# Patient Record
Sex: MALE | Race: WHITE | NOT HISPANIC OR LATINO | Employment: OTHER | URBAN - METROPOLITAN AREA
[De-identification: names, ages, dates, MRNs, and addresses within clinical notes are randomized per-mention and may not be internally consistent; named-entity substitution may affect disease eponyms.]

---

## 2017-01-11 ENCOUNTER — OFFICE VISIT (OUTPATIENT)
Dept: OPHTHALMOLOGY | Facility: CLINIC | Age: 72
End: 2017-01-11
Attending: OPHTHALMOLOGY
Payer: COMMERCIAL

## 2017-01-11 DIAGNOSIS — H52.4 PRESBYOPIA: ICD-10-CM

## 2017-01-11 DIAGNOSIS — H52.203 MYOPIC ASTIGMATISM OF BOTH EYES: ICD-10-CM

## 2017-01-11 DIAGNOSIS — H52.13 MYOPIC ASTIGMATISM OF BOTH EYES: ICD-10-CM

## 2017-01-11 DIAGNOSIS — H26.9 CATARACTS, BILATERAL: Primary | ICD-10-CM

## 2017-01-11 PROCEDURE — 99214 OFFICE O/P EST MOD 30 MIN: CPT | Mod: ZF

## 2017-01-11 ASSESSMENT — REFRACTION_MANIFEST
OD_AXIS: 140
OD_ADD: +2.50
OD_CYLINDER: +0.50
OS_AXIS: 130
OS_SPHERE: -4.00
OS_CYLINDER: +0.50
OD_SPHERE: -5.75
OS_ADD: +2.50

## 2017-01-11 ASSESSMENT — VISUAL ACUITY
OS_CC+: -1
OD_CC: 20/60
METHOD: SNELLEN - LINEAR
OD_CC+: -2
OD_PH_CC: 20/25-3
OS_PH_CC: 20/20-1
CORRECTION_TYPE: GLASSES
OS_CC: 20/50

## 2017-01-11 ASSESSMENT — REFRACTION_WEARINGRX
OS_SPHERE: -2.00
OS_CYLINDER: SPHERE
OD_SPHERE: -3.75
OD_CYLINDER: +0.50
OD_AXIS: 132

## 2017-01-11 ASSESSMENT — EXTERNAL EXAM - RIGHT EYE: OD_EXAM: NORMAL

## 2017-01-11 ASSESSMENT — CONF VISUAL FIELD
METHOD: COUNTING FINGERS
OS_NORMAL: 1
OD_NORMAL: 1

## 2017-01-11 ASSESSMENT — EXTERNAL EXAM - LEFT EYE: OS_EXAM: NORMAL

## 2017-01-11 ASSESSMENT — TONOMETRY
OD_IOP_MMHG: 8
IOP_METHOD: TONOPEN
OS_IOP_MMHG: 11

## 2017-01-11 ASSESSMENT — CUP TO DISC RATIO
OS_RATIO: 0.3
OD_RATIO: 0.3

## 2017-01-11 ASSESSMENT — SLIT LAMP EXAM - LIDS
COMMENTS: NORMAL
COMMENTS: NORMAL

## 2017-01-11 NOTE — MR AVS SNAPSHOT
After Visit Summary   2017    Reinaldo Gordillo    MRN: [id number]           Patient Information     Date Of Birth          1945        Visit Information        Provider Department      2017 1:15 PM [facility]        Today's Diagnoses     Cataracts, bilateral    -  1     Myopic astigmatism of both eyes         Presbyopia            Follow-ups after your visit        Follow-up notes from your care team     Return in about 1 year (around 2018).      Who to contact     Please call your clinic at [phone] to:    Ask questions about your health    Make or cancel appointments    Discuss your medicines    Learn about your test results    Speak to your doctor   If you have compliments or concerns about an experience at your clinic, or if you wish to file a complaint, please contact [facility] Patient Relations at [phone] or email us at [email or web address]         Additional Information About Your Visit        MyChart Information     [employer] is an electronic gateway that provides easy, online access to your medical records. With [employer], you can request a clinic appointment, read your test results, renew a prescription or communicate with your care team.     To sign up for [employer] visit the website at [email or web address]   You will be asked to enter the access code listed below, as well as some personal information. Please follow the directions to create your username and password.     Your access code is: [email or web address]  Expires: 2017  8:30 AM     Your access code will  in 90 days. If you need help or a new code, please contact your [facility] or call [phone] for assistance.        Care EveryWhere ID     This is your Care EveryWhere ID. This could be used by other organizations to access your [facility] medical records  [id number]         Blood Pressure from Last 3  Encounters:   No data found for BP    Weight from Last 3 Encounters:   No data found for Wt              Today, you had the following     No orders found for display       Primary Care Provider    None Specified       No primary provider on file.        Thank you!     Thank you for choosing [facility]  for your care. Our goal is always to provide you with excellent care. Hearing back from our patients is one way we can continue to improve our services. Please take a few minutes to complete the written survey that you may receive in the mail after your visit with us. Thank you!             Your Updated Medication List - Protect others around you: Learn how to safely use, store and throw away your medicines at www.disposemymeds.org.          This list is accurate as of: 1/11/17  2:13 PM.  Always use your most recent med list.                   Brand Name Dispense Instructions for use    ASPIRIN PO          CRESTOR PO          TOPROL XL PO

## 2017-01-11 NOTE — NURSING NOTE
Chief Complaints and History of Present Illnesses   Patient presents with     Blurred Vision Both Eyes     HPI    Additional Referring Providers:  Dr. Isabelle Larose MD   Affected eye(s):  Both   Symptoms:     Blurred vision   Difficulty with driving      Unknown duration    Frequency:  Constant, Daily       Do you have eye pain now?:  No      Comments:  Pt complains of blurry vision at distance, hard to focus while driving. Likes to take off glasses to read the fine print, sometimes. BE feeling good and comfortable. Notes that he recently just moved back to [facility] from [place]. Pt referred to Dr. Liz from Dr. 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

## 2017-01-11 NOTE — PROGRESS NOTES
HPI  Reinaldo Gordillo is a 71 year old male here for cataract evaluation. He has been noticing a slow decrease in his distance vision in both eyes over the last year or so. He cannot read road signs as well or follow the hockey puck as clearly. His current glasses don't make the vision as clear as they used to. They are ~ 5 years old. No pain, redness, discharge. No flashes/floaters.    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  Plan: Recommend updating glasses for now. If he continues to have issues with new glasses or notes continued changes, can consider CE/IOL    (H52.203) Myopic astigmatism of both eyes  (H52.4) Presbyopia  Comment: Good vision with refraction  Plan: Given updated glasses Rx.      -----------------------------------------------------------------------------------    Patient disposition:   Return in about 1 year (around 1/11/2018). or sooner as needed.    Teaching statement:  I have confirmed the content of the chief complaint, history of present illness, review of systems, and past medical/surgical history sections as documented by others and edited the information as needed.    I have interviewed and examined the patient and confirm the pertinent findings.  I agree with the findings and plan as documented.    Justa Liz MD  Comprehensive Ophthalmology & Ocular Pathology  Department of Ophthalmology and Visual Neurosciences  [email or web address]  Pager [phone]

## 2019-06-18 ENCOUNTER — TELEPHONE (OUTPATIENT)
Dept: DERMATOLOGY | Facility: CLINIC | Age: 74
End: 2019-06-18

## 2019-06-18 NOTE — TELEPHONE ENCOUNTER
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 He has had previous lesions that were cancerous. He would like to see Dr Tovar for initial appt as well as for procedure. Please call the pt to discuss. Thanks.    Action Taken: Message routed to:  [facility] ([facility] madhu jimenez

## 2019-06-19 NOTE — TELEPHONE ENCOUNTER
I called the patient and it sounds like he needs a skin cancer exam. I let the patient know that Dr. Tovar typically only does skin cancer surgery, and he was offered an appointment with another staff member. The patient was scheduled with Dr. Randall.   Janene Bianchi, STEWART

## 2019-06-26 ENCOUNTER — DOCUMENTATION ONLY (OUTPATIENT)
Dept: CARE COORDINATION | Facility: CLINIC | Age: 74
End: 2019-06-26

## 2019-07-10 ENCOUNTER — TELEPHONE (OUTPATIENT)
Dept: GASTROENTEROLOGY | Facility: CLINIC | Age: 74
End: 2019-07-10

## 2019-07-10 DIAGNOSIS — Z12.11 SPECIAL SCREENING FOR MALIGNANT NEOPLASMS, COLON: Primary | ICD-10-CM

## 2019-07-10 NOTE — TELEPHONE ENCOUNTER
"Patient Name: Reinaldo Godrillo   : 1945  MRN: [id number]       Patient scheduled for:  [x] ? EGD (see additional information below)                   [x] Colonoscopy  [] EUS  [] Flex Sig   [] Other:     Indication for procedure. [x] Screening   []     Sedation Type: [x] Conscious Sedation   [] MAC   [] None    Procedure Provider:  Chon      Referring Provider. (self referral)    Arrival time verified: 19    Facility location verified:   [][facility] - [address], 1st Floor, [address]    Pt meets medical necessity for outpatient procedure in hospital Endoscopy Unit:   [x] [facility], 5th floor     [][facility] - [address], 3rd Floor Surgery check-in      Prep Type:   [x]Golytely eRx: [id number] - [place], [place] - [address] [place] [address];  [] MoviPrep: , [] MiraLax: , [] Other:   []NPO /p [place], No solid food /p 2200 the night before    Anticoagulants or blood thinners: [x]None [] ASA 81mg  - may continue           [] Warfarin   [] Warfarin + Lovenox bridge [] Plavix [] Effient [] Eliquis         [] Xarelto  [] Brilinta [] NSAIDS  [] Other    LAST anticoagulant dose: Date/Time:   INR:     Electronic implanted devices: [x] No  [] IPG  []  ICD  []  LVAD  []     H&P / Pre op physical completed: [x] N/A, [] Complete, Date , [] Scheduled, Date , [] No,     Additional Information: No medical history in Epic, No \"Care Everywhere\" info in Epic    Pt stated during pre-assessment call that he was to have EGD & Colonoscopy.  Education for both provided.  He stated he talked with Dr. Givens about this and has instructions for both.  No orders or clinic notes found to support this.  Inbox msg to Dr. Givens reguesting guidance/order.      _______________________________________________      Instructions given: [x] Rec'd & Read   [] Reviewed         Pre procedure teaching completed: [x] Yes - Reviewed, [] No,     [x] No questions " regarding Sedation as ordered, []     Transportation from procedure & responsible adult to be with patient following procedure for a minimum of 6 hrs (Conscious Sedation) 24 hrs (MAC): [x] Yes Wife - confirmed will have post-procedure companionship as required, [] Pending , [] No     Zoila Hurst RN  [facility] Endoscopy

## 2019-07-12 DIAGNOSIS — R12 HEARTBURN: Primary | ICD-10-CM

## 2019-07-12 RX ORDER — BISACODYL 5 MG/1
10 TABLET, DELAYED RELEASE ORAL DAILY
Qty: 4 TABLET | Refills: 0 | Status: SHIPPED | OUTPATIENT
Start: 2019-07-12 | End: 2019-07-17

## 2019-07-17 ENCOUNTER — HOSPITAL ENCOUNTER (OUTPATIENT)
Facility: AMBULATORY SURGERY CENTER | Age: 74
End: 2019-07-17
Attending: INTERNAL MEDICINE
Payer: COMMERCIAL

## 2019-07-17 VITALS
SYSTOLIC BLOOD PRESSURE: 143 MMHG | DIASTOLIC BLOOD PRESSURE: 95 MMHG | OXYGEN SATURATION: 93 % | RESPIRATION RATE: 12 BRPM | HEART RATE: 56 BPM

## 2019-07-17 LAB
COLONOSCOPY: NORMAL
UPPER GI ENDOSCOPY: NORMAL

## 2019-07-17 RX ORDER — NALOXONE HYDROCHLORIDE 0.4 MG/ML
.1-.4 INJECTION, SOLUTION INTRAMUSCULAR; INTRAVENOUS; SUBCUTANEOUS
Status: DISCONTINUED | OUTPATIENT
Start: 2019-07-17 | End: 2019-07-18 | Stop reason: HOSPADM

## 2019-07-17 RX ORDER — ONDANSETRON 4 MG/1
4 TABLET, ORALLY DISINTEGRATING ORAL EVERY 6 HOURS PRN
Status: DISCONTINUED | OUTPATIENT
Start: 2019-07-17 | End: 2019-07-18 | Stop reason: HOSPADM

## 2019-07-17 RX ORDER — FENTANYL CITRATE 50 UG/ML
INJECTION, SOLUTION INTRAMUSCULAR; INTRAVENOUS PRN
Status: DISCONTINUED | OUTPATIENT
Start: 2019-07-17 | End: 2019-07-17 | Stop reason: HOSPADM

## 2019-07-17 RX ORDER — ONDANSETRON 2 MG/ML
4 INJECTION INTRAMUSCULAR; INTRAVENOUS
Status: DISCONTINUED | OUTPATIENT
Start: 2019-07-17 | End: 2019-07-17 | Stop reason: HOSPADM

## 2019-07-17 RX ORDER — ONDANSETRON 2 MG/ML
4 INJECTION INTRAMUSCULAR; INTRAVENOUS EVERY 6 HOURS PRN
Status: DISCONTINUED | OUTPATIENT
Start: 2019-07-17 | End: 2019-07-18 | Stop reason: HOSPADM

## 2019-07-17 RX ORDER — LIDOCAINE 40 MG/G
CREAM TOPICAL
Status: DISCONTINUED | OUTPATIENT
Start: 2019-07-17 | End: 2019-07-17 | Stop reason: HOSPADM

## 2019-07-17 RX ORDER — SIMETHICONE
LIQUID (ML) MISCELLANEOUS PRN
Status: DISCONTINUED | OUTPATIENT
Start: 2019-07-17 | End: 2019-07-17 | Stop reason: HOSPADM

## 2019-07-17 RX ORDER — FLUMAZENIL 0.1 MG/ML
0.2 INJECTION, SOLUTION INTRAVENOUS
Status: ACTIVE | OUTPATIENT
Start: 2019-07-17 | End: 2019-07-17

## 2019-07-17 NOTE — DISCHARGE INSTRUCTIONS
Discharge Instructions after Colonoscopy  or Sigmoidoscopy    Today you had a __x__ Colonoscopy ____ Sigmoidoscopy    Activity and Diet  You were given medicine for pain. You may be dizzy or sleepy.  For 24 hours:    Do not drive or use heavy equipment.    Do not make important decisions.    Do not drink any alcohol.  You may return to your normal diet and medicines.    Discomfort    Air was placed in your colon during the exam in order to see it. Walking helps to pass the air.    You may take Tylenol (acetaminophen) for pain unless your doctor has told you not to.  Do not take aspirin or ibuprofen (Advil, Motrin, or other anti-inflammatory  drugs) for _____ days.    Follow-up  ____ We took small tissue samples or polyps to study. Your doctor will call you with the results  within two weeks.    When to call:    Call right away if you have:    Unusual pain in belly or chest pain not relieved with passing air.    More than 1 to 2 Tablespoons of bleeding from your rectum.    Fever above 100.6  F (37.5  C).    If you have severe pain, bleeding, or shortness of breath, go to an emergency room.    If you have questions, call:  Monday to Friday, 7 a.m. to 4:30 p.m.  Endoscopy: [phone] (We may have to call you back)    After hours  Hospital: [phone] (Ask for the GI fellow on call)      Discharge Instructions after  Upper Endoscopy (EGD)    Activity and Diet  You were given medicine for pain. You may be dizzy or sleepy.  For 24 hours:    Do not drive or use heavy equipment.    Do not make important decisions.    Do not drink any alcohol.  ___ You may return to your regular diet.    Discomfort  You may have a sore throat for 2 to 3 days. It may help to:    Avoid hot liquids for 24 hours.    Use sore throat lozenges.    Gargle as needed with salt water up to 4 times a day. Mix 1 cup of warm water  with 1 teaspoon of salt. Do not swallow.  ___ Your esophagus was dilated (opened) or banded during the exam:    Drink  only cool liquids for the rest of the day. Eat a soft diet for the next few days.    You may have a sore chest for 2 to 3 days.    You may take Tylenol (acetaminophen) for pain unless your doctor has told you not to.    Do not take aspirin or ibuprofen (Advil, Motrin) or other NSAIDS  (anti-inflammatory drugs) for ___ days.    Follow-up  ___ We took small tissue samples for study. If you do not have a follow-up visit scheduled,  call your provider s office in 2 weeks for the results.    Other instructions________________________________________________________    When to call us:  Problems are rare. Call right away if you have:    Unusual throat pain or trouble swallowing    Unusual pain in belly or chest that is not relieved by belching or passing air    Black stools (tar-like looking bowel movement)    Temperature above 100.6  F. (37.5  C).    If you vomit blood or have severe pain, go to an emergency room.    If you have questions, call:  Monday to Friday, 7 a.m. to 4:30 p.m.: Endoscopy: [phone] (We may have to call you back)    After hours: Hospital: [phone] (Ask for the GI fellow on call)

## 2019-07-18 LAB — COPATH REPORT: NORMAL

## 2019-07-23 ENCOUNTER — OFFICE VISIT (OUTPATIENT)
Dept: DERMATOLOGY | Facility: CLINIC | Age: 74
End: 2019-07-23
Payer: COMMERCIAL

## 2019-07-23 DIAGNOSIS — L57.8 DIFFUSE PHOTODAMAGE OF SKIN: ICD-10-CM

## 2019-07-23 DIAGNOSIS — Z85.828 HISTORY OF SKIN CANCER: ICD-10-CM

## 2019-07-23 DIAGNOSIS — L57.0 ACTINIC KERATOSIS: ICD-10-CM

## 2019-07-23 DIAGNOSIS — D48.5 NEOPLASM OF UNCERTAIN BEHAVIOR OF SKIN: Primary | ICD-10-CM

## 2019-07-23 DIAGNOSIS — L30.9 ECZEMA, UNSPECIFIED TYPE: ICD-10-CM

## 2019-07-23 RX ORDER — FLUOROURACIL 50 MG/G
CREAM TOPICAL 2 TIMES DAILY
Qty: 40 G | Refills: 1 | Status: SHIPPED | OUTPATIENT
Start: 2019-07-23 | End: 2020-10-06

## 2019-07-23 RX ORDER — CLOBETASOL PROPIONATE 0.5 MG/G
OINTMENT TOPICAL 2 TIMES DAILY
Qty: 120 G | Refills: 3 | Status: ON HOLD | OUTPATIENT
Start: 2019-07-23 | End: 2023-04-30

## 2019-07-23 RX ORDER — CALCIPOTRIENE 50 UG/G
OINTMENT TOPICAL
Qty: 90 G | Refills: 0 | Status: SHIPPED | OUTPATIENT
Start: 2019-07-23 | End: 2020-10-06

## 2019-07-23 ASSESSMENT — PAIN SCALES - GENERAL
PAINLEVEL: NO PAIN (0)
PAINLEVEL: NO PAIN (0)

## 2019-07-23 NOTE — PATIENT INSTRUCTIONS
Wound Care After a Biopsy    What is a skin biopsy?  A skin biopsy allows the doctor to examine a very small piece of tissue under the microscope to determine the diagnosis and the best treatment for the skin condition. A local anesthetic (numbing medicine)  is injected with a very small needle into the skin area to be tested. A small piece of skin is taken from the area. Sometimes a suture (stitch) is used.     What are the risks of a skin biopsy?  I will experience scar, bleeding, swelling, pain, crusting and redness. I may experience incomplete removal or recurrence. Risks of this procedure are excessive bleeding, bruising, infection, nerve damage, numbness, thick (hypertrophic or keloidal) scar and non-diagnostic biopsy.    How should I care for my wound for the first 24 hours?    Keep the wound dry and covered for 24 hours    If it bleeds, hold direct pressure on the area for 15 minutes. If bleeding does not stop then go to the emergency room    Avoid strenuous exercise the first 1-2 days or as your doctor instructs you    How should I care for the wound after 24 hours?    After 24 hours, remove the bandage    You may bathe or shower as normal    If you had a scalp biopsy, you can shampoo as usual and can use shower water to clean the biopsy site daily    Clean the wound twice a day with gentle soap and water    Do not scrub, be gentle    Apply white petroleum/Vaseline after cleaning the wound with a cotton swab or a clean finger, and keep the site covered with a Bandaid /bandage. Bandages are not necessary with a scalp biopsy    If you are unable to cover the site with a Bandaid /bandage, re-apply ointment 2-3 times a day to keep the site moist. Moisture will help with healing    Avoid strenuous activity for first 1-2 days    Avoid lakes, rivers, pools, and oceans until the stitches are removed or the site is healed    How do I clean my wound?    Wash hands thoroughly with soap or use hand  before all  wound care    Clean the wound with gentle soap and water    Apply white petroleum/Vaseline  to wound after it is clean    Replace the Bandaid /bandage to keep the wound covered for the first few days or as instructed by your doctor    If you had a scalp biopsy, warm shower water to the area on a daily basis should suffice    What should I use to clean my wound?     Cotton-tipped applicators (Qtips )    White petroleum jelly (Vaseline ). Use a clean new container and use Q-tips to apply.    Bandaids   as needed    Gentle soap     How should I care for my wound long term?    Do not get your wound dirty    Keep up with wound care for one week or until the area is healed.    A small scab will form and fall off by itself when the area is completely healed. The area will be red and will become pink in color as it heals. Sun protection is very important for how your scar will turn out. Sunscreen with an SPF 30 or greater is recommended once the area is healed.    You should have some soreness but it should be mild and slowly go away over several days. Talk to your doctor about using tylenol for pain,    When should I call my doctor?  If you have increased:     Pain or swelling    Pus or drainage (clear or slightly yellow drainage is ok)    Temperature over 100F    Spreading redness or warmth around wound    When will I hear about my results?  The biopsy results can take 2-3 weeks to come back. The clinic will call you with the results, send you a [employer] message, or have you schedule a follow-up clinic or phone time to discuss the results. Contact our clinics if you do not hear from us in 3 weeks.     Who should I call with questions?    [facility]: [phone]     [facility]: [phone]    For urgent needs outside of business hours call the [facility] at [phone] and ask for the dermatology resident on call    Cryotherapy    What is it?    Use  of a very cold liquid, such as liquid nitrogen, to freeze and destroy abnormal skin cells that need to be removed    What should I expect?    Tenderness and redness    A small blister that might grow and fill with dark purple blood. There may be crusting.    More than one treatment may be needed if the lesions do not go away.    How do I care for the treated area?    Gently wash the area with your hands when bathing.    Use a thin layer of Vaseline to help with healing. You may use a Band-Aid.     The area should heal within 7-10 days and may leave behind a pink or lighter color.     Do not use an antibiotic or Neosporin ointment.     You may take acetaminophen (Tylenol) for pain.     Call your Doctor if you have:    Severe pain    Signs of infection (warmth, redness, cloudy yellow drainage, and or a bad smell)    Questions or concerns    Who should I call with questions?       [facility]: [phone]       [facility]: [phone]       For urgent needs outside of business hours call the [facility] at [phone]        and ask for the dermatology resident on call

## 2019-07-23 NOTE — Clinical Note
7/23/2019       RE: Reinaldo Gordillo  [address]  [address]  [place] [address]     Dear Colleague,    Thank you for referring your patient, Reinaldo Gordillo, to the [facility] DERMATOLOGY at [facility]. Please see a copy of my visit note below.    [facility] Dermatology Note      Dermatology Problem List:    # NUB, left malar cheek, 1 cm erythematous and hyperkeratotic plaque, dDx: NMSC versus HAK, s/p shave biopsy on 7/23/2019  # NUB, left lateral forehead, 9 mm hyperkeratotic papule, dDx: NMSC versus HAK, s/p shave biopsy on 7/23/2019  # NUB, left central chest, 1.1 cm hyperkeratotic erythematous plaque with superficial erosions, dDx: KA, s/p shave biopsy on 7/23/2019    0. Continue to monitor: L lateral shoulder 1 cm hyperkeratotic plaque with superficial erosion, suspected irritated keratosis  1. BCC- upper lip- s/p MMS in 2015- performed in [place]  2. Actinic keratoses  3. NUB on the L upper chest-ddx NMSC- s/p biopsy 7/23/19  4. NUB on the L malar cheek- ddx NMSC vs HAK- s/p biopsy 7/23/19  5. NUB on the L lateral forehead- ddx NMSC vs HAK- s/p biopsy 7/23/19  6. Actinic keratoses on the face and ears   -s/p cryo 7/23/19, plan for fluorouracil 5% cream and calcipotriene 0.005% ointment  7.  Eczematous Dermatitis of the lower legs   -clobetasol 0.05% ointment BID  8. Filiform wart on the L inner thigh- s/p cryo on 7/23/19   9. Seborrheic keratoses, cherry angiomata, solar lentigines    Encounter Date: Jul 23, 2019    CC:  Chief Complaint   Patient presents with     Skin Check     Personal hx of BCC. Ed has a few spots of concern today.     History of Present Illness:  Mr. Reinaldo Gordillo is a 74 year old male with a history of NMSC who presets for a skin check. The patient reports that he has lesions of concern on his bilateral cheeks, forehead, and chest which he would like evaluated. He denies any pain, bleeding, or tingling associated with the lesions. The  lesion on his chest is mildly tender and has been present for about 2 months. There is an area of rash on his left leg that the patient reports has been on going for a long time despite his use of prescribed topical steroids. He denies using compressions stockings and he denies any history of asthma. 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 He states that he does not have an extensive history of sun burns, but he notes that he used tanning beds as a teenager. The patient voices no other concerns. He denies constitutional symptoms or other skin problems.    Past Medical History:   There is no problem list on file for this patient.    Past Medical History:   Diagnosis Date     ASCVD (arteriosclerotic cardiovascular disease)      Coronary artery disease      Gastroesophageal reflux disease      Hypertension      Stented coronary artery      Past Surgical History:   Procedure Laterality Date     COLONOSCOPY N/A 7/17/2019    Procedure: COLONOSCOPY;  Surgeon: Roque Givens MD;  Location: [facility] OR     ESOPHAGOSCOPY, GASTROSCOPY, DUODENOSCOPY (EGD), COMBINED N/A 7/17/2019    Procedure: ESOPHAGOGASTRODUODENOSCOPY, WITH BIOPSY;  Surgeon: Roque Givens MD;  Location: [facility] OR     HC CORONARY STENT PERCUT, EA ADDTL VESSEL       HERNIA REPAIR  2000     MOHS MICROGRAPHIC PROCEDURE         Social History:  Patient reports that he has never smoked. He has never used smokeless tobacco. He reports that he drinks alcohol.    Family History:  Family History   Problem Relation Age of Onset     Glaucoma No family hx of      Macular Degeneration No family hx of      Retinal detachment No family hx of      Amblyopia No family hx of      Melanoma No family hx of      Skin Cancer No family hx of        Medications:  Current Outpatient Medications   Medication Sig Dispense Refill     ASPIRIN PO        Metoprolol Succinate (TOPROL XL PO)        omeprazole (PRILOSEC) 20 MG DR capsule  Take 20 mg by mouth daily         No Known Allergies    Review of Systems:  -Constitutional: Otherwise feeling well today, in usual state of health.  -HEENT: Patient denies nonhealing oral sores.  -Skin: As above in HPI. No additional skin concerns.    Physical exam:  Vitals: There were no vitals taken for this visit.  GEN: This is a well developed, well-nourished male in no acute distress, in a pleasant mood.    SKIN: Total skin excluding the undergarment areas was performed. The exam included the head/face, neck, both arms, chest, back, abdomen, both legs, digits and/or nails.   -There are reticular hypopigmented patches on the lower legs at areas of prior rash, per the patient  -Bilateral lateral calves: large erythematous scaly plaques with many superficial erosions and excoriations  -on the L chest, there is a 1.1 cm hyperkeratotic erythematous plaque with superficial erosions  -on the face and ear helices, there are scattered hyperkeratotic erythematous papules   -there are Medium brown macules on the back c/w solar lentigines  -trunk and extremities relatively spared of melanocytic nevi  -Scattered stuck on medium brown papules on the trunk and extremities  -Scattered cherry red vascular papules  -L lateral shoulder, there is a 1 cm hyperkeratotic plaque with superficial erosion  -L inner thigh: papulated flesh colored papule measuring 6mm   -on the L malar cheek: there is a 1 cm erythematous and hyperkeratotic plaque  -on the L lateral forehead, there is a 9 mm hyperkeratotic papule  -No other lesions of concern on areas examined.                 Impression/Plan:  1. Neoplasm of uncertain behavior on the left chest. The differential diagnosis includes NMSC.     Shave biopsy:  After discussion of benefits and risks including but not limited to bleeding/bruising, pain/swelling, infection, scar, incomplete removal, nerve damage/numbness, recurrence, and non-diagnostic biopsy, written consent, verbal consent and  photographs were obtained. Time-out was performed. The area was cleaned with isopropyl alcohol. 0.5ml of 1% lidocaine with 1:100,000 epinephrine was injected to obtain adequate anesthesia. A shave biopsy was performed. Hemostasis was achieved with aluminium chloride. Vaseline and a sterile dressing were applied. The patient tolerated the procedure and no complications were noted. The patient was provided with verbal and written post care instructions.    2. Neoplasm of uncertain behavior on the L malar cheek. The Ddx includes NMSC vs HAK    Shave biopsy:  After discussion of benefits and risks including but not limited to bleeding/bruising, pain/swelling, infection, scar, incomplete removal, nerve damage/numbness, recurrence, and non-diagnostic biopsy, written consent, verbal consent and photographs were obtained. Time-out was performed. The area was cleaned with isopropyl alcohol. 0.5ml of 1% lidocaine with 1:100,000 epinephrine was injected to obtain adequate anesthesia. A shave biopsy was performed. Hemostasis was achieved with aluminium chloride. Vaseline and a sterile dressing were applied. The patient tolerated the procedure and no complications were noted. The patient was provided with verbal and written post care instructions.     3. Neoplasm of uncertain behavior on the L lateral forehead. The Ddx includes NMSC vs HAK    Shave biopsy:  After discussion of benefits and risks including but not limited to bleeding/bruising, pain/swelling, infection, scar, incomplete removal, nerve damage/numbness, recurrence, and non-diagnostic biopsy, written consent, verbal consent and photographs were obtained. Time-out was performed. The area was cleaned with isopropyl alcohol. 0.5ml of 1% lidocaine with 1:100,000 epinephrine was injected to obtain adequate anesthesia. A shave biopsy was performed. Hemostasis was achieved with aluminium chloride. Vaseline and a sterile dressing were applied. The patient tolerated the  procedure and no complications were noted. The patient was provided with verbal and written post care instructions.     4. Many actinic keratoses on the face and ear helices    Cryotherapy procedure note: After verbal consent and discussion of risks and benefits including but no limited to dyspigmentation/scar, blister, and pain, 12 was(were) treated with 1-2mm freeze border for 2 cycles with liquid nitrogen. Post cryotherapy instructions were provided.     Discussed the risks and benefits of field treatment with topical fluorouracil and calcipotriene    The patient will start fluorouracil 0.5% cream and calcipotriene 0.005% ointment BID in equal parts after his biopsy sites are well-healed for 4-5 days. We spoke about expected irritation and redness.    5. Filiform wart on the L inner thigh    Cryotherapy procedure note: After verbal consent and discussion of risks and benefits including but no limited to dyspigmentation/scar, blister, and pain, 1 was(were) treated with 1-2mm freeze border for 2 cycles with liquid nitrogen. Post cryotherapy instructions were provided.     6. Eczematous dermatitis    Start clobetasol 0.05% ointment    7. Cherry angioma(s), Seborrheic keratosis, non irritated and Solar lentigines    Discussed the natural etiology and provided reassurances about the benign nature of the lesions.     No further intervention required. Patient to report changes.     8. History of nonmelanoma skin cancer (NMSC), last lesion identified and treated in 2015 of upper cutaneous lip  - no evidence of recurrent disease via inspection or palpation; all sites well-healed  - photoprotection discussed (SPF30+ sunscreen and proper use, UPF clothing, sun avoidance, tanning bed avoidance)    - continued skin surveillance recommended    9. Continue to monitor: L lateral shoulder 1 cm hyperkeratotic plaque with superficial erosion, suspected irritated keratosis    Follow-up in 3 months, earlier for new or changing  lesions, given we are attempting field therapy on the face and ears.    Dr. Randall staffed the patient    Staff Involved:  Scribe/Resident (Dr. Roman Hale)/Staff    Scribe Disclosure  I, Dominic Najjar, am serving as a scribe to document services personally performed by Dr. Yonathan Randall MD, based on data collection and the provider's statements to me.     Again, thank you for allowing me to participate in the care of your patient.      Sincerely,    Yonathan Randall MD

## 2019-07-23 NOTE — LETTER
Date:July 31, 2019      Patient was self referred, no letter generated. Do not send.        [facility] Information

## 2019-07-23 NOTE — PROGRESS NOTES
[facility] Dermatology Note      Dermatology Problem List:    # NUB, left malar cheek, 1 cm erythematous and hyperkeratotic plaque, dDx: NMSC versus HAK, s/p shave biopsy on 7/23/2019  # NUB, left lateral forehead, 9 mm hyperkeratotic papule, dDx: NMSC versus HAK, s/p shave biopsy on 7/23/2019  # NUB, left central chest, 1.1 cm hyperkeratotic erythematous plaque with superficial erosions, dDx: KA, s/p shave biopsy on 7/23/2019    0. Continue to monitor: L lateral shoulder 1 cm hyperkeratotic plaque with superficial erosion, suspected irritated keratosis  1. BCC- upper lip- s/p MMS in 2015- performed in CT  2. Actinic keratoses  3. NUB on the L upper chest-ddx NMSC- s/p biopsy 7/23/19  4. NUB on the L malar cheek- ddx NMSC vs HAK- s/p biopsy 7/23/19  5. NUB on the L lateral forehead- ddx NMSC vs HAK- s/p biopsy 7/23/19  6. Actinic keratoses on the face and ears   -s/p cryo 7/23/19, plan for fluorouracil 5% cream and calcipotriene 0.005% ointment  7.  Eczematous Dermatitis of the lower legs   -clobetasol 0.05% ointment BID  8. Filiform wart on the L inner thigh- s/p cryo on 7/23/19   9. Seborrheic keratoses, cherry angiomata, solar lentigines    Encounter Date: Jul 23, 2019    CC:  Chief Complaint   Patient presents with     Skin Check     Personal hx of BCC. Ed has a few spots of concern today.     History of Present Illness:  Mr. Reinaldo Gordillo is a 74 year old male with a history of NMSC who presets for a skin check. The patient reports that he has lesions of concern on his bilateral cheeks, forehead, and chest which he would like evaluated. He denies any pain, bleeding, or tingling associated with the lesions. The lesion on his chest is mildly tender and has been present for about 2 months. There is an area of rash on his left leg that the patient reports has been on going for a long time despite his use of prescribed topical steroids. He denies using compressions stockings and he denies any  history of asthma. 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 He states that he does not have an extensive history of sun burns, but he notes that he used tanning beds as a teenager. The patient voices no other concerns. He denies constitutional symptoms or other skin problems.    Past Medical History:   There is no problem list on file for this patient.    Past Medical History:   Diagnosis Date     ASCVD (arteriosclerotic cardiovascular disease)      Coronary artery disease      Gastroesophageal reflux disease      Hypertension      Stented coronary artery      Past Surgical History:   Procedure Laterality Date     COLONOSCOPY N/A 7/17/2019    Procedure: COLONOSCOPY;  Surgeon: Roque Givens MD;  Location: [facility] OR     ESOPHAGOSCOPY, GASTROSCOPY, DUODENOSCOPY (EGD), COMBINED N/A 7/17/2019    Procedure: ESOPHAGOGASTRODUODENOSCOPY, WITH BIOPSY;  Surgeon: Roque Givens MD;  Location: [facility] OR     HC CORONARY STENT PERCUT, EA ADDTL VESSEL       HERNIA REPAIR  2000     MOHS MICROGRAPHIC PROCEDURE         Social History:  Patient reports that he has never smoked. He has never used smokeless tobacco. He reports that he drinks alcohol.    Family History:  Family History   Problem Relation Age of Onset     Glaucoma No family hx of      Macular Degeneration No family hx of      Retinal detachment No family hx of      Amblyopia No family hx of      Melanoma No family hx of      Skin Cancer No family hx of        Medications:  Current Outpatient Medications   Medication Sig Dispense Refill     ASPIRIN PO        Metoprolol Succinate (TOPROL XL PO)        omeprazole (PRILOSEC) 20 MG DR capsule Take 20 mg by mouth daily         No Known Allergies    Review of Systems:  -Constitutional: Otherwise feeling well today, in usual state of health.  -HEENT: Patient denies nonhealing oral sores.  -Skin: As above in HPI. No additional skin concerns.    Physical exam:  Vitals: There  were no vitals taken for this visit.  GEN: This is a well developed, well-nourished male in no acute distress, in a pleasant mood.    SKIN: Total skin excluding the undergarment areas was performed. The exam included the head/face, neck, both arms, chest, back, abdomen, both legs, digits and/or nails.   -There are reticular hypopigmented patches on the lower legs at areas of prior rash, per the patient  -Bilateral lateral calves: large erythematous scaly plaques with many superficial erosions and excoriations  -on the L chest, there is a 1.1 cm hyperkeratotic erythematous plaque with superficial erosions  -on the face and ear helices, there are scattered hyperkeratotic erythematous papules   -there are Medium brown macules on the back c/w solar lentigines  -trunk and extremities relatively spared of melanocytic nevi  -Scattered stuck on medium brown papules on the trunk and extremities  -Scattered cherry red vascular papules  -L lateral shoulder, there is a 1 cm hyperkeratotic plaque with superficial erosion  -L inner thigh: papulated flesh colored papule measuring 6mm   -on the L malar cheek: there is a 1 cm erythematous and hyperkeratotic plaque  -on the L lateral forehead, there is a 9 mm hyperkeratotic papule  -No other lesions of concern on areas examined.                 Impression/Plan:  1. Neoplasm of uncertain behavior on the left chest. The differential diagnosis includes NMSC.     Shave biopsy:  After discussion of benefits and risks including but not limited to bleeding/bruising, pain/swelling, infection, scar, incomplete removal, nerve damage/numbness, recurrence, and non-diagnostic biopsy, written consent, verbal consent and photographs were obtained. Time-out was performed. The area was cleaned with isopropyl alcohol. 0.5ml of 1% lidocaine with 1:100,000 epinephrine was injected to obtain adequate anesthesia. A shave biopsy was performed. Hemostasis was achieved with aluminium chloride. Vaseline and a  sterile dressing were applied. The patient tolerated the procedure and no complications were noted. The patient was provided with verbal and written post care instructions.    2. Neoplasm of uncertain behavior on the L malar cheek. The Ddx includes NMSC vs HAK    Shave biopsy:  After discussion of benefits and risks including but not limited to bleeding/bruising, pain/swelling, infection, scar, incomplete removal, nerve damage/numbness, recurrence, and non-diagnostic biopsy, written consent, verbal consent and photographs were obtained. Time-out was performed. The area was cleaned with isopropyl alcohol. 0.5ml of 1% lidocaine with 1:100,000 epinephrine was injected to obtain adequate anesthesia. A shave biopsy was performed. Hemostasis was achieved with aluminium chloride. Vaseline and a sterile dressing were applied. The patient tolerated the procedure and no complications were noted. The patient was provided with verbal and written post care instructions.     3. Neoplasm of uncertain behavior on the L lateral forehead. The Ddx includes NMSC vs HAK    Shave biopsy:  After discussion of benefits and risks including but not limited to bleeding/bruising, pain/swelling, infection, scar, incomplete removal, nerve damage/numbness, recurrence, and non-diagnostic biopsy, written consent, verbal consent and photographs were obtained. Time-out was performed. The area was cleaned with isopropyl alcohol. 0.5ml of 1% lidocaine with 1:100,000 epinephrine was injected to obtain adequate anesthesia. A shave biopsy was performed. Hemostasis was achieved with aluminium chloride. Vaseline and a sterile dressing were applied. The patient tolerated the procedure and no complications were noted. The patient was provided with verbal and written post care instructions.     4. Many actinic keratoses on the face and ear helices    Cryotherapy procedure note: After verbal consent and discussion of risks and benefits including but no limited  to dyspigmentation/scar, blister, and pain, 12 was(were) treated with 1-2mm freeze border for 2 cycles with liquid nitrogen. Post cryotherapy instructions were provided.     Discussed the risks and benefits of field treatment with topical fluorouracil and calcipotriene    The patient will start fluorouracil 0.5% cream and calcipotriene 0.005% ointment BID in equal parts after his biopsy sites are well-healed for 4-5 days. We spoke about expected irritation and redness.    5. Filiform wart on the L inner thigh    Cryotherapy procedure note: After verbal consent and discussion of risks and benefits including but no limited to dyspigmentation/scar, blister, and pain, 1 was(were) treated with 1-2mm freeze border for 2 cycles with liquid nitrogen. Post cryotherapy instructions were provided.     6. Eczematous dermatitis    Start clobetasol 0.05% ointment    7. Cherry angioma(s), Seborrheic keratosis, non irritated and Solar lentigines    Discussed the natural etiology and provided reassurances about the benign nature of the lesions.     No further intervention required. Patient to report changes.     8. History of nonmelanoma skin cancer (NMSC), last lesion identified and treated in 2015 of upper cutaneous lip  - no evidence of recurrent disease via inspection or palpation; all sites well-healed  - photoprotection discussed (SPF30+ sunscreen and proper use, UPF clothing, sun avoidance, tanning bed avoidance)    - continued skin surveillance recommended    9. Continue to monitor: L lateral shoulder 1 cm hyperkeratotic plaque with superficial erosion, suspected irritated keratosis    Follow-up in 3 months, earlier for new or changing lesions, given we are attempting field therapy on the face and ears.    Dr. Randall staffed the patient    Staff Involved:  Scribe/Resident (Dr. Roman Hale)/Staff    Scribe Disclosure  I, Dominic Najjar, am serving as a scribe to document services personally performed by Dr. Yonathan Randall MD,  based on data collection and the provider's statements to me.

## 2019-07-23 NOTE — NURSING NOTE
Lidocaine-epinephrine 1-1:166110 % injection   3 mL once for one use, starting 7/23/2019 ending 7/23/2019,  2mL disp, R-0, injection  Injected by Dr. Hale

## 2019-07-23 NOTE — NURSING NOTE
Dermatology Rooming Note    Reinaldo Gordillo's goals for this visit include:   Chief Complaint   Patient presents with     Skin Check     Personal hx of BCC. Ed has a few spots of concern today.     Kimber Coleman, CMA

## 2019-07-24 ENCOUNTER — OFFICE VISIT (OUTPATIENT)
Dept: OPHTHALMOLOGY | Facility: CLINIC | Age: 74
End: 2019-07-24
Attending: OPHTHALMOLOGY
Payer: COMMERCIAL

## 2019-07-24 DIAGNOSIS — H52.13 MYOPIC ASTIGMATISM OF BOTH EYES: ICD-10-CM

## 2019-07-24 DIAGNOSIS — H52.203 MYOPIC ASTIGMATISM OF BOTH EYES: ICD-10-CM

## 2019-07-24 DIAGNOSIS — H52.4 PRESBYOPIA: ICD-10-CM

## 2019-07-24 DIAGNOSIS — H25.13 NUCLEAR SCLEROTIC CATARACT OF BOTH EYES: Primary | ICD-10-CM

## 2019-07-24 PROCEDURE — G0463 HOSPITAL OUTPT CLINIC VISIT: HCPCS | Mod: ZF

## 2019-07-24 PROCEDURE — 92015 DETERMINE REFRACTIVE STATE: CPT | Mod: ZF

## 2019-07-24 ASSESSMENT — REFRACTION_WEARINGRX
OD_AXIS: 140
OS_SPHERE: -3.75
OD_SPHERE: -5.75
OD_CYLINDER: +0.75
OS_CYLINDER: SPHERE

## 2019-07-24 ASSESSMENT — SLIT LAMP EXAM - LIDS
COMMENTS: NORMAL
COMMENTS: NORMAL

## 2019-07-24 ASSESSMENT — REFRACTION_MANIFEST
OS_CYLINDER: +0.50
OS_SPHERE: -4.75
OD_AXIS: 140
OD_CYLINDER: +0.75
OD_SPHERE: -5.25
OS_AXIS: 130
OD_ADD: +2.50
OS_ADD: +2.50

## 2019-07-24 ASSESSMENT — VISUAL ACUITY
OD_CC: 20/30
CORRECTION_TYPE: GLASSES
OS_CC+: -2
METHOD: SNELLEN - LINEAR
OS_CC: 20/30

## 2019-07-24 ASSESSMENT — CUP TO DISC RATIO
OS_RATIO: 0.3
OD_RATIO: 0.3

## 2019-07-24 ASSESSMENT — CONF VISUAL FIELD
OS_NORMAL: 1
METHOD: COUNTING FINGERS
OD_NORMAL: 1

## 2019-07-24 ASSESSMENT — TONOMETRY
IOP_METHOD: TONOPEN
OS_IOP_MMHG: 16
OD_IOP_MMHG: 16

## 2019-07-24 ASSESSMENT — EXTERNAL EXAM - RIGHT EYE: OD_EXAM: NORMAL

## 2019-07-24 ASSESSMENT — EXTERNAL EXAM - LEFT EYE: OS_EXAM: NORMAL

## 2019-07-24 NOTE — NURSING NOTE
Chief Complaints and History of Present Illnesses   Patient presents with     Yearly Exam     Chief Complaint(s) and History of Present Illness(es)     Yearly Exam     Laterality: both eyes    Onset: gradual    Onset: years ago    Quality: Decreased at dist    Frequency: constantly    Associated symptoms: Negative for haloes, glare, floaters, flashes, dryness, redness and tearing    Pain scale: 0/10              Comments     Miriam Singer COT 9:59 AM July 24, 2019

## 2019-07-24 NOTE — PROGRESS NOTES
HPI  Reinaldo Gordillo is a 71 year old male here for cataract evaluation.   He has noticed a mild decline in distance vision in both eyes over the last year with more trouble reading signs. Having some trouble reading on the computer.  No pain, redness, discharge. No flashes/floaters.    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 If he continues to have issues with new glasses or notes continued changes, can consider CE/IOL    (H52.203,  H52.13) Myopic astigmatism of both eyes  (H52.4) Presbyopia  Comment: BCVA 20/30 and 20/20 with refraction  Plan: Given updated glasses Rx.     -----------------------------------------------------------------------------------    Patient disposition:   Return in about 1 year (around 7/24/2020). or sooner as needed.    Ruben Herzog MD  Ophthalmology PGY-2  [facility]    Teaching statement:  Complete documentation of historical and exam elements from today's encounter can be found in the full encounter summary report (not reduplicated in this progress note). I personally obtained the chief complaint(s) and history of present illness.  I confirmed and edited as necessary the review of systems, past medical/surgical history, family history, social history, and examination findings as documented by others; and I examined the patient myself. I personally reviewed the relevant tests, images, and reports as documented above.     I formulated and edited as necessary the assessment and plan and discussed the findings and management plan with the patient and family.  Justa Liz MD  Comprehensive Ophthalmology & Ocular Pathology  Department of Ophthalmology and Visual  [email or web address]  Pager [phone]

## 2019-07-26 LAB — COPATH REPORT: NORMAL

## 2019-08-06 ENCOUNTER — OFFICE VISIT (OUTPATIENT)
Dept: DERMATOLOGY | Facility: CLINIC | Age: 74
End: 2019-08-06
Payer: COMMERCIAL

## 2019-08-06 VITALS — SYSTOLIC BLOOD PRESSURE: 138 MMHG | OXYGEN SATURATION: 96 % | HEART RATE: 85 BPM | DIASTOLIC BLOOD PRESSURE: 85 MMHG

## 2019-08-06 DIAGNOSIS — C44.519 BASAL CELL CARCINOMA OF ANTERIOR CHEST: Primary | ICD-10-CM

## 2019-08-06 RX ORDER — LIDOCAINE HYDROCHLORIDE AND EPINEPHRINE 10; 10 MG/ML; UG/ML
3 INJECTION, SOLUTION INFILTRATION; PERINEURAL ONCE
Status: DISCONTINUED | OUTPATIENT
Start: 2019-08-06 | End: 2023-04-30

## 2019-08-06 NOTE — PATIENT INSTRUCTIONS

## 2019-08-06 NOTE — NURSING NOTE
Chief Complaint   Patient presents with     Derm Problem     Ed is here today for an Excision to his upper left chest.      Karis Brantley LPN

## 2019-08-06 NOTE — NURSING NOTE
Lidocaine-epinephrine 1-1:150575 % injection   8 mL once for one use, starting 8/6/2019 ending 8/6/2019,  2mL disp, R-0, injection  Injected by Karis Brantley LPN      Pressure bandage applied. Patient sent home with wound care supplies and post procedure instructions.     Karis Brantley LPN

## 2019-08-06 NOTE — LETTER
8/6/2019       RE: Reinaldo Gordillo  [address]  [address]  [place] [address]     Dear Colleague,    Thank you for referring your patient, Reinaldo Gordillo, to the [facility] DERMATOLOGY at [facility]. Please see a copy of my visit note below.    DERMATOLOGIC SURGERY REPORT    NAME OF PROCEDURE:  EXCISION AND INTERMEDIATE CLOSURE    Surgeon:  Yonathan Randall    PREOPERATIVE DIAGNOSIS: BCC anterior chest  POSTOPERATIVE DIAGNOSIS: Same  FINAL EXCISION SIZE: 12mm lesion with 3mm margins  TOTAL EXCISED DIAMETER: 18mm  FINAL REPAIR LENGTH:  36mm    INDICATIONS:  This patient presented with a 12mm x 12mm scar from prior biopsy of BCC on the anterior chest. Excision was indicated. We discussed the principles of treatment and most likely complications including bleeding, infection, wound dehiscence, pain, nerve damage, and scarring. Informed consent was obtained and the patient underwent the procedure as follows.    PROCEDURE:  The patient was taken to the operative suite. The treatment area was anesthetized with 1% lidocaine with [id number] epinephrine buffered with bicarbonate. The area was washed with Hibiclens, rinsed with saline and draped with sterile towels. The lesion was delineated and excised down to subcutaneous fat. Hemostasis was obtained by electrocoagulation. With a margin of 3mm, the final excision size was 18mm x 18mm.      REPAIR:  In order to repair this defect while maintaining the normal anatomic relations and function, we elected to utilize an intermediate linear closure. Closure was oriented so that the wound was in the patient's natural skin tension lines. Deeper layers of the subcutaneous tissue were undermined first. Deep dermal and subcutaneous layer closure was performed using 3-0 Vicryl deep, intradermal and subcutaneous sutures. Two redundant columns were removed by triangulation. Final cutaneous approximation was achieved with 3-0 Prolene simple running  sutures.     The final wound length was 36mm.  A total of 6mL of anesthesia was administered for all surgical sites. Estimated blood loss was less than 10mL for all surgical sites. A sterile pressure dressing was applied and wound care instructions, with a written handout, were given. The [facility] was [facility] alert and ambulatory.    Yonathan Randall MD  Dermatology/Dermatopathology Staff Physician  , Department of Dermatology

## 2019-08-06 NOTE — PROGRESS NOTES
DERMATOLOGIC SURGERY REPORT    NAME OF PROCEDURE:  EXCISION AND INTERMEDIATE CLOSURE    Surgeon:  Yonathan Randall    PREOPERATIVE DIAGNOSIS: BCC anterior chest  POSTOPERATIVE DIAGNOSIS: Same  FINAL EXCISION SIZE: 12mm lesion with 3mm margins  TOTAL EXCISED DIAMETER: 18mm  FINAL REPAIR LENGTH:  36mm    INDICATIONS:  This patient presented with a 12mm x 12mm scar from prior biopsy of BCC on the anterior chest. Excision was indicated. We discussed the principles of treatment and most likely complications including bleeding, infection, wound dehiscence, pain, nerve damage, and scarring. Informed consent was obtained and the patient underwent the procedure as follows.    PROCEDURE:  The patient was taken to the operative suite. The treatment area was anesthetized with 1% lidocaine with [id number] epinephrine buffered with bicarbonate. The area was washed with Hibiclens, rinsed with saline and draped with sterile towels. The lesion was delineated and excised down to subcutaneous fat. Hemostasis was obtained by electrocoagulation. With a margin of 3mm, the final excision size was 18mm x 18mm.      REPAIR:  In order to repair this defect while maintaining the normal anatomic relations and function, we elected to utilize an intermediate linear closure. Closure was oriented so that the wound was in the patient's natural skin tension lines. Deeper layers of the subcutaneous tissue were undermined first. Deep dermal and subcutaneous layer closure was performed using 3-0 Vicryl deep, intradermal and subcutaneous sutures. Two redundant columns were removed by triangulation. Final cutaneous approximation was achieved with 3-0 Prolene simple running sutures.     The final wound length was 36mm.  A total of 6mL of anesthesia was administered for all surgical sites. Estimated blood loss was less than 10mL for all surgical sites. A sterile pressure dressing was applied and wound care instructions, with a written handout, were given. The  patient was [facility] alert and ambulatory.    Yonathan Randall MD  Dermatology/Dermatopathology Staff Physician  , Department of Dermatology

## 2019-08-08 LAB — COPATH REPORT: NORMAL

## 2019-10-04 ENCOUNTER — HEALTH MAINTENANCE LETTER (OUTPATIENT)
Age: 74
End: 2019-10-04

## 2020-02-08 ENCOUNTER — HEALTH MAINTENANCE LETTER (OUTPATIENT)
Age: 75
End: 2020-02-08

## 2020-02-18 ENCOUNTER — OFFICE VISIT (OUTPATIENT)
Dept: DERMATOLOGY | Facility: CLINIC | Age: 75
End: 2020-02-18

## 2020-02-18 DIAGNOSIS — Z85.828 HISTORY OF NONMELANOMA SKIN CANCER: ICD-10-CM

## 2020-02-18 DIAGNOSIS — Z12.83 SCREENING EXAM FOR SKIN CANCER: ICD-10-CM

## 2020-02-18 DIAGNOSIS — L82.1 SK (SEBORRHEIC KERATOSIS): Primary | ICD-10-CM

## 2020-02-18 DIAGNOSIS — L57.0 AK (ACTINIC KERATOSIS): ICD-10-CM

## 2020-02-18 DIAGNOSIS — L85.3 XEROSIS CUTIS: ICD-10-CM

## 2020-02-18 RX ORDER — LISINOPRIL 5 MG/1
20 TABLET ORAL EVERY MORNING
Status: ON HOLD | COMMUNITY
Start: 2019-12-12 | End: 2023-04-30

## 2020-02-18 ASSESSMENT — PAIN SCALES - GENERAL: PAINLEVEL: NO PAIN (0)

## 2020-02-18 NOTE — PROGRESS NOTES
[facility] Dermatology Note      Dermatology Problem List:     0. Continue to monitor: L lateral shoulder 1 cm hyperkeratotic plaque with superficial erosion, suspected irritated keratosis  1. BCC- upper lip- s/p MMS in 2015- performed in [place]  2. Actinic keratoses  3. HAK left malar cheek, 1 cm erythematous and hyperkeratotic plaque, dDx: NMSC versus HAK, s/p shave biopsy on 7/23/2019  4. HAK, left lateral forehead, s/p shave biopsy on 7/23/2019  5. BCC left central chest, shave biopsy on 7/23/2019, excision on 8/6/19  6. Actinic keratoses on the face and ears              -s/p cryo 7/23/19, plan for fluorouracil 5% cream and calcipotriene 0.005% ointment  7.  Eczematous Dermatitis of the lower legs              -clobetasol 0.05% ointment BID  8. Filiform wart on the L inner thigh- s/p cryo on 7/23/19   9. Seborrheic keratoses, cherry angiomata, solar lentigines    Encounter Date: Feb 18, 2020    CC:  Chief Complaint   Patient presents with     Skin Check     Ed is here today for a skin check- Ed notes some areas of concern.          History of Present Illness:  Mr. Reinaldo Gordillo is a 74 year old male who presents for a skin check. The patient was last seen 8/6/2019 when he underwent excision of a BCC on his anterior chest. Today, the patient reports that he has been doing well since his last visit, he tolerated the course of fluorouracil well on his face and ears. He voices concern about two rough scaly lesions on his nose and on his chest which he would like evaluated. He also notes that he has some persistent areas of chronic irritation and dryness on his extremities. The patient voices no other concerns.    Past Medical History:   There is no problem list on file for this patient.    Past Medical History:   Diagnosis Date     ASCVD (arteriosclerotic cardiovascular disease)      Coronary artery disease      Gastroesophageal reflux disease      Hypertension      Stented coronary artery       Past Surgical History:   Procedure Laterality Date     COLONOSCOPY N/A 7/17/2019    Procedure: COLONOSCOPY;  Surgeon: Roque Givens MD;  Location: [facility] OR     ESOPHAGOSCOPY, GASTROSCOPY, DUODENOSCOPY (EGD), COMBINED N/A 7/17/2019    Procedure: ESOPHAGOGASTRODUODENOSCOPY, WITH BIOPSY;  Surgeon: Roque Givens MD;  Location: [facility] OR     HC CORONARY STENT PERCUT, EA ADDTL VESSEL       HERNIA REPAIR  2000     MOHS MICROGRAPHIC PROCEDURE         Social History:  Patient reports that he has never smoked. He has never used smokeless tobacco. He reports current alcohol use.    Family History:  Family History   Problem Relation Age of Onset     Glaucoma No family hx of      Macular Degeneration No family hx of      Retinal detachment No family hx of      Amblyopia No family hx of      Melanoma No family hx of      Skin Cancer No family hx of        Medications:  Current Outpatient Medications   Medication Sig Dispense Refill     ASPIRIN PO        calcipotriene (DOVONOX) 0.005 % external ointment Mix in equal parts with 5-fluorouracil cream in the palm of the hand and apply a thin layer to the face and ears for 4-5 days 90 g 0     clobetasol (TEMOVATE) 0.05 % external ointment Apply topically 2 times daily To the rash on the legs as needed 120 g 3     lisinopril (ZESTRIL) 5 MG tablet        Metoprolol Succinate (TOPROL XL PO)        omeprazole (PRILOSEC) 20 MG DR capsule Take 20 mg by mouth daily       fluorouracil (EFUDEX) 5 % external cream Apply topically 2 times daily And mix in palm of the hand with calcipotriene and apply a thin layer to the face and ears for 4-5 days (Patient not taking: Reported on 2/18/2020) 40 g 1       No Known Allergies    Review of Systems:  -Constitutional: Otherwise feeling well today, in usual state of health.  -HEENT: Patient denies nonhealing oral sores.  -Skin: As above in HPI. No additional skin concerns.    Physical exam:  Vitals: There were no vitals taken for this  visit.  GEN: This is a well developed, well-nourished male in no acute distress, in a pleasant mood.    SKIN: Full skin, which includes the head/face, both arms, chest, back, abdomen,both legs, genitalia and/or groin buttocks, digits and/or nails, was examined.  -Munguia skin type: II  -- There are waxy stuck on tan to brown papules on the trunk and extremities.   -well-healed surgical scar at site of prior BCC on the anterior chest  -scattered patches of xerosis on the trunk and extremities   -There are erythematous macules with overyling adherent scale on the cheeks and nose.   -there is a 1.5 mm blue macule with a central whitish area on the R frontal scalp   -No other lesions of concern on areas examined.     Impression/Plan:  1. History of nonmelanoma skin cancer (NMSC), last lesion identified and treated in 2019  - no evidence of recurrent disease via inspection or palpation; all sites well-healed  - photoprotection discussed (SPF30+ sunscreen and proper use, UPF clothing, sun avoidance, tanning bed avoidance)  - continued skin surveillance recommended    2. Actinic keratosis  - Cryotherapy procedure note: After verbal consent and discussion of risks and benefits including but no limited to dyspigmentation/scar, blister, and pain, 5 was(were) treated with 1-2mm freeze border for 2 cycles with liquid nitrogen. Post cryotherapy instructions were provided.    3. Xerosis   - recommended gentle skin care procedures and the use of OTC topical moisturizers    4. Blue nevus on the R frontal scalp  -will monitor clinically    5. Seborrheic keratoses  - Discussed the natural etiology and provided reassurances about the benign nature of the lesions.     Follow-up in 6 months, earlier for new or changing lesions.     Staff Involved:  Scribe/Staff/Resident(Karan)    Scribe Disclosure  I, Dominic Najjar, am serving as a scribe to document services personally performed by Dr. Yonathan Randall MD, based on data collection  and the provider's statements to me.     Staff Physician Comments:   I saw and evaluated the patient with the resident and I agree with the assessment and plan.  I was present for the entire minor procedure and examination. I have made edits if needed.    Yonathan Randall MD  Staff Dermatologist and Dermatopathologist  , Department of Dermatology      Staff attestation:  The documentation recorded by the scribe accurately reflects the services I personally performed and the decisions I personally made. I have made edits where needed.    Yonathan Randall MD  Staff Dermatologist and Dermatopathologist  , Department of Dermatology

## 2020-02-18 NOTE — LETTER
2/18/2020       RE: Reinaldo Gordillo  [address]  [address]  [place] [address]     Dear Colleague,    Thank you for referring your patient, Reinaldo Gordillo, to the [facility] DERMATOLOGY at [facility]. Please see a copy of my visit note below.    [facility] Dermatology Note      Dermatology Problem List:     0. Continue to monitor: L lateral shoulder 1 cm hyperkeratotic plaque with superficial erosion, suspected irritated keratosis  1. BCC- upper lip- s/p MMS in 2015- performed in [place]  2. Actinic keratoses  3. HAK left malar cheek, 1 cm erythematous and hyperkeratotic plaque, dDx: NMSC versus HAK, s/p shave biopsy on 7/23/2019  4. HAK, left lateral forehead, s/p shave biopsy on 7/23/2019  5. BCC left central chest, shave biopsy on 7/23/2019, excision on 8/6/19  6. Actinic keratoses on the face and ears              -s/p cryo 7/23/19, plan for fluorouracil 5% cream and calcipotriene 0.005% ointment  7.  Eczematous Dermatitis of the lower legs              -clobetasol 0.05% ointment BID  8. Filiform wart on the L inner thigh- s/p cryo on 7/23/19   9. Seborrheic keratoses, cherry angiomata, solar lentigines    Encounter Date: Feb 18, 2020    CC:  Chief Complaint   Patient presents with     Skin Check     Ed is here today for a skin check- Ed notes some areas of concern.          History of Present Illness:  Mr. Reinaldo Gordillo is a 74 year old male who presents for a skin check. The patient was last seen 8/6/2019 when he underwent excision of a BCC on his anterior chest. Today, the patient reports that he has been doing well since his last visit, he tolerated the course of fluorouracil well on his face and ears. He voices concern about two rough scaly lesions on his nose and on his chest which he would like evaluated. He also notes that he has some persistent areas of chronic irritation and dryness on his extremities. The patient voices no other concerns.    Past  Medical History:   There is no problem list on file for this patient.    Past Medical History:   Diagnosis Date     ASCVD (arteriosclerotic cardiovascular disease)      Coronary artery disease      Gastroesophageal reflux disease      Hypertension      Stented coronary artery      Past Surgical History:   Procedure Laterality Date     COLONOSCOPY N/A 7/17/2019    Procedure: COLONOSCOPY;  Surgeon: Roque Givens MD;  Location: [facility] OR     ESOPHAGOSCOPY, GASTROSCOPY, DUODENOSCOPY (EGD), COMBINED N/A 7/17/2019    Procedure: ESOPHAGOGASTRODUODENOSCOPY, WITH BIOPSY;  Surgeon: Roque Givens MD;  Location: [facility] OR     HC CORONARY STENT PERCUT, EA ADDTL VESSEL       HERNIA REPAIR  2000     MOHS MICROGRAPHIC PROCEDURE         Social History:  Patient reports that he has never smoked. He has never used smokeless tobacco. He reports current alcohol use.    Family History:  Family History   Problem Relation Age of Onset     Glaucoma No family hx of      Macular Degeneration No family hx of      Retinal detachment No family hx of      Amblyopia No family hx of      Melanoma No family hx of      Skin Cancer No family hx of        Medications:  Current Outpatient Medications   Medication Sig Dispense Refill     ASPIRIN PO        calcipotriene (DOVONOX) 0.005 % external ointment Mix in equal parts with 5-fluorouracil cream in the palm of the hand and apply a thin layer to the face and ears for 4-5 days 90 g 0     clobetasol (TEMOVATE) 0.05 % external ointment Apply topically 2 times daily To the rash on the legs as needed 120 g 3     lisinopril (ZESTRIL) 5 MG tablet        Metoprolol Succinate (TOPROL XL PO)        omeprazole (PRILOSEC) 20 MG DR capsule Take 20 mg by mouth daily       fluorouracil (EFUDEX) 5 % external cream Apply topically 2 times daily And mix in palm of the hand with calcipotriene and apply a thin layer to the face and ears for 4-5 days (Patient not taking: Reported on 2/18/2020) 40 g 1       No Known  Allergies    Review of Systems:  -Constitutional: Otherwise feeling well today, in usual state of health.  -HEENT: Patient denies nonhealing oral sores.  -Skin: As above in HPI. No additional skin concerns.    Physical exam:  Vitals: There were no vitals taken for this visit.  GEN: This is a well developed, well-nourished male in no acute distress, in a pleasant mood.    SKIN: Full skin, which includes the head/face, both arms, chest, back, abdomen,both legs, genitalia and/or groin buttocks, digits and/or nails, was examined.  -Munguia skin type: II  -- There are waxy stuck on tan to brown papules on the trunk and extremities.   -well-healed surgical scar at site of prior BCC on the anterior chest  -scattered patches of xerosis on the trunk and extremities   -There are erythematous macules with overyling adherent scale on the cheeks and nose.   -there is a 1.5 mm blue macule with a central whitish area on the R frontal scalp   -No other lesions of concern on areas examined.     Impression/Plan:  1. History of nonmelanoma skin cancer (NMSC), last lesion identified and treated in 2019  - no evidence of recurrent disease via inspection or palpation; all sites well-healed  - photoprotection discussed (SPF30+ sunscreen and proper use, UPF clothing, sun avoidance, tanning bed avoidance)  - continued skin surveillance recommended    2. Actinic keratosis  - Cryotherapy procedure note: After verbal consent and discussion of risks and benefits including but no limited to dyspigmentation/scar, blister, and pain, 5 was(were) treated with 1-2mm freeze border for 2 cycles with liquid nitrogen. Post cryotherapy instructions were provided.    3. Xerosis   - recommended gentle skin care procedures and the use of OTC topical moisturizers    4. Blue nevus on the R frontal scalp  -will monitor clinically    5. Seborrheic keratoses  - Discussed the natural etiology and provided reassurances about the benign nature of the lesions.      Follow-up in 6 months, earlier for new or changing lesions.     Staff Involved:  Scribe/Staff/Resident(Karan)    Scribe Disclosure  I, Dominic Najjar, am serving as a scribe to document services personally performed by Dr. Yonathan Randall MD, based on data collection and the provider's statements to me.     Staff Physician Comments:   I saw and evaluated the patient with the resident and I agree with the assessment and plan.  I was present for the entire minor procedure and examination. I have made edits if needed.    Yonathan Randall MD  Staff Dermatologist and Dermatopathologist  , Department of Dermatology      Staff attestation:  The documentation recorded by the scribe accurately reflects the services I personally performed and the decisions I personally made. I have made edits where needed.    Yonathan Randall MD  Staff Dermatologist and Dermatopathologist  , Department of Dermatology

## 2020-02-18 NOTE — PATIENT INSTRUCTIONS
Recommendations for dry skin and dermatitis   1. Bathe or shower daily in lukewarm water  2. Use a gentle non-soap detergent cleanser  - Soaps are alkaline (which can irritate sensitive skin) and remove natural moisturizing factors   - Recommended products, in no particular order, include:   - Bars:    - Aveeno Moisturizing Bar    - Cetaphil Gentle Cleansing Bar    - Dove Sensitive Skin Unscented Beauty Bar    - Olay Ultra Moisture Bar   - Liquid Cleansers:    - Aquanil Cleanser    - CeraVe Hydrating Cleanser    - Cetaphil Gentle Skin Cleanser  - Avoid scented soaps or bath additives unless your doctor tells you otherwise  - Focus on washing the face, underarms, and underwear areas; other sites usually do not need frequent washing  3. Rinse off thoroughly, then pat dry until skin is slightly damp  4. Apply moisturizer to damp skin within 3-5 minutes of exiting the bath/shower  - Recommended products, in no particular order, include:   - Lotions (thinner/lighter, but may be less effective)    - AmLactin Cerapeutic Restoring Body Lotion    - CeraVe Facial Moisturizing Lotion (AM and/or PM)    - Lubriderm Advanced Therapy Lotion   - Creams (thicker, likely the best balance of effectiveness and feel)    - AmLactin Ultra Hydrating Body Cream    - Aveeno Eczema Therapy Moisturizing Cream    - Aveeno Eczema Therapy Itch Relief Balm    - CeraVe Itch Relief Moisturizing Cream   - Ointments (thickest)    - Vaseline  5. If prescribed a topical steroid medication, this may be applied before or after the moisturizer (whichever order you prefer)  6. Reapply moisturizer one or two additional times throughout the day when dry skin is present; once this improves, reduce to daily or every other day as needed to prevent recurrence  7. If dry skin or dermatitis is present on the hands, keep moisturizer near the sink and apply after washing and drying your hands  8. A humidifier may be helpful during the winter months (when ambient  humidity is very low)     Cryotherapy    What is it?    Use of a very cold liquid, such as liquid nitrogen, to freeze and destroy abnormal skin cells that need to be removed    What should I expect?    Tenderness and redness    A small blister that might grow and fill with dark purple blood. There may be crusting.    More than one treatment may be needed if the lesions do not go away.    How do I care for the treated area?    Gently wash the area with your hands when bathing.    Use a thin layer of Vaseline to help with healing. You may use a Band-Aid.     The area should heal within 7-10 days and may leave behind a pink or lighter color.     Do not use an antibiotic or Neosporin ointment.     You may take acetaminophen (Tylenol) for pain.     Call your Doctor if you have:    Severe pain    Signs of infection (warmth, redness, cloudy yellow drainage, and or a bad smell)    Questions or concerns    Who should I call with questions?       [facility]: [phone]       [facility]: [phone]       For urgent needs outside of business hours call the [facility] at [phone]        and ask for the dermatology resident on call

## 2020-02-18 NOTE — NURSING NOTE
Dermatology Rooming Note    Reinaldo Gordillo's goals for this visit include:   Chief Complaint   Patient presents with     Skin Check     Ed is here today for a skin check- Ed notes some areas of concern.      NILESH Nieto

## 2020-05-12 ENCOUNTER — APPOINTMENT (OUTPATIENT)
Dept: LAB | Facility: CLINIC | Age: 75
End: 2020-05-12

## 2020-05-13 ENCOUNTER — RESULTS ONLY (OUTPATIENT)
Dept: LAB | Age: 75
End: 2020-05-13

## 2020-05-13 ENCOUNTER — APPOINTMENT (OUTPATIENT)
Dept: URGENT CARE | Facility: URGENT CARE | Age: 75
End: 2020-05-13

## 2020-05-14 LAB
SARS-COV-2 RNA SPEC QL NAA+PROBE: NOT DETECTED
SPECIMEN SOURCE: NORMAL

## 2020-10-06 ENCOUNTER — OFFICE VISIT (OUTPATIENT)
Dept: OPHTHALMOLOGY | Facility: CLINIC | Age: 75
End: 2020-10-06
Attending: OPHTHALMOLOGY
Payer: COMMERCIAL

## 2020-10-06 DIAGNOSIS — H52.13 MYOPIC ASTIGMATISM OF BOTH EYES: ICD-10-CM

## 2020-10-06 DIAGNOSIS — H52.4 PRESBYOPIA: ICD-10-CM

## 2020-10-06 DIAGNOSIS — H25.13 NUCLEAR SCLEROTIC CATARACT OF BOTH EYES: Primary | ICD-10-CM

## 2020-10-06 DIAGNOSIS — H52.203 MYOPIC ASTIGMATISM OF BOTH EYES: ICD-10-CM

## 2020-10-06 PROCEDURE — 76519 ECHO EXAM OF EYE: CPT | Performed by: OPHTHALMOLOGY

## 2020-10-06 PROCEDURE — 92014 COMPRE OPH EXAM EST PT 1/>: CPT | Performed by: OPHTHALMOLOGY

## 2020-10-06 PROCEDURE — G0463 HOSPITAL OUTPT CLINIC VISIT: HCPCS

## 2020-10-06 RX ORDER — ZOLPIDEM TARTRATE 10 MG/1
TABLET ORAL
Status: ON HOLD | COMMUNITY
Start: 2020-08-30 | End: 2023-04-30

## 2020-10-06 RX ORDER — PRAVASTATIN SODIUM 80 MG/1
80 TABLET ORAL AT BEDTIME
COMMUNITY
Start: 2020-08-21 | End: 2023-06-07

## 2020-10-06 ASSESSMENT — REFRACTION_WEARINGRX
OD_ADD: +2.50
OS_ADD: +2.50
OS_AXIS: 130
OS_SPHERE: -4.75
SPECS_TYPE: PAL
OS_CYLINDER: +0.50
OD_CYLINDER: +0.75
OD_AXIS: 140
OD_SPHERE: -5.25

## 2020-10-06 ASSESSMENT — VISUAL ACUITY
METHOD: SNELLEN - LINEAR
OS_PH_CC+: +3
OS_CC+: -1
OS_CC: 20/50
OD_CC: 20/80
CORRECTION_TYPE: GLASSES
OD_CC+: -1
OD_PH_CC+: -2
METHOD_MR: DIAGNOSTIC
OD_PH_CC: 20/40
OS_PH_CC: 20/25

## 2020-10-06 ASSESSMENT — REFRACTION_MANIFEST
OD_CYLINDER: +0.50
OS_SPHERE: -6.00
OD_SPHERE: -8.00
OD_AXIS: 140
OS_CYLINDER: +0.75
OS_AXIS: 125

## 2020-10-06 ASSESSMENT — CONF VISUAL FIELD
OD_NORMAL: 1
METHOD: COUNTING FINGERS
OS_NORMAL: 1

## 2020-10-06 ASSESSMENT — CUP TO DISC RATIO
OD_RATIO: 0.3
OS_RATIO: 0.3

## 2020-10-06 ASSESSMENT — TONOMETRY
IOP_METHOD: TONOPEN
OD_IOP_MMHG: 18
OS_IOP_MMHG: 16

## 2020-10-06 ASSESSMENT — SLIT LAMP EXAM - LIDS
COMMENTS: NORMAL
COMMENTS: NORMAL

## 2020-10-06 ASSESSMENT — EXTERNAL EXAM - RIGHT EYE: OD_EXAM: NORMAL

## 2020-10-06 ASSESSMENT — EXTERNAL EXAM - LEFT EYE: OS_EXAM: NORMAL

## 2020-10-06 NOTE — NURSING NOTE
Chief Complaints and History of Present Illnesses   Patient presents with     Follow Up     Nuclear sclerotic cataract     Chief Complaint(s) and History of Present Illness(es)     Follow Up     Laterality: both eyes    Onset: gradual    Onset: 14 months ago    Frequency: constantly    Context: distance vision    Course: gradually worsening    Associated symptoms: floaters (stable).  Negative for glare, haloes and eye pain    Treatments tried: no treatments    Pain scale: 0/10    Comments: Nuclear sclerotic cataract              Comments     He states that his vision seems a bit worse in the distance in both eyes.  He is having trouble reading street signs.      ELAINA Peña 10:04 AM  October 6, 2020

## 2020-10-06 NOTE — PROGRESS NOTES
HPI  Reinaldo Gordillo is a 75 year old male here for cataract evaluation. Over the last year, he has noted a persistent slow worsening blurring of his vision at near and distance in both eyes. He had to take the vision test to renew his drivers license, and he passed, but had some difficulty. He is not bothered by associated glare at night. No pain, redness, discharge. No new flashes/floaters.    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    Dilates to: 7 mm  Alpha blockers/Flomax: None  Trauma/Pseudoxfoliation: None  Fuchs dystrophy/guttae: None    Diabetes: No  Anticoagulation: None    Cyl: 1.44 @ 071 right eye, 1.47 @ 114 left eye (less cyl on payton and somewhat irregular, do not recommend toric)    We discussed the risks and benefits of cataract surgery, and informed consent was obtained.  Proceed with CE/IOL right eye followed by left eye    Discussed refractive target. Also discussed pros and cons of multifocal IOLs. He thinks he would be frustrated by losing the ability to perform near vision tasks without glasses. My recommendation is for monofocal lens with myopic target.     Surgical plan:  Topical  Aim for near vision ~ -2.50  FACULTY    (H52.203,  H52.13) Myopic astigmatism of both eyes  (H52.4) Presbyopia  Comment: Myopic shift. Vision limited by cataract  Plan: Hold off on new glasses Rx until after CE/IOL    -----------------------------------------------------------------------------------    Patient disposition:   Return for scheduled procedure. or sooner as needed.      Teaching statement:  Complete documentation of historical and exam elements  from today's encounter can be found in the full encounter summary report (not reduplicated in this progress note). I personally obtained the chief complaint(s) and history of present illness.  I confirmed and edited as necessary the review of systems, past medical/surgical history, family history, social history, and examination findings as documented by others; and I examined the patient myself. I personally reviewed the relevant tests, images, and reports as documented above.     I formulated and edited as necessary the assessment and plan and discussed the findings and management plan with the patient and family.  Justa Liz MD  Comprehensive Ophthalmology & Ocular Pathology  Department of Ophthalmology and Visual Neurosciences  [email or web address]  Pager [phone]

## 2020-10-15 ENCOUNTER — TELEPHONE (OUTPATIENT)
Dept: OPHTHALMOLOGY | Facility: CLINIC | Age: 75
End: 2020-10-15

## 2020-10-15 NOTE — TELEPHONE ENCOUNTER
Called to schedule right and left eye surgery with patient.  Patient needs a day or two to get back to me before scheduling for his procedures.  Patient was given my direct dial [phone] to call back when ready to schedule

## 2020-10-16 NOTE — TELEPHONE ENCOUNTER
Spoke with patient to schedule right eye surgery with Dr. Justa Liz.    Surgery was scheduled on 11/27 at [facility]  Patient will have H&P at [facility] wit Dr. Vences.     Patient is aware a COVID-19 test is needed before their procedure. The test should be with-in 4 days of their procedure.   Test Details: Date 11/23 Location [facility]    Post-Op visit was scheduled on 11/27 and 12/16   Patient was advised a / is needed day of surgery. As well as, for 24 hours after their surgery procedure.  Surgery packet was mailed 10/15, patient has my direct contact information for any further questions [phone].

## 2020-10-16 NOTE — TELEPHONE ENCOUNTER
Spoke with patient to schedule left eye surgery with Dr. Justa Liz.    Surgery was scheduled on  12/4 at [facility]  Patient will have H&P at [facility] with Dr. Vences     Patient is aware a COVID-19 test is needed before their procedure. The test should be with-in 4 days of their procedure.   Test Details: Date 11/30 Location [facility]    Post-Op visit was scheduled on 12/4 and 12/16  Patient was advised a / is needed day of surgery. As well as, for 24 hours after their surgery procedure.  Surgery packet was mailed 10/15, patient has my direct contact information for any further questions [phone].

## 2020-10-19 DIAGNOSIS — Z11.59 ENCOUNTER FOR SCREENING FOR OTHER VIRAL DISEASES: Primary | ICD-10-CM

## 2020-11-08 ENCOUNTER — HEALTH MAINTENANCE LETTER (OUTPATIENT)
Age: 75
End: 2020-11-08

## 2020-11-20 NOTE — TELEPHONE ENCOUNTER
Patient emailed requesting to cancel his surgery procedures with Dr. Liz due to covid.   Patient has been removed from the surgery schedule, as well as all post-op appointments have been cancelled.

## 2020-11-27 ENCOUNTER — HOSPITAL ENCOUNTER (OUTPATIENT)
Facility: AMBULATORY SURGERY CENTER | Age: 75
End: 2020-11-27
Attending: OPHTHALMOLOGY
Payer: COMMERCIAL

## 2020-11-27 DIAGNOSIS — H25.11 AGE-RELATED NUCLEAR CATARACT, RIGHT: Primary | ICD-10-CM

## 2020-12-04 ENCOUNTER — HOSPITAL ENCOUNTER (OUTPATIENT)
Facility: AMBULATORY SURGERY CENTER | Age: 75
End: 2020-12-04
Attending: OPHTHALMOLOGY
Payer: COMMERCIAL

## 2020-12-04 DIAGNOSIS — H25.12 AGE-RELATED NUCLEAR CATARACT, LEFT: Primary | ICD-10-CM

## 2021-03-27 ENCOUNTER — HEALTH MAINTENANCE LETTER (OUTPATIENT)
Age: 76
End: 2021-03-27

## 2021-05-26 ENCOUNTER — TELEPHONE (OUTPATIENT)
Dept: OPHTHALMOLOGY | Facility: CLINIC | Age: 76
End: 2021-05-26

## 2021-05-26 NOTE — TELEPHONE ENCOUNTER
Spoke with patient to schedule right eye surgery with Dr. Liz    Surgery was scheduled on 7/2 at [facility]  Patient will have H&P at [facility]     Patient is aware a COVID-19 test is needed before their procedure. The test should be with-in 4 days of their procedure.   Test Details: Date 6/29 Location [facility]    Post-Op visit was scheduled on 7/19  Patient was advised a / is needed day of surgery. As well as, for 24 hours after their surgery procedure.  Surgery packet was mailed 5/26, patient has my direct contact information for any further questions [phone].

## 2021-05-26 NOTE — TELEPHONE ENCOUNTER
Spoke with patient to schedule left eye surgery with Dr. Liz    Surgery was scheduled on 7/9 at [facility]  Patient will have H&P at [facility]     Patient is aware a COVID-19 test is needed before their procedure. The test should be with-in 4 days of their procedure.   Test Details: Date 7/5 Location [facility]    Post-Op visit was scheduled on 7/19  Patient was advised a / is needed day of surgery. As well as, for 24 hours after their surgery procedure.  Surgery packet was mailed 5/26, patient has my direct contact information for any further questions [phone].

## 2021-05-31 DIAGNOSIS — Z11.59 ENCOUNTER FOR SCREENING FOR OTHER VIRAL DISEASES: ICD-10-CM

## 2021-06-29 DIAGNOSIS — H25.12 AGE-RELATED NUCLEAR CATARACT, LEFT: Primary | ICD-10-CM

## 2021-06-29 DIAGNOSIS — Z11.59 ENCOUNTER FOR SCREENING FOR OTHER VIRAL DISEASES: ICD-10-CM

## 2021-06-29 LAB
LABORATORY COMMENT REPORT: NORMAL
SARS-COV-2 RNA RESP QL NAA+PROBE: NEGATIVE
SARS-COV-2 RNA RESP QL NAA+PROBE: NORMAL
SPECIMEN SOURCE: NORMAL
SPECIMEN SOURCE: NORMAL

## 2021-06-29 PROCEDURE — U0003 INFECTIOUS AGENT DETECTION BY NUCLEIC ACID (DNA OR RNA); SEVERE ACUTE RESPIRATORY SYNDROME CORONAVIRUS 2 (SARS-COV-2) (CORONAVIRUS DISEASE [COVID-19]), AMPLIFIED PROBE TECHNIQUE, MAKING USE OF HIGH THROUGHPUT TECHNOLOGIES AS DESCRIBED BY CMS-2020-01-R: HCPCS | Performed by: PATHOLOGY

## 2021-06-29 PROCEDURE — U0005 INFEC AGEN DETEC AMPLI PROBE: HCPCS | Performed by: PATHOLOGY

## 2021-06-29 RX ORDER — DICLOFENAC SODIUM 1 MG/ML
1 SOLUTION/ DROPS OPHTHALMIC
Status: CANCELLED | OUTPATIENT
Start: 2021-06-29

## 2021-06-29 RX ORDER — PREDNISOLONE ACETATE 10 MG/ML
SUSPENSION/ DROPS OPHTHALMIC
Qty: 5 ML | Refills: 1 | Status: SHIPPED | OUTPATIENT
Start: 2021-06-29 | End: 2021-12-03

## 2021-06-29 RX ORDER — CYCLOPENTOLAT/TROPIC/PHENYLEPH 1%-1%-2.5%
1 DROPS (EA) OPHTHALMIC (EYE)
Status: CANCELLED | OUTPATIENT
Start: 2021-06-29

## 2021-06-29 RX ORDER — OFLOXACIN 3 MG/ML
1 SOLUTION/ DROPS OPHTHALMIC
Status: CANCELLED | OUTPATIENT
Start: 2021-06-29

## 2021-06-29 RX ORDER — OFLOXACIN 3 MG/ML
SOLUTION/ DROPS OPHTHALMIC
Qty: 5 ML | Refills: 0 | Status: SHIPPED | OUTPATIENT
Start: 2021-06-29 | End: 2021-07-19

## 2021-06-29 RX ORDER — PROPARACAINE HYDROCHLORIDE 5 MG/ML
1 SOLUTION/ DROPS OPHTHALMIC ONCE
Status: CANCELLED | OUTPATIENT
Start: 2021-06-29 | End: 2021-06-29

## 2021-06-30 ENCOUNTER — ALLIED HEALTH/NURSE VISIT (OUTPATIENT)
Dept: OPHTHALMOLOGY | Facility: CLINIC | Age: 76
End: 2021-06-30
Attending: OPHTHALMOLOGY
Payer: COMMERCIAL

## 2021-06-30 DIAGNOSIS — H25.13 NUCLEAR SCLEROTIC CATARACT OF BOTH EYES: Primary | ICD-10-CM

## 2021-06-30 PROCEDURE — 76519 ECHO EXAM OF EYE: CPT

## 2021-06-30 PROCEDURE — 76519 ECHO EXAM OF EYE: CPT | Mod: 26 | Performed by: OPHTHALMOLOGY

## 2021-06-30 NOTE — NURSING NOTE
Chief Complaints and History of Present Illnesses   Patient presents with     Follow Up     Patient is here for a repeat calculation for cataract surgery, tech only     Chief Complaint(s) and History of Present Illness(es)     Follow Up     Comments: Patient is here for a repeat calculation for cataract surgery, tech only

## 2021-07-01 ENCOUNTER — ANESTHESIA EVENT (OUTPATIENT)
Dept: SURGERY | Facility: AMBULATORY SURGERY CENTER | Age: 76
End: 2021-07-01
Payer: COMMERCIAL

## 2021-07-01 PROBLEM — H25.13 NUCLEAR SCLEROTIC CATARACT OF BOTH EYES: Status: ACTIVE | Noted: 2020-10-16

## 2021-07-02 ENCOUNTER — OFFICE VISIT (OUTPATIENT)
Dept: OPHTHALMOLOGY | Facility: CLINIC | Age: 76
End: 2021-07-02
Payer: COMMERCIAL

## 2021-07-02 ENCOUNTER — ANESTHESIA (OUTPATIENT)
Dept: SURGERY | Facility: AMBULATORY SURGERY CENTER | Age: 76
End: 2021-07-02
Payer: COMMERCIAL

## 2021-07-02 ENCOUNTER — HOSPITAL ENCOUNTER (OUTPATIENT)
Facility: AMBULATORY SURGERY CENTER | Age: 76
End: 2021-07-02
Attending: OPHTHALMOLOGY
Payer: COMMERCIAL

## 2021-07-02 VITALS
HEIGHT: 72 IN | BODY MASS INDEX: 26.41 KG/M2 | TEMPERATURE: 97.7 F | RESPIRATION RATE: 20 BRPM | HEART RATE: 71 BPM | SYSTOLIC BLOOD PRESSURE: 109 MMHG | OXYGEN SATURATION: 99 % | DIASTOLIC BLOOD PRESSURE: 64 MMHG | WEIGHT: 195 LBS

## 2021-07-02 DIAGNOSIS — Z98.890 POSTSURGICAL STATE, EYE: Primary | ICD-10-CM

## 2021-07-02 DIAGNOSIS — H25.13 NUCLEAR SCLEROTIC CATARACT OF BOTH EYES: ICD-10-CM

## 2021-07-02 DIAGNOSIS — H25.11 AGE-RELATED NUCLEAR CATARACT, RIGHT: ICD-10-CM

## 2021-07-02 PROCEDURE — 99024 POSTOP FOLLOW-UP VISIT: CPT | Mod: GC | Performed by: OPHTHALMOLOGY

## 2021-07-02 PROCEDURE — 66984 XCAPSL CTRC RMVL W/O ECP: CPT | Mod: RT

## 2021-07-02 DEVICE — EYE IMP IOL AMO PCL TECNIS ZCB00 16.5: Type: IMPLANTABLE DEVICE | Site: EYE | Status: FUNCTIONAL

## 2021-07-02 RX ORDER — TETRACAINE HYDROCHLORIDE 5 MG/ML
SOLUTION OPHTHALMIC PRN
Status: DISCONTINUED | OUTPATIENT
Start: 2021-07-02 | End: 2021-07-02 | Stop reason: HOSPADM

## 2021-07-02 RX ORDER — DICLOFENAC SODIUM 1 MG/ML
1 SOLUTION/ DROPS OPHTHALMIC
Status: COMPLETED | OUTPATIENT
Start: 2021-07-02 | End: 2021-07-02

## 2021-07-02 RX ORDER — FENTANYL CITRATE 50 UG/ML
25-50 INJECTION, SOLUTION INTRAMUSCULAR; INTRAVENOUS
Status: DISCONTINUED | OUTPATIENT
Start: 2021-07-02 | End: 2021-07-02 | Stop reason: HOSPADM

## 2021-07-02 RX ORDER — LIDOCAINE 40 MG/G
CREAM TOPICAL
Status: DISCONTINUED | OUTPATIENT
Start: 2021-07-02 | End: 2021-07-02 | Stop reason: HOSPADM

## 2021-07-02 RX ORDER — NALOXONE HYDROCHLORIDE 0.4 MG/ML
0.2 INJECTION, SOLUTION INTRAMUSCULAR; INTRAVENOUS; SUBCUTANEOUS
Status: DISCONTINUED | OUTPATIENT
Start: 2021-07-02 | End: 2021-07-03 | Stop reason: HOSPADM

## 2021-07-02 RX ORDER — ONDANSETRON 2 MG/ML
4 INJECTION INTRAMUSCULAR; INTRAVENOUS EVERY 30 MIN PRN
Status: DISCONTINUED | OUTPATIENT
Start: 2021-07-02 | End: 2021-07-03 | Stop reason: HOSPADM

## 2021-07-02 RX ORDER — SODIUM CHLORIDE, SODIUM LACTATE, POTASSIUM CHLORIDE, CALCIUM CHLORIDE 600; 310; 30; 20 MG/100ML; MG/100ML; MG/100ML; MG/100ML
INJECTION, SOLUTION INTRAVENOUS CONTINUOUS
Status: DISCONTINUED | OUTPATIENT
Start: 2021-07-02 | End: 2021-07-03 | Stop reason: HOSPADM

## 2021-07-02 RX ORDER — NALOXONE HYDROCHLORIDE 0.4 MG/ML
0.4 INJECTION, SOLUTION INTRAMUSCULAR; INTRAVENOUS; SUBCUTANEOUS
Status: DISCONTINUED | OUTPATIENT
Start: 2021-07-02 | End: 2021-07-03 | Stop reason: HOSPADM

## 2021-07-02 RX ORDER — FENTANYL CITRATE 50 UG/ML
INJECTION, SOLUTION INTRAMUSCULAR; INTRAVENOUS PRN
Status: DISCONTINUED | OUTPATIENT
Start: 2021-07-02 | End: 2021-07-02

## 2021-07-02 RX ORDER — MEPERIDINE HYDROCHLORIDE 25 MG/ML
12.5 INJECTION INTRAMUSCULAR; INTRAVENOUS; SUBCUTANEOUS
Status: DISCONTINUED | OUTPATIENT
Start: 2021-07-02 | End: 2021-07-03 | Stop reason: HOSPADM

## 2021-07-02 RX ORDER — ONDANSETRON 4 MG/1
4 TABLET, ORALLY DISINTEGRATING ORAL EVERY 30 MIN PRN
Status: DISCONTINUED | OUTPATIENT
Start: 2021-07-02 | End: 2021-07-03 | Stop reason: HOSPADM

## 2021-07-02 RX ORDER — PROPARACAINE HYDROCHLORIDE 5 MG/ML
1 SOLUTION/ DROPS OPHTHALMIC ONCE
Status: COMPLETED | OUTPATIENT
Start: 2021-07-02 | End: 2021-07-02

## 2021-07-02 RX ORDER — ACETAMINOPHEN 325 MG/1
975 TABLET ORAL ONCE
Status: COMPLETED | OUTPATIENT
Start: 2021-07-02 | End: 2021-07-02

## 2021-07-02 RX ORDER — HYDROMORPHONE HYDROCHLORIDE 1 MG/ML
0.2 INJECTION, SOLUTION INTRAMUSCULAR; INTRAVENOUS; SUBCUTANEOUS EVERY 10 MIN PRN
Status: DISCONTINUED | OUTPATIENT
Start: 2021-07-02 | End: 2021-07-03 | Stop reason: HOSPADM

## 2021-07-02 RX ORDER — OFLOXACIN 3 MG/ML
1 SOLUTION/ DROPS OPHTHALMIC
Status: COMPLETED | OUTPATIENT
Start: 2021-07-02 | End: 2021-07-02

## 2021-07-02 RX ORDER — OXYCODONE HYDROCHLORIDE 5 MG/1
5 TABLET ORAL EVERY 4 HOURS PRN
Status: DISCONTINUED | OUTPATIENT
Start: 2021-07-02 | End: 2021-07-03 | Stop reason: HOSPADM

## 2021-07-02 RX ORDER — BALANCED SALT SOLUTION 6.4; .75; .48; .3; 3.9; 1.7 MG/ML; MG/ML; MG/ML; MG/ML; MG/ML; MG/ML
SOLUTION OPHTHALMIC PRN
Status: DISCONTINUED | OUTPATIENT
Start: 2021-07-02 | End: 2021-07-02 | Stop reason: HOSPADM

## 2021-07-02 RX ORDER — CYCLOPENTOLAT/TROPIC/PHENYLEPH 1%-1%-2.5%
1 DROPS (EA) OPHTHALMIC (EYE)
Status: COMPLETED | OUTPATIENT
Start: 2021-07-02 | End: 2021-07-02

## 2021-07-02 RX ORDER — SODIUM CHLORIDE, SODIUM LACTATE, POTASSIUM CHLORIDE, CALCIUM CHLORIDE 600; 310; 30; 20 MG/100ML; MG/100ML; MG/100ML; MG/100ML
INJECTION, SOLUTION INTRAVENOUS CONTINUOUS
Status: DISCONTINUED | OUTPATIENT
Start: 2021-07-02 | End: 2021-07-02 | Stop reason: HOSPADM

## 2021-07-02 RX ORDER — MOXIFLOXACIN IN NACL,ISO-OS/PF 0.3MG/0.3
SYRINGE (ML) INTRAOCULAR PRN
Status: DISCONTINUED | OUTPATIENT
Start: 2021-07-02 | End: 2021-07-02 | Stop reason: HOSPADM

## 2021-07-02 RX ORDER — LIDOCAINE HYDROCHLORIDE 10 MG/ML
INJECTION, SOLUTION EPIDURAL; INFILTRATION; INTRACAUDAL; PERINEURAL PRN
Status: DISCONTINUED | OUTPATIENT
Start: 2021-07-02 | End: 2021-07-02 | Stop reason: HOSPADM

## 2021-07-02 RX ADMIN — DICLOFENAC SODIUM 1 DROP: 1 SOLUTION/ DROPS OPHTHALMIC at 07:24

## 2021-07-02 RX ADMIN — PROPARACAINE HYDROCHLORIDE 1 DROP: 5 SOLUTION/ DROPS OPHTHALMIC at 06:55

## 2021-07-02 RX ADMIN — OFLOXACIN 1 DROP: 3 SOLUTION/ DROPS OPHTHALMIC at 07:07

## 2021-07-02 RX ADMIN — FENTANYL CITRATE 50 MCG: 50 INJECTION, SOLUTION INTRAMUSCULAR; INTRAVENOUS at 08:01

## 2021-07-02 RX ADMIN — Medication 1 DROP: at 07:28

## 2021-07-02 RX ADMIN — SODIUM CHLORIDE, SODIUM LACTATE, POTASSIUM CHLORIDE, CALCIUM CHLORIDE: 600; 310; 30; 20 INJECTION, SOLUTION INTRAVENOUS at 07:58

## 2021-07-02 RX ADMIN — OFLOXACIN 1 DROP: 3 SOLUTION/ DROPS OPHTHALMIC at 07:26

## 2021-07-02 RX ADMIN — Medication 1 DROP: at 07:21

## 2021-07-02 RX ADMIN — DICLOFENAC SODIUM 1 DROP: 1 SOLUTION/ DROPS OPHTHALMIC at 07:17

## 2021-07-02 RX ADMIN — OFLOXACIN 1 DROP: 3 SOLUTION/ DROPS OPHTHALMIC at 07:20

## 2021-07-02 RX ADMIN — ACETAMINOPHEN 975 MG: 325 TABLET ORAL at 06:48

## 2021-07-02 RX ADMIN — Medication 1 DROP: at 07:13

## 2021-07-02 RX ADMIN — DICLOFENAC SODIUM 1 DROP: 1 SOLUTION/ DROPS OPHTHALMIC at 07:01

## 2021-07-02 ASSESSMENT — MIFFLIN-ST. JEOR: SCORE: 1662.51

## 2021-07-02 ASSESSMENT — VISUAL ACUITY
OD_SC: 20/30
METHOD: SNELLEN - LINEAR

## 2021-07-02 ASSESSMENT — SLIT LAMP EXAM - LIDS: COMMENTS: NORMAL

## 2021-07-02 ASSESSMENT — TONOMETRY
OD_IOP_MMHG: 17
IOP_METHOD: TONOPEN

## 2021-07-02 NOTE — DISCHARGE INSTRUCTIONS
[facility]     Home Care Following Cataract Surgery    If you have a gauze eye patch on, please do not remove it until it is removed by your doctor at your first appointment after your surgery.  You will start your eye drops the next day.    OR    If you only have a clear eye shield on, you may remove the eye shield when you get home and begin eye drops today as directed by your doctor.      Wear the clear eye shield for protection when sleeping for the next 5 days.      Do not rub the eye that had the operation.      Your eye might be sensitive to light.  Wearing sunglasses may be more comfortable for you.      You may have some discomfort and irritation.  Acetaminophen (Tylenol) or Ibuprofen (Advil) may be taken for discomfort. If pain persists please call your doctor's office.      Keep the eye that had the surgery dry. You may wash your hair, bathe or shower, but keep your eye closed while doing so.       Avoid bending over, strenuous activity or heavy lifting until this activity has been approved by your doctor.      You have a follow-up appointment with your doctor today or tomorrow.  Bring all your prescribed eye drops with you to this follow-up appointment.        If you take glaucoma medications, bring them with you to your follow-up appointment.      Use medication exactly as prescribed by your doctor. Wait 3-5 minutes between eye drops.  You may restart your regular home medications.       Artificial tears may be used immediately for foreign body sensation, dryness or itching.  These should also be spaced 3-5 minutes apart from any prescription eye drops.      Occasional blood-tinged tears are normal the day or two after surgery. However, if there is large or persistent bleeding from the eye, that is not normal, and you should contact the clinic.    Call your doctor s office if any of the following should occur:    - Any sudden vision changes, including decreased  vision  - Nausea or severe headache  - Increase in pain that is not controlled with Acetaminophen (Tylenol) or Ibuprofen (Advil)  - Signs of infection (pus, increasing redness or tenderness)  - Severe sensitivity to light  - An increase in floaters (black spots in your vision)    Your doctor is:  Dr. Justa Liz, Ophthalmology: [phone]        [facility]     Home Care Following Cataract Surgery    If you have a gauze eye patch on, please do not remove it until it is removed by your doctor at your first appointment after your surgery.  You will start your eye drops the next day.    OR    If you only have a clear eye shield on, you may remove the eye shield when you get home and begin eye drops today as directed by your doctor.      Wear the clear eye shield for protection when sleeping for the next 5 days.      Do not rub the eye that had the operation.      Your eye might be sensitive to light.  Wearing sunglasses may be more comfortable for you.      You may have some discomfort and irritation.  Acetaminophen (Tylenol) or Ibuprofen (Advil) may be taken for discomfort. If pain persists please call your doctor's office.      Keep the eye that had the surgery dry. You may wash your hair, bathe or shower, but keep your eye closed while doing so.       Avoid bending over, strenuous activity or heavy lifting until this activity has been approved by your doctor.      You have a follow-up appointment with your doctor today or tomorrow.  Bring all your prescribed eye drops with you to this follow-up appointment.        If you take glaucoma medications, bring them with you to your follow-up appointment.      Use medication exactly as prescribed by your doctor. Wait 3-5 minutes between eye drops.  You may restart your regular home medications.       Artificial tears may be used immediately for foreign body sensation, dryness or itching.  These should also be spaced 3-5 minutes apart  from any prescription eye drops.      Occasional blood-tinged tears are normal the day or two after surgery. However, if there is large or persistent bleeding from the eye, that is not normal, and you should contact the clinic.    Call your doctor s office if any of the following should occur:    - Any sudden vision changes, including decreased vision  - Nausea or severe headache  - Increase in pain that is not controlled with Acetaminophen (Tylenol) or Ibuprofen (Advil)  - Signs of infection (pus, increasing redness or tenderness)  - Severe sensitivity to light  - An increase in floaters (black spots in your vision)    Your doctor is:  Dr. Justa Liz, Ophthalmology: [phone]          [facility]  Home Care Following Anesthesia  For 24 hours after surgery:  1. Get plenty of rest.  A responsible adult must stay with you for at least 24 hours after you leave the surgery center.  2. Do not drive or use heavy equipment.  If you have weakness or tingling, don't drive or use heavy equipment until this feeling goes away.   3. Do not drink alcohol.   4. Avoid strenuous or risky activities.  Ask for help when climbing stairs.  5. You may feel lightheaded.  IF so, sit for a few minutes before standing.  Have someone help you get up.   6. If you have nausea (feel sick to your stomach): Drink only clear liquids such as apple juice, ginger ale, broth or 7-Up.  Rest may also help.  Be sure to drink enough fluids.  Move to a regular diet as you feel able.   7. You may have a slight fever.  Call the doctor if your fever is over 100 F (37.7 C) (taken under the tongue) or lasts longer than 24 hours.  8. You may have a dry mouth, a sore throat, muscle aches or trouble sleeping. These should go away after 24 hours.  9. Do not make important or legal decisions.   10. It is recommended to avoid smoking.               Tips for taking pain medications  To get the best pain relief possible,  remember these points:    Take pain medications as directed, before pain becomes severe.    Pain medication can upset your stomach: taking it with food may help.    Constipation is a common side effect of pain medication. Drink plenty of  fluids.    Eat foods high in fiber. Take a stool softener if recommended by your doctor or pharmacist.    Do not drink alcohol, drive or operate machinery while taking pain medications.    Ask about other ways to control pain, such as with heat, ice or relaxation.    Tylenol/Acetaminophen Consumption  To help encourage the safe use of acetaminophen, the makers of TYLENOL  have lowered the maximum daily dose for single-ingredient Extra Strength TYLENOL  (acetaminophen) products sold in the [place] from 8 pills per day (4,000 mg) to 6 pills per day (3,000 mg). The dosing interval has also changed from 2 pills every 4-6 hours to 2 pills every 6 hours.    If you feel your pain relief is insufficient, you may take Tylenol/Acetaminophen in addition to your narcotic pain medication.     Be careful not to exceed 3,000 mg of Tylenol/Acetaminophen in a 24 hour period from all sources.    If you are taking extra strength Tylenol/acetaminophen (500 mg), the maximum dose is 6 tablets in 24 hours.    If you are taking regular strength acetaminophen (325 mg), the maximum dose is 9 tablets in 24 hours.    ***975mg of Tylenol received at 06:50 am***    Call a doctor for any of the followin. Signs of infection (fever, growing tenderness at the surgery site, a large amount of drainage or bleeding, severe pain, foul-smelling drainage, redness, swelling).  2. It has been over 8 to 10 hours since surgery and you are still not able to urinate (pass water).  3. Headache for over 24 hours.  4. Numbness, tingling or weakness the day after surgery (if you had spinal anesthesia).  5. Signs of Covid-19 infection (temperature over 100 degrees, shortness of breath, cough, loss of taste/smell, generalized body  aches, persistent headache, chills, sore throat, nausea/vomiting/diarrhea)  Your doctor is:       Dr. Justa Liz, Ophthalmology: [phone]               Or dial [phone] and ask for the resident on call for:  Ophthalmology  For emergency care, call the:  [facility]:  [phone] (TTY for hearing impaired: [phone])

## 2021-07-02 NOTE — ANESTHESIA PREPROCEDURE EVALUATION
Anesthesia Pre-Procedure Evaluation    Patient: Reinaldo Gordillo   MRN: [id number] : 1945        Preoperative Diagnosis: Nuclear sclerotic cataract of both eyes [H25.13]   Procedure : Procedure(s):  RIGHT EYE CATARACT REMOVAL WITH INTRAOCULAR LENS IMPLANT     Past Medical History:   Diagnosis Date     ASCVD (arteriosclerotic cardiovascular disease)      Coronary artery disease      Gastroesophageal reflux disease      Hypertension      Stented coronary artery       Past Surgical History:   Procedure Laterality Date     COLONOSCOPY N/A 2019    Procedure: COLONOSCOPY;  Surgeon: Roque Givens MD;  [facility]     ESOPHAGOSCOPY, GASTROSCOPY, DUODENOSCOPY (EGD), COMBINED N/A 2019    Procedure: ESOPHAGOGASTRODUODENOSCOPY, WITH BIOPSY;  Surgeon: Roque Givens MD;  Location: [facility]     HERNIA REPAIR  2000     MOHS MICROGRAPHIC PROCEDURE       ZZHC CORONARY STENT PERCUT, EA ADDTL VESSEL        No Known Allergies   Social History     Tobacco Use     Smoking status: Never Smoker     Smokeless tobacco: Never Used   Substance Use Topics     Alcohol use: Yes     Comment: 1-2 drinks 5 days a week      Wt Readings from Last 1 Encounters:   21 88.5 kg (195 lb)        Anesthesia Evaluation   Pt has had prior anesthetic.     No history of anesthetic complications       ROS/MED HX  ENT/Pulmonary:  - neg pulmonary ROS     Neurologic:  - neg neurologic ROS     Cardiovascular:     (+) hypertension--CAD --stent-    METS/Exercise Tolerance:     Hematologic:  - neg hematologic  ROS     Musculoskeletal:  - neg musculoskeletal ROS     GI/Hepatic:     (+) GERD, Asymptomatic on medication,     Renal/Genitourinary:  - neg Renal ROS     Endo:  - neg endo ROS     Psychiatric/Substance Use:  - neg psychiatric ROS     Infectious Disease:  - neg infectious disease ROS     Malignancy:       Other:               OUTSIDE LABS:  CBC: No results found for: WBC, HGB, HCT, PLT  BMP: No results found for: NA, POTASSIUM,  CHLORIDE, CO2, BUN, CR, GLC  COAGS: No results found for: PTT, INR, FIBR  POC: No results found for: BGM, HCG, HCGS  HEPATIC: No results found for: ALBUMIN, PROTTOTAL, ALT, AST, GGT, ALKPHOS, BILITOTAL, BILIDIRECT, SALINAS  OTHER: No results found for: PH, LACT, A1C, CRISS, PHOS, MAG, LIPASE, AMYLASE, TSH, T4, T3, CRP, SED    Anesthesia Plan    ASA Status:  3   NPO Status:  NPO Appropriate    Anesthesia Type: MAC.     - Reason for MAC: straight local not clinically adequate   Induction: Intravenous.   Maintenance: TIVA.        Consents    Anesthesia Plan(s) and associated risks, benefits, and realistic alternatives discussed. Questions answered and patient/representative(s) expressed understanding.     - Discussed with:  Patient      - Extended Intubation/Ventilatory Support Discussed: No.      - Patient is DNR/DNI Status: No    Use of blood products discussed: No .     Postoperative Care    Pain management: IV analgesics, Oral pain medications, Multi-modal analgesia.        Comments:         H&P reviewed: Unable to attach H&P to encounter due to EHR limitations. H&P Update: appropriate H&P reviewed, patient examined. No interval changes since H&P (within 30 days).         Daniel Meade MD

## 2021-07-02 NOTE — PROGRESS NOTES
POD#0, status post cataract surgery, RIGHT eye    No complaints.  Denies eye pain.      Impression/Plan:  Pseudophakia, OD: POD0, good post-operative appearance. IOP reasonable.      Eye protection at all times and eye shield at night for 1 week.    Limited activities with no exercise or heavy lifting for 1 week.    Instructed patient to contact us for decreasing vision, eye pain, new floaters or flashes of light or other concerning symptoms.    Written instructions given    Return to clinic as scheduled.    Shlomo Ley MD  Ophthalmology PGY-4    Teaching statement:  Complete documentation of historical and exam elements from today's encounter can be found in the full encounter summary report (not reduplicated in this progress note). I personally obtained the chief complaint(s) and history of present illness.  I confirmed and edited as necessary the review of systems, past medical/surgical history, family history, social history, and examination findings as documented by others; and I examined the patient myself. I personally reviewed the relevant tests, images, and reports as documented above.     I formulated and edited as necessary the assessment and plan and discussed the findings and management plan with the patient and family.    Justa Liz MD  Comprehensive Ophthalmology & Ocular Pathology  Department of Ophthalmology and Visual Neurosciences  [email or web address]  Pager [phone]

## 2021-07-02 NOTE — ANESTHESIA CARE TRANSFER NOTE
Patient: Reinaldo Gordillo    Procedure(s):  RIGHT EYE CATARACT REMOVAL WITH INTRAOCULAR LENS IMPLANT    Diagnosis: Nuclear sclerotic cataract of both eyes [H25.13]  Diagnosis Additional Information: No value filed.    Anesthesia Type:   MAC     Note:    Oropharynx: oropharynx clear of all foreign objects and spontaneously breathing  Level of Consciousness: awake  Oxygen Supplementation: room air    Independent Airway: airway patency satisfactory and stable  Dentition: dentition unchanged      Patient transferred to: Phase II    Handoff Report: Identifed the Patient, Identified the Reponsible Provider, Reviewed the pertinent medical history, Discussed the surgical course, Reviewed Intra-OP anesthesia mangement and issues during anesthesia, Set expectations for post-procedure period and Allowed opportunity for questions and acknowledgement of understanding      Vitals: (Last set prior to Anesthesia Care Transfer)  CRNA VITALS  7/2/2021 0804 - 7/2/2021 0904      7/2/2021             Resp Rate (set):  10        Electronically Signed By: SARITA Muñoz CRNA  July 2, 2021  9:31 AM

## 2021-07-02 NOTE — ANESTHESIA POSTPROCEDURE EVALUATION
Patient: Reinaldo Gordillo    Procedure(s):  RIGHT EYE CATARACT REMOVAL WITH INTRAOCULAR LENS IMPLANT    Diagnosis:Nuclear sclerotic cataract of both eyes [H25.13]  Diagnosis Additional Information: No value filed.    Anesthesia Type:  MAC    Note:  Disposition: Outpatient   Postop Pain Control: Uneventful            Sign Out: Well controlled pain   PONV: No   Neuro/Psych: Uneventful            Sign Out: Acceptable/Baseline neuro status   Airway/Respiratory: Uneventful            Sign Out: Acceptable/Baseline resp. status   CV/Hemodynamics: Uneventful            Sign Out: Acceptable CV status; No obvious hypovolemia; No obvious fluid overload   Other NRE: NONE   DID A NON-ROUTINE EVENT OCCUR? No           Last vitals:  Vitals:    07/02/21 0837 07/02/21 0850 07/02/21 0905   BP: 125/67 116/57 109/64   Pulse:      Resp: 18 18 20   Temp: 36.3  C (97.4  F)  36.5  C (97.7  F)   SpO2: 99% 99% 99%       Last vitals prior to Anesthesia Care Transfer:  CRNA VITALS  7/2/2021 0804 - 7/2/2021 0904      7/2/2021             Resp Rate (set):  10          Electronically Signed By: Daniel Meade MD  July 2, 2021  10:03 AM

## 2021-07-02 NOTE — PROCEDURES
Ophthalmology Post-op Procedure Note    Surgical Service:    Ophthalmology & Visual Sciences  Date Performed:      July 2, 2021  [facility]      Pre-operative Diagnosis: Visually significant cataract, right eye  Post-operative Diagnosis:  Pseudophakia, right eye  Operative Procedure:  Phacoemulsification with intraocular lens implantation, right eye      Surgeon(s):  Fellow/Staff Surgeon:       Justa Liz MD  Resident Surgeon:            Flor Ley MD    Anesthesia:   Topical/MAC  Findings:  No unusual findings   Blood Loss:    Minimal  Implants:  ZCB00 16.5 intraocular lens  Specimens:  None     Complications:  The patient did not experience any complications.   Condition: Stable    Operative Report Completion:    Description of Operation/Procedure:  After appropriate informed consent was obtained, the patient was brought to the operating room. The appropriate cardiac and blood pressure monitors were placed. A final pause occurred just before the start of the procedure during which the entire procedure team actively confirmed the correct patient, procedure, site, special equipment and special requirements. The patient was prepped and draped in the usual sterile fashion using 5% povidone/iodine.     An eyelid speculum was placed to open the eyelids. A paracentesis port was placed approximately sixty degrees to the left of the planned temporal incision location using the sideport blade. Approximately 0.8 cc of 1% nonpreserved lidocaine was placed into the anterior chamber. The anterior chamber was filled with dispersive viscoelastic. A clear corneal temporal incision was made with a metal 2.6 mm keratome. A round continuous tear capsulorhexis was initiated with a cystotome and completed with the Utrata forceps. Balanced salt solution on a cannula was used to perform hydrodissection. The nucleus was removed using phacoemulsification with a chop technique. The remaining cortical material was removed  using the irrigation/aspiration handpiece. The capsular bag was filled with dispersive viscoelastic. A lens of the  model and power listed above was placed into the capsular bag using the cartridge injection system. The remaining viscoelastic was removed using the irrigation aspiration handpiece. The paracentesis and temporal wounds were hydrated with balanced salt solution. Intracameral moxifloxacin was administered. At the conclusion of the case, the wounds were felt to be watertight, the pupil was round, the lens was centered, and the anterior chamber was deep. A few drops of antibiotic and prednisolone were given to the operative eye. The eyelid speculum was removed. A shield was placed over the operative eye.      Attending Attestation:  I was present for and performed the entire procedure.  Justa Liz MD

## 2021-07-05 DIAGNOSIS — Z20.822 COVID-19 RULED OUT: Primary | ICD-10-CM

## 2021-07-05 DIAGNOSIS — Z20.822 COVID-19 RULED OUT: ICD-10-CM

## 2021-07-05 PROCEDURE — U0003 INFECTIOUS AGENT DETECTION BY NUCLEIC ACID (DNA OR RNA); SEVERE ACUTE RESPIRATORY SYNDROME CORONAVIRUS 2 (SARS-COV-2) (CORONAVIRUS DISEASE [COVID-19]), AMPLIFIED PROBE TECHNIQUE, MAKING USE OF HIGH THROUGHPUT TECHNOLOGIES AS DESCRIBED BY CMS-2020-01-R: HCPCS | Performed by: PATHOLOGY

## 2021-07-05 PROCEDURE — U0005 INFEC AGEN DETEC AMPLI PROBE: HCPCS | Performed by: PATHOLOGY

## 2021-07-06 DIAGNOSIS — H25.812 COMBINED FORM OF AGE-RELATED CATARACT, LEFT EYE: Primary | ICD-10-CM

## 2021-07-06 RX ORDER — PROPARACAINE HYDROCHLORIDE 5 MG/ML
1 SOLUTION/ DROPS OPHTHALMIC ONCE
Status: CANCELLED | OUTPATIENT
Start: 2021-07-06 | End: 2021-07-06

## 2021-07-06 RX ORDER — OFLOXACIN 3 MG/ML
SOLUTION/ DROPS OPHTHALMIC
Qty: 5 ML | Refills: 0 | Status: SHIPPED | OUTPATIENT
Start: 2021-07-06 | End: 2021-07-19

## 2021-07-06 RX ORDER — PREDNISOLONE ACETATE 10 MG/ML
SUSPENSION/ DROPS OPHTHALMIC
Qty: 5 ML | Refills: 1 | Status: SHIPPED | OUTPATIENT
Start: 2021-07-06 | End: 2021-12-03

## 2021-07-06 RX ORDER — DICLOFENAC SODIUM 1 MG/ML
1 SOLUTION/ DROPS OPHTHALMIC
Status: CANCELLED | OUTPATIENT
Start: 2021-07-06

## 2021-07-06 RX ORDER — OFLOXACIN 3 MG/ML
1 SOLUTION/ DROPS OPHTHALMIC
Status: CANCELLED | OUTPATIENT
Start: 2021-07-06

## 2021-07-06 RX ORDER — CYCLOPENTOLAT/TROPIC/PHENYLEPH 1%-1%-2.5%
1 DROPS (EA) OPHTHALMIC (EYE)
Status: CANCELLED | OUTPATIENT
Start: 2021-07-06

## 2021-07-08 ENCOUNTER — ANESTHESIA EVENT (OUTPATIENT)
Dept: SURGERY | Facility: AMBULATORY SURGERY CENTER | Age: 76
End: 2021-07-08
Payer: COMMERCIAL

## 2021-07-08 RX ORDER — NALOXONE HYDROCHLORIDE 0.4 MG/ML
0.2 INJECTION, SOLUTION INTRAMUSCULAR; INTRAVENOUS; SUBCUTANEOUS
Status: CANCELLED | OUTPATIENT
Start: 2021-07-08

## 2021-07-08 RX ORDER — ACETAMINOPHEN 325 MG/1
975 TABLET ORAL ONCE
Status: CANCELLED | OUTPATIENT
Start: 2021-07-08 | End: 2021-07-08

## 2021-07-08 RX ORDER — SODIUM CHLORIDE, SODIUM LACTATE, POTASSIUM CHLORIDE, CALCIUM CHLORIDE 600; 310; 30; 20 MG/100ML; MG/100ML; MG/100ML; MG/100ML
INJECTION, SOLUTION INTRAVENOUS CONTINUOUS
Status: CANCELLED | OUTPATIENT
Start: 2021-07-08

## 2021-07-08 RX ORDER — MEPERIDINE HYDROCHLORIDE 25 MG/ML
12.5 INJECTION INTRAMUSCULAR; INTRAVENOUS; SUBCUTANEOUS
Status: CANCELLED | OUTPATIENT
Start: 2021-07-08

## 2021-07-08 RX ORDER — ONDANSETRON 4 MG/1
4 TABLET, ORALLY DISINTEGRATING ORAL EVERY 30 MIN PRN
Status: CANCELLED | OUTPATIENT
Start: 2021-07-08

## 2021-07-08 RX ORDER — NALOXONE HYDROCHLORIDE 0.4 MG/ML
0.4 INJECTION, SOLUTION INTRAMUSCULAR; INTRAVENOUS; SUBCUTANEOUS
Status: CANCELLED | OUTPATIENT
Start: 2021-07-08

## 2021-07-08 RX ORDER — ONDANSETRON 2 MG/ML
4 INJECTION INTRAMUSCULAR; INTRAVENOUS EVERY 30 MIN PRN
Status: CANCELLED | OUTPATIENT
Start: 2021-07-08

## 2021-07-09 ENCOUNTER — ANESTHESIA (OUTPATIENT)
Dept: SURGERY | Facility: AMBULATORY SURGERY CENTER | Age: 76
End: 2021-07-09
Payer: COMMERCIAL

## 2021-07-09 ENCOUNTER — HOSPITAL ENCOUNTER (OUTPATIENT)
Facility: AMBULATORY SURGERY CENTER | Age: 76
End: 2021-07-09
Attending: OPHTHALMOLOGY
Payer: COMMERCIAL

## 2021-07-09 ENCOUNTER — OFFICE VISIT (OUTPATIENT)
Dept: OPHTHALMOLOGY | Facility: CLINIC | Age: 76
End: 2021-07-09

## 2021-07-09 VITALS
TEMPERATURE: 97.3 F | DIASTOLIC BLOOD PRESSURE: 61 MMHG | RESPIRATION RATE: 16 BRPM | BODY MASS INDEX: 26.55 KG/M2 | HEART RATE: 59 BPM | WEIGHT: 196 LBS | HEIGHT: 72 IN | OXYGEN SATURATION: 97 % | SYSTOLIC BLOOD PRESSURE: 114 MMHG

## 2021-07-09 DIAGNOSIS — H25.13 NUCLEAR SCLEROTIC CATARACT OF BOTH EYES: ICD-10-CM

## 2021-07-09 DIAGNOSIS — H25.12 AGE-RELATED NUCLEAR CATARACT, LEFT: ICD-10-CM

## 2021-07-09 DIAGNOSIS — Z96.1 PSEUDOPHAKIA, BOTH EYES: Primary | ICD-10-CM

## 2021-07-09 PROCEDURE — 99024 POSTOP FOLLOW-UP VISIT: CPT | Mod: GC | Performed by: STUDENT IN AN ORGANIZED HEALTH CARE EDUCATION/TRAINING PROGRAM

## 2021-07-09 PROCEDURE — 66984 XCAPSL CTRC RMVL W/O ECP: CPT | Mod: LT

## 2021-07-09 DEVICE — EYE IMP IOL AMO PCL TECNIS ZCB00 17.5: Type: IMPLANTABLE DEVICE | Site: EYE | Status: FUNCTIONAL

## 2021-07-09 RX ORDER — TETRACAINE HYDROCHLORIDE 5 MG/ML
SOLUTION OPHTHALMIC PRN
Status: DISCONTINUED | OUTPATIENT
Start: 2021-07-09 | End: 2021-07-09 | Stop reason: HOSPADM

## 2021-07-09 RX ORDER — ACETAMINOPHEN 325 MG/1
975 TABLET ORAL ONCE
Status: COMPLETED | OUTPATIENT
Start: 2021-07-09 | End: 2021-07-09

## 2021-07-09 RX ORDER — SODIUM CHLORIDE, SODIUM LACTATE, POTASSIUM CHLORIDE, CALCIUM CHLORIDE 600; 310; 30; 20 MG/100ML; MG/100ML; MG/100ML; MG/100ML
INJECTION, SOLUTION INTRAVENOUS CONTINUOUS
Status: DISCONTINUED | OUTPATIENT
Start: 2021-07-09 | End: 2021-07-10 | Stop reason: HOSPADM

## 2021-07-09 RX ORDER — FENTANYL CITRATE 50 UG/ML
INJECTION, SOLUTION INTRAMUSCULAR; INTRAVENOUS PRN
Status: DISCONTINUED | OUTPATIENT
Start: 2021-07-09 | End: 2021-07-09

## 2021-07-09 RX ORDER — DICLOFENAC SODIUM 1 MG/ML
1 SOLUTION/ DROPS OPHTHALMIC
Status: COMPLETED | OUTPATIENT
Start: 2021-07-09 | End: 2021-07-09

## 2021-07-09 RX ORDER — OFLOXACIN 3 MG/ML
1 SOLUTION/ DROPS OPHTHALMIC
Status: COMPLETED | OUTPATIENT
Start: 2021-07-09 | End: 2021-07-09

## 2021-07-09 RX ORDER — PROPARACAINE HYDROCHLORIDE 5 MG/ML
1 SOLUTION/ DROPS OPHTHALMIC ONCE
Status: COMPLETED | OUTPATIENT
Start: 2021-07-09 | End: 2021-07-09

## 2021-07-09 RX ORDER — MOXIFLOXACIN IN NACL,ISO-OS/PF 0.3MG/0.3
SYRINGE (ML) INTRAOCULAR PRN
Status: DISCONTINUED | OUTPATIENT
Start: 2021-07-09 | End: 2021-07-09 | Stop reason: HOSPADM

## 2021-07-09 RX ORDER — NALOXONE HYDROCHLORIDE 0.4 MG/ML
0.2 INJECTION, SOLUTION INTRAMUSCULAR; INTRAVENOUS; SUBCUTANEOUS
Status: DISCONTINUED | OUTPATIENT
Start: 2021-07-09 | End: 2021-07-10 | Stop reason: HOSPADM

## 2021-07-09 RX ORDER — NALOXONE HYDROCHLORIDE 0.4 MG/ML
0.4 INJECTION, SOLUTION INTRAMUSCULAR; INTRAVENOUS; SUBCUTANEOUS
Status: DISCONTINUED | OUTPATIENT
Start: 2021-07-09 | End: 2021-07-10 | Stop reason: HOSPADM

## 2021-07-09 RX ORDER — SODIUM CHLORIDE, SODIUM LACTATE, POTASSIUM CHLORIDE, CALCIUM CHLORIDE 600; 310; 30; 20 MG/100ML; MG/100ML; MG/100ML; MG/100ML
INJECTION, SOLUTION INTRAVENOUS CONTINUOUS PRN
Status: DISCONTINUED | OUTPATIENT
Start: 2021-07-09 | End: 2021-07-09

## 2021-07-09 RX ORDER — LIDOCAINE HYDROCHLORIDE 10 MG/ML
INJECTION, SOLUTION EPIDURAL; INFILTRATION; INTRACAUDAL; PERINEURAL PRN
Status: DISCONTINUED | OUTPATIENT
Start: 2021-07-09 | End: 2021-07-09 | Stop reason: HOSPADM

## 2021-07-09 RX ORDER — MEPERIDINE HYDROCHLORIDE 25 MG/ML
12.5 INJECTION INTRAMUSCULAR; INTRAVENOUS; SUBCUTANEOUS
Status: DISCONTINUED | OUTPATIENT
Start: 2021-07-09 | End: 2021-07-10 | Stop reason: HOSPADM

## 2021-07-09 RX ORDER — CYCLOPENTOLAT/TROPIC/PHENYLEPH 1%-1%-2.5%
1 DROPS (EA) OPHTHALMIC (EYE)
Status: COMPLETED | OUTPATIENT
Start: 2021-07-09 | End: 2021-07-09

## 2021-07-09 RX ORDER — ONDANSETRON 4 MG/1
4 TABLET, ORALLY DISINTEGRATING ORAL EVERY 30 MIN PRN
Status: DISCONTINUED | OUTPATIENT
Start: 2021-07-09 | End: 2021-07-10 | Stop reason: HOSPADM

## 2021-07-09 RX ORDER — BALANCED SALT SOLUTION 6.4; .75; .48; .3; 3.9; 1.7 MG/ML; MG/ML; MG/ML; MG/ML; MG/ML; MG/ML
SOLUTION OPHTHALMIC PRN
Status: DISCONTINUED | OUTPATIENT
Start: 2021-07-09 | End: 2021-07-09 | Stop reason: HOSPADM

## 2021-07-09 RX ORDER — ONDANSETRON 2 MG/ML
4 INJECTION INTRAMUSCULAR; INTRAVENOUS EVERY 30 MIN PRN
Status: DISCONTINUED | OUTPATIENT
Start: 2021-07-09 | End: 2021-07-10 | Stop reason: HOSPADM

## 2021-07-09 RX ADMIN — DICLOFENAC SODIUM 1 DROP: 1 SOLUTION/ DROPS OPHTHALMIC at 07:14

## 2021-07-09 RX ADMIN — Medication 1 DROP: at 07:17

## 2021-07-09 RX ADMIN — PROPARACAINE HYDROCHLORIDE 1 DROP: 5 SOLUTION/ DROPS OPHTHALMIC at 07:10

## 2021-07-09 RX ADMIN — SODIUM CHLORIDE, SODIUM LACTATE, POTASSIUM CHLORIDE, CALCIUM CHLORIDE: 600; 310; 30; 20 INJECTION, SOLUTION INTRAVENOUS at 07:49

## 2021-07-09 RX ADMIN — OFLOXACIN 1 DROP: 3 SOLUTION/ DROPS OPHTHALMIC at 07:15

## 2021-07-09 RX ADMIN — ACETAMINOPHEN 975 MG: 325 TABLET ORAL at 07:10

## 2021-07-09 RX ADMIN — OFLOXACIN 1 DROP: 3 SOLUTION/ DROPS OPHTHALMIC at 07:16

## 2021-07-09 RX ADMIN — Medication 1 DROP: at 07:16

## 2021-07-09 RX ADMIN — FENTANYL CITRATE 50 MCG: 50 INJECTION, SOLUTION INTRAMUSCULAR; INTRAVENOUS at 07:52

## 2021-07-09 RX ADMIN — DICLOFENAC SODIUM 1 DROP: 1 SOLUTION/ DROPS OPHTHALMIC at 07:11

## 2021-07-09 RX ADMIN — OFLOXACIN 1 DROP: 3 SOLUTION/ DROPS OPHTHALMIC at 07:12

## 2021-07-09 RX ADMIN — DICLOFENAC SODIUM 1 DROP: 1 SOLUTION/ DROPS OPHTHALMIC at 07:15

## 2021-07-09 RX ADMIN — Medication 1 DROP: at 07:10

## 2021-07-09 ASSESSMENT — VISUAL ACUITY: METHOD: CF

## 2021-07-09 ASSESSMENT — TONOMETRY
IOP_METHOD: TONOPEN
OS_IOP_MMHG: 15

## 2021-07-09 ASSESSMENT — MIFFLIN-ST. JEOR: SCORE: 1667.05

## 2021-07-09 ASSESSMENT — SLIT LAMP EXAM - LIDS: COMMENTS: NORMAL

## 2021-07-09 NOTE — ANESTHESIA POSTPROCEDURE EVALUATION
Patient: Reinaldo Gordillo    Procedure(s):  LEFT EYE CATARACT REMOVAL WITH INTRAOCULAR LENS IMPLANT    Diagnosis:Nuclear sclerotic cataract of both eyes [H25.13]  Diagnosis Additional Information: No value filed.    Anesthesia Type:  MAC    Note:  Disposition: Outpatient   Postop Pain Control: Uneventful            Sign Out: Well controlled pain   PONV: No   Neuro/Psych: Uneventful            Sign Out: Acceptable/Baseline neuro status   Airway/Respiratory: Uneventful            Sign Out: Acceptable/Baseline resp. status   CV/Hemodynamics: Uneventful            Sign Out: Acceptable CV status; No obvious hypovolemia; No obvious fluid overload   Other NRE: NONE   DID A NON-ROUTINE EVENT OCCUR? No           Last vitals:  Vitals:    07/09/21 0645 07/09/21 0830 07/09/21 0845   BP: 129/70 123/64 114/61   Pulse: 68 57 59   Resp: 18 16 16   Temp: 36.3  C (97.3  F) 36.6  C (97.9  F) 36.3  C (97.3  F)   SpO2: 96% 96% 97%       Last vitals prior to Anesthesia Care Transfer:  CRNA VITALS  7/9/2021 0756 - 7/9/2021 0856      7/9/2021             Pulse:  53    Ht Rate:  54    SpO2:  99 %    Resp Rate (set):  10          Electronically Signed By: SHANTELL LR MD  July 9, 2021  10:03 AM

## 2021-07-09 NOTE — ANESTHESIA CARE TRANSFER NOTE
Patient: Reinaldo Gordillo    Procedure(s):  LEFT EYE CATARACT REMOVAL WITH INTRAOCULAR LENS IMPLANT    Diagnosis: Nuclear sclerotic cataract of both eyes [H25.13]  Diagnosis Additional Information: No value filed.    Anesthesia Type:   MAC     Note:    Oropharynx: spontaneously breathing  Level of Consciousness: awake  Oxygen Supplementation: room air    Independent Airway: airway patency satisfactory and stable  Dentition: dentition unchanged  Vital Signs Stable: post-procedure vital signs reviewed and stable  Report to RN Given: handoff report given  Patient transferred to: Phase II    Handoff Report: Identifed the Patient, Identified the Reponsible Provider, Reviewed the pertinent medical history, Discussed the surgical course, Reviewed Intra-OP anesthesia mangement and issues during anesthesia, Set expectations for post-procedure period and Allowed opportunity for questions and acknowledgement of understanding      Vitals: (Last set prior to Anesthesia Care Transfer)  CRNA VITALS  7/9/2021 0756 - 7/9/2021 0828      7/9/2021             Pulse:  53    Ht Rate:  54    SpO2:  99 %    Resp Rate (set):  10        Electronically Signed By: SARITA Yan CRNA  July 9, 2021  8:28 AM

## 2021-07-09 NOTE — ANESTHESIA PREPROCEDURE EVALUATION
Anesthesia Pre-Procedure Evaluation    Patient: Reinaldo Gordillo   MRN: [id number] : 1945        Preoperative Diagnosis: Nuclear sclerotic cataract of both eyes [H25.13]   Procedure : Procedure(s):  LEFT EYE CATARACT REMOVAL WITH INTRAOCULAR LENS IMPLANT     Past Medical History:   Diagnosis Date     ASCVD (arteriosclerotic cardiovascular disease)      Coronary artery disease      Gastroesophageal reflux disease      Hypertension      Stented coronary artery       Past Surgical History:   Procedure Laterality Date     COLONOSCOPY N/A 2019    Procedure: COLONOSCOPY;  Surgeon: Roque Givens MD;  Location: [facility] OR     ESOPHAGOSCOPY, GASTROSCOPY, DUODENOSCOPY (EGD), COMBINED N/A 2019    Procedure: ESOPHAGOGASTRODUODENOSCOPY, WITH BIOPSY;  Surgeon: Roque Givens MD;  Location: [facility] OR     HERNIA REPAIR  2000     MOHS MICROGRAPHIC PROCEDURE       PHACOEMULSIFICATION CLEAR CORNEA WITH STANDARD INTRAOCULAR LENS IMPLANT Right 2021    Procedure: RIGHT EYE CATARACT REMOVAL WITH INTRAOCULAR LENS IMPLANT;  Surgeon: Justa Liz MD;  Location: [facility] OR     [facility] CORONARY STENT PERCUT, EA ADDTL VESSEL        No Known Allergies   Social History     Tobacco Use     Smoking status: Never Smoker     Smokeless tobacco: Never Used   Substance Use Topics     Alcohol use: Yes     Comment: 1-2 drinks 5 days a week      Wt Readings from Last 1 Encounters:   21 88.9 kg (196 lb)        Anesthesia Evaluation            ROS/MED HX  ENT/Pulmonary:       Neurologic:       Cardiovascular: Comment: RCA stent     (+) Dyslipidemia hypertension--CAD --stent-. Previous cardiac testing   Echo: Date: Results:    Stress Test: Date: Results:    ECG Reviewed: Date: 2020 Results:  SR, incomplete RBBB  Cath: Date: Results:      METS/Exercise Tolerance:     Hematologic:       Musculoskeletal:       GI/Hepatic:     (+) GERD, Asymptomatic on medication,     Renal/Genitourinary:       Endo:        Psychiatric/Substance Use:       Infectious Disease:       Malignancy:       Other:               OUTSIDE LABS:  CBC: No results found for: WBC, HGB, HCT, PLT  BMP: No results found for: NA, POTASSIUM, CHLORIDE, CO2, BUN, CR, GLC  COAGS: No results found for: PTT, INR, FIBR  POC: No results found for: BGM, HCG, HCGS  HEPATIC: No results found for: ALBUMIN, PROTTOTAL, ALT, AST, GGT, ALKPHOS, BILITOTAL, BILIDIRECT, SALINAS  OTHER: No results found for: PH, LACT, A1C, CRISS, PHOS, MAG, LIPASE, AMYLASE, TSH, T4, T3, CRP, SED    Anesthesia Plan    ASA Status:  3   NPO Status:  NPO Appropriate    Anesthesia Type: MAC.     - Reason for MAC: straight local not clinically adequate   Induction: Intravenous.   Maintenance: TIVA.        Consents    Anesthesia Plan(s) and associated risks, benefits, and realistic alternatives discussed. Questions answered and patient/representative(s) expressed understanding.     - Discussed with:  Patient      - Extended Intubation/Ventilatory Support Discussed: No.      - Patient is DNR/DNI Status: No    Use of blood products discussed: No .     Postoperative Care    Pain management: Multi-modal analgesia.   PONV prophylaxis: Ondansetron (or other 5HT-3)     Comments:         H&P reviewed: Unable to attach H&P to encounter due to EHR limitations. H&P Update: appropriate H&P reviewed, patient examined. No interval changes since H&P (within 30 days).         SHANTELL LR MD

## 2021-07-09 NOTE — PROCEDURES
Ophthalmology Post-op Procedure Note    Surgical Service:    Ophthalmology & Visual Sciences  Date Performed:      July 9, 2021  Location: [facility]      Pre-operative Diagnosis: Visually significant cataract, left eye  Post-operative Diagnosis:  Pseudophakia, left eye  Operative Procedure:  Phacoemulsification with intraocular lens implantation, left eye    Surgeon(s):  Fellow/Staff Surgeon:       Justa Liz MD  Resident Surgeon:            Elsy Seymour MD    Anesthesia:   Topical/MAC  Findings:  No unusual findings   Blood Loss:    Minimal  Implants:  ZCB00 17.5 intraocular lens  Specimens:  None     Complications:  The patient did not experience any complications.   Condition: Stable    Operative Report Completion:    Description of Operation/Procedure:    After appropriate informed consent was obtained, the patient was brought to the operating room. The appropriate cardiac and blood pressure monitors were placed. A final pause occurred just before the start of the procedure during which the entire procedure team actively confirmed the correct patient, procedure, site, special equipment and special requirements. The patient was prepped and draped in the usual sterile fashion using 5% povidone/iodine.     An eyelid speculum was placed to open the eyelids. A paracentesis port was placed approximately sixty degrees to the left of the planned temporal incision location using the sideport blade. Approximately 0.8 cc of 1% nonpreserved lidocaine was placed into the anterior chamber. The anterior chamber was filled with dispersive viscoelastic. A clear corneal temporal incision was made with a metal 2.6 mm keratome. A round continuous tear capsulorhexis was initiated with a cystotome and completed with the Utrata forceps. Balanced salt solution on a cannula was used to perform hydrodissection. The nucleus was removed using phacoemulsification with a chop technique. The remaining cortical material was removed  using the irrigation/aspiration handpiece. The capsular bag was filled with dispersive viscoelastic. A lens of the  model and power listed above was placed into the capsular bag using the cartridge injection system. The remaining viscoelastic was removed using the irrigation aspiration handpiece. The paracentesis and temporal wounds were hydrated with balanced salt solution. Intracameral moxifloxacin was administered. At the conclusion of the case, the wounds were felt to be watertight, the pupil was round, the lens was centered, and the anterior chamber was deep. A few drops of antibiotic and prednisolone were given to the operative eye. The eyelid speculum was removed. A shield was placed over the operative eye.    Attending Attestation:  I was present for and performed the entire procedure.  Justa Liz MD

## 2021-07-09 NOTE — DISCHARGE INSTRUCTIONS
[facility]  Home Care Following Anesthesia  For 24 hours after surgery:  1. Get plenty of rest.  A responsible adult must stay with you for at least 24 hours after you leave the surgery center.  2. Do not drive or use heavy equipment.  If you have weakness or tingling, don't drive or use heavy equipment until this feeling goes away.   3. Do not drink alcohol.   4. Avoid strenuous or risky activities.  Ask for help when climbing stairs.  5. You may feel lightheaded.  IF so, sit for a few minutes before standing.  Have someone help you get up.   6. If you have nausea (feel sick to your stomach): Drink only clear liquids such as apple juice, ginger ale, broth or 7-Up.  Rest may also help.  Be sure to drink enough fluids.  Move to a regular diet as you feel able.   7. You may have a slight fever.  Call the doctor if your fever is over 100 F (37.7 C) (taken under the tongue) or lasts longer than 24 hours.  8. You may have a dry mouth, a sore throat, muscle aches or trouble sleeping. These should go away after 24 hours.  9. Do not make important or legal decisions.   10. It is recommended to avoid smoking.               Tips for taking pain medications  To get the best pain relief possible, remember these points:    Take pain medications as directed, before pain becomes severe.    Pain medication can upset your stomach: taking it with food may help.    Constipation is a common side effect of pain medication. Drink plenty of  fluids.    Eat foods high in fiber. Take a stool softener if recommended by your doctor or pharmacist.    Do not drink alcohol, drive or operate machinery while taking pain medications.    Ask about other ways to control pain, such as with heat, ice or relaxation.    Tylenol/Acetaminophen Consumption  To help encourage the safe use of acetaminophen, the makers of TYLENOL  have lowered the maximum daily dose for single-ingredient Extra Strength TYLENOL   (acetaminophen) products sold in the [place] from 8 pills per day (4,000 mg) to 6 pills per day (3,000 mg). The dosing interval has also changed from 2 pills every 4-6 hours to 2 pills every 6 hours.    If you feel your pain relief is insufficient, you may take Tylenol/Acetaminophen in addition to your narcotic pain medication.     Be careful not to exceed 3,000 mg of Tylenol/Acetaminophen in a 24 hour period from all sources.    If you are taking extra strength Tylenol/acetaminophen (500 mg), the maximum dose is 6 tablets in 24 hours.    If you are taking regular strength acetaminophen (325 mg), the maximum dose is 9 tablets in 24 hours.    You received 975 mg of Tylenol at 7:10 am.  Your next dose is available after 1:10 pm.  Then follow the directions on the package.    Call a doctor for any of the followin. Signs of infection (fever, growing tenderness at the surgery site, a large amount of drainage or bleeding, severe pain, foul-smelling drainage, redness, swelling).  2. It has been over 8 to 10 hours since surgery and you are still not able to urinate (pass water).  3. Headache for over 24 hours.  4. Signs of Covid-19 infection (temperature over 100 degrees, shortness of breath, cough, loss of taste/smell, generalized body aches, persistent headache, chills, sore throat, nausea/vomiting/diarrhea)    Your doctor is:       Dr. Justa Liz, Ophthalmology: [phone]               After Hours and Weekends dial: [phone] and ask for the resident on call for:  Ophthalmology  For emergency care, call the:  [facility]:  [phone] (TTY for hearing impaired: [phone])

## 2021-07-19 ENCOUNTER — OFFICE VISIT (OUTPATIENT)
Dept: OPHTHALMOLOGY | Facility: CLINIC | Age: 76
End: 2021-07-19
Attending: OPHTHALMOLOGY
Payer: COMMERCIAL

## 2021-07-19 DIAGNOSIS — H52.4 PRESBYOPIA: ICD-10-CM

## 2021-07-19 DIAGNOSIS — Z96.1 PSEUDOPHAKIA, BOTH EYES: Primary | ICD-10-CM

## 2021-07-19 PROCEDURE — 99024 POSTOP FOLLOW-UP VISIT: CPT | Mod: GC | Performed by: OPHTHALMOLOGY

## 2021-07-19 PROCEDURE — G0463 HOSPITAL OUTPT CLINIC VISIT: HCPCS | Mod: 25

## 2021-07-19 PROCEDURE — 92015 DETERMINE REFRACTIVE STATE: CPT

## 2021-07-19 ASSESSMENT — CUP TO DISC RATIO
OD_RATIO: 0.3
OS_RATIO: 0.3

## 2021-07-19 ASSESSMENT — REFRACTION_MANIFEST
OD_AXIS: 040
OS_SPHERE: +0.25
OD_ADD: +2.00
OS_AXIS: 130
OS_ADD: +2.00
OD_SPHERE: PLANO
OS_CYLINDER: +0.25
OD_CYLINDER: +0.75

## 2021-07-19 ASSESSMENT — VISUAL ACUITY
OS_SC: 20/20
OD_SC+: -2
OS_SC+: -2
METHOD: SNELLEN - LINEAR
OD_PH_SC: 20/20
OD_SC: 20/30

## 2021-07-19 ASSESSMENT — EXTERNAL EXAM - LEFT EYE: OS_EXAM: NORMAL

## 2021-07-19 ASSESSMENT — CONF VISUAL FIELD
OD_NORMAL: 1
OS_NORMAL: 1
METHOD: COUNTING FINGERS

## 2021-07-19 ASSESSMENT — SLIT LAMP EXAM - LIDS
COMMENTS: NORMAL
COMMENTS: NORMAL

## 2021-07-19 ASSESSMENT — EXTERNAL EXAM - RIGHT EYE: OD_EXAM: NORMAL

## 2021-07-19 ASSESSMENT — TONOMETRY
OD_IOP_MMHG: 15
OS_IOP_MMHG: 15
IOP_METHOD: TONOPEN

## 2021-07-19 NOTE — PROGRESS NOTES
HPI  Reinaldo Gordillo is a 76 year old male here for follow-up after CE/IOL now both eyes. He notes good distance vision, but is getting used to needing readers up close. No pain, redness, discharge. No new flashes/floaters. Using post-operative drops as directed.    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  Plan: Complete drop taper.    (H52.4) Presbyopia  Comment: Good uncorrected distance acuity.   Plan:  Ok to continue with OTC readers. Glasses Rx also given, optional to fill.     -----------------------------------------------------------------------------------    Patient disposition:   Return in about 1 year (around 7/19/2022) for dilated eye exam, or sooner as needed.       Teaching statement:  Complete documentation of historical and exam elements from today's encounter can be found in the full encounter summary report (not reduplicated in this progress note). I personally obtained the chief complaint(s) and history of present illness.  I confirmed and edited as necessary the review of systems, past medical/surgical history, family history, social history, and examination findings as documented by others; and I examined the patient myself. I personally reviewed the relevant tests, images, and reports as documented above.     I formulated and edited as necessary the assessment and plan and discussed the findings and management plan with the patient and family.  Justa Liz MD  Comprehensive Ophthalmology & Ocular Pathology  Department of Ophthalmology and Visual Neurosciences  [email or web address]  Pager [phone]

## 2021-07-19 NOTE — NURSING NOTE
Chief Complaints and History of Present Illnesses   Patient presents with     Post Op (Ophthalmology) Both Eyes     Chief Complaint(s) and History of Present Illness(es)     Post Op (Ophthalmology) Both Eyes     Laterality: both eyes    Onset: gradual    Severity: moderate    Frequency: constantly    Associated symptoms: Negative for eye pain, dryness, discharge, itching, flashes and floaters    Pain scale: 0/10              Comments     Ed is here 10 days post cataract surgery LE (DOS, 7-9), and and 17 days post cataract surgery RE (DOS 7-2).   He states vision is good BE since surgery. He adds that reading is a little difficult.     Cesar Ramires COT 8:58 AM July 19, 2021

## 2021-08-02 ENCOUNTER — TELEPHONE (OUTPATIENT)
Dept: ENDOCRINOLOGY | Facility: CLINIC | Age: 76
End: 2021-08-02

## 2021-08-09 DIAGNOSIS — G62.9 PERIPHERAL POLYNEUROPATHY: Primary | ICD-10-CM

## 2021-09-11 ENCOUNTER — HEALTH MAINTENANCE LETTER (OUTPATIENT)
Age: 76
End: 2021-09-11

## 2021-09-22 ENCOUNTER — LAB (OUTPATIENT)
Dept: LAB | Facility: CLINIC | Age: 76
End: 2021-09-22
Payer: COMMERCIAL

## 2021-09-22 ENCOUNTER — OFFICE VISIT (OUTPATIENT)
Dept: NEUROLOGY | Facility: CLINIC | Age: 76
End: 2021-09-22
Attending: PSYCHIATRY & NEUROLOGY
Payer: COMMERCIAL

## 2021-09-22 DIAGNOSIS — M47.12 CERVICAL SPONDYLOSIS WITH MYELOPATHY: Primary | ICD-10-CM

## 2021-09-22 DIAGNOSIS — G62.9 PERIPHERAL POLYNEUROPATHY: ICD-10-CM

## 2021-09-22 LAB
TOTAL PROTEIN SERUM FOR ELP: 6.8 G/DL (ref 6.8–8.8)
VIT B12 SERPL-MCNC: 441 PG/ML (ref 193–986)

## 2021-09-22 PROCEDURE — 84155 ASSAY OF PROTEIN SERUM: CPT | Performed by: PSYCHIATRY & NEUROLOGY

## 2021-09-22 PROCEDURE — 95885 MUSC TST DONE W/NERV TST LIM: CPT | Performed by: PSYCHIATRY & NEUROLOGY

## 2021-09-22 PROCEDURE — 84165 PROTEIN E-PHORESIS SERUM: CPT | Mod: 26 | Performed by: PATHOLOGY

## 2021-09-22 PROCEDURE — 84165 PROTEIN E-PHORESIS SERUM: CPT | Mod: TC | Performed by: PATHOLOGY

## 2021-09-22 PROCEDURE — 36415 COLL VENOUS BLD VENIPUNCTURE: CPT | Performed by: PATHOLOGY

## 2021-09-22 PROCEDURE — 82607 VITAMIN B-12: CPT | Performed by: PATHOLOGY

## 2021-09-22 PROCEDURE — 86334 IMMUNOFIX E-PHORESIS SERUM: CPT | Mod: 26 | Performed by: PATHOLOGY

## 2021-09-22 PROCEDURE — 86334 IMMUNOFIX E-PHORESIS SERUM: CPT | Mod: TC | Performed by: PSYCHIATRY & NEUROLOGY

## 2021-09-22 PROCEDURE — 95913 NRV CNDJ TEST 13/> STUDIES: CPT | Performed by: PSYCHIATRY & NEUROLOGY

## 2021-09-22 NOTE — PROGRESS NOTES
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     He describes bilateral sensory distortion in the lower extremities that has affected his balance slightly but has not leading to alls or limiting his ability to walk several miles for exercise.  He had a fall in the he past year that produced some neck pain. He denies weakness. He is borderline diabetic with HbA1c of 6.0 and recent fasting glucose of 122. He is trying to work on diet and exercise to control.    No pseudoclaudication symptoms to report. Normal bladder function.     On brief exam the patient is noted to have some bruising and scars from falls on his left lateral elbow and on his right shin. He has normal muscle bulk except for the feet where there is some intrinsic foot muscle atrophy.     Reflexes are brisk in the upper extremities and at the knees. Ankles normal. Plantar response is equivocal.     Sensation to vibration is absent at the toes, early extinction at ankles and normal at knees    Techniques:  Motor and sensory conduction studies were done with surface recording electrodes. EMG was done with a concentric needle electrode.     Results:  Sensory Nerve Conduction Studies  Bilateral sural and superficial peroneal studies are absent. In the left upper extremity ulnar and median studies revealed delayed peak latency and the ulnar study revealed low amplitude.     Motor Nerve Conduction Studies:   Tibial motor studies are low amplitude bilaterally, peroneal or fibular study is within normal limits. The left median motor study  reveal a borderline distal latency and amplitude. The ulnar motor study revealed slowing at the elbow.     F Wave studies indicate that the left tibial F wave has prolonged latency (64.72 ms).  All remaining F Wave latencies were within normal limits.      Needle evaluation of the left Extensor Hallucis Longus muscle showed slightly increased motor unit amplitude, slightly increased motor unit duration, and slightly increased polyphasic potentials.  All remaining muscles (as indicated in the following table) showed no evidence of electrical instability.        Interpretation:    The EMG is abnormal. 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 It would be considered moderate in severity and there is evidence that it is axonal in nature. There may be an overlying chronic S 1 radiculopathy present as well but clinical correlation is recommended     I ordered Lab testing to look into treatable causes of neuropathy and I also ordered an MRI cervical spine in light of brisk reflexes. I will call with those results.       ___________________________  Bushra Begum MD        Nerve Conduction Studies  Motor Sites      Latency Amplitude Neg. Amp Diff Segment Distance Velocity Neg. Dur Neg Area Diff Temperature Comment   Site (ms) Norm (mV) Norm %  cm m/s Norm ms %  C    Left Fibular (EDB) Motor   Ankle 4.3  < 6.0 3.8  > 2.0  Ankle-EDB 8   7.1  31.6    Right Fibular (EDB) Motor   Ankle 3.9  < 6.0 7.1  > 2.0  Ankle-EDB 8   5.2  31    Bel Fib Head 13.4 - 6.2 - -12.7 Bel Fib Head-Ankle 39 41  > 38 4.9 -13.8 31    Pop Fossa 15.9 - 5.9 - -4.8 Pop Fossa-Bel Fib Head 11 44  > 38 5.0 -4.1 31    Left Median (APB) Motor   Wrist 4.2  < 4.2 5.0  > 5.0  Wrist-APB 8   4.4  31.8    Elbow 8.4 - 3.9 - -22.0 Elbow-Wrist 24 57  > 48 5.4 -14.6 31.8    Left Median/Ulnar (Lumb-Dors Int II) Motor        Median (Lumb I)   Wrist 3.4 - 2.5 -      5.2  31.4         Ulnar (Dorsal Int (manus))   Wrist 3.5  - 5.4 -      4.8  31.5    Left Tibial (AHB) Motor   Ankle 4.3  < 6.5 *3.2  > 4.4  Ankle-AHB 8   6.9  31    Right Tibial (AHB) Motor   Ankle 4.2  < 6.5 *3.8  > 4.4  Ankle-AHB 8   5.6  30.9    Knee 14.9 - 2.1 - -44.7 Knee-Ankle 44 41  > 38 7.5 -21.1 31    Left Ulnar (ADM) Motor   Wrist 2.9  < 3.5 9.7  > 3.0  Wrist-ADM 8   5.6  31.3    Bel Elbow 6.5 - 6.8 - -29.9 Bel Elbow-Wrist 23 64  > 48 5.8 -19.3 31.2    Abv Elbow 9.4 - 6.2 - -8.8 Abv Elbow-Bel Elbow 9 *31  > 48 5.9 -5.6 31.2      Sensory Sites      Onset Lat Neg Peak Lat Amp (O-P) Amp (P-P) Segment Distance Velocity Temperature Comment   Site ms ms  V Norm  V  cm m/s Norm  C    Left Median Sensory   Wrist-Dig II 2.5 *3.6 13  > 10 21 Wrist-Dig II 14 56  > 48 31.3    Left Radial Sensory   Forearm-Wrist 1.98 2.6 16  > 15 30 Forearm-Wrist 10 51 - 31.5    Left Superficial Fibular Sensory   14 cm-Ankle *NR *NR *NR  > 3 *NR 14 cm-Ankle 12.5 *NR  > 38 31.3    Right Superficial Fibular Sensory   14 cm-Ankle *NR *NR *NR  > 3 *NR 14 cm-Ankle 12.5 *NR  > 38 30.6    Left Sural Sensory   Calf-Lat Mall *NR *NR *NR  > 5 *NR Calf-Lat Mall 14 *NR  > 38 31.5    Right Sural Sensory   Calf-Lat Mall *NR *NR *NR  > 5 *NR Calf-Lat Mall 14 *NR  > 38 30.9    Left Ulnar Sensory   Wrist-Dig V 2.5 *3.4 *6  > 8 18 Wrist-Dig V 12.5 50  > 48 31.5        Electromyography     Side Muscle Ins Act Fibs/PSW Fasc HF Amp Dur Poly Recrt Int Pat Comment   Left Tib Anterior Nml None Nml 0 Nml Nml 0 Nml Nml    Left Gastroc Nml None Nml 0 Nml Nml 0 Nml Nml    Left Ext Stratton Long Nml None Nml 0 1+ 1+ 1+ Nml Nml    Left Vastus Lat Nml None Nml 0 Nml Nml 0 Nml Nml          NCS Waveforms:    Motor                         Sensory                           Ultrasound Images:

## 2021-09-22 NOTE — LETTER
2021       RE: Reinaldo Gordillo  [address]  [address]  [place] [address]     Dear Colleague,    Thank you for referring your patient, Reinaldo Gordillo, to the [facility] EMG CLIN[facility] [place] [facility]. Please see a copy of my visit note below.          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     He describes bilateral sensory distortion in the lower extremities that has affected his balance slightly but has not leading to alls or limiting his ability to walk several miles for exercise.  He had a fall in the he past year that produced some neck pain. He denies weakness. He is borderline diabetic with HbA1c of 6.0 and recent fasting glucose of 122. He is trying to work on diet and exercise to control.    No pseudoclaudication symptoms to report. Normal bladder function.     On brief exam the patient is noted to have some bruising and scars from falls on his left lateral elbow and on his right shin. He has normal muscle bulk except for the feet where there is some intrinsic foot muscle atrophy.     Reflexes are brisk in the upper extremities and at the knees. Ankles normal. Plantar response is equivocal.     Sensation to vibration is absent at the toes, early extinction at ankles and normal at knees    Techniques:  Motor and sensory conduction studies were done with surface recording electrodes. EMG was done with a concentric needle electrode.     Results:  Sensory Nerve Conduction Studies  Bilateral sural and superficial peroneal studies are absent. In the left upper extremity ulnar and median studies revealed delayed peak latency and the ulnar  study revealed low amplitude.     Motor Nerve Conduction Studies:   Tibial motor studies are low amplitude bilaterally, peroneal or fibular study is within normal limits. The left median motor study reveal a borderline distal latency and amplitude. The ulnar motor study revealed slowing at the elbow.     F Wave studies indicate that the left tibial F wave has prolonged latency (64.72 ms).  All remaining F Wave latencies were within normal limits.      Needle evaluation of the left Extensor Hallucis Longus muscle showed slightly increased motor unit amplitude, slightly increased motor unit duration, and slightly increased polyphasic potentials.  All remaining muscles (as indicated in the following table) showed no evidence of electrical instability.        Interpretation:    The EMG is abnormal. 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 It would be considered moderate in severity and there is evidence that it is axonal in nature. There may be an overlying chronic S 1 radiculopathy present as well but clinical correlation is recommended     I ordered Lab testing to look into treatable causes of neuropathy and I also ordered an MRI cervical spine in light of brisk reflexes. I will call with those results.       ___________________________  Bushra Begum MD    Nerve Conduction Studies  Motor Sites      Latency Amplitude Neg. Amp Diff Segment Distance Velocity Neg. Dur Neg Area Diff Temperature Comment   Site (ms) Norm (mV) Norm %  cm m/s Norm ms %  C    Left Fibular (EDB) Motor   Ankle 4.3  < 6.0 3.8  > 2.0  Ankle-EDB 8   7.1  31.6    Right Fibular (EDB) Motor   Ankle 3.9  < 6.0 7.1  > 2.0  Ankle-EDB 8   5.2  31    Bel Fib Head 13.4 - 6.2 - -12.7 Bel Fib Head-Ankle 39 41  > 38 4.9 -13.8 31    Pop Fossa 15.9 - 5.9 - -4.8 Pop Fossa-Bel Fib Head 11 44  > 38 5.0 -4.1 31    Left Median (APB) Motor   Wrist 4.2  < 4.2 5.0  > 5.0  Wrist-APB 8   4.4  31.8    Elbow 8.4 - 3.9 -  -22.0 Elbow-Wrist 24 57  > 48 5.4 -14.6 31.8    Left Median/Ulnar (Lumb-Dors Int II) Motor        Median (Lumb I)   Wrist 3.4 - 2.5 -      5.2  31.4         Ulnar (Dorsal Int (manus))   Wrist 3.5 - 5.4 -      4.8  31.5    Left Tibial (AHB) Motor   Ankle 4.3  < 6.5 *3.2  > 4.4  Ankle-AHB 8   6.9  31    Right Tibial (AHB) Motor   Ankle 4.2  < 6.5 *3.8  > 4.4  Ankle-AHB 8   5.6  30.9    Knee 14.9 - 2.1 - -44.7 Knee-Ankle 44 41  > 38 7.5 -21.1 31    Left Ulnar (ADM) Motor   Wrist 2.9  < 3.5 9.7  > 3.0  Wrist-ADM 8   5.6  31.3    Bel Elbow 6.5 - 6.8 - -29.9 Bel Elbow-Wrist 23 64  > 48 5.8 -19.3 31.2    Abv Elbow 9.4 - 6.2 - -8.8 Abv Elbow-Bel Elbow 9 *31  > 48 5.9 -5.6 31.2      Sensory Sites      Onset Lat Neg Peak Lat Amp (O-P) Amp (P-P) Segment Distance Velocity Temperature Comment   Site ms ms  V Norm  V  cm m/s Norm  C    Left Median Sensory   Wrist-Dig II 2.5 *3.6 13  > 10 21 Wrist-Dig II 14 56  > 48 31.3    Left Radial Sensory   Forearm-Wrist 1.98 2.6 16  > 15 30 Forearm-Wrist 10 51 - 31.5    Left Superficial Fibular Sensory   14 cm-Ankle *NR *NR *NR  > 3 *NR 14 cm-Ankle 12.5 *NR  > 38 31.3    Right Superficial Fibular Sensory   14 cm-Ankle *NR *NR *NR  > 3 *NR 14 cm-Ankle 12.5 *NR  > 38 30.6    Left Sural Sensory   Calf-Lat Mall *NR *NR *NR  > 5 *NR Calf-Lat Mall 14 *NR  > 38 31.5    Right Sural Sensory   Calf-Lat Mall *NR *NR *NR  > 5 *NR Calf-Lat Mall 14 *NR  > 38 30.9    Left Ulnar Sensory   Wrist-Dig V 2.5 *3.4 *6  > 8 18 Wrist-Dig V 12.5 50  > 48 31.5        Electromyography     Side Muscle Ins Act Fibs/PSW Fasc HF Amp Dur Poly Recrt Int Pat Comment   Left Tib Anterior Nml None Nml 0 Nml Nml 0 Nml Nml    Left Gastroc Nml None Nml 0 Nml Nml 0 Nml Nml    Left Ext Stratton Long Nml None Nml 0 1+ 1+ 1+ Nml Nml    Left Vastus Lat Nml None Nml 0 Nml Nml 0 Nml Nml          NCS Waveforms:    Motor                         Sensory                         Ultrasound Images:    Again, thank you for allowing me to  participate in the care of your patient.      Sincerely,    Bushra Begum MD

## 2021-09-23 LAB
ALBUMIN SERPL ELPH-MCNC: 3.8 G/DL (ref 3.7–5.1)
ALPHA1 GLOB SERPL ELPH-MCNC: 0.4 G/DL (ref 0.2–0.4)
ALPHA2 GLOB SERPL ELPH-MCNC: 0.9 G/DL (ref 0.5–0.9)
B-GLOBULIN SERPL ELPH-MCNC: 0.9 G/DL (ref 0.6–1)
GAMMA GLOB SERPL ELPH-MCNC: 0.7 G/DL (ref 0.7–1.6)
M PROTEIN SERPL ELPH-MCNC: 0 G/DL
PROT PATTERN SERPL ELPH-IMP: NORMAL
PROT PATTERN SERPL IFE-IMP: NORMAL

## 2021-09-29 ENCOUNTER — ANCILLARY PROCEDURE (OUTPATIENT)
Dept: MRI IMAGING | Facility: CLINIC | Age: 76
End: 2021-09-29
Attending: PSYCHIATRY & NEUROLOGY
Payer: COMMERCIAL

## 2021-09-29 DIAGNOSIS — M47.12 CERVICAL SPONDYLOSIS WITH MYELOPATHY: ICD-10-CM

## 2021-09-29 PROCEDURE — 72141 MRI NECK SPINE W/O DYE: CPT | Performed by: RADIOLOGY

## 2021-11-08 NOTE — TELEPHONE ENCOUNTER
FUTURE VISIT INFORMATION      FUTURE VISIT INFORMATION:    Date: 12/3/2021    Time: 115pm    [place] [facility] [place]  REFERRAL INFORMATION:    Referring provider:     Referring providers clinic:      Reason for visit/diagnosis      RECORDS REQUESTED FROM:       Clinic name Comments Records Status Imaging Status   Internal EMG-9/22/2021    MR Cervical Spine-9/29/2021 Epic PACS

## 2021-11-30 ASSESSMENT — ENCOUNTER SYMPTOMS
TINGLING: 1
TREMORS: 0
SPEECH CHANGE: 0
NUMBNESS: 1
HEADACHES: 0
LOSS OF CONSCIOUSNESS: 0
DIZZINESS: 0
SEIZURES: 0
WEAKNESS: 0
MEMORY LOSS: 0
PARALYSIS: 0

## 2021-12-03 ENCOUNTER — OFFICE VISIT (OUTPATIENT)
Dept: NEUROLOGY | Facility: CLINIC | Age: 76
End: 2021-12-03
Payer: COMMERCIAL

## 2021-12-03 ENCOUNTER — PRE VISIT (OUTPATIENT)
Dept: NEUROLOGY | Facility: CLINIC | Age: 76
End: 2021-12-03
Payer: COMMERCIAL

## 2021-12-03 VITALS
HEART RATE: 81 BPM | RESPIRATION RATE: 16 BRPM | WEIGHT: 201 LBS | DIASTOLIC BLOOD PRESSURE: 93 MMHG | OXYGEN SATURATION: 96 % | BODY MASS INDEX: 27.26 KG/M2 | SYSTOLIC BLOOD PRESSURE: 162 MMHG

## 2021-12-03 DIAGNOSIS — G60.9 HEREDITARY AND IDIOPATHIC PERIPHERAL NEUROPATHY: Primary | ICD-10-CM

## 2021-12-03 DIAGNOSIS — R26.81 GAIT INSTABILITY: ICD-10-CM

## 2021-12-03 PROBLEM — R73.9 HYPERGLYCEMIA: Status: ACTIVE | Noted: 2018-05-16

## 2021-12-03 PROCEDURE — 99203 OFFICE O/P NEW LOW 30 MIN: CPT | Performed by: PSYCHIATRY & NEUROLOGY

## 2021-12-03 RX ORDER — ASPIRIN 81 MG/1
81 TABLET, COATED ORAL EVERY MORNING
COMMUNITY
Start: 2021-01-01

## 2021-12-03 ASSESSMENT — PAIN SCALES - GENERAL: PAINLEVEL: NO PAIN (0)

## 2021-12-03 NOTE — LETTER
12/3/2021       RE: Reinaldo Gordillo  [address]  [address]  [place] [facility] [address]     Dear Colleague,    Thank you for referring your patient, Reinaldo Gordillo, to the [facility] NEUROLOGY CLIN[facility]. Please see a copy of my visit note below.      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     Plan:     1. Monitor clinically  2. No need to treat DM unless HbA1c elevates over 6.5%, likely a contributing factor but not the whole story for the neuropathy.     30 minutes spent with the patient on the day of the visit.     Bushra Begum MD               History of Present Illness:   CC: Recheck for neuropathy     Duy was originally evaluated at the time of his EMG     He has symptoms of gait instability and examination and EMG is consistent with a peripheral neuropathy.     Lab testing looking for B12 deficiency and monoclonal protein were negative.     Fasting glucose is elevated by HbA1c is 5.9. Not treatment  For DM at this time just watching diet.                Physical Exam:     BP (!) 162/93   Pulse 81   Resp 16   Wt 91.2 kg (201 lb)   SpO2 96%   BMI 27.26 kg/m      No vibration sensation at the toes, early extinction at the ankles and normal at the knees.          Pertinent History/Data for appointment:     Answers for HPI/ROS submitted by the patient on 11/30/2021  General Symptoms: No  Skin Symptoms: No  HENT Symptoms: No  EYE SYMPTOMS: No  HEART SYMPTOMS: No  LUNG SYMPTOMS: No  INTESTINAL SYMPTOMS: No  URINARY SYMPTOMS: No  REPRODUCTIVE SYMPTOMS: No  SKELETAL SYMPTOMS:  No  BLOOD SYMPTOMS: No  NERVOUS SYSTEM SYMPTOMS: Yes  MENTAL HEALTH SYMPTOMS: No  Dizziness or trouble with balance: No  Fainting or black-out spells: No  Memory loss: No  Headache: No  Seizures: No  Speech problems: No  Tingling: Yes  Tremor: No  Weakness: No  Difficulty walking: Yes  Paralysis: No  Numbness: Yes          Again, thank you for allowing me to participate in the care of your patient.      Sincerely,    Bushra Begum MD

## 2021-12-03 NOTE — PROGRESS NOTES
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     Plan:     1. Monitor clinically  2. No need to treat DM unless HbA1c elevates over 6.5%, likely a contributing factor but not the whole story for the neuropathy.     30 minutes spent with the patient on the day of the visit.     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     Lab testing looking for B12 deficiency and monoclonal protein were negative.     Fasting glucose is elevated by HbA1c is 5.9. Not treatment  For DM at this time just watching diet.                Physical Exam:     BP (!) 162/93   Pulse 81   Resp 16   Wt 91.2 kg (201 lb)   SpO2 96%   BMI 27.26 kg/m      No vibration sensation at the toes, early extinction at the ankles and normal at the knees.          Pertinent History/Data for appointment:     Answers for HPI/ROS submitted by the patient on 11/30/2021  General Symptoms: No  Skin Symptoms: No  HENT Symptoms: No  EYE SYMPTOMS: No  HEART SYMPTOMS: No  LUNG SYMPTOMS: No  INTESTINAL SYMPTOMS: No  URINARY SYMPTOMS: No  REPRODUCTIVE SYMPTOMS: No  SKELETAL SYMPTOMS: No  BLOOD SYMPTOMS: No  NERVOUS SYSTEM SYMPTOMS: Yes  MENTAL HEALTH SYMPTOMS: No  Dizziness or trouble with balance: No  Fainting or black-out spells: No  Memory loss: No  Headache: No  Seizures: No  Speech problems: No  Tingling: Yes  Tremor: No  Weakness: No  Difficulty walking: Yes  Paralysis: No  Numbness: Yes

## 2022-04-23 ENCOUNTER — HEALTH MAINTENANCE LETTER (OUTPATIENT)
Age: 77
End: 2022-04-23

## 2022-07-01 ENCOUNTER — TELEPHONE (OUTPATIENT)
Dept: INTERNAL MEDICINE | Facility: CLINIC | Age: 77
End: 2022-07-01

## 2022-07-01 NOTE — TELEPHONE ENCOUNTER
Dr. Smiley was reached about establishing care with patient.  Dr. Smiley okayed to see patient next month.         Yan Contreras CMA ([facility]) at 1:25 PM on 7/1/2022

## 2022-08-27 NOTE — PROGRESS NOTES
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 He has some R lower back/flank pain. He tries to get some exercise with walking up to 4 miles/day. He does not smoke nor abuse EtOH. He had COVID about 3 months ago and was treated with Paxlovid. He does not feel he has sleep apnea. He may have had low testosterone in the past. Still with some mild neuropathy and he tries to be careful walking and balance. He has three children that are not in the [facility]. No other HEENT, cardiopulmonary, abdominal, , neurological, systemic, psychiatric, lymphatic, endocrine, vascular complaints.     Past Medical History:   Diagnosis Date     ASCVD (arteriosclerotic cardiovascular disease)      Coronary artery disease      Gastroesophageal reflux disease      Hypertension      Stented coronary artery      Past Surgical History:   Procedure Laterality Date     CATARACT IOL, RT/LT       COLONOSCOPY N/A 7/17/2019    Procedure: COLONOSCOPY;  Surgeon: Roque Givens MD;  Location: [facility] OR     ESOPHAGOSCOPY, GASTROSCOPY, DUODENOSCOPY (EGD), COMBINED N/A 7/17/2019    Procedure: ESOPHAGOGASTRODUODENOSCOPY, WITH BIOPSY;  Surgeon: Roque Givens MD;  Location: [facility] OR     HERNIA REPAIR  2000     MOHS MICROGRAPHIC PROCEDURE       PHACOEMULSIFICATION CLEAR CORNEA WITH STANDARD INTRAOCULAR LENS IMPLANT Right 7/2/2021    Procedure: RIGHT EYE CATARACT REMOVAL WITH INTRAOCULAR LENS IMPLANT;  Surgeon: Justa Liz MD;  [place] [facility] [place]     PHACOEMULSIFICATION CLEAR CORNEA WITH STANDARD INTRAOCULAR LENS IMPLANT Left 7/9/2021    Procedure: LEFT EYE CATARACT REMOVAL WITH INTRAOCULAR LENS IMPLANT;  Surgeon: Justa Liz MD;  Location: [facility] [place]     [facility] CORONARY STENT PERCUT, EA ADDTL VESSEL       PE:    Vitals noted, gen, nad, cooperative, alert; neck supple nl rom, no B carotid brutis, lungs with good air movement, RRR, S1, S2, no MRG, abdomen, no acute findings.  Grossly normal neurological exam. No tenderness to palpation R lower back.     Exercise Nuclear Stress Test (Care Everywhere); 5/18/2018:    Summary   Stress results:   Duration of exercise was 8 min and 30 sec.   Target heart rate was achieved.   There was resting hypertension with an appropriate blood   pressure response to stress.   There was no chest pain during stress.   ECG conclusions:   The stress ECG was consistent with myocardial ischemia.   Perfusion imaging:   There were no definite perfusion defects.   Gated SPECT:   The calculated left ventricular ejection fraction was 67 %.   Left ventricular ejection fraction was within normal limits   by visual estimate.   There was no diagnostic evidence for left ventricular   regional abnormality.   Impressions   Normal study after maximal exercise. There is no evidence of   stress induced ischemia. Left ventricular systolic function   was normal. Based on this study, the annual cardiovascular   mortality rate is low (less than 1%).   Previous study comparison   There has been no interval change in myocardial perfusion.      A/P:    1. Immunizations; COVID Pfizer vaccine x 4. He has completed the Shigrix vaccine series. Tdap 8/7/2015. Pneumococcal 23 done 9/6/2019. Prevnar 13 done 5/15/2018  2. Dermatology; appt. With Dr. Randall scheduled for 11/29/2022  3. PSA; 0.2 on 11/17/2020  4. Colonoscopy; 7/17/2019  5. Increased lipids on Pravastatin; lipids 12/1/2021 HDL 95. He could not tolerate Crestor (CK 1600) with muscle complaints.   6. HTN; on Lisinopril   7. Neurology follow up with Dr. Begum 12/2/2022. Last visit 12/3/2021 for neuropathy unclear etiology   8. A1c was 5.9% on 12/1/2021  9. CAD; seen 12/15/2021, [facility] Dr. Madrigal, note in Care Everywhere and there is a detailed note in the chart. Placed cardiology referral to be seen here. Will consider ordering  resting echo.   10. Fatigue. Ordered Testosterone, TSH. He does not feel he has  sleep apnea. Some mild depression?  11. He states possible albumin in his urine in the past and ordered UA, urine protein and urine microalbumin  12. R flank pain will follow for now and could consider further imaging. Ordered Esr/Crp today     40 minutes spent on the date of the encounter doing chart review, history and exam, documentation and further activities as noted above exclusive of procedures and other billable interpretations

## 2022-08-29 ENCOUNTER — LAB (OUTPATIENT)
Dept: LAB | Facility: CLINIC | Age: 77
End: 2022-08-29
Payer: COMMERCIAL

## 2022-08-29 ENCOUNTER — OFFICE VISIT (OUTPATIENT)
Dept: INTERNAL MEDICINE | Facility: CLINIC | Age: 77
End: 2022-08-29
Payer: COMMERCIAL

## 2022-08-29 VITALS
BODY MASS INDEX: 25.73 KG/M2 | DIASTOLIC BLOOD PRESSURE: 74 MMHG | HEART RATE: 72 BPM | SYSTOLIC BLOOD PRESSURE: 129 MMHG | HEIGHT: 72 IN | WEIGHT: 190 LBS | RESPIRATION RATE: 16 BRPM | OXYGEN SATURATION: 94 %

## 2022-08-29 DIAGNOSIS — Z12.5 SCREENING FOR PROSTATE CANCER: ICD-10-CM

## 2022-08-29 DIAGNOSIS — R53.82 CHRONIC FATIGUE: ICD-10-CM

## 2022-08-29 DIAGNOSIS — I10 BENIGN ESSENTIAL HYPERTENSION: ICD-10-CM

## 2022-08-29 DIAGNOSIS — I10 BENIGN ESSENTIAL HYPERTENSION: Primary | ICD-10-CM

## 2022-08-29 LAB
ALBUMIN SERPL-MCNC: 3.4 G/DL (ref 3.4–5)
ALBUMIN UR-MCNC: NEGATIVE MG/DL
ALP SERPL-CCNC: 56 U/L (ref 40–150)
ALT SERPL W P-5'-P-CCNC: 46 U/L (ref 0–70)
ANION GAP SERPL CALCULATED.3IONS-SCNC: 4 MMOL/L (ref 3–14)
APPEARANCE UR: CLEAR
AST SERPL W P-5'-P-CCNC: 24 U/L (ref 0–45)
BASOPHILS # BLD AUTO: 0.1 10E3/UL (ref 0–0.2)
BASOPHILS NFR BLD AUTO: 1 %
BILIRUB SERPL-MCNC: 0.3 MG/DL (ref 0.2–1.3)
BILIRUB UR QL STRIP: NEGATIVE
BUN SERPL-MCNC: 40 MG/DL (ref 7–30)
CALCIUM SERPL-MCNC: 9.1 MG/DL (ref 8.5–10.1)
CHLORIDE BLD-SCNC: 107 MMOL/L (ref 94–109)
CHOLEST SERPL-MCNC: 165 MG/DL
CO2 SERPL-SCNC: 28 MMOL/L (ref 20–32)
COLOR UR AUTO: YELLOW
CREAT SERPL-MCNC: 1.32 MG/DL (ref 0.66–1.25)
CREAT UR-MCNC: 143 MG/DL
CREAT UR-MCNC: 143 MG/DL
CRP SERPL-MCNC: <2.9 MG/L (ref 0–8)
EOSINOPHIL # BLD AUTO: 0.3 10E3/UL (ref 0–0.7)
EOSINOPHIL NFR BLD AUTO: 5 %
ERYTHROCYTE [DISTWIDTH] IN BLOOD BY AUTOMATED COUNT: 11.9 % (ref 10–15)
ERYTHROCYTE [SEDIMENTATION RATE] IN BLOOD BY WESTERGREN METHOD: 9 MM/HR (ref 0–20)
FASTING STATUS PATIENT QL REPORTED: YES
GFR SERPL CREATININE-BSD FRML MDRD: 56 ML/MIN/1.73M2
GLUCOSE BLD-MCNC: 118 MG/DL (ref 70–99)
GLUCOSE UR STRIP-MCNC: NEGATIVE MG/DL
HCT VFR BLD AUTO: 38.8 % (ref 40–53)
HDLC SERPL-MCNC: 93 MG/DL
HGB BLD-MCNC: 12.6 G/DL (ref 13.3–17.7)
HGB UR QL STRIP: NEGATIVE
HYALINE CASTS: 6 /LPF
IMM GRANULOCYTES # BLD: 0 10E3/UL
IMM GRANULOCYTES NFR BLD: 0 %
KETONES UR STRIP-MCNC: NEGATIVE MG/DL
LDLC SERPL CALC-MCNC: 63 MG/DL
LEUKOCYTE ESTERASE UR QL STRIP: NEGATIVE
LYMPHOCYTES # BLD AUTO: 1.2 10E3/UL (ref 0.8–5.3)
LYMPHOCYTES NFR BLD AUTO: 22 %
MCH RBC QN AUTO: 31.5 PG (ref 26.5–33)
MCHC RBC AUTO-ENTMCNC: 32.5 G/DL (ref 31.5–36.5)
MCV RBC AUTO: 97 FL (ref 78–100)
MICROALBUMIN UR-MCNC: 18 MG/L
MICROALBUMIN/CREAT UR: 12.59 MG/G CR (ref 0–17)
MONOCYTES # BLD AUTO: 0.6 10E3/UL (ref 0–1.3)
MONOCYTES NFR BLD AUTO: 11 %
MUCOUS THREADS #/AREA URNS LPF: PRESENT /LPF
NEUTROPHILS # BLD AUTO: 3.1 10E3/UL (ref 1.6–8.3)
NEUTROPHILS NFR BLD AUTO: 61 %
NITRATE UR QL: NEGATIVE
NONHDLC SERPL-MCNC: 72 MG/DL
NRBC # BLD AUTO: 0 10E3/UL
NRBC BLD AUTO-RTO: 0 /100
PH UR STRIP: 5 [PH] (ref 5–7)
PLATELET # BLD AUTO: 225 10E3/UL (ref 150–450)
POTASSIUM BLD-SCNC: 4.9 MMOL/L (ref 3.4–5.3)
PROT SERPL-MCNC: 6.6 G/DL (ref 6.8–8.8)
PROT UR-MCNC: 0.19 G/L
PROT/CREAT 24H UR: 0.13 G/G CR (ref 0–0.2)
PSA SERPL-MCNC: 0.58 UG/L (ref 0–4)
RBC # BLD AUTO: 4 10E6/UL (ref 4.4–5.9)
RBC URINE: <1 /HPF
SHBG SERPL-SCNC: 119 NMOL/L (ref 11–80)
SODIUM SERPL-SCNC: 139 MMOL/L (ref 133–144)
SP GR UR STRIP: 1.01 (ref 1–1.03)
TRIGL SERPL-MCNC: 43 MG/DL
TSH SERPL DL<=0.005 MIU/L-ACNC: 1.82 MU/L (ref 0.4–4)
UROBILINOGEN UR STRIP-MCNC: NORMAL MG/DL
WBC # BLD AUTO: 5.3 10E3/UL (ref 4–11)
WBC URINE: 1 /HPF

## 2022-08-29 PROCEDURE — 80061 LIPID PANEL: CPT | Performed by: PATHOLOGY

## 2022-08-29 PROCEDURE — 84443 ASSAY THYROID STIM HORMONE: CPT | Performed by: PATHOLOGY

## 2022-08-29 PROCEDURE — 84403 ASSAY OF TOTAL TESTOSTERONE: CPT | Performed by: INTERNAL MEDICINE

## 2022-08-29 PROCEDURE — 99204 OFFICE O/P NEW MOD 45 MIN: CPT | Performed by: INTERNAL MEDICINE

## 2022-08-29 PROCEDURE — 85652 RBC SED RATE AUTOMATED: CPT | Performed by: PATHOLOGY

## 2022-08-29 PROCEDURE — 86140 C-REACTIVE PROTEIN: CPT | Performed by: PATHOLOGY

## 2022-08-29 PROCEDURE — 36415 COLL VENOUS BLD VENIPUNCTURE: CPT | Performed by: PATHOLOGY

## 2022-08-29 PROCEDURE — 81001 URINALYSIS AUTO W/SCOPE: CPT | Performed by: PATHOLOGY

## 2022-08-29 PROCEDURE — 84270 ASSAY OF SEX HORMONE GLOBUL: CPT | Performed by: INTERNAL MEDICINE

## 2022-08-29 PROCEDURE — 84156 ASSAY OF PROTEIN URINE: CPT | Performed by: PATHOLOGY

## 2022-08-29 PROCEDURE — 85025 COMPLETE CBC W/AUTO DIFF WBC: CPT | Performed by: PATHOLOGY

## 2022-08-29 PROCEDURE — 82043 UR ALBUMIN QUANTITATIVE: CPT | Performed by: PATHOLOGY

## 2022-08-29 PROCEDURE — 80053 COMPREHEN METABOLIC PANEL: CPT | Performed by: PATHOLOGY

## 2022-08-29 PROCEDURE — G0103 PSA SCREENING: HCPCS | Performed by: PATHOLOGY

## 2022-08-29 ASSESSMENT — PAIN SCALES - GENERAL: PAINLEVEL: NO PAIN (0)

## 2022-08-29 NOTE — NURSING NOTE
"Chief Complaint   Patient presents with     Establish Care     Pt ambulatory into clinic with steady gait- alert, oriented x3. States hx of neuropathy and voices concern for feeling \"tired\".     Faviola Menchaca RN at 7:51 AM on 8/29/2022.   "

## 2022-08-31 LAB
TESTOST FREE SERPL-MCNC: 2.2 NG/DL
TESTOST SERPL-MCNC: 295 NG/DL (ref 240–950)

## 2022-09-01 ENCOUNTER — MYC MEDICAL ADVICE (OUTPATIENT)
Dept: INTERNAL MEDICINE | Facility: CLINIC | Age: 77
End: 2022-09-01

## 2022-09-03 NOTE — TELEPHONE ENCOUNTER
(1) keep 10/18/2022    (2) Will just follow renal function and Hgb    (3) Please give Dr. Gordillo my cell phone number [phone]    Thanks, JESSICA Hull

## 2022-09-04 ENCOUNTER — MYC MEDICAL ADVICE (OUTPATIENT)
Dept: INTERNAL MEDICINE | Facility: CLINIC | Age: 77
End: 2022-09-04

## 2022-10-16 NOTE — PROGRESS NOTES
HPI:    Initial visit with me 8/29/2022 and additional details in that note. Still with a little R  flank pain. Somewhat elevated creatinine. Some fatigue. No other HEENT, cardiopulmonary, abdominal, , neurological, systemic, psychiatric, lymphatic, endocrine, vascular complaints.     Past Medical History:   Diagnosis Date     ASCVD (arteriosclerotic cardiovascular disease)      Coronary artery disease      Gastroesophageal reflux disease      Hypertension      Stented coronary artery      ,  Past Surgical History:   Procedure Laterality Date     CATARACT IOL, RT/LT       COLONOSCOPY N/A 7/17/2019    Procedure: COLONOSCOPY;  Surgeon: Roque Givens MD;  Location: [facility] OR     ESOPHAGOSCOPY, GASTROSCOPY, DUODENOSCOPY (EGD), COMBINED N/A 7/17/2019    Procedure: ESOPHAGOGASTRODUODENOSCOPY, WITH BIOPSY;  Surgeon: Roque Givens MD;  Location: [facility] OR     HERNIA REPAIR  2000     MOHS MICROGRAPHIC PROCEDURE       PHACOEMULSIFICATION CLEAR CORNEA WITH STANDARD INTRAOCULAR LENS IMPLANT Right 7/2/2021    Procedure: RIGHT EYE CATARACT REMOVAL WITH INTRAOCULAR LENS IMPLANT;  Surgeon: Justa Liz MD;  [place] [facility] [place]     PHACOEMULSIFICATION CLEAR CORNEA WITH STANDARD INTRAOCULAR LENS IMPLANT Left 7/9/2021    Procedure: LEFT EYE CATARACT REMOVAL WITH INTRAOCULAR LENS IMPLANT;  Surgeon: Justa Liz MD;  Location: [facility] [place]     [facility] CORONARY STENT PERCUT, EA ADDTL VESSEL       PE    Vitals noted, gen, nad, cooperative, alert, neck supple nl rom, lungs with good air movement, RRR, S1, S2, no MRG, abdomen, no acute findings. Grossly normal neurological exam. No tenderness to palpation to L flank.     A/P:    1. Immunizations; COVID Pfizer vaccine x 5 including the bi-valent Pfizer on 9/8/2022. He has completed the Shigrix vaccine series. Tdap 8/7/2015. Pneumococcal 23 done 9/6/2019. Prevnar 13 done 5/15/2018. Influenza vaccine done 9/9/2022  2. Dermatology; appt. With Dr. Randall scheduled for 11/29/2022  3.  PSA; 0.58 on 8/29/2022  4. Colonoscopy; 7/17/2019  5. Increased lipids on Pravastatin; lipids 8/29/2022 TG's 43, HDL 93 and LDL 63. He could not tolerate Crestor (CK 1600) with muscle complaints.   6. HTN; on Lisinopril; Creatinine 1.32 on 8/29/2022. K+ was 4.9  7. Neurology follow up with Dr. Begum 12/2/2022. Last visit 12/3/2021 for neuropathy unclear etiology   8. A1c was 5.9% on 12/1/2021  9. CAD; seen 12/15/2021, [facility] Dr. Madrigal, note in Care Everywhere and there is a detailed note in the chart. He has 11/14/2022 appt. With Dr. Chapin. Ordered resting echo 10/16/2022.   10. Fatigue. Ordered Testosterone, TSH (normal at 1.82 on 8/29/2022). He does not feel he has sleep apnea. Some mild depression? Low testosterone 2.20 on 8/29/2022 and he has endocrinology appt. With Dr. White 1/3/2023  11. He states possible albumin in his urine in the past and ordered UA, urine protein and urine microalbumin that were normal on 8/29/2022  12. R flank pain. Ordered non-contrast abdomina/pelvic CT scan   13. Mild anemia (Hgb 12.6 on 8/29/2022); ordered labs 9/4/2022 and he can do these today 10/18/2022     30 minutes spent on the date of the encounter doing chart review, history and exam, documentation and further activities as noted above exclusive of procedures and other billable interpretations

## 2022-10-18 ENCOUNTER — OFFICE VISIT (OUTPATIENT)
Dept: INTERNAL MEDICINE | Facility: CLINIC | Age: 77
End: 2022-10-18
Payer: COMMERCIAL

## 2022-10-18 ENCOUNTER — LAB (OUTPATIENT)
Dept: LAB | Facility: CLINIC | Age: 77
End: 2022-10-18
Payer: COMMERCIAL

## 2022-10-18 VITALS
DIASTOLIC BLOOD PRESSURE: 77 MMHG | OXYGEN SATURATION: 97 % | HEART RATE: 64 BPM | WEIGHT: 196.6 LBS | BODY MASS INDEX: 26.66 KG/M2 | SYSTOLIC BLOOD PRESSURE: 140 MMHG

## 2022-10-18 DIAGNOSIS — E34.9 TESTOSTERONE DEFICIENCY: ICD-10-CM

## 2022-10-18 DIAGNOSIS — D64.9 ANEMIA, UNSPECIFIED TYPE: ICD-10-CM

## 2022-10-18 DIAGNOSIS — I10 BENIGN ESSENTIAL HYPERTENSION: Primary | ICD-10-CM

## 2022-10-18 DIAGNOSIS — R10.9 FLANK PAIN: ICD-10-CM

## 2022-10-18 LAB
BASOPHILS # BLD AUTO: 0 10E3/UL (ref 0–0.2)
BASOPHILS NFR BLD AUTO: 1 %
EOSINOPHIL # BLD AUTO: 0.2 10E3/UL (ref 0–0.7)
EOSINOPHIL NFR BLD AUTO: 5 %
ERYTHROCYTE [DISTWIDTH] IN BLOOD BY AUTOMATED COUNT: 12.3 % (ref 10–15)
FERRITIN SERPL-MCNC: 91 NG/ML (ref 31–409)
FOLATE SERPL-MCNC: 5.9 NG/ML (ref 4.6–34.8)
HCT VFR BLD AUTO: 39.7 % (ref 40–53)
HGB BLD-MCNC: 13 G/DL (ref 13.3–17.7)
IMM GRANULOCYTES # BLD: 0 10E3/UL
IMM GRANULOCYTES NFR BLD: 1 %
IRON BINDING CAPACITY (ROCHE): 353 UG/DL (ref 240–430)
IRON SATN MFR SERPL: 25 % (ref 15–46)
IRON SERPL-MCNC: 90 UG/DL (ref 61–157)
LYMPHOCYTES # BLD AUTO: 1 10E3/UL (ref 0.8–5.3)
LYMPHOCYTES NFR BLD AUTO: 23 %
MCH RBC QN AUTO: 32.1 PG (ref 26.5–33)
MCHC RBC AUTO-ENTMCNC: 32.7 G/DL (ref 31.5–36.5)
MCV RBC AUTO: 98 FL (ref 78–100)
MONOCYTES # BLD AUTO: 0.5 10E3/UL (ref 0–1.3)
MONOCYTES NFR BLD AUTO: 10 %
NEUTROPHILS # BLD AUTO: 2.7 10E3/UL (ref 1.6–8.3)
NEUTROPHILS NFR BLD AUTO: 60 %
NRBC # BLD AUTO: 0 10E3/UL
NRBC BLD AUTO-RTO: 0 /100
PLATELET # BLD AUTO: 221 10E3/UL (ref 150–450)
RBC # BLD AUTO: 4.05 10E6/UL (ref 4.4–5.9)
RETICS # AUTO: 0.06 10E6/UL (ref 0.03–0.1)
RETICS/RBC NFR AUTO: 1.4 % (ref 0.5–2)
VIT B12 SERPL-MCNC: 407 PG/ML (ref 232–1245)
WBC # BLD AUTO: 4.4 10E3/UL (ref 4–11)

## 2022-10-18 PROCEDURE — 83540 ASSAY OF IRON: CPT | Performed by: PATHOLOGY

## 2022-10-18 PROCEDURE — 83550 IRON BINDING TEST: CPT | Performed by: PATHOLOGY

## 2022-10-18 PROCEDURE — 36415 COLL VENOUS BLD VENIPUNCTURE: CPT | Performed by: PATHOLOGY

## 2022-10-18 PROCEDURE — 82746 ASSAY OF FOLIC ACID SERUM: CPT | Performed by: INTERNAL MEDICINE

## 2022-10-18 PROCEDURE — 85025 COMPLETE CBC W/AUTO DIFF WBC: CPT | Performed by: PATHOLOGY

## 2022-10-18 PROCEDURE — 82607 VITAMIN B-12: CPT | Performed by: INTERNAL MEDICINE

## 2022-10-18 PROCEDURE — 82728 ASSAY OF FERRITIN: CPT | Performed by: INTERNAL MEDICINE

## 2022-10-18 PROCEDURE — 85045 AUTOMATED RETICULOCYTE COUNT: CPT | Performed by: PATHOLOGY

## 2022-10-18 PROCEDURE — 99214 OFFICE O/P EST MOD 30 MIN: CPT | Performed by: INTERNAL MEDICINE

## 2022-10-18 NOTE — PATIENT INSTRUCTIONS
To schedule your echocardiogram (ECHO), please call [phone].     To schedule your appointment with imaging for your CT, please call [phone].

## 2022-10-18 NOTE — NURSING NOTE
Reinaldo Gordillo is a 77 year old male that presents in clinic today for the following:     Chief Complaint   Patient presents with     Follow Up     Pt here for a one month follow up; pt would like referrals        The patient's allergies and medications were reviewed. The patient's vitals were obtained without incident. The patient does not have any other questions for the provider.     Brenda Esparza, EMT at 7:52 AM on 10/18/2022.  Primary Care Clinic: [phone]

## 2022-10-20 ENCOUNTER — ANCILLARY PROCEDURE (OUTPATIENT)
Dept: CT IMAGING | Facility: CLINIC | Age: 77
End: 2022-10-20
Attending: INTERNAL MEDICINE
Payer: COMMERCIAL

## 2022-10-20 DIAGNOSIS — I10 BENIGN ESSENTIAL HYPERTENSION: ICD-10-CM

## 2022-10-20 DIAGNOSIS — R10.9 FLANK PAIN: ICD-10-CM

## 2022-10-20 PROCEDURE — 74176 CT ABD & PELVIS W/O CONTRAST: CPT | Mod: GC | Performed by: RADIOLOGY

## 2022-10-28 ENCOUNTER — MYC MEDICAL ADVICE (OUTPATIENT)
Dept: DERMATOLOGY | Facility: CLINIC | Age: 77
End: 2022-10-28

## 2022-10-29 ENCOUNTER — ANCILLARY PROCEDURE (OUTPATIENT)
Dept: CARDIOLOGY | Facility: CLINIC | Age: 77
End: 2022-10-29
Attending: INTERNAL MEDICINE
Payer: COMMERCIAL

## 2022-10-29 DIAGNOSIS — I10 BENIGN ESSENTIAL HYPERTENSION: ICD-10-CM

## 2022-10-29 LAB — LVEF ECHO: NORMAL

## 2022-10-29 PROCEDURE — 93306 TTE W/DOPPLER COMPLETE: CPT | Performed by: INTERNAL MEDICINE

## 2022-11-01 NOTE — TELEPHONE ENCOUNTER
Scheduled per Dr. Randall for Friday 11/4 at 9:30 am at  [facility].  Pt notified via my chart of the appt and address of clinic.    oRsalba WESTON RN  [facility] [place]  [phone]

## 2022-11-03 NOTE — PROGRESS NOTES
[facility] Dermatology Clinic Note - EP    Encounter Date: Nov 4, 2022    Dermatology Problem List:  #. BCC - upper lip- s/p MMS in 2015 - performed in CT  #. HAK left malar cheek, 1 cm erythematous and hyperkeratotic plaque, dDx: NMSC versus HAK, s/p shave biopsy on 7/23/2019  #. HAK, left lateral forehead, s/p shave biopsy on 7/23/2019  #. BCC left central chest, shave biopsy on 7/23/2019, excision on 8/6/19  #. Actinic keratoses on the face and ears              -s/p cryo 7/23/19, 11/4/22; fluorouracil 5% cream and calcipotriene 0.005% ointment BID x8 day 1/2023  #.  Eczematous Dermatitis of the lower legs              -clobetasol 0.05% ointment BID  #. Pruritus scroti with possible intertrigo   -desonide ointment BID PRN  #. Filiform wart on the L inner thigh - s/p cryo on 7/23/19   ___________________________________    Assessment & Plan:   # History of nonmelanoma skin cancer (NMSC), last lesion identified and treated in 2019  - no evidence of recurrent disease via inspection or palpation; all sites well-healed  - photoprotection discussed (SPF30+ sunscreen and proper use, UPF clothing, sun avoidance, tanning bed avoidance)  - continued skin surveillance recommended     # Actinic keratosis  - Cryotherapy procedure as below for 12 lesions on face  - Discussed starting field therapy with 5 Fluorouracil and calicpotriene BID for 8 days in January   - start efudex 5% cream with calcipotriene BID x 8 days to ears, forehead, nose and cheeks  - counseled that patient should expect erythema, scale, and some irritation to occur, but should discontinue this medication if significant oozing or pain greater than 5/10 is to occur; recommend the patient wash hands after use or use gloves to apply; keep medication away from pets; handout provided with administration instructions.    # Pruritus scroti  - No rash identified on exam. Patient with intermittent itch of scrotum which has resolved with clobetasol use.   - OK to use  desonide ointment BID PRN for itch  - stop using clobetasol  - zinc oxide in future if worsens with sweat      # Inflamed seborrheic keratoses of central chest  - Discussed the natural etiology and provided reassurances about the benign nature of the lesions.   - cryotherapy as below    # BLK L scapula  - no further treatment needed and no signs for BCC on exam today    Procedures Performed:   - Cryotherapy procedure note, location(s): nose, cheeks and forehead. After verbal consent and discussion of risks and benefits including, but not limited to, dyspigmentation/scar, blister, and pain, 12 lesion(s) was(were) treated with 1-2 mm freeze border for 1-2 cycles with liquid nitrogen. Post cryotherapy instructions were provided.  - Cryotherapy procedure note, location(s): central chest. After verbal consent and discussion of risks and benefits including, but not limited to, dyspigmentation/scar, blister, and pain, 1 lesion(s) was(were) treated with 1-2 mm freeze border for 1-2 cycles with liquid nitrogen. Post cryotherapy instructions were provided.    Follow-up: 6 months for field therapy follow up and skin check    Staff and Resident:  I, Taye Thompson MD, discussed and evaluated the patient with Dr. Randall.    Staff Physician Comments:   I saw and evaluated the patient with the resident and I agree with the assessment and plan.  I was present for the entire minor procedure and examination. I have made edits if needed.    Yonathan Randall MD  Staff Dermatologist and Dermatopathologist  , Department of Dermatology   ____________________________________________    CC: No chief complaint on file.      HPI:  Mr. Reinaldo Gordillo is a(n) extremely pleasant 77 year old male who presents today as a return patient for a few lesions on chest and face. Last seen by myself on 2/18/20, at which time the patient underwent cryotherapy for treatment of AKs.     Denies any growth, pain or bleeding of these spots.  Most are just new scaly.    The patient denies any painful, bleeding, or nonhealing lesions, or any new or changing moles.    Patient is otherwise feeling well, without additional skin concerns.     Labs Reviewed:  N/A    Physical Exam:  Vitals: There were no vitals taken for this visit.  SKIN: Focused examination of face, left scapula and central chest was performed along with groin.  - There are erythematous macules with overyling adherent scale on the nose, forehead, cheeks and ears.   - There is a waxy stuck on tan to brown papule on the central chest.  - No rash identified on scrotum or groin with no lichenification or erosions  - pink/flesh colored papule of left scapula with no globules or arborizing vessels  - No other lesions of concern on areas examined.     Medications:  Current Outpatient Medications   Medication     ASPIRIN LOW DOSE 81 MG EC tablet     ASPIRIN PO     clobetasol (TEMOVATE) 0.05 % external ointment     lisinopril (ZESTRIL) 5 MG tablet     Metoprolol Succinate (TOPROL XL PO)     omeprazole (PRILOSEC) 20 MG DR capsule     pravastatin (PRAVACHOL) 80 MG tablet     zolpidem (AMBIEN) 10 MG tablet     Current Facility-Administered Medications   Medication     lidocaine 1% with EPINEPHrine 1:100,000 injection 3 mL      Past Medical History:   Patient Active Problem List   Diagnosis     Nuclear sclerotic cataract of both eyes     Hyperglycemia     Hyperlipidemia     Stented coronary artery     Coronary artery disease involving native coronary artery without angina pectoris     Basal cell carcinoma (BCC) in situ of skin     Colon polyp     Hereditary and idiopathic peripheral neuropathy     Past Medical History:   Diagnosis Date     ASCVD (arteriosclerotic cardiovascular disease)      Coronary artery disease      Gastroesophageal reflux disease      Hypertension      Stented coronary artery        CC No referring provider defined for this encounter. on close of this encounter.    This note has been  created using voice recognition software; while it has been reviewed, some errors may persist.

## 2022-11-04 ENCOUNTER — OFFICE VISIT (OUTPATIENT)
Dept: FAMILY MEDICINE | Facility: CLINIC | Age: 77
End: 2022-11-04
Payer: COMMERCIAL

## 2022-11-04 DIAGNOSIS — L82.0 INFLAMED SEBORRHEIC KERATOSIS: ICD-10-CM

## 2022-11-04 DIAGNOSIS — L29.1 PRURITUS SCROTI: ICD-10-CM

## 2022-11-04 DIAGNOSIS — L57.0 AK (ACTINIC KERATOSIS): Primary | ICD-10-CM

## 2022-11-04 PROCEDURE — 17003 DESTRUCT PREMALG LES 2-14: CPT | Mod: XS | Performed by: DERMATOLOGY

## 2022-11-04 PROCEDURE — 17000 DESTRUCT PREMALG LESION: CPT | Mod: XS | Performed by: DERMATOLOGY

## 2022-11-04 PROCEDURE — 17110 DESTRUCTION B9 LES UP TO 14: CPT | Mod: GC | Performed by: DERMATOLOGY

## 2022-11-04 PROCEDURE — 99214 OFFICE O/P EST MOD 30 MIN: CPT | Mod: 25 | Performed by: DERMATOLOGY

## 2022-11-04 RX ORDER — FLUOROURACIL 50 MG/G
CREAM TOPICAL 2 TIMES DAILY
Qty: 40 G | Refills: 0 | Status: SHIPPED | OUTPATIENT
Start: 2022-11-04 | End: 2022-11-14

## 2022-11-04 RX ORDER — CALCIPOTRIENE 50 UG/G
OINTMENT TOPICAL
Qty: 60 G | Refills: 0 | Status: SHIPPED | OUTPATIENT
Start: 2022-11-04 | End: 2022-11-14

## 2022-11-04 RX ORDER — DESONIDE 0.5 MG/G
OINTMENT TOPICAL 2 TIMES DAILY
Qty: 60 G | Refills: 1 | Status: SHIPPED | OUTPATIENT
Start: 2022-11-04 | End: 2022-11-14

## 2022-11-04 ASSESSMENT — PAIN SCALES - GENERAL: PAINLEVEL: NO PAIN (0)

## 2022-11-04 NOTE — LETTER
11/4/2022         RE: Reinaldo Gordillo  [address]  [address]  [place] [address]        Dear Colleague,    Thank you for referring your patient, Reinaldo Gordillo, to the [facility]. Please see a copy of my visit note below.    [facility] Dermatology Clinic Note - EP    Encounter Date: Nov 4, 2022    Dermatology Problem List:  #. BCC - upper lip- s/p MMS in 2015 - performed in CT  #. HAK left malar cheek, 1 cm erythematous and hyperkeratotic plaque, dDx: NMSC versus HAK, s/p shave biopsy on 7/23/2019  #. HAK, left lateral forehead, s/p shave biopsy on 7/23/2019  #. BCC left central chest, shave biopsy on 7/23/2019, excision on 8/6/19  #. Actinic keratoses on the face and ears              -s/p cryo 7/23/19, 11/4/22; fluorouracil 5% cream and calcipotriene 0.005% ointment BID x8 day 1/2023  #.  Eczematous Dermatitis of the lower legs              -clobetasol 0.05% ointment BID  #. Pruritus scroti with possible intertrigo   -desonide ointment BID PRN  #. Filiform wart on the L inner thigh - s/p cryo on 7/23/19   ___________________________________    Assessment & Plan:   # History of nonmelanoma skin cancer (NMSC), last lesion identified and treated in 2019  - no evidence of recurrent disease via inspection or palpation; all sites well-healed  - photoprotection discussed (SPF30+ sunscreen and proper use, UPF clothing, sun avoidance, tanning bed avoidance)  - continued skin surveillance recommended     # Actinic keratosis  - Cryotherapy procedure as below for 12 lesions on face  - Discussed starting field therapy with 5 Fluorouracil and calicpotriene BID for 8 days in January   - start efudex 5% cream with calcipotriene BID x 8 days to ears, forehead, nose and cheeks  - counseled that patient should expect erythema, scale, and some irritation to occur, but should discontinue this medication if significant oozing or pain greater than 5/10 is to occur; recommend the patient wash hands after  use or use gloves to apply; keep medication away from pets; handout provided with administration instructions.    # Pruritus scroti  - No rash identified on exam. Patient with intermittent itch of scrotum which has resolved with clobetasol use.   - OK to use desonide ointment BID PRN for itch  - stop using clobetasol  - zinc oxide in future if worsens with sweat      # Inflamed seborrheic keratoses of central chest  - Discussed the natural etiology and provided reassurances about the benign nature of the lesions.   - cryotherapy as below    # BLK L scapula  - no further treatment needed and no signs for BCC on exam today    Procedures Performed:   - Cryotherapy procedure note, location(s): nose, cheeks and forehead. After verbal consent and discussion of risks and benefits including, but not limited to, dyspigmentation/scar, blister, and pain, 12 lesion(s) was(were) treated with 1-2 mm freeze border for 1-2 cycles with liquid nitrogen. Post cryotherapy instructions were provided.  - Cryotherapy procedure note, location(s): central chest. After verbal consent and discussion of risks and benefits including, but not limited to, dyspigmentation/scar, blister, and pain, 1 lesion(s) was(were) treated with 1-2 mm freeze border for 1-2 cycles with liquid nitrogen. Post cryotherapy instructions were provided.    Follow-up: 6 months for field therapy follow up and skin check    Staff and Resident:  I, Taye Thompson MD, discussed and evaluated the patient with Dr. Randall.    Staff Physician Comments:   I saw and evaluated the patient with the resident and I agree with the assessment and plan.  I was present for the entire minor procedure and examination. I have made edits if needed.    Yonathan Randall MD  Staff Dermatologist and Dermatopathologist  , Department of Dermatology   ____________________________________________    CC: No chief complaint on file.      HPI:  Mr. Reinaldo Gordillo is a(n) extremely  pleasant 77 year old male who presents today as a return patient for a few lesions on chest and face. Last seen by myself on 2/18/20, at which time the patient underwent cryotherapy for treatment of AKs.     Denies any growth, pain or bleeding of these spots. Most are just new scaly.    The patient denies any painful, bleeding, or nonhealing lesions, or any new or changing moles.    Patient is otherwise feeling well, without additional skin concerns.     Labs Reviewed:  N/A    Physical Exam:  Vitals: There were no vitals taken for this visit.  SKIN: Focused examination of face, left scapula and central chest was performed along with groin.  - There are erythematous macules with overyling adherent scale on the nose, forehead, cheeks and ears.   - There is a waxy stuck on tan to brown papule on the central chest.  - No rash identified on scrotum or groin with no lichenification or erosions  - pink/flesh colored papule of left scapula with no globules or arborizing vessels  - No other lesions of concern on areas examined.     Medications:  Current Outpatient Medications   Medication     ASPIRIN LOW DOSE 81 MG EC tablet     ASPIRIN PO     clobetasol (TEMOVATE) 0.05 % external ointment     lisinopril (ZESTRIL) 5 MG tablet     Metoprolol Succinate (TOPROL XL PO)     omeprazole (PRILOSEC) 20 MG DR capsule     pravastatin (PRAVACHOL) 80 MG tablet     zolpidem (AMBIEN) 10 MG tablet     Current Facility-Administered Medications   Medication     lidocaine 1% with EPINEPHrine 1:100,000 injection 3 mL      Past Medical History:   Patient Active Problem List   Diagnosis     Nuclear sclerotic cataract of both eyes     Hyperglycemia     Hyperlipidemia     Stented coronary artery     Coronary artery disease involving native coronary artery without angina pectoris     Basal cell carcinoma (BCC) in situ of skin     Colon polyp     Hereditary and idiopathic peripheral neuropathy     Past Medical History:   Diagnosis Date     ASCVD  (arteriosclerotic cardiovascular disease)      Coronary artery disease      Gastroesophageal reflux disease      Hypertension      Stented coronary artery        CC No referring provider defined for this encounter. on close of this encounter.    This note has been created using voice recognition software; while it has been reviewed, some errors may persist.         Again, thank you for allowing me to participate in the care of your patient.        Sincerely,        Yonathan Randall MD

## 2022-11-04 NOTE — PATIENT INSTRUCTIONS
"For actinic keratoses on face, these are low-risk precancers called actinic keratoses. We will treat them with an anti-cancer cream.  Please start Efudex and calcipotriene mixed equally together. Apply twice daily to forehead, ears, nose and cheeks for 8 days.  Skin will get red and crusty (like hamburger).  Please keep Efudex away from pets and children. Wash hands thoroughly after application.   See handout below for more information about Efudex.      For groin rash, start using desonide daily as needed for itch. Stop using clobetasol. If noticing more itch when sweaty and in warmer weather, get zinc oxide ointment to help with moisture control as sweat can be cause of irritation and itch in the groin.       Cryotherapy Instructions    For the areas treated with liquid nitrogen (cryotherapy or freezing) today, they are expected to get pink, puffy, and perhaps even blister. The area should then crust up and fall off and the goal is to have nice new skin underneath. There is nothing special that you need to do for these areas. You can wash them as you do normal skin.     Sometimes a blister develops; if a blister does develop do NOT pop it. However, if it breaks open on its own, be sure to wash it with soap and water daily and put plain vasaline or petroleum jelly and a bandaid on it until the skin is healed.     Please call the clinic if you have any questions or concerns.     Sun Protection    Sunscreen   What does \"broad spectrum mean\"?  Broad spectrum sunscreens protect against both UVA and UVB radiation. UVC is filtered out by the ozone layer.     What does SPF mean?   SPF stands for  Sun Protection Factor  and represents the ability to screen only UVB (burning) rays. UVB rays are mostly blocked in all sunscreens, but only those that contain titanium dioxide, zinc oxide, mexoryl or Parsol 1789 (avobenzone) block the UVA spectrum. Even though a sunscreen is labeled  UVA/UVB Protection  that is not entirely " accurate because products that only partially protect against UVA can claim to protect against both UVA and UVB.     What SPF should I chose?   Aim to get a sunscreen that is at least sun protection factor (SPF) 30. SPF 15 provides about 92-93% coverage, SPF 30 about 95-97% coverage, and SPF 45 about 98% coverage. That is to say, SPF 30 is not twice as good as SPF 15. The reason why we recommend SPF 30 is because we are usually only putting on half the necessary amount of sunscreen to achieve the advertised protection. That means that it is very possible that your SPF 30 sunscreen is only providing you with SPF 15 coverage based on how much you are applying. SPF 15 (92-93% coverage) is the absolute minimal that we recommend. Similarly, the benefit of sunscreens with SPF higher than 50 is that even if you put on less than the required amount, you are likely still getting good protection (ex: even if you apply only half the recommended amount of , it should still provide you with an SPF of 50).     How much should I apply?  If covering your whole body, you should be using 30 grams, or one ounce, which is how much is in one shot glass! That s a THICK layer! May times, you are only applying half the recommended amount, which means that you are only getting half the SPF (for example, you may be using SPF 30 but if you're only applying half the recommended amount, you're only getting SPF 15 protection.      When do I need to wear sunscreen?  Every day, rain or shine! Even on a rainy day or a day when you are only indoors, you are still being exposed harmful UV radiation from the sun. We usually recommend physical/mineral sunscreens (active ingredient is titanium dioxide or zinc oxide) as these ingredients have been around for many years, there is no concern of them being absorbed into the bloodstream, and they are coral reef friendly! However, the best sunscreen is the one that you will use everyday.     What about  my kids?  Sunscreen is not recommended for infants under the age of 6 months. Use clothing, shade and sun avoidance for small infants. For kids older than 6 months, we recommend that you should use only mineral/physical sunscreens that have zinc oxide as the active ingredient. Sun-protective clothing and hats are also important for people of all ages.     Sun protective Clothing:  www.coolibar.com  www.sunprecautions.com  www.nelson.com    Do I need tinted sunscreen?  There is more and more research showing that visible light can also lead to discoloration (such as melasma). Tinted sunscreens (which contain iron oxides) protect against visible light as an added bonus.     What brands do you recommend?    Physical/Mineral Sunscreens (in no particular order)  Elta MD UV Physical Broad-Spectrum SPF 41 Sunscreen (Tinted, $33)  Skin Ceuticals Physical Fusion UV Defense SPF 50 (Tinted, $34)  Unsun Mineral Tinted Face Sunscreen (Tinted, with 2 shade ranges, $29)  It Cosmetics CC+ Cream with SPF 50+ (Tinted, can also double as foundation/coverage -- great range of shades, $40)  Biossance Squalane + Zinc Sheer Mineral Sunscreen SPF 30 PA +++ (goes on white then blends in, $30)  Cerave 100% Mineral Sunscreen SPF 50 Face (good for sensitive skin, $15)  La Roche Posay Anthelios Mineral Zinc Oxide Sunscreen SPF 50 ($35)  La Roche Posay Anthelios Mineral Tinted Sunscreen for Face SPF 50 ($35)  Think Sport Sunscreen (great for sports, though has more of a white cast, $20)  Think Baby Sunscreen (for kids, $21)  Color Science Sunforgettable Total Protection Brush On Shield SPF 50 (Multiple tints, $130)    Chemical Sunscreens  Bee Gomez Weightless Protection SPF 30 ($48)  Floresita SPF Brightening Moisturizer ($30)  Urban Skin Complexion Protection Moisturizer SPF 30 ($20)  Total Defense + Repair Broad Spectrum SPF 34 ($68)  Clarins UV PLUS Anti-Pollution Sunscreen Multi-Protection Tint SPF 50 (Multiple tints, $45)  Neutrogena  "Healthy Skin Glow Sheers Tinted Moisturizer with SPF 20 (Multiple tints, $11)    The ABCDEs of Melanoma  Skin cancer can develop anywhere on the skin. Once a month, take a look at your entire body and note any changing moles or spots. Ask someone for help when checking your skin, especially for hard to see places such as your back. If you notice a mole that looks different from others, or one that changes, enlarges, itches, or bleeds, you should see a dermatologist.    Asymmetry, Border (irregularity), Color (not uniform, changes in color), Diameter (greater than 6 mm which is about the size of a pencil eraser), and Evolving (any changes in pre-existing moles). In short, look for the \"ugly duckling.\" You want all of the spots on your body to look like cousins (like they could be related). If something stands out, take a photo of it and make an appointment to have it evaluated.     "

## 2022-11-13 NOTE — PROGRESS NOTES
HPI:      Last visit with me 10/18/2022 and additional details in that note. Overall doing well. No new HEENT, cardiopulmonary, abdominal, , neurological, systemic, psychiatric, lymphatic, endocrine, vascular complaints. He still exercises w/o problems.       Past Medical History:   Diagnosis Date     ASCVD (arteriosclerotic cardiovascular disease)      Coronary artery disease      Gastroesophageal reflux disease      Hypertension      Stented coronary artery      Past Surgical History:   Procedure Laterality Date     CATARACT IOL, RT/LT       COLONOSCOPY N/A 7/17/2019    Procedure: COLONOSCOPY;  Surgeon: Roque Givens MD;  Location: [facility] OR     ESOPHAGOSCOPY, GASTROSCOPY, DUODENOSCOPY (EGD), COMBINED N/A 7/17/2019    Procedure: ESOPHAGOGASTRODUODENOSCOPY, WITH BIOPSY;  Surgeon: Roque Givens MD;  Location: [facility] OR     HERNIA REPAIR  2000     MOHS MICROGRAPHIC PROCEDURE       PHACOEMULSIFICATION CLEAR CORNEA WITH STANDARD INTRAOCULAR LENS IMPLANT Right 7/2/2021    Procedure: RIGHT EYE CATARACT REMOVAL WITH INTRAOCULAR LENS IMPLANT;  Surgeon: Justa Liz MD;  Location: [facility] OR     PHACOEMULSIFICATION CLEAR CORNEA WITH STANDARD INTRAOCULAR LENS IMPLANT Left 7/9/2021    Procedure: LEFT EYE CATARACT REMOVAL WITH INTRAOCULAR LENS IMPLANT;  Surgeon: Justa Liz MD;  Location: [facility] OR     ZZ CORONARY STENT PERCUT, EA ADDTL VESSEL       PE:    Vitals noted, gen, nad, cooperative, alert, neck supple nl rom, lungs with good air movement, RRR, S1, S2, no MRG, abdomen, no acute findings. Grossly normal neurological exam. He has some skin changes on his scalp. He states his R Flank sxs. Are essentially gone. No urinary complaints.     A/P:    1. Immunizations; COVID Pfizer vaccine x 5 including the bi-valent Pfizer on 9/8/2022. He has completed the Shigrix vaccine series. Tdap 8/7/2015. Pneumococcal 23 done 9/6/2019. Prevnar 13 done 5/15/2018. Influenza vaccine done 9/9/2022  2. Dermatology; appt.  With Dr. Randall scheduled seen 11/29/2022 and he has follow up 5/9/2023  3. PSA; 0.58 on 8/29/2022  4. Colonoscopy; 7/17/2019  5. Increased lipids on Pravastatin; lipids 8/29/2022 TG's 43, HDL 93 and LDL 63. He could not tolerate Crestor (CK 1600) with muscle complaints.   6. HTN; on Lisinopril; Creatinine 1.32 on 8/29/2022. K+ was 4.9  7. Neurology follow up with Dr. Begum 12/2/2022. Last visit 12/3/2021 for neuropathy unclear etiology   8. A1c was 5.9% on 12/1/2021  9. CAD; seen 12/15/2021, [facility] Dr. Madrigal, note in Care Everywhere and there is a detailed note in the chart. He has today 11/14/2022 appt. With Dr. Chapin. Resting echo 10/29/2022.   10. Fatigue. Ordered Testosterone, TSH (normal at 1.82 on 8/29/2022). He does not feel he has sleep apnea. Some mild depression? Low testosterone 2.20 on 8/29/2022 and he has endocrinology appt. With Dr. White 1/3/2023  11. He states possible albumin in his urine in the past and ordered UA, urine protein and urine microalbumin that were normal on 8/29/2022  12. R flank pain. Ordered non-contrast abdomina/pelvic CT scan done 10/20/2022 that did not show any acute findings.   13. Mild anemia (Hgb 12.6 on 8/29/2022); labs done 10/18/2022: Hgb 13.0, MCV 98, RDW normal. Other cell lines normal. Ferritin, Iron studies, B12 and Folate normal. Reticulocyte count normal.      30 minutes spent on the date of the encounter doing chart review, history and exam, documentation and further activities as noted above exclusive of procedures and other billable interpretations

## 2022-11-14 ENCOUNTER — PRE VISIT (OUTPATIENT)
Dept: CARDIOLOGY | Facility: CLINIC | Age: 77
End: 2022-11-14

## 2022-11-14 ENCOUNTER — OFFICE VISIT (OUTPATIENT)
Dept: CARDIOLOGY | Facility: CLINIC | Age: 77
End: 2022-11-14
Attending: INTERNAL MEDICINE
Payer: COMMERCIAL

## 2022-11-14 ENCOUNTER — OFFICE VISIT (OUTPATIENT)
Dept: INTERNAL MEDICINE | Facility: CLINIC | Age: 77
End: 2022-11-14
Payer: COMMERCIAL

## 2022-11-14 VITALS
DIASTOLIC BLOOD PRESSURE: 78 MMHG | RESPIRATION RATE: 16 BRPM | HEIGHT: 72 IN | OXYGEN SATURATION: 96 % | SYSTOLIC BLOOD PRESSURE: 144 MMHG | WEIGHT: 195.4 LBS | BODY MASS INDEX: 26.47 KG/M2 | HEART RATE: 71 BPM

## 2022-11-14 VITALS
OXYGEN SATURATION: 97 % | HEART RATE: 63 BPM | HEIGHT: 71 IN | WEIGHT: 197.3 LBS | SYSTOLIC BLOOD PRESSURE: 146 MMHG | DIASTOLIC BLOOD PRESSURE: 80 MMHG | BODY MASS INDEX: 27.62 KG/M2

## 2022-11-14 DIAGNOSIS — I10 BENIGN ESSENTIAL HYPERTENSION: ICD-10-CM

## 2022-11-14 DIAGNOSIS — I10 BENIGN ESSENTIAL HYPERTENSION: Primary | ICD-10-CM

## 2022-11-14 PROCEDURE — 99203 OFFICE O/P NEW LOW 30 MIN: CPT | Mod: GC | Performed by: INTERNAL MEDICINE

## 2022-11-14 PROCEDURE — 99214 OFFICE O/P EST MOD 30 MIN: CPT | Performed by: INTERNAL MEDICINE

## 2022-11-14 PROCEDURE — G0463 HOSPITAL OUTPT CLINIC VISIT: HCPCS

## 2022-11-14 ASSESSMENT — PAIN SCALES - GENERAL: PAINLEVEL: NO PAIN (0)

## 2022-11-14 NOTE — LETTER
11/14/2022      RE: Reinaldo Gordillo  [address]  [address]  [facility] [address]       Dear Colleague,    Thank you for the opportunity to participate in the care of your patient, Reinaldo Gordillo, at the [facility] [place] at [facility]. Please see a copy of my visit note below.    Chief complaint: Establish Care  Referral Doctor: Dr. Smiley    HPI:   Reinaldo Gordillo is a 77 year old male with history of coronary artery disease, HTN, HL is here to establish care.      Pertinent cardiac history obtained from chart and verified by the patient:  Patient had CACS of 1500 AU at [facility]. He underwent a stress test and developed ventricular tachycardia post stress test. This lead to an angiogram that demonstrated severe RCA disease which was stented successfully with overlapping 4.0X12, 3.5X8, 2.5X28 and 2.25X28 mm Synergy AREN with excellent angiographic results. Reinaldo was on Prasugrel for 3 years and stopped medication in 2018. He was on Crestor but was unable to tolerate the medication due to muscle weakness and an elevated CK level (1600).     Patient exercises 3-4 times a week.  He has no limitations with activity. He denies any chest pain, dyspnea at rest or with exertion, PND, orthopnea, peripheral edema, palpitations, lightheadedness or syncope.     PAST MEDICAL HISTORY:  Past Medical History:   Diagnosis Date     ASCVD (arteriosclerotic cardiovascular disease)      Coronary artery disease      Gastroesophageal reflux disease      Hypertension      Stented coronary artery        CURRENT MEDICATIONS:  Current Outpatient Medications   Medication Sig Dispense Refill     ASPIRIN LOW DOSE 81 MG EC tablet        ASPIRIN PO        calcipotriene (DOVONOX) 0.005 % external ointment Apply topically 2 times daily and mix pea sized amount with 5-FU 60 g 0     clobetasol (TEMOVATE) 0.05 % external ointment Apply topically 2 times daily To the rash on  "the legs as needed 120 g 3     desonide (DESOWEN) 0.05 % external ointment Apply topically 2 times daily 60 g 1     fluorouracil (EFUDEX) 5 % external cream Apply topically 2 times daily Mix pea sized amount with calcipotriene twice daily for 8 days 40 g 0     lisinopril (ZESTRIL) 5 MG tablet        Metoprolol Succinate (TOPROL XL PO)        omeprazole (PRILOSEC) 20 MG DR capsule Take 20 mg by mouth daily       pravastatin (PRAVACHOL) 80 MG tablet 80 mg        zolpidem (AMBIEN) 10 MG tablet          ALLERGIES:   No Known Allergies    FAMILY HISTORY:  Family History   Problem Relation Age of Onset     Glaucoma No family hx of      Macular Degeneration No family hx of      Retinal detachment No family hx of      Amblyopia No family hx of      Melanoma No family hx of      Skin Cancer No family hx of        SOCIAL HISTORY:  Social History     Tobacco Use     Smoking status: Never     Smokeless tobacco: Never   Substance Use Topics     Alcohol use: Yes     Comment: 1-2 drinks 5 days a week     Drug use: Never       ROS:   A comprehensive 14 point review of systems is negative other than as mentioned in HPI.    Exam:  BP (!) 146/80 (BP Location: Right arm, Patient Position: Chair, Cuff Size: Adult Regular)   Pulse 63   Ht 1.806 m (5' 11.1\")   Wt 89.5 kg (197 lb 4.8 oz)   SpO2 97%   BMI 27.44 kg/m    GENERAL APPEARANCE: healthy, alert and no distress  EYES: no icterus, no xanthelasmas  ENT: normal palate, mucosa moist, no central cyanosis  NECK: JVP not elevated  RESPIRATORY: lungs clear to auscultation - no rales, rhonchi or wheezes, no use of accessory muscles, no retractions, respirations are unlabored, normal respiratory rate  CARDIOVASCULAR: regular rhythm, normal S1 with physiologic split S2, no S3 or S4 and no murmur, click or rub.  GI: soft, non tender, bowel sounds normal,no abdominal bruits  EXTREMITIES: no edema, no bruits  NEURO: alert and oriented to person/place/time, normal speech, gait and " affect  VASC: Radial, dorsalis pedis and posterior tibialis pulses 2+ bilaterally.  SKIN: no ecchymoses, no rashes.  PSYCH: cooperative, affect appropriate.     Labs/Imaging:  Reviewed.     Echocardiogram 10/29/2022:  Left ventricular size, wall motion and function are normal. The ejection fraction is 60-65%.  Global right ventricular function is normal.  Aortic valve calcification without significant stenosis.  No pericardial effusion.    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      #. Coronary artery disease  -S/p AREN of RCA x4 in 2015.  -He is currently asymptomatic.  -Continue aspirin 81 mg daily, pravastatin 80 mg daily, Toprol-XL 25 mg daily, and lisinopril 20 mg daily.    #.  Hypertension  -Blood pressure well controlled at home.  -Continue Toprol-XL 25 mg daily and lisinopril 20 mg daily.    #.  Hyperlipidemia  -LDL: 63 on 8/29/2022  -On Pravastatin 80 mg daily.  -Did not tolerate rosuvastatin in the past due to muscle cramps and elevated CK.     Patient was seen and discussed with Dr. Chapin.    Follow-up: 1 year    Victoria Martin MD  Cardiology fellow    CC  YULY MENDES        Attestation signed by Pito Chapin MD at 11/27/2022  8:14 PM:  ATTENDING ATTESTATION:   I personally examined and evaluated this patient on November 14, 2022.  I have personally reviewed today's vital signs, medications, all labs, and all imaging/cardiac studies described above. I have reviewed and edited, as necessary, the history, review of systems, physical examination, and assessment and plan.  I discussed the patient with Dr. Martin and agree with the assessment and plan of care as documented in the note above.  I personally spent 30 min today reviewing the medical record, meeting with the patient, and completing this note.  Thank you for allowing us to take part in the care of this very pleasant  patient.  Please do not hesitate to call if any further questions or concerns arise.    Pito Chapin MD, PhD  Interventional/Critical Care Cardiology  [phone]    November 14, 2022

## 2022-11-14 NOTE — PROGRESS NOTES
Chief complaint: Establish Care  Referral Doctor: Dr. Smiley    HPI:   Reinaldo Gordillo is a 77 year old male with history of coronary artery disease, HTN, HL is here to establish care.      Pertinent cardiac history obtained from chart and verified by the patient:  Patient had CACS of 1500 AU at [facility]. He underwent a stress test and developed ventricular tachycardia post stress test. This lead to an angiogram that demonstrated severe RCA disease which was stented successfully with overlapping 4.0X12, 3.5X8, 2.5X28 and 2.25X28 mm Synergy AREN with excellent angiographic results. Reinaldo was on Prasugrel for 3 years and stopped medication in 2018. He was on Crestor but was unable to tolerate the medication due to muscle weakness and an elevated CK level (1600).     Patient exercises 3-4 times a week.  He has no limitations with activity. He denies any chest pain, dyspnea at rest or with exertion, PND, orthopnea, peripheral edema, palpitations, lightheadedness or syncope.     PAST MEDICAL HISTORY:  Past Medical History:   Diagnosis Date     ASCVD (arteriosclerotic cardiovascular disease)      Coronary artery disease      Gastroesophageal reflux disease      Hypertension      Stented coronary artery        CURRENT MEDICATIONS:  Current Outpatient Medications   Medication Sig Dispense Refill     ASPIRIN LOW DOSE 81 MG EC tablet        ASPIRIN PO        calcipotriene (DOVONOX) 0.005 % external ointment Apply topically 2 times daily and mix pea sized amount with 5-FU 60 g 0     clobetasol (TEMOVATE) 0.05 % external ointment Apply topically 2 times daily To the rash on the legs as needed 120 g 3     desonide (DESOWEN) 0.05 % external ointment Apply topically 2 times daily 60 g 1     fluorouracil (EFUDEX) 5 % external cream Apply topically 2 times daily Mix pea sized amount with calcipotriene twice daily for 8 days 40 g 0     lisinopril (ZESTRIL) 5 MG tablet        Metoprolol Succinate (TOPROL XL PO)         "omeprazole (PRILOSEC) 20 MG DR capsule Take 20 mg by mouth daily       pravastatin (PRAVACHOL) 80 MG tablet 80 mg        zolpidem (AMBIEN) 10 MG tablet          ALLERGIES:   No Known Allergies    FAMILY HISTORY:  Family History   Problem Relation Age of Onset     Glaucoma No family hx of      Macular Degeneration No family hx of      Retinal detachment No family hx of      Amblyopia No family hx of      Melanoma No family hx of      Skin Cancer No family hx of        SOCIAL HISTORY:  Social History     Tobacco Use     Smoking status: Never     Smokeless tobacco: Never   Substance Use Topics     Alcohol use: Yes     Comment: 1-2 drinks 5 days a week     Drug use: Never       ROS:   A comprehensive 14 point review of systems is negative other than as mentioned in HPI.    Exam:  BP (!) 146/80 (BP Location: Right arm, Patient Position: Chair, Cuff Size: Adult Regular)   Pulse 63   Ht 1.806 m (5' 11.1\")   Wt 89.5 kg (197 lb 4.8 oz)   SpO2 97%   BMI 27.44 kg/m    GENERAL APPEARANCE: healthy, alert and no distress  EYES: no icterus, no xanthelasmas  ENT: normal palate, mucosa moist, no central cyanosis  NECK: JVP not elevated  RESPIRATORY: lungs clear to auscultation - no rales, rhonchi or wheezes, no use of accessory muscles, no retractions, respirations are unlabored, normal respiratory rate  CARDIOVASCULAR: regular rhythm, normal S1 with physiologic split S2, no S3 or S4 and no murmur, click or rub.  GI: soft, non tender, bowel sounds normal,no abdominal bruits  EXTREMITIES: no edema, no bruits  NEURO: alert and oriented to person/place/time, normal speech, gait and affect  VASC: Radial, dorsalis pedis and posterior tibialis pulses 2+ bilaterally.  SKIN: no ecchymoses, no rashes.  PSYCH: cooperative, affect appropriate.     Labs/Imaging:  Reviewed.     Echocardiogram 10/29/2022:  Left ventricular size, wall motion and function are normal. The ejection fraction is 60-65%.  Global right ventricular function is " normal.  Aortic valve calcification without significant stenosis.  No pericardial effusion.    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      #. Coronary artery disease  -S/p AREN of RCA x4 in 2015.  -He is currently asymptomatic.  -Continue aspirin 81 mg daily, pravastatin 80 mg daily, Toprol-XL 25 mg daily, and lisinopril 20 mg daily.    #.  Hypertension  -Blood pressure well controlled at home.  -Continue Toprol-XL 25 mg daily and lisinopril 20 mg daily.    #.  Hyperlipidemia  -LDL: 63 on 8/29/2022  -On Pravastatin 80 mg daily.  -Did not tolerate rosuvastatin in the past due to muscle cramps and elevated CK.     Patient was seen and discussed with Dr. Chapin.    Follow-up: 1 year    Victoria Martin MD  Cardiology fellow    [facility] LAGUNAS

## 2022-11-14 NOTE — NURSING NOTE
Reinaldo Gordillo is a 77 year old male patient that presents today in clinic for the following:    Chief Complaint   Patient presents with     Follow Up     He reports no new health concerns today in clinic     The patient's allergies and medications were reviewed as noted. A set of vitals were recorded as noted without incident: BP (!) 144/78 (BP Location: Right arm, Patient Position: Sitting, Cuff Size: Adult Regular)   Pulse 71   Resp 16   Ht 1.829 m (6')   Wt 88.6 kg (195 lb 6.4 oz)   SpO2 96%   BMI 26.50 kg/m  . The patient does not have any other questions for the provider.    Paras Chance, EMT  4:48 PM 11/14/2022

## 2022-11-14 NOTE — NURSING NOTE
Chief Complaint   Patient presents with     Follow Up      referral from Dr. Smiley for hypertension     Vitals were taken and medications reconciled.    Jose D Ortega, EMT  5:52 PM

## 2022-11-15 NOTE — PATIENT INSTRUCTIONS
Patient Instructions:  It was a pleasure to see you in the cardiology clinic today.      If you have any questions, call  Jackelyn Contreras RN, at [phone].   [facility].  To schedule an appointment or to leave a message for your Care Team Press #1  If you are a physician calling for another physician Press #2  For Billing Press #3  For Medical Records Press #4  We are encouraging the use of [employer] to communicate with your HealthCare Provider    Note the new medications: none  Stop the following medications: none    The results from today include: none  Please follow up with Dr. Chapin in one year with labs.    Can you please take your blood pressure daily and log it. Send in one weeks worth of your at home blood pressures via [employer]      If you have an urgent need after hours (8:00 am to 4:30 pm) please call [phone] and ask for the cardiology fellow on call.

## 2022-12-04 DIAGNOSIS — L57.0 AK (ACTINIC KERATOSIS): ICD-10-CM

## 2022-12-16 NOTE — TELEPHONE ENCOUNTER
Routing refill request to provider for review/approval because:  Drug not on the [facility] refill protocol     Rosalba WESTON RN  [facility] [place]  [phone]

## 2022-12-23 RX ORDER — CALCIPOTRIENE 50 UG/G
OINTMENT TOPICAL
Qty: 60 G | Refills: 0 | Status: SHIPPED | OUTPATIENT
Start: 2022-12-23 | End: 2023-02-20

## 2023-01-03 ENCOUNTER — VIRTUAL VISIT (OUTPATIENT)
Dept: ENDOCRINOLOGY | Facility: CLINIC | Age: 78
End: 2023-01-03
Attending: INTERNAL MEDICINE
Payer: COMMERCIAL

## 2023-01-03 DIAGNOSIS — E29.1 MALE HYPOGONADISM: Primary | ICD-10-CM

## 2023-01-03 PROCEDURE — 99204 OFFICE O/P NEW MOD 45 MIN: CPT | Mod: 95 | Performed by: INTERNAL MEDICINE

## 2023-01-03 NOTE — PROGRESS NOTES
Patient is being evaluated via a billable video visit.      How would you like to obtain your AVS? Verbally Reviewed  If the video visit is dropped, the invitation should be resent by: Cellphone  Will anyone else be joining your video visit? No        Video-Visit Details    Video Start Time: 11:05 am    Type of service:  Video Visit    Video End Time:  11:35 am    Originating Location (pt. Location):  Home    PROVIDER LOCATION On-site vs Off-site    Distant Location (provider location):  Home    Platform used for Video Visit: [facility]      Name: Reinaldo Gordillo is a 77 year old man, seen at the request of Dr. 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 Naresh Chandler/[facility]  8/2021. Lab testing showed low testosterone level, but management plan unclear  Previous [facility] labs include:      Previous [facility] labs include:   Latest Reference Range & Units 08/29/22 08:42   Testosterone Total 240 - 950 ng/dL 295      Latest Reference Range & Units 08/29/22 08:42   Free Testosterone Calculated ng/dL 2.20          Cheko 5: 4.1-23.9   Latest Reference Range & Units 08/29/22 08:42   Sex Hormone Binding Globulin 11 - 80 nmol/L 119 (H)   (H): Data is abnormally high     Lab Test 08/29/22  0842   PSA 0.58     Additional health history:   Testicular injury:    none            Testicular surgery:   vasectomy  Testosterone med use:     none        Fam Hx Hypogonadism: None    Recent symptoms:  Decreased libido, but able to  achieve erection with Cialis use  Reduced muscle strength  Decreased axillary hair  Normal sense of smell  Some muscle aches, but may be statin related  Denies arthralgias        Lives in [facility]  Sees Dr. Joao Smiley/[facility] for general medicine evaluations.    PMH/PSH:  Past Medical History:   Diagnosis Date     ASCVD (arteriosclerotic cardiovascular disease)      Coronary artery disease      Gastroesophageal reflux disease      Hypertension      Stented coronary artery      Past Surgical History:   Procedure Laterality Date     CATARACT IOL, RT/LT       COLONOSCOPY N/A 7/17/2019    Procedure: COLONOSCOPY;  Surgeon: Roque Givens MD;  Location: [facility] OR     ESOPHAGOSCOPY, GASTROSCOPY, DUODENOSCOPY (EGD), COMBINED N/A 7/17/2019    Procedure: ESOPHAGOGASTRODUODENOSCOPY, WITH BIOPSY;  Surgeon: Roque Givens MD;  Location: [facility] OR     HERNIA REPAIR  2000     MOHS MICROGRAPHIC PROCEDURE       PHACOEMULSIFICATION CLEAR CORNEA WITH STANDARD INTRAOCULAR LENS IMPLANT Right 7/2/2021    Procedure: RIGHT EYE CATARACT REMOVAL WITH INTRAOCULAR LENS IMPLANT;  Surgeon: Justa Liz MD;  Location: [facility] OR     PHACOEMULSIFICATION CLEAR CORNEA WITH STANDARD INTRAOCULAR LENS IMPLANT Left 7/9/2021    Procedure: LEFT EYE CATARACT REMOVAL WITH INTRAOCULAR LENS IMPLANT;  Surgeon: Justa Liz MD;  Location: [facility] OR     [facility] CORONARY STENT PERCUT, EA ADDTL VESSEL         Family Hx:  Family History   Problem Relation Age of Onset     Glaucoma No family hx of      Macular Degeneration No family hx of      Retinal detachment No family hx of      Amblyopia No family hx of      Melanoma No family hx of      Skin Cancer No family hx of          Social Hx:  Social History     Socioeconomic History     Marital status:      Spouse name: Not on file     Number of children: Not on file     Years of education: Not on file     Highest education level: Not on file   Occupational History     Not on file    Tobacco Use     Smoking status: Never     Smokeless tobacco: Never   Substance and Sexual Activity     Alcohol use: Yes     Comment: 1-2 drinks 5 days a week     Drug use: Never     Sexual activity: Not on file   Other Topics Concern     Parent/sibling w/ CABG, MI or angioplasty before 65F 55M? Not Asked   Social History Narrative     Not on file     Social Determinants of Health     Financial Resource Strain: Not on file   Food Insecurity: Not on file   Transportation Needs: Not on file   Physical Activity: Not on file   Stress: Not on file   Social Connections: Not on file   Intimate Partner Violence: Not on file   Housing Stability: Not on file          MEDICATIONS:  has a current medication list which includes the following prescription(s): aspirin low dose, clobetasol, lisinopril, metoprolol succinate, omeprazole, pravastatin, zolpidem, aspirin, and calcipotriene, and the following Facility-Administered Medications: lidocaine 1% with epinephrine 1:100,000.    ROS:     ROS: 10 point ROS neg other than the symptoms noted above in the HPI.    GENERAL: no fatigue, wt stable; denies fevers, chills, night sweats.    HEENT: normal sense of smell; no dysphagia, odonophagia, diplopia, neck pain  THYROID:  no apparent hyper or hypothyroid symptoms  CV: no chest pain, pressure, palpitations  LUNGS: no SOB, ANTHONY, cough, wheezing   ABDOMEN: no diarrhea, constipation, abdominal pain  EXTREMITIES: no rashes, ulcers, edema  NEUROLOGY: no headaches, denies changes in vision, tingling, extremitiy numbness   MSK: decreased muscle strength, some myalgias as noted; no arthralgias  SKIN: decreased axillary hair growth; no rashes or lesions  : decreased libido and erectile function  PSYCH:  stable mood, no significant anxiety or depression  ENDOCRINE: no heat or cold intolerance    Physical Exam (visual exam)  VS:  no vital signs taken for video visit  CONSTITUTIONAL: healthy, alert and NAD, well dressed, answering questions  appropriately  ENT: no nose swelling or nasal discharge, mouth redness or gum changes.  EYES: eyes grossly normal to inspection, conjunctivae and sclerae normal, no exophthalmos or proptosis  THYROID:  no apparent nodules or goiter  LUNGS: no audible wheeze, cough or visible cyanosis, no visible retractions or increased work of breathing  ABDOMEN: abdomen not evaluated  EXTREMITIES: no hand tremors, limited exam  NEUROLOGY: CN grossly intact, mentation intact and speech normal   SKIN:  no apparent skin lesions, rash, or edema with visualized skin appearance  PSYCH: mentation appears normal, affect normal/bright, judgement and insight intact,   normal speech and appearance well groomed      LABS:    All pertinent notes, labs, and images personally reviewed by me.     A/P:  Encounter Diagnosis   Name Primary?     Male hypogonadism Yes       Comments:  Reviewed health history and hypogonadism issues.  Suspect symptoms and low free testosterone correlate with male hypogonadism    Plan:  Discussed general issues with the hypogonadism diagnosis and management  We discussed lab tests to assess testosterone axis hormone levels.  Reviewed treatment options with topical, nasal, and injectable testosterone medication use.    Recommend:  Advise additional lab testing to assess low testosterone level   Check pituitary and gonad, related lab tests   Consider nearby [facility] clinic   Lab orders placed  Monitor for symptom changes  No testicular or pituitary imaging needed at this time  Discussed potential treatment options with testosterone medication   We focused on testosterone gel med option  Plan repeat testosterone lab testing 2-4 weeks after starting medication treatment  Will summarize test results and recommendations when labs available    Plan followup for the anemia condition with PCP  Addressed patient questions today    There are no Patient Instructions on file for this visit.    Future labs ordered today:   Orders  Placed This Encounter   Procedures     Ferritin     Luteinizing Hormone     Prolactin     Follicle stimulating hormone     Testosterone Free and Total     Radiology/Consults ordered today:     Total time spent on day of encounter:  35 min    Follow-up:  4/2023, MATT White MD, MS  Endocrinology  [facility]    CC: Joao Smiley

## 2023-01-03 NOTE — LETTER
1/3/2023         RE: Dellchristi CARDONA Duy  [address]  [address]  [place] [address]        Dear Colleague,    Thank you for referring your patient, Reinaldo Gordillo, to the [facility] [place]. Please see a copy of my visit note below.    Patient is being evaluated via a billable video visit.      How would you like to obtain your AVS? Verbally Reviewed  If the video visit is dropped, the invitation should be resent by: Cellphone  Will anyone else be joining your video visit? No        Video-Visit Details    Video Start Time: 11:05 am    Type of service:  Video Visit    Video End Time:  11:35 am    Originating Location (pt. Location):  Home    PROVIDER LOCATION On-site vs Off-site    Distant Location (provider location):  Home    Platform used for Video Visit: [facility]      Name: Dellhcristi CARDONA Duy Gordillo is a 77 year old man, seen at the request of Dr. 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 Naresh Chandler/[facility]  8/2021. Lab testing showed low testosterone level, but management plan unclear  Previous  labs include:      Previous [facility] labs include:   Latest Reference Range & Units 08/29/22 08:42   Testosterone Total 240 - 950 ng/dL 295      Latest Reference Range & Units 08/29/22 08:42   Free Testosterone Calculated ng/dL 2.20          Cheko 5: 4.1-23.9   Latest Reference Range & Units 08/29/22 08:42   Sex Hormone Binding Globulin 11 - 80 nmol/L 119 (H)   (H): Data is abnormally  high     Lab Test 08/29/22  0842   PSA 0.58     Additional health history:   Testicular injury:    none            Testicular surgery:   vasectomy  Testosterone med use:     none        Fam Hx Hypogonadism: None    Recent symptoms:  Decreased libido, but able to achieve erection with Cialis use  Reduced muscle strength  Decreased axillary hair  Normal sense of smell  Some muscle aches, but may be statin related  Denies arthralgias        Lives in [place] [facility] [facility] [place]  Sees Dr. Joao Smiley/[facility] for general medicine evaluations.    PMH/PSH:  Past Medical History:   Diagnosis Date     ASCVD (arteriosclerotic cardiovascular disease)      Coronary artery disease      Gastroesophageal reflux disease      Hypertension      Stented coronary artery      Past Surgical History:   Procedure Laterality Date     CATARACT IOL, RT/LT       COLONOSCOPY N/A 7/17/2019    Procedure: COLONOSCOPY;  Surgeon: Roque Givens MD;  [facility]     ESOPHAGOSCOPY, GASTROSCOPY, DUODENOSCOPY (EGD), COMBINED N/A 7/17/2019    Procedure: ESOPHAGOGASTRODUODENOSCOPY, WITH BIOPSY;  Surgeon: Roque Givens MD;  [facility]: [facility] OR     HERNIA REPAIR  2000     MOHS MICROGRAPHIC PROCEDURE       PHACOEMULSIFICATION CLEAR CORNEA WITH STANDARD INTRAOCULAR LENS IMPLANT Right 7/2/2021    Procedure: RIGHT EYE CATARACT REMOVAL WITH INTRAOCULAR LENS IMPLANT;  Surgeon: Justa Liz MD;  [place] [facility] [place]     PHACOEMULSIFICATION CLEAR CORNEA WITH STANDARD INTRAOCULAR LENS IMPLANT Left 7/9/2021    Procedure: LEFT EYE CATARACT REMOVAL WITH INTRAOCULAR LENS IMPLANT;  Surgeon: Justa Liz MD;  [place] [facility] [place]     [facility] CORONARY STENT PERCUT, EA ADDTL VESSEL         Family Hx:  Family History   Problem Relation Age of Onset     Glaucoma No family hx of      Macular Degeneration No family hx of      Retinal detachment No family hx of      Amblyopia No family hx of      Melanoma No family hx of      Skin Cancer No family hx of           Social Hx:  Social History     Socioeconomic History     Marital status:      Spouse name: Not on file     Number of children: Not on file     Years of education: Not on file     Highest education level: Not on file   Occupational History     Not on file   Tobacco Use     Smoking status: Never     Smokeless tobacco: Never   Substance and Sexual Activity     Alcohol use: Yes     Comment: 1-2 drinks 5 days a week     Drug use: Never     Sexual activity: Not on file   Other Topics Concern     Parent/sibling w/ CABG, MI or angioplasty before 65F 55M? Not Asked   Social History Narrative     Not on file     Social Determinants of Health     Financial Resource Strain: Not on file   Food Insecurity: Not on file   Transportation Needs: Not on file   Physical Activity: Not on file   Stress: Not on file   Social Connections: Not on file   Intimate Partner Violence: Not on file   Housing Stability: Not on file          MEDICATIONS:  has a current medication list which includes the following prescription(s): aspirin low dose, clobetasol, lisinopril, metoprolol succinate, omeprazole, pravastatin, zolpidem, aspirin, and calcipotriene, and the following Facility-Administered Medications: lidocaine 1% with epinephrine 1:100,000.    ROS:     ROS: 10 point ROS neg other than the symptoms noted above in the HPI.    GENERAL: no fatigue, wt stable; denies fevers, chills, night sweats.    HEENT: normal sense of smell; no dysphagia, odonophagia, diplopia, neck pain  THYROID:  no apparent hyper or hypothyroid symptoms  CV: no chest pain, pressure, palpitations  LUNGS: no SOB, ANTHONY, cough, wheezing   ABDOMEN: no diarrhea, constipation, abdominal pain  EXTREMITIES: no rashes, ulcers, edema  NEUROLOGY: no headaches, denies changes in vision, tingling, extremitiy numbness   MSK: decreased muscle strength, some myalgias as noted; no arthralgias  SKIN: decreased axillary hair growth; no rashes or lesions  : decreased libido and  erectile function  PSYCH:  stable mood, no significant anxiety or depression  ENDOCRINE: no heat or cold intolerance    Physical Exam (visual exam)  VS:  no vital signs taken for video visit  CONSTITUTIONAL: healthy, alert and NAD, well dressed, answering questions appropriately  ENT: no nose swelling or nasal discharge, mouth redness or gum changes.  EYES: eyes grossly normal to inspection, conjunctivae and sclerae normal, no exophthalmos or proptosis  THYROID:  no apparent nodules or goiter  LUNGS: no audible wheeze, cough or visible cyanosis, no visible retractions or increased work of breathing  ABDOMEN: abdomen not evaluated  EXTREMITIES: no hand tremors, limited exam  NEUROLOGY: CN grossly intact, mentation intact and speech normal   SKIN:  no apparent skin lesions, rash, or edema with visualized skin appearance  PSYCH: mentation appears normal, affect normal/bright, judgement and insight intact,   normal speech and appearance well groomed      LABS:    All pertinent notes, labs, and images personally reviewed by me.     A/P:  Encounter Diagnosis   Name Primary?     Male hypogonadism Yes       Comments:  Reviewed health history and hypogonadism issues.  Suspect symptoms and low free testosterone correlate with male hypogonadism    Plan:  Discussed general issues with the hypogonadism diagnosis and management  We discussed lab tests to assess testosterone axis hormone levels.  Reviewed treatment options with topical, nasal, and injectable testosterone medication use.    Recommend:  Advise additional lab testing to assess low testosterone level   Check pituitary and gonad, related lab tests   Consider nearby [facility] clinic   Lab orders placed  Monitor for symptom changes  No testicular or pituitary imaging needed at this time  Discussed potential treatment options with testosterone medication   We focused on testosterone gel med option  Plan repeat testosterone lab testing 2-4 weeks after starting medication  treatment  Will summarize test results and recommendations when labs available    Plan followup for the anemia condition with PCP  Addressed patient questions today    There are no Patient Instructions on file for this visit.    Future labs ordered today:   Orders Placed This Encounter   Procedures     Ferritin     Luteinizing Hormone     Prolactin     Follicle stimulating hormone     Testosterone Free and Total     Radiology/Consults ordered today:     Total time spent on day of encounter:  35 min    Follow-up:  4/2023, MATT White MD, MS  Endocrinology  [facility]    CC: Joao Smiley          Again, thank you for allowing me to participate in the care of your patient.        Sincerely,        Salomon White MD

## 2023-01-04 ENCOUNTER — TELEPHONE (OUTPATIENT)
Dept: ENDOCRINOLOGY | Facility: CLINIC | Age: 78
End: 2023-01-04

## 2023-01-04 NOTE — TELEPHONE ENCOUNTER
LVM for PT to call [phone] to schedule f/u appt fro April with Dr. White.  Lab appt needs to be scheduled too.

## 2023-01-21 ENCOUNTER — APPOINTMENT (OUTPATIENT)
Dept: CARDIOLOGY | Facility: CLINIC | Age: 78
End: 2023-01-21
Attending: EMERGENCY MEDICINE
Payer: COMMERCIAL

## 2023-01-21 ENCOUNTER — APPOINTMENT (OUTPATIENT)
Dept: CT IMAGING | Facility: CLINIC | Age: 78
End: 2023-01-21
Attending: EMERGENCY MEDICINE
Payer: COMMERCIAL

## 2023-01-21 ENCOUNTER — HOSPITAL ENCOUNTER (EMERGENCY)
Facility: CLINIC | Age: 78
Discharge: HOME | End: 2023-01-21
Attending: EMERGENCY MEDICINE | Admitting: EMERGENCY MEDICINE
Payer: COMMERCIAL

## 2023-01-21 VITALS
BODY MASS INDEX: 27.09 KG/M2 | SYSTOLIC BLOOD PRESSURE: 153 MMHG | OXYGEN SATURATION: 96 % | RESPIRATION RATE: 16 BRPM | HEART RATE: 74 BPM | DIASTOLIC BLOOD PRESSURE: 78 MMHG | TEMPERATURE: 98.6 F | WEIGHT: 200 LBS | HEIGHT: 72 IN

## 2023-01-21 DIAGNOSIS — R55 SYNCOPE AND COLLAPSE: ICD-10-CM

## 2023-01-21 DIAGNOSIS — D64.9 ANEMIA, UNSPECIFIED: ICD-10-CM

## 2023-01-21 LAB
ALBUMIN SERPL BCG-MCNC: 3.9 G/DL (ref 3.5–5.2)
ALP SERPL-CCNC: 53 U/L (ref 40–129)
ALT SERPL W P-5'-P-CCNC: 36 U/L (ref 10–50)
ANION GAP SERPL CALCULATED.3IONS-SCNC: 12 MMOL/L (ref 7–15)
AST SERPL W P-5'-P-CCNC: 28 U/L (ref 10–50)
ATRIAL RATE - MUSE: 65 BPM
BASOPHILS # BLD AUTO: 0.1 10E3/UL (ref 0–0.2)
BASOPHILS NFR BLD AUTO: 1 %
BILIRUB SERPL-MCNC: 0.2 MG/DL
BUN SERPL-MCNC: 17.6 MG/DL (ref 8–23)
CALCIUM SERPL-MCNC: 9.5 MG/DL (ref 8.8–10.2)
CHLORIDE SERPL-SCNC: 107 MMOL/L (ref 98–107)
CREAT SERPL-MCNC: 1.09 MG/DL (ref 0.67–1.17)
DEPRECATED HCO3 PLAS-SCNC: 22 MMOL/L (ref 22–29)
DIASTOLIC BLOOD PRESSURE - MUSE: NORMAL MMHG
EOSINOPHIL # BLD AUTO: 0.3 10E3/UL (ref 0–0.7)
EOSINOPHIL NFR BLD AUTO: 6 %
ERYTHROCYTE [DISTWIDTH] IN BLOOD BY AUTOMATED COUNT: 11.9 % (ref 10–15)
GFR SERPL CREATININE-BSD FRML MDRD: 71 ML/MIN/1.73M2
GLUCOSE SERPL-MCNC: 121 MG/DL (ref 70–99)
HCT VFR BLD AUTO: 39.6 % (ref 40–53)
HGB BLD-MCNC: 13 G/DL (ref 13.3–17.7)
IMM GRANULOCYTES # BLD: 0 10E3/UL
IMM GRANULOCYTES NFR BLD: 0 %
INTERPRETATION ECG - MUSE: NORMAL
LYMPHOCYTES # BLD AUTO: 0.9 10E3/UL (ref 0.8–5.3)
LYMPHOCYTES NFR BLD AUTO: 17 %
MCH RBC QN AUTO: 32.5 PG (ref 26.5–33)
MCHC RBC AUTO-ENTMCNC: 32.8 G/DL (ref 31.5–36.5)
MCV RBC AUTO: 99 FL (ref 78–100)
MONOCYTES # BLD AUTO: 0.5 10E3/UL (ref 0–1.3)
MONOCYTES NFR BLD AUTO: 9 %
NEUTROPHILS # BLD AUTO: 3.4 10E3/UL (ref 1.6–8.3)
NEUTROPHILS NFR BLD AUTO: 67 %
NRBC # BLD AUTO: 0 10E3/UL
NRBC BLD AUTO-RTO: 0 /100
P AXIS - MUSE: 10 DEGREES
PLATELET # BLD AUTO: 221 10E3/UL (ref 150–450)
POTASSIUM SERPL-SCNC: 5.1 MMOL/L (ref 3.4–5.3)
PR INTERVAL - MUSE: 180 MS
PROT SERPL-MCNC: 6.2 G/DL (ref 6.4–8.3)
QRS DURATION - MUSE: 90 MS
QT - MUSE: 388 MS
QTC - MUSE: 403 MS
R AXIS - MUSE: 13 DEGREES
RBC # BLD AUTO: 4 10E6/UL (ref 4.4–5.9)
SODIUM SERPL-SCNC: 141 MMOL/L (ref 136–145)
SYSTOLIC BLOOD PRESSURE - MUSE: NORMAL MMHG
T AXIS - MUSE: 1 DEGREES
VENTRICULAR RATE- MUSE: 65 BPM
WBC # BLD AUTO: 5.1 10E3/UL (ref 4–11)

## 2023-01-21 PROCEDURE — 99285 EMERGENCY DEPT VISIT HI MDM: CPT | Mod: 25

## 2023-01-21 PROCEDURE — 93242 EXT ECG>48HR<7D RECORDING: CPT

## 2023-01-21 PROCEDURE — 93005 ELECTROCARDIOGRAM TRACING: CPT

## 2023-01-21 PROCEDURE — 99284 EMERGENCY DEPT VISIT MOD MDM: CPT | Mod: 25 | Performed by: EMERGENCY MEDICINE

## 2023-01-21 PROCEDURE — 85025 COMPLETE CBC W/AUTO DIFF WBC: CPT | Performed by: EMERGENCY MEDICINE

## 2023-01-21 PROCEDURE — 70450 CT HEAD/BRAIN W/O DYE: CPT

## 2023-01-21 PROCEDURE — 93244 EXT ECG>48HR<7D REV&INTERPJ: CPT | Performed by: INTERNAL MEDICINE

## 2023-01-21 PROCEDURE — 80053 COMPREHEN METABOLIC PANEL: CPT | Performed by: EMERGENCY MEDICINE

## 2023-01-21 PROCEDURE — 72125 CT NECK SPINE W/O DYE: CPT

## 2023-01-21 PROCEDURE — 36415 COLL VENOUS BLD VENIPUNCTURE: CPT | Performed by: EMERGENCY MEDICINE

## 2023-01-21 PROCEDURE — 70450 CT HEAD/BRAIN W/O DYE: CPT | Mod: 26 | Performed by: RADIOLOGY

## 2023-01-21 PROCEDURE — 93010 ELECTROCARDIOGRAM REPORT: CPT | Performed by: EMERGENCY MEDICINE

## 2023-01-21 PROCEDURE — 72125 CT NECK SPINE W/O DYE: CPT | Mod: 26 | Performed by: RADIOLOGY

## 2023-01-21 ASSESSMENT — ACTIVITIES OF DAILY LIVING (ADL): ADLS_ACUITY_SCORE: 35

## 2023-01-21 NOTE — ED PROVIDER NOTES
[place] EMERGENCY DEPARTMENT ([facility])  1/21/23  History     Chief Complaint   Patient presents with     Fall     The history is provided by the patient and medical records.     Reinaldo Gordillo is a 77 year old male with a history notable for CAD s/p PCI and HTN.  He is anticoagulated on 81 mg daily aspirin.  He presents for a fall.  The patient states that he is not exactly sure what happened.  He remembers waking up at approximately 8:00 this morning and walking downstairs to the kitchen to make coffee.  He had an unwitnessed fall.  His wife heard a thump and then came downstairs to find him seated in a chair.  Patient does not recall having any prodromal symptoms. He is uncertain if he might of slipped and fallen. He does not remember getting himself off the ground into the chair.  He does not have any difficulty with with memory since that time. No focal weakness, no speech change, slurring. No facial droop.  No past history of seizures.  No past history of atrial fibrillation. I reviewed his medication list; he has not had any recent medication changes.  He does have as needed Ambien but he did not take that last night and has not been taking it recently, does not use any other obviously sedating medications with exception of occasional melatonin at bedtime.      This part of the medical record was transcribed by Sravanthi Farr Medical Scribe, from a dictation done by Fransisca Bello MD.     I have reviewed the Medications, Allergies, Past Medical and Surgical History, and Social History in the [facility] system.  PAST MEDICAL HISTORY:   Past Medical History:   Diagnosis Date     ASCVD (arteriosclerotic cardiovascular disease)      Coronary artery disease      Gastroesophageal reflux disease      Hypertension      Stented coronary artery        PAST SURGICAL HISTORY:   Past Surgical History:   Procedure Laterality Date     CATARACT IOL, RT/LT       COLONOSCOPY N/A 7/17/2019    Procedure:  COLONOSCOPY;  Surgeon: Roque Givens MD;  [facility]     ESOPHAGOSCOPY, GASTROSCOPY, DUODENOSCOPY (EGD), COMBINED N/A 7/17/2019    Procedure: ESOPHAGOGASTRODUODENOSCOPY, WITH BIOPSY;  Surgeon: Roque Givens MD;  [facility]     HERNIA REPAIR  2000     MOHS MICROGRAPHIC PROCEDURE       PHACOEMULSIFICATION CLEAR CORNEA WITH STANDARD INTRAOCULAR LENS IMPLANT Right 7/2/2021    Procedure: RIGHT EYE CATARACT REMOVAL WITH INTRAOCULAR LENS IMPLANT;  Surgeon: Justa Liz MD;  Location[facility] OR     PHACOEMULSIFICATION CLEAR CORNEA WITH STANDARD INTRAOCULAR LENS IMPLANT Left 7/9/2021    Procedure: LEFT EYE CATARACT REMOVAL WITH INTRAOCULAR LENS IMPLANT;  Surgeon: Justa Liz MD;  [place] [facility] [place]     [facility] CORONARY STENT PERCUT, EA ADDTL VESSEL         Past medical history, past surgical history, medications, and allergies were reviewed with the patient. Additional pertinent items: None    FAMILY HISTORY:   Family History   Problem Relation Age of Onset     Glaucoma No family hx of      Macular Degeneration No family hx of      Retinal detachment No family hx of      Amblyopia No family hx of      Melanoma No family hx of      Skin Cancer No family hx of        SOCIAL HISTORY:   Social History     Tobacco Use     Smoking status: Never     Smokeless tobacco: Never   Substance Use Topics     Alcohol use: Yes     Comment: 1-2 drinks 5 days a week     Social history was reviewed with the patient. Additional pertinent items: None       No Known Allergies     Review of Systems  A medically appropriate review of systems was performed with pertinent positives and negatives noted in the HPI, and all other systems negative.    Physical Exam   BP: (!) 153/78  Pulse: 74  Temp: 98.6  F (37  C)  Resp: 16  Height: 182.9 cm (6')  Weight: 90.7 kg (200 lb)  SpO2: 96 %      Physical Exam  Gen:A&Ox3, no acute distress  HEENT:PERRL, no facial tenderness or wounds, minimal scalp hematoma, oropharynx clear,  mucous membranes moist, TMs clear bilaterally  Neck:no bony tenderness or step offs, no JVD, trachea midline, mild midline and perispinal mid neck tenderness  Back: no CVA tenderness, no midline bony tenderness  CV:RRR without murmurs  PULM:Clear to auscultation bilaterally  Abd:soft, nontender, nondistended. Bowel sounds present and normal  UE:No traumatic injuries, skin normal  LE:no traumatic injuries, skin normal, no LE edema  Neuro:CN II-XII intact, strength 5/5 of flexion and extension of the toes, ankles, knees, hips, hands, wrists, elbows and shoulders.  Sensation intact to touch throughout. Coordination normal on finger to nose testing. Reflexes 3/6 and symmetric throughout. No clonus.  Gait normal. Able to walk on heels and toes.  Negative Romberg sign.   Skin: no rashes or ecchymoses  ED Course, Procedures, & Data   Procedures            EKG Interpretation:      Interpreted by Fransisca Bello MD  Time reviewed: 9:45AM  Symptoms at time of EKG: syncope  Rhythm: normal sinus   Rate: 65  Axis: normal  Ectopy: none  Conduction: normal  ST Segments/ T Waves: No ST-T wave changes  Q Waves: none  Comparison to prior: Unchanged from 12/4/2020 other than faster HR    Clinical Impression: normal EKG    No results found for this or any previous visit (from the past 24 hour(s)).  Medications - No data to display          Medical Decision Making  The patient presented with a problem that is an acute and uncomplicated illness or injury.    The patient's evaluation involved:  an assessment requiring an independent historian (additional history taken from pt's wife)  ordering and/or review of 3+ test(s) in this encounter (see separate area of note for details)  strong consideration of a test (see separate area of note for details) that was ultimately deferred    The patient's management involved only low risk treatment.    Assessments & Plan   77-year-old male presenting with episode of loss of consciousness with  fall.  Unclear if it was syncope versus mechanical fall with LOC.    He arrives afebrile and hemodynamically stable with mild hypertension with a blood pressure 132/78.    His neurologic exam is without focal neurodeficits and he seems to have conversational speech and no difficulty with word finding or item naming.    NIH stroke scale 0.    Work-up included CT head and cervical spine to assess for traumatic injuries that showed no acute traumatic injuries.   An EKG was performed that shows normal sinus rhythm with rate of 65 and no acute ischemic abnormalities.  He was monitored on telemetry and no arrhythmias were noted.   Lab tests included CBC and comprehensive metabolic panel. Notable for mild anemia with hgb 13, stable. Normal Cr and electrolytes.   Neurologic exam stable.   We discussed likely concussion symptoms, as well as home care/return to activity, and avoidance of repeat head injury.  Due to syncope, a Zio Patch monitor was placed for 7 days.   Recommended follow up with Primary Care.   Discharged.     This part of the medical record was transcribed by Sravanthi Farr Medical Scribe, from a dictation done by Fransisca Bello MD.     I have reviewed the nursing notes.    I have reviewed the findings, diagnosis, plan and need for follow up with the patient.      Final diagnoses:   Syncope and collapse       Fransisca Bello MD  1/21/2023   [facility] EMERGENCY DEPARTMENT     Fransisca Bello MD  01/23/23 8107

## 2023-01-21 NOTE — DISCHARGE INSTRUCTIONS
Thank you for coming to the [facility] Emergency Department.     Please wear the Zio Patch monitor for 7 days, then return it by mail.     Return to the ER for any signs of stroke (facial droop, speech change, dis-coordination, arm or leg weakness) or feelings of palpitations.     Please follow up with Dr. Smiley as soon as possible.

## 2023-01-21 NOTE — ED TRIAGE NOTES
Triage Assessment & Note:    Pulse 74   Temp 98.6  F (37  C) (Temporal)   Resp 16   Ht 1.829 m (6')   Wt 90.7 kg (200 lb)   SpO2 96%   BMI 27.12 kg/m      Patient presents with: PT reports he had a fall this AM and does not remember what caused the fall or how he fell. PT has no other complaints.     Home Treatments/Remedies: None    Febrile / Afebrile? Afebrile     Duration of C/o: 1-2 hrs     Addy Amado RN  January 21, 2023         Triage Assessment     Row Name 01/21/23 0924       Triage Assessment (Adult)    Airway WDL WDL       Respiratory WDL    Respiratory WDL WDL       Cardiac WDL    Cardiac WDL WDL

## 2023-01-23 ENCOUNTER — MYC MEDICAL ADVICE (OUTPATIENT)
Dept: INTERNAL MEDICINE | Facility: CLINIC | Age: 78
End: 2023-01-23
Payer: COMMERCIAL

## 2023-01-25 NOTE — TELEPHONE ENCOUNTER
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 No further sxs.  He is leaving for [place] next Monday. He will turn in his cardiac monitor before he leaves.    JESSICA Smiley

## 2023-01-28 ENCOUNTER — LAB (OUTPATIENT)
Dept: LAB | Facility: CLINIC | Age: 78
End: 2023-01-28
Payer: COMMERCIAL

## 2023-01-28 DIAGNOSIS — E29.1 MALE HYPOGONADISM: ICD-10-CM

## 2023-01-28 LAB
FERRITIN SERPL-MCNC: 108 NG/ML (ref 31–409)
FSH SERPL IRP2-ACNC: 63.8 MIU/ML (ref 1.5–12.4)
LH SERPL-ACNC: 43.1 MIU/ML (ref 1.7–8.6)
PROLACTIN SERPL 3RD IS-MCNC: 16 NG/ML (ref 4–15)

## 2023-01-28 PROCEDURE — 99000 SPECIMEN HANDLING OFFICE-LAB: CPT | Performed by: PATHOLOGY

## 2023-01-28 PROCEDURE — 83001 ASSAY OF GONADOTROPIN (FSH): CPT | Mod: 90 | Performed by: PATHOLOGY

## 2023-01-28 PROCEDURE — 82728 ASSAY OF FERRITIN: CPT | Mod: 90 | Performed by: PATHOLOGY

## 2023-01-28 PROCEDURE — 84146 ASSAY OF PROLACTIN: CPT | Mod: 90 | Performed by: PATHOLOGY

## 2023-01-28 PROCEDURE — 36415 COLL VENOUS BLD VENIPUNCTURE: CPT | Performed by: PATHOLOGY

## 2023-01-28 PROCEDURE — 83002 ASSAY OF GONADOTROPIN (LH): CPT | Mod: 90 | Performed by: PATHOLOGY

## 2023-01-28 PROCEDURE — 84270 ASSAY OF SEX HORMONE GLOBUL: CPT | Mod: 90 | Performed by: PATHOLOGY

## 2023-01-28 PROCEDURE — 84403 ASSAY OF TOTAL TESTOSTERONE: CPT | Mod: 90 | Performed by: PATHOLOGY

## 2023-01-30 LAB — SHBG SERPL-SCNC: 99 NMOL/L (ref 11–80)

## 2023-01-31 LAB
TESTOST FREE SERPL-MCNC: 2.73 NG/DL
TESTOST SERPL-MCNC: 311 NG/DL (ref 240–950)

## 2023-02-10 ENCOUNTER — MYC MEDICAL ADVICE (OUTPATIENT)
Dept: INTERNAL MEDICINE | Facility: CLINIC | Age: 78
End: 2023-02-10
Payer: COMMERCIAL

## 2023-02-10 DIAGNOSIS — I49.8 OTHER CARDIAC ARRHYTHMIA: Primary | ICD-10-CM

## 2023-02-11 NOTE — TELEPHONE ENCOUNTER
Heart monitor with some minor ventricular findings. 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 I placed a referral today and please help coordinate the appointment.    ThanksJESSICA

## 2023-02-13 ENCOUNTER — MYC MEDICAL ADVICE (OUTPATIENT)
Dept: INTERNAL MEDICINE | Facility: CLINIC | Age: 78
End: 2023-02-13
Payer: COMMERCIAL

## 2023-02-15 ENCOUNTER — TELEPHONE (OUTPATIENT)
Dept: CARDIOLOGY | Facility: CLINIC | Age: 78
End: 2023-02-15
Payer: COMMERCIAL

## 2023-02-15 NOTE — TELEPHONE ENCOUNTER
[facility] Call Center    Phone Message    May a detailed message be left on voicemail: no     Reason for Call: Other: Ed is returning a missed phone call, please reach back out to him at [phone].     Action Taken: Message routed to:  [facility] Cardiology    Travel Screening: Not Applicable

## 2023-02-20 ENCOUNTER — OFFICE VISIT (OUTPATIENT)
Dept: CARDIOLOGY | Facility: CLINIC | Age: 78
End: 2023-02-20
Attending: INTERNAL MEDICINE
Payer: COMMERCIAL

## 2023-02-20 VITALS
HEART RATE: 69 BPM | WEIGHT: 198.9 LBS | OXYGEN SATURATION: 98 % | BODY MASS INDEX: 27.85 KG/M2 | HEIGHT: 71 IN | SYSTOLIC BLOOD PRESSURE: 154 MMHG | DIASTOLIC BLOOD PRESSURE: 83 MMHG

## 2023-02-20 DIAGNOSIS — I25.10 CORONARY ARTERY DISEASE INVOLVING NATIVE CORONARY ARTERY OF NATIVE HEART WITHOUT ANGINA PECTORIS: Primary | ICD-10-CM

## 2023-02-20 PROCEDURE — 99215 OFFICE O/P EST HI 40 MIN: CPT | Mod: GC | Performed by: INTERNAL MEDICINE

## 2023-02-20 PROCEDURE — G0463 HOSPITAL OUTPT CLINIC VISIT: HCPCS | Performed by: INTERNAL MEDICINE

## 2023-02-20 ASSESSMENT — PAIN SCALES - GENERAL: PAINLEVEL: NO PAIN (0)

## 2023-02-20 NOTE — PROGRESS NOTES
[facility]  CARDIOVASCULAR MEDICINE CLINIC NOTE    Referring Provider: Joao Smiley   Primary Care Provider: Joao Smiley     Patient Name: Reinaldo Gordillo   MRN: [id number]     PERTINENT CLINICAL HISTORY:   Reinaldo Gordillo is a 77 year old male with history of coronary artery disease (s/p AREN x4 to RCA in 2015), HTN, HLD who returns to clinic for follow up.       Patient was last seen in clinic in 11/2022 as a first visit when he established cardiac health care with Dr. Chapin. He returns to clinic today after a recent ED visit which occurred 3 weeks ago (1/21/22). Patient reports that he fell at home and suffered a mild concussion with brief memory loss. He is not clear on the circumstances that led to his fall though he though it was mechanical. Specifically, on 1/21/23, patient was walking down a staircase at home and fell backwards, hitting his head. He recalls falling and getting back up but then has some memory loss that occurred after he got back up. He presented to the ED where they suspected he may have had a post fall concussion with short lived amnesia. Head imaging was benign and labs were unremarkable. However, given the patient's history of CAD with ventricular tachycardia, a zio monitor was placed in the ED prior to patient's discharge. Patient wore the zio monitor for 7 days and he returns to clinic today with his zio monitor results (details below).      Patient is otherwise doing well. He is retired but remains active and has no complaints. He denies any chest pain, dyspnea at rest or with exertion, PND, orthopnea, peripheral edema, palpitations, lightheadedness     Cardiac medications:   - Aspirin 81 mg daily  - Lisinopril 5 mg daily  - Metoprolol XL 25 mg daily  - Pravastatin 80 mg daily     PAST MEDICAL HISTORY:   Notable details of cardiac history obtained from chart check:   Patient had CACS of 1500 AU at [facility] in 2015. He underwent a stress test and developed  ventricular tachycardia post stress test. This lead to an angiogram that demonstrated severe RCA disease which was stented successfully with overlapping 4.0X12, 3.5X8, 2.5X28 and 2.25X28 mm Synergy AREN with excellent angiographic results. Reinaldo was on Prasugrel for 3 years and stopped medication in 2018. He was on Crestor but was unable to tolerate the medication due to muscle weakness and an elevated CK level (1600).     Past Medical History:   Diagnosis Date     ASCVD (arteriosclerotic cardiovascular disease)      Coronary artery disease      Gastroesophageal reflux disease      Hypertension      Stented coronary artery         PAST SURGICAL HISTORY:     Past Surgical History:   Procedure Laterality Date     CATARACT IOL, RT/LT       COLONOSCOPY N/A 7/17/2019    Procedure: COLONOSCOPY;  Surgeon: Roque Givens MD;  [facility]     ESOPHAGOSCOPY, GASTROSCOPY, DUODENOSCOPY (EGD), COMBINED N/A 7/17/2019    Procedure: ESOPHAGOGASTRODUODENOSCOPY, WITH BIOPSY;  Surgeon: Roque Givens MD;  [facility] OR     HERNIA REPAIR  2000     MOHS MICROGRAPHIC PROCEDURE       PHACOEMULSIFICATION CLEAR CORNEA WITH STANDARD INTRAOCULAR LENS IMPLANT Right 7/2/2021    Procedure: RIGHT EYE CATARACT REMOVAL WITH INTRAOCULAR LENS IMPLANT;  Surgeon: Justa Liz MD;  Location: [facility] OR     PHACOEMULSIFICATION CLEAR CORNEA WITH STANDARD INTRAOCULAR LENS IMPLANT Left 7/9/2021    Procedure: LEFT EYE CATARACT REMOVAL WITH INTRAOCULAR LENS IMPLANT;  Surgeon: Justa Liz MD;  Location[place] [facility] [place]     [facility] CORONARY STENT PERCUT, EA ADDTL VESSEL          CURRENT MEDICATIONS:     Current Outpatient Medications   Medication Sig Dispense Refill     ASPIRIN LOW DOSE 81 MG EC tablet        ASPIRIN PO  (Patient not taking: Reported on 11/14/2022)       calcipotriene (DOVONOX) 0.005 % external ointment APPLY TOPICALLY 2 TIMES DAILY AND MIX PEA SIZED AMOUNT WITH 5-FU.USE MONDAY-FRIDAY (Patient not taking: Reported on 1/3/2023)  "60 g 0     clobetasol (TEMOVATE) 0.05 % external ointment Apply topically 2 times daily To the rash on the legs as needed 120 g 3     lisinopril (ZESTRIL) 5 MG tablet 20 mg       Metoprolol Succinate (TOPROL XL PO) Take 25 mg by mouth       omeprazole (PRILOSEC) 20 MG DR capsule Take 20 mg by mouth daily       pravastatin (PRAVACHOL) 80 MG tablet 80 mg        zolpidem (AMBIEN) 10 MG tablet           ALLERGIES:   No Known Allergies     FAMILY HISTORY:     Family History   Problem Relation Age of Onset     Glaucoma No family hx of      Macular Degeneration No family hx of      Retinal detachment No family hx of      Amblyopia No family hx of      Melanoma No family hx of      Skin Cancer No family hx of         SOCIAL HISTORY:     Social History     Socioeconomic History     Marital status:    Tobacco Use     Smoking status: Never     Smokeless tobacco: Never   Substance and Sexual Activity     Alcohol use: Yes     Comment: 1-2 drinks 5 days a week     Drug use: Never     Edward  reports current alcohol use. and  reports that he has never smoked. He has never used smokeless tobacco..     REVIEW OF SYSTEMS:   A comprehensive review of systems was performed and negative unless otherwise noted in the HPI above.      PHYSICAL EXAMINATION:   BP (!) 154/83 (BP Location: Right arm, Patient Position: Chair, Cuff Size: Adult Regular)   Pulse 69   Ht 1.796 m (5' 10.71\")   Wt 90.2 kg (198 lb 14.4 oz)   SpO2 98%   BMI 27.97 kg/m    There is no height or weight on file to calculate BMI.  Wt Readings from Last 2 Encounters:   01/21/23 90.7 kg (200 lb)   11/14/22 89.5 kg (197 lb 4.8 oz)     Constitutional: no acute distress, pleasant and cooperative, appears overall well.  Eyes:sclera white, conjunctiva clear, without icterus or pallor   Ears/Nose/Mouth/Throat: Pinna, tragus, and external canal non-tender are normal, Nares patent b/l, moist mucous membranes  Cardiovascular: RRR nl S1S2, JVP not elevated, extremities with " no edema or cyanosis  Respiratory: clear to auscultation and percussion bilaterally anterior and posterior  Gastrointestinal: soft, nontender, non distended, no hepatosplenomegaly or masses  Musculoskeletal: normal muscle bulk and tone, joints   Skin: normal skin appearance without worrisome lesions.   Neurologic: Alert and oriented, face symmetric, normal gait  Psychiatric: appropriate affect, eye contact, intact thought and speech       LABORATORY DATA:     LIPID RESULTS:  Recent Labs   Lab Test 22  0842   CHOL 165   HDL 93   LDL 63   TRIG 43        LIVER ENZYME RESULTS:  Recent Labs   Lab Test 23  1016 22  0842   AST 28 24   ALT 36 46       CBC RESULTS:  Recent Labs   Lab Test 23  1016 10/18/22  0923   WBC 5.1 4.4   HGB 13.0* 13.0*   HCT 39.6* 39.7*    221       BMP RESULTS:  Recent Labs   Lab Test 23  1016 22  0842    139   POTASSIUM 5.1 4.9   CHLORIDE 107 107   CO2 22 28   ANIONGAP 12 4   * 118*   BUN 17.6 40*   CR 1.09 1.32*   CRISS 9.5 9.1       A1C RESULTS:  No results found for: A1C    INR RESULTS:  No results for input(s): INR in the last 79978 hours.       PROCEDURES & FURTHER ASSESSMENTS:     Zio Monitor - (7 days):   13 beats run of non sustained ventricular tachycardia (patient was asymptomatic) with some PAC.     EK23: NSR at 65 bpm     Echocardiogram 10/29/2022:  Left ventricular size, wall motion and function are normal. The ejection fraction is 60-65%.  Global right ventricular function is normal.  Aortic valve calcification without significant stenosis.  No pericardial effusion.     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     # Fall with post concussive transient amnesia  # Non sustained VT  Unclear if fall was  mechanical vs. Syncope. Given patient's history of ventricular arrhythmia (prior to his PCIs in 2015), and now captures NSVT on zio monitor in an otherwise stably healthy state,  it will be prudent to investigate for potential underlying worsening CAD as the potential culprit for patient's NSVT.    - Coronary angiogram (recommended but patient will like sometime to think about it and discuss with his wife/family)  - TTE given recent non sustained arrhythmia      #. Coronary artery disease  -S/p AREN of RCA x4 in 2015.  -He is currently asymptomatic.  -Continue aspirin 81 mg daily, pravastatin 80 mg daily, Toprol-XL 25 mg daily, and lisinopril 20 mg daily.     #.  Hypertension  -Blood pressure well controlled at home.  -Continue Toprol-XL 25 mg daily and lisinopril 20 mg daily.     #.  Hyperlipidemia  -LDL: 63 on 8/29/2022  -On Pravastatin 80 mg daily.  -Did not tolerate rosuvastatin in the past due to muscle cramps and elevated CK.     Patient seen and discussed with Dr. Chapin.     Follow-up: patient to contact us via my chart regarding his decision on coronary angiogram    Adele Trimble MD, PhD  Cardiology Fellow  [phone]    February 20, 2023    CC  Patient Care Team:  Yuly Mendes MD as PCP - General  Bushra Begum MD as MD (Neurology)  Bushra Begum MD as Assigned Neuroscience Provider  Yonathan Randall MD as MD (Dermatology)  Yuly Mendes MD as Assigned PCP  Salomon White MD as MD (Endocrinology, Diabetes, and Metabolism)  Yonathan Randall MD as Assigned Surgical Provider  Pito Chapin MD as Assigned Heart and Vascular Provider  Salomon White MD as Assigned Endocrinology Provider  YULY MENDES

## 2023-02-20 NOTE — LETTER
2/20/2023      RE: Reinaldo Gordillo  [address]  [address]  [place] [address]       Dear Colleague,    Thank you for the opportunity to participate in the care of your patient, Reinaldo Gordillo, at the [facility] [place] [facility]. Please see a copy of my visit note below.    [facility]  CARDIOVASCULAR MEDICINE CLINIC NOTE    Referring Provider: Joao Smiley   Primary Care Provider: Joao Smiley     Patient Name: Reinaldo Gordillo   MRN: [id number]     PERTINENT CLINICAL HISTORY:   Reinaldo Gordillo is a 77 year old male with history of coronary artery disease (s/p AREN x4 to RCA in 2015), HTN, HLD who returns to clinic for follow up.       Patient was last seen in clinic in 11/2022 as a first visit when he established cardiac health care with Dr. Chapin. He returns to clinic today after a recent ED visit which occurred 3 weeks ago (1/21/22). Patient reports that he fell at home and suffered a mild concussion with brief memory loss. He is not clear on the circumstances that led to his fall though he though it was mechanical. Specifically, on 1/21/23, patient was walking down a staircase at home and fell backwards, hitting his head. He recalls falling and getting back up but then has some memory loss that occurred after he got back up. He presented to the ED where they suspected he may have had a post fall concussion with short lived amnesia. Head imaging was benign and labs were unremarkable. However, given the patient's history of CAD with ventricular tachycardia, a zio monitor was placed in the ED prior to patient's discharge. Patient wore the zio monitor for 7 days and he returns to clinic today with his zio monitor results (details below).      Patient is otherwise doing well. He is retired but remains active and has no complaints. He denies any chest pain, dyspnea at rest or with exertion, PND, orthopnea, peripheral edema,  palpitations, lightheadedness     Cardiac medications:   - Aspirin 81 mg daily  - Lisinopril 5 mg daily  - Metoprolol XL 25 mg daily  - Pravastatin 80 mg daily     PAST MEDICAL HISTORY:   Notable details of cardiac history obtained from chart check:   Patient had CACS of 1500 AU at [facility] in 2015. He underwent a stress test and developed ventricular tachycardia post stress test. This lead to an angiogram that demonstrated severe RCA disease which was stented successfully with overlapping 4.0X12, 3.5X8, 2.5X28 and 2.25X28 mm Synergy AREN with excellent angiographic results. Reinaldo was on Prasugrel for 3 years and stopped medication in 2018. He was on Crestor but was unable to tolerate the medication due to muscle weakness and an elevated CK level (1600).     Past Medical History:   Diagnosis Date     ASCVD (arteriosclerotic cardiovascular disease)      Coronary artery disease      Gastroesophageal reflux disease      Hypertension      Stented coronary artery         PAST SURGICAL HISTORY:     Past Surgical History:   Procedure Laterality Date     CATARACT IOL, RT/LT       COLONOSCOPY N/A 7/17/2019    Procedure: COLONOSCOPY;  Surgeon: Roque Givens MD;  [facility]     ESOPHAGOSCOPY, GASTROSCOPY, DUODENOSCOPY (EGD), COMBINED N/A 7/17/2019    Procedure: ESOPHAGOGASTRODUODENOSCOPY, WITH BIOPSY;  Surgeon: Roque Givens MD;  [facility]     HERNIA REPAIR  2000     MOHS MICROGRAPHIC PROCEDURE       PHACOEMULSIFICATION CLEAR CORNEA WITH STANDARD INTRAOCULAR LENS IMPLANT Right 7/2/2021    Procedure: RIGHT EYE CATARACT REMOVAL WITH INTRAOCULAR LENS IMPLANT;  Surgeon: Justa Liz MD;  [place] [facility] [place]     PHACOEMULSIFICATION CLEAR CORNEA WITH STANDARD INTRAOCULAR LENS IMPLANT Left 7/9/2021    Procedure: LEFT EYE CATARACT REMOVAL WITH INTRAOCULAR LENS IMPLANT;  Surgeon: Justa Liz MD;  [place] [facility] [place]     [facility] CORONARY STENT KOJOUT, ANGELA ADDTL VESSEL          CURRENT MEDICATIONS:  "    Current Outpatient Medications   Medication Sig Dispense Refill     ASPIRIN LOW DOSE 81 MG EC tablet        ASPIRIN PO  (Patient not taking: Reported on 11/14/2022)       calcipotriene (DOVONOX) 0.005 % external ointment APPLY TOPICALLY 2 TIMES DAILY AND MIX PEA SIZED AMOUNT WITH 5-FU.USE MONDAY-FRIDAY (Patient not taking: Reported on 1/3/2023) 60 g 0     clobetasol (TEMOVATE) 0.05 % external ointment Apply topically 2 times daily To the rash on the legs as needed 120 g 3     lisinopril (ZESTRIL) 5 MG tablet 20 mg       Metoprolol Succinate (TOPROL XL PO) Take 25 mg by mouth       omeprazole (PRILOSEC) 20 MG DR capsule Take 20 mg by mouth daily       pravastatin (PRAVACHOL) 80 MG tablet 80 mg        zolpidem (AMBIEN) 10 MG tablet           ALLERGIES:   No Known Allergies     FAMILY HISTORY:     Family History   Problem Relation Age of Onset     Glaucoma No family hx of      Macular Degeneration No family hx of      Retinal detachment No family hx of      Amblyopia No family hx of      Melanoma No family hx of      Skin Cancer No family hx of         SOCIAL HISTORY:     Social History     Socioeconomic History     Marital status:    Tobacco Use     Smoking status: Never     Smokeless tobacco: Never   Substance and Sexual Activity     Alcohol use: Yes     Comment: 1-2 drinks 5 days a week     Drug use: Never     Edward  reports current alcohol use. and  reports that he has never smoked. He has never used smokeless tobacco..     REVIEW OF SYSTEMS:   A comprehensive review of systems was performed and negative unless otherwise noted in the HPI above.      PHYSICAL EXAMINATION:   BP (!) 154/83 (BP Location: Right arm, Patient Position: Chair, Cuff Size: Adult Regular)   Pulse 69   Ht 1.796 m (5' 10.71\")   Wt 90.2 kg (198 lb 14.4 oz)   SpO2 98%   BMI 27.97 kg/m    There is no height or weight on file to calculate BMI.  Wt Readings from Last 2 Encounters:   01/21/23 90.7 kg (200 lb)   11/14/22 89.5 kg (197 " lb 4.8 oz)     Constitutional: no acute distress, pleasant and cooperative, appears overall well.  Eyes:sclera white, conjunctiva clear, without icterus or pallor   Ears/Nose/Mouth/Throat: Pinna, tragus, and external canal non-tender are normal, Nares patent b/l, moist mucous membranes  Cardiovascular: RRR nl S1S2, JVP not elevated, extremities with no edema or cyanosis  Respiratory: clear to auscultation and percussion bilaterally anterior and posterior  Gastrointestinal: soft, nontender, non distended, no hepatosplenomegaly or masses  Musculoskeletal: normal muscle bulk and tone, joints   Skin: normal skin appearance without worrisome lesions.   Neurologic: Alert and oriented, face symmetric, normal gait  Psychiatric: appropriate affect, eye contact, intact thought and speech       LABORATORY DATA:     LIPID RESULTS:  Recent Labs   Lab Test 22  0842   CHOL 165   HDL 93   LDL 63   TRIG 43        LIVER ENZYME RESULTS:  Recent Labs   Lab Test 23  1016 22  0842   AST 28 24   ALT 36 46       CBC RESULTS:  Recent Labs   Lab Test 23  1016 10/18/22  0923   WBC 5.1 4.4   HGB 13.0* 13.0*   HCT 39.6* 39.7*    221       BMP RESULTS:  Recent Labs   Lab Test 23  1016 22  0842    139   POTASSIUM 5.1 4.9   CHLORIDE 107 107   CO2 22 28   ANIONGAP 12 4   * 118*   BUN 17.6 40*   CR 1.09 1.32*   CRISS 9.5 9.1       A1C RESULTS:  No results found for: A1C    INR RESULTS:  No results for input(s): INR in the last 39159 hours.       PROCEDURES & FURTHER ASSESSMENTS:     Zio Monitor - (7 days):   13 beats run of non sustained ventricular tachycardia (patient was asymptomatic) with some PAC.     EK23: NSR at 65 bpm     Echocardiogram 10/29/2022:  Left ventricular size, wall motion and function are normal. The ejection fraction is 60-65%.  Global right ventricular function is normal.  Aortic valve calcification without significant stenosis.  No pericardial  effusion.     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     # Fall with post concussive transient amnesia  # Non sustained VT  Unclear if fall was mechanical vs. Syncope. Given patient's history of ventricular arrhythmia (prior to his PCIs in 2015), and now captures NSVT on zio monitor in an otherwise stably healthy state,  it will be prudent to investigate for potential underlying worsening CAD as the potential culprit for patient's NSVT.    - Coronary angiogram (recommended but patient will like sometime to think about it and discuss with his wife/family)  - TTE given recent non sustained arrhythmia      #. Coronary artery disease  -S/p AREN of RCA x4 in 2015.  -He is currently asymptomatic.  -Continue aspirin 81 mg daily, pravastatin 80 mg daily, Toprol-XL 25 mg daily, and lisinopril 20 mg daily.     #.  Hypertension  -Blood pressure well controlled at home.  -Continue Toprol-XL 25 mg daily and lisinopril 20 mg daily.     #.  Hyperlipidemia  -LDL: 63 on 8/29/2022  -On Pravastatin 80 mg daily.  -Did not tolerate rosuvastatin in the past due to muscle cramps and elevated CK.     Patient seen and discussed with Dr. Chapin.     Follow-up: patient to contact us via my chart regarding his decision on coronary angiogram    Adele Trimble MD, PhD  Cardiology Fellow  [phone]    February 20, 2023    CC  Patient Care Team:  Joao Smiley MD as PCP - General  Bushra Begum MD as MD (Neurology)  Bushra Begum MD as Assigned Neuroscience Provider  Yonathan Randall MD as MD (Dermatology)  Joao Smiley MD as Assigned PCP  Salomon White MD as MD (Endocrinology, Diabetes, and Metabolism)  Yonathan Randall MD as Assigned Surgical Provider  Pito Chapin MD as Assigned  Heart and Vascular Provider  Salomon White MD as Assigned Endocrinology Provider  YULY MENDES    Attestation signed by Pito Chapin MD at 3/1/2023  4:27 PM (Updated):  ATTENDING ATTESTATION:   I personally examined and evaluated this patient on February 20, 2023.  I have personally reviewed today's vital signs, medications, all labs, and all imaging/cardiac studies described above. I have reviewed and edited, as necessary, the history, review of systems, physical examination, and assessment and plan.  I discussed the patient with Dr. Trimble and agree with the assessment and plan of care as documented in the note above.  I personally spent 40 min today reviewing the medical record, meeting with the patient, and completing this note.  Thank you for allowing us to take part in the care of this very pleasant patient.  Please do not hesitate to call if any further questions or concerns arise.        February 20, 2023        Please do not hesitate to contact me if you have any questions/concerns.     Sincerely,     Pito Chapin MD

## 2023-02-20 NOTE — NURSING NOTE
Chief Complaint   Patient presents with     Follow Up     Return Cardiology     Vitals were taken and medications reconciled.    TRISH Foss  8:59 AM

## 2023-02-20 NOTE — PATIENT INSTRUCTIONS
Patient Instructions:  It was a pleasure to see you in the cardiology clinic today.      If you have any questions, call  Jackelyn Contreras RN, at [phone].   [facility] Cardiology Clinics.  To schedule an appointment or to leave a message for your Care Team Press #1  If you are a physician calling for another physician Press #2  For Billing Press #3  For Medical Records Press #4  We are encouraging the use of [employer] to communicate with your HealthCare Provider    Note the new medications: none  Stop the following medications: none    The results from today include: none  Please follow up with Dr. Chapin in one year with labs      If you have an urgent need after hours (8:00 am to 4:30 pm) please call [phone] and ask for the cardiology fellow on call.

## 2023-03-01 DIAGNOSIS — I25.10 CORONARY ARTERY DISEASE INVOLVING NATIVE CORONARY ARTERY WITHOUT ANGINA PECTORIS: Primary | ICD-10-CM

## 2023-03-01 RX ORDER — POTASSIUM CHLORIDE 1500 MG/1
40 TABLET, EXTENDED RELEASE ORAL
Status: CANCELLED | OUTPATIENT
Start: 2023-03-01

## 2023-03-01 RX ORDER — POTASSIUM CHLORIDE 1500 MG/1
20 TABLET, EXTENDED RELEASE ORAL
Status: CANCELLED | OUTPATIENT
Start: 2023-03-01

## 2023-03-01 RX ORDER — ASPIRIN 81 MG/1
243 TABLET, CHEWABLE ORAL ONCE
Status: CANCELLED | OUTPATIENT
Start: 2023-03-01

## 2023-03-01 RX ORDER — ASPIRIN 325 MG
325 TABLET ORAL ONCE
Status: CANCELLED | OUTPATIENT
Start: 2023-03-01 | End: 2023-03-01

## 2023-03-01 RX ORDER — SODIUM CHLORIDE 9 MG/ML
INJECTION, SOLUTION INTRAVENOUS CONTINUOUS
Status: CANCELLED | OUTPATIENT
Start: 2023-03-01

## 2023-03-20 ENCOUNTER — TELEPHONE (OUTPATIENT)
Dept: CARDIOLOGY | Facility: CLINIC | Age: 78
End: 2023-03-20
Payer: COMMERCIAL

## 2023-03-21 ENCOUNTER — APPOINTMENT (OUTPATIENT)
Dept: MEDSURG UNIT | Facility: CLINIC | Age: 78
End: 2023-03-21
Attending: INTERNAL MEDICINE
Payer: COMMERCIAL

## 2023-03-21 ENCOUNTER — HOSPITAL ENCOUNTER (OUTPATIENT)
Dept: CARDIOLOGY | Facility: CLINIC | Age: 78
Discharge: HOME | End: 2023-03-21
Attending: INTERNAL MEDICINE
Payer: COMMERCIAL

## 2023-03-21 ENCOUNTER — LAB (OUTPATIENT)
Dept: LAB | Facility: CLINIC | Age: 78
End: 2023-03-21
Attending: INTERNAL MEDICINE
Payer: COMMERCIAL

## 2023-03-21 ENCOUNTER — HOSPITAL ENCOUNTER (OUTPATIENT)
Facility: CLINIC | Age: 78
Discharge: HOME | End: 2023-03-21
Attending: INTERNAL MEDICINE | Admitting: INTERNAL MEDICINE
Payer: COMMERCIAL

## 2023-03-21 VITALS
HEART RATE: 53 BPM | RESPIRATION RATE: 16 BRPM | DIASTOLIC BLOOD PRESSURE: 77 MMHG | WEIGHT: 198.4 LBS | TEMPERATURE: 97.8 F | BODY MASS INDEX: 27.77 KG/M2 | SYSTOLIC BLOOD PRESSURE: 146 MMHG | OXYGEN SATURATION: 98 % | HEIGHT: 71 IN

## 2023-03-21 DIAGNOSIS — I25.10 CORONARY ARTERY DISEASE INVOLVING NATIVE CORONARY ARTERY WITHOUT ANGINA PECTORIS: ICD-10-CM

## 2023-03-21 DIAGNOSIS — I25.119 CORONARY ARTERY DISEASE INVOLVING NATIVE CORONARY ARTERY OF NATIVE HEART WITH ANGINA PECTORIS (H): Primary | ICD-10-CM

## 2023-03-21 DIAGNOSIS — I47.29 PAROXYSMAL VENTRICULAR TACHYCARDIA (H): ICD-10-CM

## 2023-03-21 PROBLEM — Z98.890 STATUS POST CORONARY ANGIOGRAM: Status: ACTIVE | Noted: 2023-03-21

## 2023-03-21 LAB
ACT BLD: 224 SECONDS (ref 74–150)
ACT BLD: 250 SECONDS (ref 74–150)
ANION GAP SERPL CALCULATED.3IONS-SCNC: 7 MMOL/L (ref 7–15)
APTT PPP: 26 SECONDS (ref 22–38)
BUN SERPL-MCNC: 16.1 MG/DL (ref 8–23)
CALCIUM SERPL-MCNC: 9.4 MG/DL (ref 8.8–10.2)
CHLORIDE SERPL-SCNC: 106 MMOL/L (ref 98–107)
CREAT SERPL-MCNC: 0.87 MG/DL (ref 0.67–1.17)
DEPRECATED HCO3 PLAS-SCNC: 27 MMOL/L (ref 22–29)
ERYTHROCYTE [DISTWIDTH] IN BLOOD BY AUTOMATED COUNT: 11.8 % (ref 10–15)
GFR SERPL CREATININE-BSD FRML MDRD: 90 ML/MIN/1.73M2
GLUCOSE SERPL-MCNC: 115 MG/DL (ref 70–99)
HCT VFR BLD AUTO: 39 % (ref 40–53)
HGB BLD-MCNC: 12.8 G/DL (ref 13.3–17.7)
INR PPP: 1.02 (ref 0.85–1.15)
LVEF ECHO: NORMAL
MCH RBC QN AUTO: 32.5 PG (ref 26.5–33)
MCHC RBC AUTO-ENTMCNC: 32.8 G/DL (ref 31.5–36.5)
MCV RBC AUTO: 99 FL (ref 78–100)
PLATELET # BLD AUTO: 217 10E3/UL (ref 150–450)
POTASSIUM SERPL-SCNC: 4.5 MMOL/L (ref 3.4–5.3)
RBC # BLD AUTO: 3.94 10E6/UL (ref 4.4–5.9)
SODIUM SERPL-SCNC: 140 MMOL/L (ref 136–145)
WBC # BLD AUTO: 6.5 10E3/UL (ref 4–11)

## 2023-03-21 PROCEDURE — 99152 MOD SED SAME PHYS/QHP 5/>YRS: CPT | Performed by: INTERNAL MEDICINE

## 2023-03-21 PROCEDURE — 93010 ELECTROCARDIOGRAM REPORT: CPT | Performed by: INTERNAL MEDICINE

## 2023-03-21 PROCEDURE — 93454 CORONARY ARTERY ANGIO S&I: CPT | Mod: 26 | Performed by: INTERNAL MEDICINE

## 2023-03-21 PROCEDURE — 272N000001 HC OR GENERAL SUPPLY STERILE: Performed by: INTERNAL MEDICINE

## 2023-03-21 PROCEDURE — 999N000142 HC STATISTIC PROCEDURE PREP ONLY

## 2023-03-21 PROCEDURE — 93571 IV DOP VEL&/PRESS C FLO 1ST: CPT | Mod: 26 | Performed by: INTERNAL MEDICINE

## 2023-03-21 PROCEDURE — 36415 COLL VENOUS BLD VENIPUNCTURE: CPT | Performed by: INTERNAL MEDICINE

## 2023-03-21 PROCEDURE — 85730 THROMBOPLASTIN TIME PARTIAL: CPT | Performed by: INTERNAL MEDICINE

## 2023-03-21 PROCEDURE — 93325 DOPPLER ECHO COLOR FLOW MAPG: CPT | Mod: 26 | Performed by: STUDENT IN AN ORGANIZED HEALTH CARE EDUCATION/TRAINING PROGRAM

## 2023-03-21 PROCEDURE — 93325 DOPPLER ECHO COLOR FLOW MAPG: CPT

## 2023-03-21 PROCEDURE — 250N000011 HC RX IP 250 OP 636: Performed by: INTERNAL MEDICINE

## 2023-03-21 PROCEDURE — 93321 DOPPLER ECHO F-UP/LMTD STD: CPT | Mod: 26 | Performed by: STUDENT IN AN ORGANIZED HEALTH CARE EDUCATION/TRAINING PROGRAM

## 2023-03-21 PROCEDURE — 85347 COAGULATION TIME ACTIVATED: CPT

## 2023-03-21 PROCEDURE — 93454 CORONARY ARTERY ANGIO S&I: CPT | Performed by: INTERNAL MEDICINE

## 2023-03-21 PROCEDURE — 93308 TTE F-UP OR LMTD: CPT

## 2023-03-21 PROCEDURE — C1894 INTRO/SHEATH, NON-LASER: HCPCS | Performed by: INTERNAL MEDICINE

## 2023-03-21 PROCEDURE — 93799 UNLISTED CV SVC/PROCEDURE: CPT | Mod: LC | Performed by: INTERNAL MEDICINE

## 2023-03-21 PROCEDURE — 80048 BASIC METABOLIC PNL TOTAL CA: CPT | Performed by: INTERNAL MEDICINE

## 2023-03-21 PROCEDURE — 999N000054 HC STATISTIC EKG NON-CHARGEABLE

## 2023-03-21 PROCEDURE — 85027 COMPLETE CBC AUTOMATED: CPT | Performed by: INTERNAL MEDICINE

## 2023-03-21 PROCEDURE — 258N000003 HC RX IP 258 OP 636: Performed by: INTERNAL MEDICINE

## 2023-03-21 PROCEDURE — 250N000009 HC RX 250: Performed by: INTERNAL MEDICINE

## 2023-03-21 PROCEDURE — 93572 IV DOP VEL&/PRESS C FLO EA: CPT | Mod: 26 | Performed by: INTERNAL MEDICINE

## 2023-03-21 PROCEDURE — 999N000134 HC STATISTIC PP CARE STAGE 3

## 2023-03-21 PROCEDURE — C1769 GUIDE WIRE: HCPCS | Performed by: INTERNAL MEDICINE

## 2023-03-21 PROCEDURE — 93308 TTE F-UP OR LMTD: CPT | Mod: 26 | Performed by: STUDENT IN AN ORGANIZED HEALTH CARE EDUCATION/TRAINING PROGRAM

## 2023-03-21 PROCEDURE — 99153 MOD SED SAME PHYS/QHP EA: CPT | Performed by: INTERNAL MEDICINE

## 2023-03-21 PROCEDURE — 93005 ELECTROCARDIOGRAM TRACING: CPT

## 2023-03-21 PROCEDURE — 85610 PROTHROMBIN TIME: CPT | Performed by: INTERNAL MEDICINE

## 2023-03-21 RX ORDER — OXYCODONE HYDROCHLORIDE 10 MG/1
10 TABLET ORAL EVERY 4 HOURS PRN
Status: DISCONTINUED | OUTPATIENT
Start: 2023-03-21 | End: 2023-03-21 | Stop reason: HOSPADM

## 2023-03-21 RX ORDER — ASPIRIN 325 MG
325 TABLET ORAL ONCE
Status: DISCONTINUED | OUTPATIENT
Start: 2023-03-21 | End: 2023-03-21 | Stop reason: HOSPADM

## 2023-03-21 RX ORDER — HEPARIN SODIUM 1000 [USP'U]/ML
INJECTION, SOLUTION INTRAVENOUS; SUBCUTANEOUS
Status: DISCONTINUED | OUTPATIENT
Start: 2023-03-21 | End: 2023-03-21 | Stop reason: HOSPADM

## 2023-03-21 RX ORDER — POTASSIUM CHLORIDE 750 MG/1
20 TABLET, EXTENDED RELEASE ORAL
Status: DISCONTINUED | OUTPATIENT
Start: 2023-03-21 | End: 2023-03-21 | Stop reason: HOSPADM

## 2023-03-21 RX ORDER — LIDOCAINE 40 MG/G
CREAM TOPICAL
Status: DISCONTINUED | OUTPATIENT
Start: 2023-03-21 | End: 2023-03-21 | Stop reason: HOSPADM

## 2023-03-21 RX ORDER — ASPIRIN 81 MG/1
243 TABLET, CHEWABLE ORAL ONCE
Status: DISCONTINUED | OUTPATIENT
Start: 2023-03-21 | End: 2023-03-21 | Stop reason: HOSPADM

## 2023-03-21 RX ORDER — FLUMAZENIL 0.1 MG/ML
0.2 INJECTION, SOLUTION INTRAVENOUS
Status: DISCONTINUED | OUTPATIENT
Start: 2023-03-21 | End: 2023-03-21 | Stop reason: HOSPADM

## 2023-03-21 RX ORDER — NALOXONE HYDROCHLORIDE 0.4 MG/ML
0.2 INJECTION, SOLUTION INTRAMUSCULAR; INTRAVENOUS; SUBCUTANEOUS
Status: DISCONTINUED | OUTPATIENT
Start: 2023-03-21 | End: 2023-03-21 | Stop reason: HOSPADM

## 2023-03-21 RX ORDER — FENTANYL CITRATE 50 UG/ML
INJECTION, SOLUTION INTRAMUSCULAR; INTRAVENOUS
Status: DISCONTINUED | OUTPATIENT
Start: 2023-03-21 | End: 2023-03-21 | Stop reason: HOSPADM

## 2023-03-21 RX ORDER — NITROGLYCERIN 5 MG/ML
VIAL (ML) INTRAVENOUS
Status: DISCONTINUED | OUTPATIENT
Start: 2023-03-21 | End: 2023-03-21 | Stop reason: HOSPADM

## 2023-03-21 RX ORDER — SODIUM CHLORIDE 9 MG/ML
INJECTION, SOLUTION INTRAVENOUS CONTINUOUS
Status: DISCONTINUED | OUTPATIENT
Start: 2023-03-21 | End: 2023-03-21 | Stop reason: HOSPADM

## 2023-03-21 RX ORDER — IOPAMIDOL 755 MG/ML
INJECTION, SOLUTION INTRAVASCULAR
Status: DISCONTINUED | OUTPATIENT
Start: 2023-03-21 | End: 2023-03-21 | Stop reason: HOSPADM

## 2023-03-21 RX ORDER — NALOXONE HYDROCHLORIDE 0.4 MG/ML
0.4 INJECTION, SOLUTION INTRAMUSCULAR; INTRAVENOUS; SUBCUTANEOUS
Status: DISCONTINUED | OUTPATIENT
Start: 2023-03-21 | End: 2023-03-21 | Stop reason: HOSPADM

## 2023-03-21 RX ORDER — NICARDIPINE HYDROCHLORIDE 2.5 MG/ML
INJECTION INTRAVENOUS
Status: DISCONTINUED | OUTPATIENT
Start: 2023-03-21 | End: 2023-03-21 | Stop reason: HOSPADM

## 2023-03-21 RX ORDER — ATROPINE SULFATE 0.1 MG/ML
0.5 INJECTION INTRAVENOUS
Status: DISCONTINUED | OUTPATIENT
Start: 2023-03-21 | End: 2023-03-21 | Stop reason: HOSPADM

## 2023-03-21 RX ORDER — FENTANYL CITRATE 50 UG/ML
25 INJECTION, SOLUTION INTRAMUSCULAR; INTRAVENOUS
Status: DISCONTINUED | OUTPATIENT
Start: 2023-03-21 | End: 2023-03-21 | Stop reason: HOSPADM

## 2023-03-21 RX ORDER — POTASSIUM CHLORIDE 750 MG/1
40 TABLET, EXTENDED RELEASE ORAL
Status: DISCONTINUED | OUTPATIENT
Start: 2023-03-21 | End: 2023-03-21 | Stop reason: HOSPADM

## 2023-03-21 RX ORDER — OXYCODONE HYDROCHLORIDE 5 MG/1
5 TABLET ORAL EVERY 4 HOURS PRN
Status: DISCONTINUED | OUTPATIENT
Start: 2023-03-21 | End: 2023-03-21 | Stop reason: HOSPADM

## 2023-03-21 RX ADMIN — SODIUM CHLORIDE: 9 INJECTION, SOLUTION INTRAVENOUS at 12:45

## 2023-03-21 ASSESSMENT — ACTIVITIES OF DAILY LIVING (ADL)
ADLS_ACUITY_SCORE: 35

## 2023-03-21 NOTE — PROGRESS NOTES
Pt here for angiogram. Prep complete. K ok, took aspirin already. Sites prepped. Wife Kasys will give ride home ([phone]).

## 2023-03-21 NOTE — PROGRESS NOTES
D/I/A: Pt roomed on 3C in bay 31.  Arrived via litter and accompanied by Cath lab RN On/Off: On monitor.  VSSA.  Rhythm upon arrival Sinus aniceto on monitor.  Denies pain or sob.  Reviewed activity restrictions and when to notify RN, ie-changes to breathing or increased chest pressure or chest pain.  CCL access:  Left radial site, 12 ml of air in place, will start to deflate at 18:30 .  P: Continue to monitor status.  Discharge to home once meeting criteria.

## 2023-03-21 NOTE — DISCHARGE INSTRUCTIONS
Going Home after an Angiogram      After you go home:  Have an adult stay with you for 24 hours.  Drink plenty of fluids.  You may eat your normal diet, unless your doctor tells you otherwise.  For 24 hours:  Relax and take it easy.  Do NOT smoke.  Do NOT make any important or legal decisions.  Do NOT drive or operate machines at home or at work.  Do NOT drink alcohol.  Remove the Band-Aid after 24 hours. If there is minor oozing, apply another Band-aid and remove it after 12 hours.  For 2 days, do NOT have sex or do any heavy exercise.  Do NOT take a bath, or use a hot tub or pool for at least 3 days. You may shower.    Care of wrist or arm site  It is normal to have soreness at the puncture site and mild tingling in your hand for up to 3 days.  For 2 days, do not use your hand or arm to support your weight (such as rising from a chair) or bend your wrist (such as lifting a garage door).  For 2 days, do not lift more than 5 pounds or exercise your arm (tennis, golf or bowling).    If you start bleeding from the site in your arm:  Sit down and press firmly on the site with your fingers for 10 minutes. Call your doctor as soon as you can.  If the bleeding stops, sit still and keep your wrist straight for 2 hours.    Medicines  If you have stopped any other medicines, check with your nurse or provider about when to restart them.    Call 911 right away if you have bleeding that is heavy or does not stop.    Call your doctor if:  You have a large or growing hard lump around the site.  The site is red, swollen, hot or tender.  Blood or fluid is draining from the site.  You have chills or a fever greater than 101 F (38 C).  Your leg or arm feels numb or cool.  You have hives, a rash or unusual itching.  [facility] [place]:  [phone] (7 days a week)

## 2023-03-22 ENCOUNTER — TELEPHONE (OUTPATIENT)
Dept: CARDIOLOGY | Facility: CLINIC | Age: 78
End: 2023-03-22
Payer: COMMERCIAL

## 2023-03-22 LAB
ATRIAL RATE - MUSE: 59 BPM
DIASTOLIC BLOOD PRESSURE - MUSE: NORMAL MMHG
INTERPRETATION ECG - MUSE: NORMAL
P AXIS - MUSE: 12 DEGREES
PR INTERVAL - MUSE: 166 MS
QRS DURATION - MUSE: 94 MS
QT - MUSE: 414 MS
QTC - MUSE: 409 MS
R AXIS - MUSE: 2 DEGREES
SYSTOLIC BLOOD PRESSURE - MUSE: NORMAL MMHG
T AXIS - MUSE: -3 DEGREES
VENTRICULAR RATE- MUSE: 59 BPM

## 2023-03-22 NOTE — TELEPHONE ENCOUNTER
S-(situation): patient discharged yesterday post coronary angiogram. Left radial artery used for access. Patient states the access site is flat and dry with some bruising.   Two vessel coronary artery disease involving the LAD and RCA.  The RCA lesion is highly calcified between stented regions.    Given the severe calcification of the RCA and the long LAD lesion extending to the ostium of the vessel, recommend CVTS consult and eval for CABG.  However, PCI is possible if the patient does not want CABG    No new meds at discharge.    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  He is referred for coronary angiogram.    A-(assessment): stable post cath    R-(recommendations): follow up with Dr. Chapin in clinic to discus CABG vs PCI.

## 2023-03-22 NOTE — PROGRESS NOTES
Pt tolerating po intake, voided and ambulated in [facility]. Vitals stable. SB in low 50s-pt reports that's normal for him since starting metoprolol. Left radial site c/d/i with no bleeding or hematoma. CMS intact. Denies pain. Discharge instructions reviewed with pt and spouse. All questions answered. PIV removed. Pt discharged via wheelchair to wife's car. All belongings returned to pt.

## 2023-03-23 ENCOUNTER — MYC MEDICAL ADVICE (OUTPATIENT)
Dept: CARDIOLOGY | Facility: CLINIC | Age: 78
End: 2023-03-23
Payer: COMMERCIAL

## 2023-03-23 ENCOUNTER — MYC MEDICAL ADVICE (OUTPATIENT)
Dept: INTERNAL MEDICINE | Facility: CLINIC | Age: 78
End: 2023-03-23
Payer: COMMERCIAL

## 2023-03-28 ENCOUNTER — MYC MEDICAL ADVICE (OUTPATIENT)
Dept: INTERNAL MEDICINE | Facility: CLINIC | Age: 78
End: 2023-03-28
Payer: COMMERCIAL

## 2023-03-28 NOTE — TELEPHONE ENCOUNTER
Most likely will hold Lisinopril before any cardiac surgery. 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    JESSICA Smiley

## 2023-03-30 ENCOUNTER — VIRTUAL VISIT (OUTPATIENT)
Dept: INTERNAL MEDICINE | Facility: CLINIC | Age: 78
End: 2023-03-30
Payer: COMMERCIAL

## 2023-03-30 DIAGNOSIS — I10 BENIGN ESSENTIAL HYPERTENSION: Primary | ICD-10-CM

## 2023-03-30 PROCEDURE — 99212 OFFICE O/P EST SF 10 MIN: CPT | Mod: 95 | Performed by: INTERNAL MEDICINE

## 2023-03-30 NOTE — PROGRESS NOTES
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 White 4/6/2023    Dermatology appointment with Dr. Randall scheduled for 5/9/2023    He had coronary angiogram 3/21/2023:    Conclusion    Two vessel coronary artery disease involving the LAD and RCA.  The RCA lesion is highly calcified between stented regions.    Given the severe calcification of the RCA and the long LAD lesion extending to the ostium of the vessel, recommend CVTS consult and eval for CABG.  However, PCI is possible if the patient does not want CABG      See Dr. Chapin' cardiology note from 2/20/2023.    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 He would like to hear from Dr. Chapin cardiology    JESSICA Smiley MD

## 2023-03-30 NOTE — NURSING NOTE
Is the patient currently in the state of [place]? YES    Visit mode:TELEPHONE    If the visit is dropped, the patient can be reconnected by: TELEPHONE VISIT: Phone number: [phone]    Will anyone else be joining the visit? NO      How would you like to obtain your AVS? MyChart    Are changes needed to the allergy or medication list? NO    Reason for visit: Follow up

## 2023-04-03 ENCOUNTER — OFFICE VISIT (OUTPATIENT)
Dept: CARDIOLOGY | Facility: CLINIC | Age: 78
End: 2023-04-03
Attending: SURGERY
Payer: COMMERCIAL

## 2023-04-03 VITALS
HEIGHT: 71 IN | DIASTOLIC BLOOD PRESSURE: 84 MMHG | WEIGHT: 196 LBS | BODY MASS INDEX: 27.44 KG/M2 | OXYGEN SATURATION: 95 % | SYSTOLIC BLOOD PRESSURE: 152 MMHG | HEART RATE: 90 BPM

## 2023-04-03 DIAGNOSIS — I99.8 OTHER DISORDER OF CIRCULATORY SYSTEM: ICD-10-CM

## 2023-04-03 DIAGNOSIS — R09.89 OTHER SPECIFIED SYMPTOMS AND SIGNS INVOLVING THE CIRCULATORY AND RESPIRATORY SYSTEMS: ICD-10-CM

## 2023-04-03 DIAGNOSIS — I25.10 CORONARY ARTERY DISEASE INVOLVING NATIVE CORONARY ARTERY OF NATIVE HEART WITHOUT ANGINA PECTORIS: Primary | ICD-10-CM

## 2023-04-03 DIAGNOSIS — R79.9 ABNORMAL FINDING OF BLOOD CHEMISTRY, UNSPECIFIED: ICD-10-CM

## 2023-04-03 DIAGNOSIS — Z01.810 PRE-OPERATIVE CARDIOVASCULAR EXAMINATION: ICD-10-CM

## 2023-04-03 PROCEDURE — G0463 HOSPITAL OUTPT CLINIC VISIT: HCPCS | Performed by: SURGERY

## 2023-04-03 PROCEDURE — 99207 PR NON-BILLABLE SERV PER CHARTING: CPT | Performed by: SURGERY

## 2023-04-03 ASSESSMENT — PAIN SCALES - GENERAL: PAINLEVEL: NO PAIN (0)

## 2023-04-03 NOTE — NURSING NOTE
Chief Complaint   Patient presents with     New Patient     New CV surgery           Vitals were taken and medications reconciled.     Jong Ventura, EMT   5:01 PM

## 2023-04-03 NOTE — LETTER
4/3/2023      RE: Reinaldo Gordillo  [address]  [address]  [facility] [address]       Dear Colleague,    Thank you for the opportunity to participate in the care of your patient, Reinaldo Gordillo, at the [facility] [place] [facility]. Please see a copy of my visit note below.    CLINICAL HISTORY:   Reinaldo Gordillo is a 77 year old male with history of coronary artery disease (s/p AREN x4 to RCA in 2015), HTN, HLD who is referred to me for evaluation for CABG.        Patient was last seen in clinic in 11/2022 as a first visit when he established cardiac health care with Dr. Chapin. He returns to clinic today after a recent ED visit which occurred 3 weeks ago (1/21/22). Patient reports that he fell at home and suffered a mild concussion with brief memory loss. He is not clear on the circumstances that led to his fall though he though it was mechanical. Specifically, on 1/21/23, patient was walking down a staircase at home and fell backwards, hitting his head. He recalls falling and getting back up but then has some memory loss that occurred after he got back up. He presented to the ED where they suspected he may have had a post fall concussion with short lived amnesia. Head imaging was benign and labs were unremarkable. However, given the patient's history of CAD with ventricular tachycardia, a zio monitor was placed in the ED prior to patient's discharge. He underwent a coronary angiogram that shows severe CAD and CABG was recommended.     Patient is otherwise doing well. He is retired but remains active and has no complaints. He denies any chest pain, dyspnea at rest or with exertion, PND, orthopnea, peripheral edema, palpitations, lightheadedness      Cardiac medications:   - Aspirin 81 mg daily  - Lisinopril 5 mg daily  - Metoprolol XL 25 mg daily  - Pravastatin 80 mg daily      PAST MEDICAL HISTORY:   Notable details of cardiac history obtained  from chart check:   Patient had CACS of 1500 AU at [facility] in 2015. He underwent a stress test and developed ventricular tachycardia post stress test. This lead to an angiogram that demonstrated severe RCA disease which was stented successfully with overlapping 4.0X12, 3.5X8, 2.5X28 and 2.25X28 mm Synergy AREN with excellent angiographic results. Reinaldo was on Prasugrel for 3 years and stopped medication in 2018. He was on Crestor but was unable to tolerate the medication due to muscle weakness and an elevated CK level (1600).      Past Medical History        Past Medical History:   Diagnosis Date     ASCVD (arteriosclerotic cardiovascular disease)       Coronary artery disease       Gastroesophageal reflux disease       Hypertension       Stented coronary artery               PAST SURGICAL HISTORY:      Past Surgical History         Past Surgical History:   Procedure Laterality Date     CATARACT IOL, RT/LT         COLONOSCOPY N/A 7/17/2019     Procedure: COLONOSCOPY;  Surgeon: Roque Givens MD;  Location: [facility] OR     ESOPHAGOSCOPY, GASTROSCOPY, DUODENOSCOPY (EGD), COMBINED N/A 7/17/2019     Procedure: ESOPHAGOGASTRODUODENOSCOPY, WITH BIOPSY;  Surgeon: Roque Givens MD;  Location: [facility] OR     HERNIA REPAIR   2000     MOHS MICROGRAPHIC PROCEDURE         PHACOEMULSIFICATION CLEAR CORNEA WITH STANDARD INTRAOCULAR LENS IMPLANT Right 7/2/2021     Procedure: RIGHT EYE CATARACT REMOVAL WITH INTRAOCULAR LENS IMPLANT;  Surgeon: Justa Liz MD;  [place] [facility]     PHACOEMULSIFICATION CLEAR CORNEA WITH STANDARD INTRAOCULAR LENS IMPLANT Left 7/9/2021     Procedure: LEFT EYE CATARACT REMOVAL WITH INTRAOCULAR LENS IMPLANT;  Surgeon: Justa Liz MD;  [place] [facility] [place]     [facility] CORONARY STENT ANGELA CORTES ADDTL VESSEL                 CURRENT MEDICATIONS:      Current Outpatient Prescriptions          Current Outpatient Medications   Medication Sig Dispense Refill     ASPIRIN LOW DOSE 81 MG EC  "tablet           ASPIRIN PO  (Patient not taking: Reported on 11/14/2022)         calcipotriene (DOVONOX) 0.005 % external ointment APPLY TOPICALLY 2 TIMES DAILY AND MIX PEA SIZED AMOUNT WITH 5-FU.USE MONDAY-FRIDAY (Patient not taking: Reported on 1/3/2023) 60 g 0     clobetasol (TEMOVATE) 0.05 % external ointment Apply topically 2 times daily To the rash on the legs as needed 120 g 3     lisinopril (ZESTRIL) 5 MG tablet 20 mg         Metoprolol Succinate (TOPROL XL PO) Take 25 mg by mouth         omeprazole (PRILOSEC) 20 MG DR capsule Take 20 mg by mouth daily         pravastatin (PRAVACHOL) 80 MG tablet 80 mg          zolpidem (AMBIEN) 10 MG tablet                   ALLERGIES:   No Known Allergies      FAMILY HISTORY:      Family History         Family History   Problem Relation Age of Onset     Glaucoma No family hx of       Macular Degeneration No family hx of       Retinal detachment No family hx of       Amblyopia No family hx of       Melanoma No family hx of       Skin Cancer No family hx of               SOCIAL HISTORY:      Social History            Socioeconomic History     Marital status:    Tobacco Use     Smoking status: Never     Smokeless tobacco: Never   Substance and Sexual Activity     Alcohol use: Yes       Comment: 1-2 drinks 5 days a week     Drug use: Never      Edward  reports current alcohol use. and  reports that he has never smoked. He has never used smokeless tobacco..      REVIEW OF SYSTEMS:   A comprehensive review of systems was performed and negative unless otherwise noted in the HPI above.       PHYSICAL EXAMINATION:   BP (!) 154/83 (BP Location: Right arm, Patient Position: Chair, Cuff Size: Adult Regular)   Pulse 69   Ht 1.796 m (5' 10.71\")   Wt 90.2 kg (198 lb 14.4 oz)   SpO2 98%   BMI 27.97 kg/m    There is no height or weight on file to calculate BMI.      Wt Readings from Last 2 Encounters:   01/21/23 90.7 kg (200 lb)   11/14/22 89.5 kg (197 lb 4.8 oz) "      Constitutional: no acute distress, pleasant and cooperative, appears overall well.  Eyes:sclera white, conjunctiva clear, without icterus or pallor   Ears/Nose/Mouth/Throat: Pinna, tragus, and external canal non-tender are normal, Nares patent b/l, moist mucous membranes  Cardiovascular: RRR nl S1S2, JVP not elevated, extremities with no edema or cyanosis  Respiratory: clear to auscultation and percussion bilaterally anterior and posterior  Gastrointestinal: soft, nontender, non distended, no hepatosplenomegaly or masses  Musculoskeletal: normal muscle bulk and tone, joints   Skin: normal skin appearance without worrisome lesions.   Neurologic: Alert and oriented, face symmetric, normal gait  Psychiatric: appropriate affect, eye contact, intact thought and speech         LABORATORY DATA:      LIPID RESULTS:      Recent Labs   Lab Test 22  0842   CHOL 165   HDL 93   LDL 63   TRIG 43         LIVER ENZYME RESULTS:       Recent Labs   Lab Test 23  1016 22  0842   AST 28 24   ALT 36 46         CBC RESULTS:       Recent Labs   Lab Test 23  1016 10/18/22  0923   WBC 5.1 4.4   HGB 13.0* 13.0*   HCT 39.6* 39.7*    221         BMP RESULTS:       Recent Labs   Lab Test 23  1016 22  0842    139   POTASSIUM 5.1 4.9   CHLORIDE 107 107   CO2 22 28   ANIONGAP 12 4   * 118*   BUN 17.6 40*   CR 1.09 1.32*   CRISS 9.5 9.1         A1C RESULTS:  No results found for: A1C     INR RESULTS:  No results for input(s): INR in the last 77147 hours.         PROCEDURES & FURTHER ASSESSMENTS:      Zio Monitor - (7 days):   13 beats run of non sustained ventricular tachycardia (patient was asymptomatic) with some PAC.      EK23: NSR at 65 bpm      Echocardiogram Interpretation Summary 3/23  Global and regional left ventricular function is normal with an EF of 55-60%.  The right ventricle is normal size. Global right ventricular function is  normal.  Mild aortic stenosis  is present. The aortic valve area is 1.8 cm^2, by the  continuity equation.  No pericardial effusion is present.     This study was compared with the study from 10/29/2022. No significant changes  noted.  ______________________________________________________________________________  Left Ventricle  Global and regional left ventricular function is normal with an EF of 55-60%.  Left ventricular wall thickness is normal. Left ventricular size is normal.  Left ventricular diastolic function is not assessable.     Right Ventricle  The right ventricle is normal size. Global right ventricular function is  normal.     Atria  Both atria appear normal.     Mitral Valve  The mitral valve is normal.     Aortic Valve  Aortic valve sclerosis is present. Mild aortic stenosis is present. The mean  AoV pressure gradient is 12.0 mmHg. The peak aortic velocity is 2.4 m/sec. The  aortic valve area is 1.8 cm^2, by the continuity equation.     Tricuspid Valve  The tricuspid valve is normal. The peak velocity of the tricuspid regurgitant  jet is not obtainable.     Pulmonic Valve  The pulmonic valve is normal.     Vessels  The aorta root is normal. The thoracic aorta cannot be assessed. The inferior  vena cava was normal in size with preserved respiratory variability. IVC  diameter <2.1 cm collapsing >50% with sniff suggests a normal RA pressure of 3  mmHg.     Pericardium  No pericardial effusion is present.     Compared to Previous Study  This study was compared with the study from 10/29/2022 . No significant  changes noted.     Attestation  I have personally viewed the imaging and agree with the interpretation and  report as documented by the fellow, Genny Miller, and/or edited by me.  ______________________________________________________________________________  Doppler Measurements & Calculations  Ao V2 max: 239.0 cm/sec  Ao max P.0 mmHg  Ao V2 mean: 160.3 cm/sec  Ao mean P.0 mmHg  Ao V2 VTI: 48.5 cm  LV V1 max P.0  mmHg  LV V1 max: 112.0 cm/sec  LV V1 VTI: 23.8 cm  AV Andrea Ratio (DI): 0.47     ______________________________________________________________________________  Report approved by: MD Jerry Reddy 03/21/2023 12:18 PM           Coronary Findings    Diagnostic  Dominance: Right  Left Main   The LM is very short with the LCx emerging from a separate ostium.   Dist LM to Prox LAD lesion is 70% stenosed.      Left Anterior Descending   Heparin was given for ACT > 250 sec. An opsens wire was advanced to the distal LAD. The iFR in the distal LAD was 0.84. The iFR in the mid LAD was 0.9.   Prox LAD to Mid LAD lesion is 40% stenosed.   Mid LAD lesion is 50% stenosed.      First Diagonal Branch   The vessel is large.   1st Diag-1 lesion is 80% stenosed.   1st Diag-2 lesion is 70% stenosed.      Second Diagonal Branch   The vessel is small.   2nd Diag lesion is 90% stenosed.      Left Circumflex   Heparin was given for ACT > 250 sec. An opsens wire was advanced to the mid LCx. The iFR in the mid LCx was 1.0.   Ost Cx to Prox Cx lesion is 20% stenosed.      Right Coronary Artery   Previously placed Prox RCA to Mid RCA stent (unknown type) is widely patent.   Mid RCA to Dist RCA lesion is 80% stenosed.   Previously placed Dist RCA stent (unknown type) is widely patent.      Right Posterior Atrioventricular Artery   Previously placed RPAV-1 stent (unknown type) is widely patent.   RPAV-2 lesion is 50% stenosed.   Previously placed RPAV-3 stent (unknown type) is widely patent.      Third Right Posterolateral Branch   Previously placed 3rd RPL stent (unknown type) is widely patent.         Intervention     No interventions have been documented.     FFR/IFR/Peak/Mean Gradient      Event Details User   4:10 PM 3/21/23 *FFR Measurements Completed FFR measurements completed. 0.84 LAD LW   4:14 PM 3/21/23 *FFR Measurements Completed FFR measurements completed. 1.0  ostium Circumflex LW     Pressures Phase:  Baseline     Time Systolic (mmHg) Diastolic (mmHg) Mean (mmHg) A Wave (mmHg) V Wave (mmHg) EDP (mmHg) Max dp/dt (mmHg/sec) HR (bpm) Content (mL/dL) SAT (%)   AO Pressures  3:40     57    75        54          Hemodynamic Waveforms -- Encounter Level:    Hemodynamic Waveforms: None found at the encounter level.        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 I discussed the risks and benefits of CABG with patient and his wife including the risks of death, bleeding stroke, infection, renal failure and arrhythmias. I have also discussed the post op recovery and answered all his questions. I have also discussed him and his answered all their questions. He will need a CT chest,vein mapping and carotid duplex as part of his preop work up.      Please do not hesitate to contact me if you have any questions/concerns.     Sincerely,     Bro Almeida MD

## 2023-04-03 NOTE — NURSING NOTE
Patient seen today for consultation for CABG with Dr. Almeida    Surgery procedure explained to patient and spouse and all questions and concerns were answered and addressed.       Reviewed pre surgery tests and procedures needed and will call patient to schedule.      Pre surgery preparation folder with instructions for surgery preparation and recovery will be mailed to the patient.     Surgery preference to schedule after May 12th.     Patient and spouse verbalized understanding of all instructions and will call with any questions or concerns.       Yony Cormier, RNCC  Cardiothoracic Surgery   [facility]  O) [phone]  F) [phone]

## 2023-04-04 ENCOUNTER — PREP FOR PROCEDURE (OUTPATIENT)
Dept: CARDIOLOGY | Facility: CLINIC | Age: 78
End: 2023-04-04
Payer: COMMERCIAL

## 2023-04-04 ENCOUNTER — TELEPHONE (OUTPATIENT)
Dept: CARDIOLOGY | Facility: CLINIC | Age: 78
End: 2023-04-04
Payer: COMMERCIAL

## 2023-04-04 DIAGNOSIS — I25.10 CAD (CORONARY ARTERY DISEASE): Primary | ICD-10-CM

## 2023-04-05 ENCOUNTER — MYC MEDICAL ADVICE (OUTPATIENT)
Dept: CARDIOLOGY | Facility: CLINIC | Age: 78
End: 2023-04-05
Payer: COMMERCIAL

## 2023-04-05 NOTE — PROGRESS NOTES
CLINICAL HISTORY:   Reinaldo Gordillo is a 77 year old male with history of coronary artery disease (s/p AREN x4 to RCA in 2015), HTN, HLD who is referred to me for evaluation for CABG.        Patient was last seen in clinic in 11/2022 as a first visit when he established cardiac health care with Dr. Chapin. He returns to clinic today after a recent ED visit which occurred 3 weeks ago (1/21/22). Patient reports that he fell at home and suffered a mild concussion with brief memory loss. He is not clear on the circumstances that led to his fall though he though it was mechanical. Specifically, on 1/21/23, patient was walking down a staircase at home and fell backwards, hitting his head. He recalls falling and getting back up but then has some memory loss that occurred after he got back up. He presented to the ED where they suspected he may have had a post fall concussion with short lived amnesia. Head imaging was benign and labs were unremarkable. However, given the patient's history of CAD with ventricular tachycardia, a zio monitor was placed in the ED prior to patient's discharge. He underwent a coronary angiogram that shows severe CAD and CABG was recommended.     Patient is otherwise doing well. He is retired but remains active and has no complaints. He denies any chest pain, dyspnea at rest or with exertion, PND, orthopnea, peripheral edema, palpitations, lightheadedness      Cardiac medications:   - Aspirin 81 mg daily  - Lisinopril 5 mg daily  - Metoprolol XL 25 mg daily  - Pravastatin 80 mg daily      PAST MEDICAL HISTORY:   Notable details of cardiac history obtained from chart check:   Patient had CACS of 1500 AU at [facility] in 2015. He underwent a stress test and developed ventricular tachycardia post stress test. This lead to an angiogram that demonstrated severe RCA disease which was stented successfully with overlapping 4.0X12, 3.5X8, 2.5X28 and 2.25X28 mm Synergy AREN with excellent angiographic  results. Reinaldo was on Prasugrel for 3 years and stopped medication in 2018. He was on Crestor but was unable to tolerate the medication due to muscle weakness and an elevated CK level (1600).      Past Medical History        Past Medical History:   Diagnosis Date     ASCVD (arteriosclerotic cardiovascular disease)       Coronary artery disease       Gastroesophageal reflux disease       Hypertension       Stented coronary artery               PAST SURGICAL HISTORY:      Past Surgical History         Past Surgical History:   Procedure Laterality Date     CATARACT IOL, RT/LT         COLONOSCOPY N/A 7/17/2019     Procedure: COLONOSCOPY;  Surgeon: Roque Givens MD;  Location: [facility] OR     ESOPHAGOSCOPY, GASTROSCOPY, DUODENOSCOPY (EGD), COMBINED N/A 7/17/2019     Procedure: ESOPHAGOGASTRODUODENOSCOPY, WITH BIOPSY;  Surgeon: Roque Givens MD;  Location: [facility] OR     HERNIA REPAIR   2000     MOHS MICROGRAPHIC PROCEDURE         PHACOEMULSIFICATION CLEAR CORNEA WITH STANDARD INTRAOCULAR LENS IMPLANT Right 7/2/2021     Procedure: RIGHT EYE CATARACT REMOVAL WITH INTRAOCULAR LENS IMPLANT;  Surgeon: Justa Liz MD;  Location: [facility] OR     PHACOEMULSIFICATION CLEAR CORNEA WITH STANDARD INTRAOCULAR LENS IMPLANT Left 7/9/2021     Procedure: LEFT EYE CATARACT REMOVAL WITH INTRAOCULAR LENS IMPLANT;  Surgeon: Justa Liz MD;  Location: [facility] OR     [facility] CORONARY STENT PERCUT, EA ADDTL VESSEL                 CURRENT MEDICATIONS:      Current Outpatient Prescriptions          Current Outpatient Medications   Medication Sig Dispense Refill     ASPIRIN LOW DOSE 81 MG EC tablet           ASPIRIN PO  (Patient not taking: Reported on 11/14/2022)         calcipotriene (DOVONOX) 0.005 % external ointment APPLY TOPICALLY 2 TIMES DAILY AND MIX PEA SIZED AMOUNT WITH 5-FU.USE MONDAY-FRIDAY (Patient not taking: Reported on 1/3/2023) 60 g 0     clobetasol (TEMOVATE) 0.05 % external ointment Apply topically 2 times daily To the  "rash on the legs as needed 120 g 3     lisinopril (ZESTRIL) 5 MG tablet 20 mg         Metoprolol Succinate (TOPROL XL PO) Take 25 mg by mouth         omeprazole (PRILOSEC) 20 MG DR capsule Take 20 mg by mouth daily         pravastatin (PRAVACHOL) 80 MG tablet 80 mg          zolpidem (AMBIEN) 10 MG tablet                   ALLERGIES:   No Known Allergies      FAMILY HISTORY:      Family History         Family History   Problem Relation Age of Onset     Glaucoma No family hx of       Macular Degeneration No family hx of       Retinal detachment No family hx of       Amblyopia No family hx of       Melanoma No family hx of       Skin Cancer No family hx of               SOCIAL HISTORY:      Social History            Socioeconomic History     Marital status:    Tobacco Use     Smoking status: Never     Smokeless tobacco: Never   Substance and Sexual Activity     Alcohol use: Yes       Comment: 1-2 drinks 5 days a week     Drug use: Never      Edward  reports current alcohol use. and  reports that he has never smoked. He has never used smokeless tobacco..      REVIEW OF SYSTEMS:   A comprehensive review of systems was performed and negative unless otherwise noted in the HPI above.       PHYSICAL EXAMINATION:   BP (!) 154/83 (BP Location: Right arm, Patient Position: Chair, Cuff Size: Adult Regular)   Pulse 69   Ht 1.796 m (5' 10.71\")   Wt 90.2 kg (198 lb 14.4 oz)   SpO2 98%   BMI 27.97 kg/m    There is no height or weight on file to calculate BMI.      Wt Readings from Last 2 Encounters:   01/21/23 90.7 kg (200 lb)   11/14/22 89.5 kg (197 lb 4.8 oz)      Constitutional: no acute distress, pleasant and cooperative, appears overall well.  Eyes:sclera white, conjunctiva clear, without icterus or pallor   Ears/Nose/Mouth/Throat: Pinna, tragus, and external canal non-tender are normal, Nares patent b/l, moist mucous membranes  Cardiovascular: RRR nl S1S2, JVP not elevated, extremities with no edema or " cyanosis  Respiratory: clear to auscultation and percussion bilaterally anterior and posterior  Gastrointestinal: soft, nontender, non distended, no hepatosplenomegaly or masses  Musculoskeletal: normal muscle bulk and tone, joints   Skin: normal skin appearance without worrisome lesions.   Neurologic: Alert and oriented, face symmetric, normal gait  Psychiatric: appropriate affect, eye contact, intact thought and speech         LABORATORY DATA:      LIPID RESULTS:      Recent Labs   Lab Test 22  0842   CHOL 165   HDL 93   LDL 63   TRIG 43         LIVER ENZYME RESULTS:       Recent Labs   Lab Test 23  1016 22  0842   AST 28 24   ALT 36 46         CBC RESULTS:       Recent Labs   Lab Test 23  1016 10/18/22  0923   WBC 5.1 4.4   HGB 13.0* 13.0*   HCT 39.6* 39.7*    221         BMP RESULTS:       Recent Labs   Lab Test 23  1016 22  0842    139   POTASSIUM 5.1 4.9   CHLORIDE 107 107   CO2 22 28   ANIONGAP 12 4   * 118*   BUN 17.6 40*   CR 1.09 1.32*   CRISS 9.5 9.1         A1C RESULTS:  No results found for: A1C     INR RESULTS:  No results for input(s): INR in the last 99775 hours.         PROCEDURES & FURTHER ASSESSMENTS:      Zio Monitor - (7 days):   13 beats run of non sustained ventricular tachycardia (patient was asymptomatic) with some PAC.      EK23: NSR at 65 bpm      Echocardiogram Interpretation Summary 3/23  Global and regional left ventricular function is normal with an EF of 55-60%.  The right ventricle is normal size. Global right ventricular function is  normal.  Mild aortic stenosis is present. The aortic valve area is 1.8 cm^2, by the  continuity equation.  No pericardial effusion is present.     This study was compared with the study from 10/29/2022. No significant changes  noted.  ______________________________________________________________________________  Left Ventricle  Global and regional left ventricular function is  normal with an EF of 55-60%.  Left ventricular wall thickness is normal. Left ventricular size is normal.  Left ventricular diastolic function is not assessable.     Right Ventricle  The right ventricle is normal size. Global right ventricular function is  normal.     Atria  Both atria appear normal.     Mitral Valve  The mitral valve is normal.     Aortic Valve  Aortic valve sclerosis is present. Mild aortic stenosis is present. The mean  AoV pressure gradient is 12.0 mmHg. The peak aortic velocity is 2.4 m/sec. The  aortic valve area is 1.8 cm^2, by the continuity equation.     Tricuspid Valve  The tricuspid valve is normal. The peak velocity of the tricuspid regurgitant  jet is not obtainable.     Pulmonic Valve  The pulmonic valve is normal.     Vessels  The aorta root is normal. The thoracic aorta cannot be assessed. The inferior  vena cava was normal in size with preserved respiratory variability. IVC  diameter <2.1 cm collapsing >50% with sniff suggests a normal RA pressure of 3  mmHg.     Pericardium  No pericardial effusion is present.     Compared to Previous Study  This study was compared with the study from 10/29/2022 . No significant  changes noted.     Attestation  I have personally viewed the imaging and agree with the interpretation and  report as documented by the fellow, Genny Miller, and/or edited by me.  ______________________________________________________________________________  Doppler Measurements & Calculations  Ao V2 max: 239.0 cm/sec  Ao max P.0 mmHg  Ao V2 mean: 160.3 cm/sec  Ao mean P.0 mmHg  Ao V2 VTI: 48.5 cm  LV V1 max P.0 mmHg  LV V1 max: 112.0 cm/sec  LV V1 VTI: 23.8 cm  AV Andrea Ratio (DI): 0.47     ______________________________________________________________________________  Report approved by: MD Jerry Reddy 2023 12:18 PM           Coronary Findings    Diagnostic  Dominance: Right  Left Main   The LM is very short with the LCx  emerging from a separate ostium.   Dist LM to Prox LAD lesion is 70% stenosed.      Left Anterior Descending   Heparin was given for ACT > 250 sec. An opsens wire was advanced to the distal LAD. The iFR in the distal LAD was 0.84. The iFR in the mid LAD was 0.9.   Prox LAD to Mid LAD lesion is 40% stenosed.   Mid LAD lesion is 50% stenosed.      First Diagonal Branch   The vessel is large.   1st Diag-1 lesion is 80% stenosed.   1st Diag-2 lesion is 70% stenosed.      Second Diagonal Branch   The vessel is small.   2nd Diag lesion is 90% stenosed.      Left Circumflex   Heparin was given for ACT > 250 sec. An opsens wire was advanced to the mid LCx. The iFR in the mid LCx was 1.0.   Ost Cx to Prox Cx lesion is 20% stenosed.      Right Coronary Artery   Previously placed Prox RCA to Mid RCA stent (unknown type) is widely patent.   Mid RCA to Dist RCA lesion is 80% stenosed.   Previously placed Dist RCA stent (unknown type) is widely patent.      Right Posterior Atrioventricular Artery   Previously placed RPAV-1 stent (unknown type) is widely patent.   RPAV-2 lesion is 50% stenosed.   Previously placed RPAV-3 stent (unknown type) is widely patent.      Third Right Posterolateral Branch   Previously placed 3rd RPL stent (unknown type) is widely patent.         Intervention     No interventions have been documented.     FFR/IFR/Peak/Mean Gradient      Event Details User   4:10 PM 3/21/23 *FFR Measurements Completed FFR measurements completed. 0.84 LAD LW   4:14 PM 3/21/23 *FFR Measurements Completed FFR measurements completed. 1.0  ostium Circumflex LW     Pressures Phase: Baseline     Time Systolic (mmHg) Diastolic (mmHg) Mean (mmHg) A Wave (mmHg) V Wave (mmHg) EDP (mmHg) Max dp/dt (mmHg/sec) HR (bpm) Content (mL/dL) SAT (%)   AO Pressures  3:40     57    75        54          Hemodynamic Waveforms -- Encounter Level:    Hemodynamic Waveforms: None found at the encounter level.        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 I discussed the risks and benefits of CABG with patient and his wife including the risks of death, bleeding stroke, infection, renal failure and arrhythmias. I have also discussed the post op recovery and answered all his questions. I have also discussed him and his answered all their questions. He will need a CT chest,vein mapping and carotid duplex as part of his preop work up.

## 2023-04-05 NOTE — TELEPHONE ENCOUNTER
FUTURE VISIT INFORMATION      SURGERY INFORMATION:    Date: 23    [facility]    Surgeon:  Bro Almeida MD    Anesthesia Type:  General    Procedure: CORONARY ARTERY BYPASS GRAFT (CABG) AND ANY associated procedures    Consult: ov 4/3    RECORDS REQUESTED FROM:       Primary Care Provider: Joao Smiley MD- [facility]    Pertinent Medical History: stented coronary artery, CAD    Most recent EKG+ Tracin/3/23    Most recent ECHO: 3/21/23    Most recent Cardiac Stress Test: 18- [facility]    Most recent Coronary Angiogram: 3/21/23

## 2023-04-11 ENCOUNTER — MYC MEDICAL ADVICE (OUTPATIENT)
Dept: CARDIOLOGY | Facility: CLINIC | Age: 78
End: 2023-04-11
Payer: COMMERCIAL

## 2023-04-11 LAB
ABO/RH(D): NORMAL
ANTIBODY SCREEN: NEGATIVE
SPECIMEN EXPIRATION DATE: NORMAL

## 2023-04-12 ENCOUNTER — PRE VISIT (OUTPATIENT)
Dept: SURGERY | Facility: CLINIC | Age: 78
End: 2023-04-12

## 2023-04-12 ENCOUNTER — ANCILLARY PROCEDURE (OUTPATIENT)
Dept: ULTRASOUND IMAGING | Facility: CLINIC | Age: 78
End: 2023-04-12
Attending: SURGERY
Payer: COMMERCIAL

## 2023-04-12 ENCOUNTER — ANCILLARY PROCEDURE (OUTPATIENT)
Dept: CT IMAGING | Facility: CLINIC | Age: 78
End: 2023-04-12
Attending: SURGERY
Payer: COMMERCIAL

## 2023-04-12 ENCOUNTER — ANCILLARY PROCEDURE (OUTPATIENT)
Dept: GENERAL RADIOLOGY | Facility: CLINIC | Age: 78
End: 2023-04-12
Attending: SURGERY
Payer: COMMERCIAL

## 2023-04-12 ENCOUNTER — OFFICE VISIT (OUTPATIENT)
Dept: SURGERY | Facility: CLINIC | Age: 78
End: 2023-04-12
Payer: COMMERCIAL

## 2023-04-12 ENCOUNTER — ANESTHESIA EVENT (OUTPATIENT)
Dept: SURGERY | Facility: CLINIC | Age: 78
DRG: 236 | End: 2023-04-12
Payer: COMMERCIAL

## 2023-04-12 ENCOUNTER — LAB (OUTPATIENT)
Dept: LAB | Facility: CLINIC | Age: 78
End: 2023-04-12
Payer: COMMERCIAL

## 2023-04-12 ENCOUNTER — LAB (OUTPATIENT)
Dept: LAB | Facility: CLINIC | Age: 78
End: 2023-04-12
Attending: SURGERY
Payer: COMMERCIAL

## 2023-04-12 VITALS
OXYGEN SATURATION: 100 % | SYSTOLIC BLOOD PRESSURE: 149 MMHG | RESPIRATION RATE: 16 BRPM | WEIGHT: 197.5 LBS | DIASTOLIC BLOOD PRESSURE: 77 MMHG | HEART RATE: 58 BPM | BODY MASS INDEX: 27.65 KG/M2 | TEMPERATURE: 97.3 F | HEIGHT: 71 IN

## 2023-04-12 DIAGNOSIS — R09.89 OTHER SPECIFIED SYMPTOMS AND SIGNS INVOLVING THE CIRCULATORY AND RESPIRATORY SYSTEMS: ICD-10-CM

## 2023-04-12 DIAGNOSIS — Z01.810 PRE-OPERATIVE CARDIOVASCULAR EXAMINATION: ICD-10-CM

## 2023-04-12 DIAGNOSIS — I25.10 CORONARY ARTERY DISEASE INVOLVING NATIVE CORONARY ARTERY OF NATIVE HEART WITHOUT ANGINA PECTORIS: ICD-10-CM

## 2023-04-12 DIAGNOSIS — Z01.818 PREOP EXAMINATION: Primary | ICD-10-CM

## 2023-04-12 DIAGNOSIS — I99.8 OTHER DISORDER OF CIRCULATORY SYSTEM: ICD-10-CM

## 2023-04-12 DIAGNOSIS — R79.9 ABNORMAL FINDING OF BLOOD CHEMISTRY, UNSPECIFIED: ICD-10-CM

## 2023-04-12 LAB
ALBUMIN SERPL BCG-MCNC: 4 G/DL (ref 3.5–5.2)
ALBUMIN UR-MCNC: NEGATIVE MG/DL
ALP SERPL-CCNC: 62 U/L (ref 40–129)
ALT SERPL W P-5'-P-CCNC: 57 U/L (ref 10–50)
ANION GAP SERPL CALCULATED.3IONS-SCNC: 8 MMOL/L (ref 7–15)
APPEARANCE UR: CLEAR
APTT PPP: 26 SECONDS (ref 22–38)
AST SERPL W P-5'-P-CCNC: 34 U/L (ref 10–50)
BILIRUB SERPL-MCNC: 0.3 MG/DL
BILIRUB UR QL STRIP: NEGATIVE
BUN SERPL-MCNC: 13.6 MG/DL (ref 8–23)
CALCIUM SERPL-MCNC: 9.5 MG/DL (ref 8.8–10.2)
CHLORIDE SERPL-SCNC: 105 MMOL/L (ref 98–107)
COLOR UR AUTO: YELLOW
CREAT SERPL-MCNC: 0.87 MG/DL (ref 0.67–1.17)
DEPRECATED HCO3 PLAS-SCNC: 27 MMOL/L (ref 22–29)
ERYTHROCYTE [DISTWIDTH] IN BLOOD BY AUTOMATED COUNT: 11.8 % (ref 10–15)
GFR SERPL CREATININE-BSD FRML MDRD: 90 ML/MIN/1.73M2
GLUCOSE SERPL-MCNC: 123 MG/DL (ref 70–99)
GLUCOSE UR STRIP-MCNC: NEGATIVE MG/DL
HBA1C MFR BLD: 6.2 %
HCT VFR BLD AUTO: 39.7 % (ref 40–53)
HGB BLD-MCNC: 12.9 G/DL (ref 13.3–17.7)
HGB UR QL STRIP: NEGATIVE
INR PPP: 1 (ref 0.85–1.15)
KETONES UR STRIP-MCNC: NEGATIVE MG/DL
LEUKOCYTE ESTERASE UR QL STRIP: ABNORMAL
MCH RBC QN AUTO: 32.3 PG (ref 26.5–33)
MCHC RBC AUTO-ENTMCNC: 32.5 G/DL (ref 31.5–36.5)
MCV RBC AUTO: 100 FL (ref 78–100)
MUCOUS THREADS #/AREA URNS LPF: PRESENT /LPF
NITRATE UR QL: NEGATIVE
PH UR STRIP: 5.5 [PH] (ref 5–7)
PLATELET # BLD AUTO: 192 10E3/UL (ref 150–450)
POTASSIUM SERPL-SCNC: 4.3 MMOL/L (ref 3.4–5.3)
PROT SERPL-MCNC: 6.6 G/DL (ref 6.4–8.3)
RBC # BLD AUTO: 3.99 10E6/UL (ref 4.4–5.9)
RBC URINE: 1 /HPF
SARS-COV-2 RNA RESP QL NAA+PROBE: NEGATIVE
SODIUM SERPL-SCNC: 140 MMOL/L (ref 136–145)
SP GR UR STRIP: 1.02 (ref 1–1.03)
UROBILINOGEN UR STRIP-MCNC: NORMAL MG/DL
WBC # BLD AUTO: 5.8 10E3/UL (ref 4–11)
WBC URINE: 4 /HPF

## 2023-04-12 PROCEDURE — 93970 EXTREMITY STUDY: CPT | Performed by: RADIOLOGY

## 2023-04-12 PROCEDURE — 84134 ASSAY OF PREALBUMIN: CPT | Mod: 90 | Performed by: PATHOLOGY

## 2023-04-12 PROCEDURE — 85610 PROTHROMBIN TIME: CPT | Performed by: PATHOLOGY

## 2023-04-12 PROCEDURE — 71250 CT THORAX DX C-: CPT | Performed by: RADIOLOGY

## 2023-04-12 PROCEDURE — U0003 INFECTIOUS AGENT DETECTION BY NUCLEIC ACID (DNA OR RNA); SEVERE ACUTE RESPIRATORY SYNDROME CORONAVIRUS 2 (SARS-COV-2) (CORONAVIRUS DISEASE [COVID-19]), AMPLIFIED PROBE TECHNIQUE, MAKING USE OF HIGH THROUGHPUT TECHNOLOGIES AS DESCRIBED BY CMS-2020-01-R: HCPCS | Mod: 90 | Performed by: PATHOLOGY

## 2023-04-12 PROCEDURE — 86850 RBC ANTIBODY SCREEN: CPT | Mod: 90 | Performed by: PATHOLOGY

## 2023-04-12 PROCEDURE — 71046 X-RAY EXAM CHEST 2 VIEWS: CPT | Performed by: RADIOLOGY

## 2023-04-12 PROCEDURE — 83036 HEMOGLOBIN GLYCOSYLATED A1C: CPT | Mod: 90 | Performed by: PATHOLOGY

## 2023-04-12 PROCEDURE — 99204 OFFICE O/P NEW MOD 45 MIN: CPT | Performed by: NURSE PRACTITIONER

## 2023-04-12 PROCEDURE — 85027 COMPLETE CBC AUTOMATED: CPT | Performed by: PATHOLOGY

## 2023-04-12 PROCEDURE — 86900 BLOOD TYPING SEROLOGIC ABO: CPT | Mod: 90 | Performed by: PATHOLOGY

## 2023-04-12 PROCEDURE — 80053 COMPREHEN METABOLIC PANEL: CPT | Performed by: PATHOLOGY

## 2023-04-12 PROCEDURE — 93880 EXTRACRANIAL BILAT STUDY: CPT | Performed by: RADIOLOGY

## 2023-04-12 PROCEDURE — 85730 THROMBOPLASTIN TIME PARTIAL: CPT | Performed by: PATHOLOGY

## 2023-04-12 PROCEDURE — 81001 URINALYSIS AUTO W/SCOPE: CPT | Performed by: PATHOLOGY

## 2023-04-12 PROCEDURE — 86901 BLOOD TYPING SEROLOGIC RH(D): CPT | Mod: 90 | Performed by: PATHOLOGY

## 2023-04-12 PROCEDURE — U0005 INFEC AGEN DETEC AMPLI PROBE: HCPCS | Mod: 90 | Performed by: PATHOLOGY

## 2023-04-12 PROCEDURE — 99000 SPECIMEN HANDLING OFFICE-LAB: CPT | Performed by: PATHOLOGY

## 2023-04-12 PROCEDURE — 36415 COLL VENOUS BLD VENIPUNCTURE: CPT | Performed by: PATHOLOGY

## 2023-04-12 ASSESSMENT — PAIN SCALES - GENERAL: PAINLEVEL: NO PAIN (0)

## 2023-04-12 ASSESSMENT — LIFESTYLE VARIABLES: TOBACCO_USE: 0

## 2023-04-12 NOTE — H&P
Pre-Operative H & P     CC:  Preoperative exam to assess for increased cardiopulmonary risk while undergoing surgery and anesthesia.    Date of Encounter: 4/12/2023  Primary Care Physician:  Joao Smiley     Reason for visit:   Encounter Diagnoses   Name Primary?     Preop examination Yes     Coronary artery disease involving native coronary artery of native heart without angina pectoris        HPI  Reinaldo Gordillo is a 77 year old male who presents for pre-operative H & P in preparation for  Procedure Information     Case: [id number] Date/Time: 04/24/23 0730    Procedure: CORONARY ARTERY BYPASS GRAFT (CABG) AND ANY associated procedures (Chest)    Anesthesia type: General    Diagnosis: CAD (coronary artery disease) [I25.10]    Pre-op diagnosis: CAD (coronary artery disease) [I25.10]    Location:  OR [address] [place] [address] OR    Providers: Bro Almeida MD Dr. Edward Craig was seen by Dr. Almeida on 4/3/2023 in cardiovascular & thoracic surgery consultation for evaluation of possible CABG.  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  He reports that he had an unwitnessed fall in January of unclear circumstances. He presented to the ED. Head imaging was benign and labs were unremarkable. However, given the patient's history of CAD with ventricular tachycardia, a zio monitor was placed in the ED prior to patient's discharge. The Zio patch show a short , isolated episode of ventricular arrhythmia.  Given this finding, the patient underwent coronary angiogram that showed severe CAD.  The patient was counseled on the findings and treatment recommendations.  Dr. Gordillo presents to the PAC in person today in preparation for the above surgical procedure.  and CABG was recommended.    History is obtained from the patient and chart review    Hx of abnormal bleeding or anti-platelet use: ASA      Past Medical History  Past Medical History:    Diagnosis Date     ASCVD (arteriosclerotic cardiovascular disease)      Coronary artery disease      Gastroesophageal reflux disease      Hypertension      Stented coronary artery        Past Surgical History  Past Surgical History:   Procedure Laterality Date     CATARACT IOL, RT/LT       COLONOSCOPY N/A 7/17/2019    Procedure: COLONOSCOPY;  Surgeon: Roque Givens MD;  Location: [facility] OR     CV CORONARY ANGIOGRAM N/A 3/21/2023    Procedure: Coronary Angiogram;  Surgeon: Pito Chapin MD;  Location:  [facility]     CV PCI N/A 3/21/2023    Procedure: Percutaneous Coronary Intervention;  Surgeon: Pito Chapin MD;  Location: [facility]ARDI[facility] CATH LAB     ESOPHAGOSCOPY, GASTROSCOPY, DUODENOSCOPY (EGD), COMBINED N/A 7/17/2019    Procedure: ESOPHAGOGASTRODUODENOSCOPY, WITH BIOPSY;  Surgeon: Roque Givens MD;  Location: [facility] OR     HERNIA REPAIR  2000     MOHS MICROGRAPHIC PROCEDURE       PHACOEMULSIFICATION CLEAR CORNEA WITH STANDARD INTRAOCULAR LENS IMPLANT Right 7/2/2021    Procedure: RIGHT EYE CATARACT REMOVAL WITH INTRAOCULAR LENS IMPLANT;  Surgeon: Justa Liz MD;  [place] [facility]     PHACOEMULSIFICATION CLEAR CORNEA WITH STANDARD INTRAOCULAR LENS IMPLANT Left 7/9/2021    Procedure: LEFT EYE CATARACT REMOVAL WITH INTRAOCULAR LENS IMPLANT;  Surgeon: Justa Liz MD;  [place] [facility] [place]     [facility] CORONARY STENT PERCUT, EA ADDTL VESSEL         Prior to Admission Medications  Current Outpatient Medications   Medication Sig Dispense Refill     ASPIRIN LOW DOSE 81 MG EC tablet Take 81 mg by mouth every morning       clobetasol (TEMOVATE) 0.05 % external ointment Apply topically 2 times daily To the rash on the legs as needed 120 g 3     lisinopril (ZESTRIL) 5 MG tablet Take 20 mg by mouth every morning       Metoprolol Succinate (TOPROL XL PO) Take 25 mg by mouth every morning       omeprazole (PRILOSEC) 20 MG DR capsule Take 20 mg by mouth as needed       pravastatin  (PRAVACHOL) 80 MG tablet Take 80 mg by mouth At Bedtime       zolpidem (AMBIEN) 10 MG tablet  (Patient not taking: Reported on 4/12/2023)         Allergies  No Known Allergies    Social History  Social History     Socioeconomic History     Marital status:      Spouse name: Not on file     Number of children: Not on file     Years of education: Not on file     Highest education level: Not on file   Occupational History     Not on file   Tobacco Use     Smoking status: Never     Smokeless tobacco: Never   Vaping Use     Vaping status: Not on file   Substance and Sexual Activity     Alcohol use: Yes     Comment: 1-2 drinks 5 days a week     Drug use: Never     Sexual activity: Not on file   Other Topics Concern     Parent/sibling w/ CABG, MI or angioplasty before 65F 55M? Not Asked   Social History Narrative     Not on file     Social Determinants of Health     Financial Resource Strain: Not on file   Food Insecurity: Not on file   Transportation Needs: Not on file   Physical Activity: Not on file   Stress: Not on file   Social Connections: Not on file   Intimate Partner Violence: Not on file   Housing Stability: Not on file       Family History  Family History   Problem Relation Age of Onset     Glaucoma No family hx of      Macular Degeneration No family hx of      Retinal detachment No family hx of      Amblyopia No family hx of      Melanoma No family hx of      Skin Cancer No family hx of        Review of Systems  The complete review of systems is negative other than noted in the HPI or here.   Anesthesia Evaluation   Pt has had prior anesthetic. Type: MAC.    No history of anesthetic complications       ROS/MED HX  ENT/Pulmonary:     (+) SKYLER risk factors, hypertension,  (-) tobacco use   Neurologic:  - neg neurologic ROS     Cardiovascular:     (+) Dyslipidemia hypertension--CAD ---Taking blood thinners Previous cardiac testing     METS/Exercise Tolerance: >4 METS Comment: Prior to coronary angiogram, was  "walking ~ 3 miles in regularly.    Hematologic:  - neg hematologic  ROS     Musculoskeletal:   (+) arthritis (thumbs ),     GI/Hepatic:     (+) GERD, Asymptomatic on medication,     Renal/Genitourinary:  - neg Renal ROS     Endo:  - neg endo ROS     Psychiatric/Substance Use:    (-) psychiatric history, alcohol abuse history and chronic opioid use history   Infectious Disease: Comment: Completed COVID vaccines.    COVID (+) ~ one year ago.  Without residual symptoms.  - neg infectious disease ROS     Malignancy:   (+) Malignancy, History of Skin.Skin CA Remission status post Surgery.        Other:  - neg other ROS          BP (!) 149/77 (BP Location: Right arm, Patient Position: Sitting, Cuff Size: Adult Regular)   Pulse 58   Temp 97.3  F (36.3  C) (Oral)   Resp 16   Ht 1.814 m (5' 11.4\")   Wt 89.6 kg (197 lb 8 oz)   SpO2 100%   BMI 27.24 kg/m      Physical Exam   Constitutional: Awake, alert, cooperative, no apparent distress, and appears stated age.  Eyes: Pupils equal, round and reactive to light, extra ocular muscles intact, sclera clear, conjunctiva normal.  HENT: Normocephalic, oral pharynx with moist mucus membranes, good dentition. No goiter appreciated.   Respiratory: Clear to auscultation bilaterally, no crackles or wheezing.  Cardiovascular: Regular rate and rhythm, normal S1 and S2, and no murmur noted.  Carotids +2, no bruits. No edema. Palpable pulses to radial  DP and PT arteries.   GI: Normal bowel sounds, soft, non-distended, non-tender, no masses palpated, no hepatosplenomegaly.    Lymph/Hematologic: No cervical lymphadenopathy and no supraclavicular lymphadenopathy.  Genitourinary:  deferred  Skin: Warm and dry.  No rashes at anticipated surgical site.   Musculoskeletal: Full ROM of neck. There is no redness, warmth, or swelling of the joints. Gross motor strength is normal.    Neurologic: Awake, alert, oriented to name, place and time. Cranial nerves II-XII are grossly intact. Gait is " normal.   Neuropsychiatric: Calm, cooperative. Normal affect.     Prior Diagnostic Studies   PROCEDURES  BILATERAL CAROTID DUPLEX DOPPLER ULTRASOUND 2023 9:37 AM                                                                  IMPRESSION:     1. RIGHT ICA: Less than 50% diameter narrowing by grayscale imaging  and sonographic velocity criteria.     2. LEFT ICA:  Less than 50% diameter narrowing by grayscale imaging  and sonographic velocity criteria.     EKG 23  Sinus bradycardia   Inferior infarct , age undetermined   Abnormal ECG   When compared with ECG of 21-MAR-2023 12:52,   No significant change was found      Coronary angiogram 3/21/23  Conclusion  Two vessel coronary artery disease involving the LAD and RCA.  The RCA lesion is highly calcified between stented regions.    Given the severe calcification of the RCA and the long LAD lesion extending to the ostium of the vessel, recommend CVTS consult and eval for CABG.  However, PCI is possible if the patient does not want CABG      Echocardiogram Interpretation Summary 3/23  Global and regional left ventricular function is normal with an EF of 55-60%.  The right ventricle is normal size. Global right ventricular function is  normal.  Mild aortic stenosis is present. The aortic valve area is 1.8 cm^2, by the  continuity equation.  No pericardial effusion is present.     This study was compared with the study from 10/29/2022. No significant changes  noted.    Zio Monitor - (7 days):   13 beats run of non sustained ventricular tachycardia (patient was asymptomatic) with some PAC.      EK23: NSR at 65 bpm  The patient's records and results personally reviewed by this provider.     Outside records reviewed from: Care Everywhere    LAB/DIAGNOSTIC STUDIES TODAY:     Latest Reference Range & Units 23 10:00   Sodium 136 - 145 mmol/L 140   Potassium 3.4 - 5.3 mmol/L 4.3   Chloride 98 - 107 mmol/L 105   Carbon Dioxide (CO2)  -   mmol/L 27   Urea Nitrogen 8.0 - 23.0 mg/dL 13.6   Creatinine 0.67 - 1.17 mg/dL 0.87   GFR Estimate >60 mL/min/1.73m2 90   Calcium 8.8 - 10.2 mg/dL 9.5   Anion Gap 7 - 15 mmol/L 8   Albumin 3.5 - 5.2 g/dL 4.0   Protein Total 6.4 - 8.3 g/dL 6.6   Alkaline Phosphatase 40 - 129 U/L 62   ALT 10 - 50 U/L 57 (H)   AST 10 - 50 U/L 34   Bilirubin Total <=1.2 mg/dL 0.3   Glucose 70 - 99 mg/dL 123 (H)   Hemoglobin A1C <5.7 % 6.2 (H)   WBC 4.0 - 11.0 10e3/uL 5.8   Hemoglobin 13.3 - 17.7 g/dL 12.9 (L)   Hematocrit 40.0 - 53.0 % 39.7 (L)   Platelet Count 150 - 450 10e3/uL 192   RBC Count 4.40 - 5.90 10e6/uL 3.99 (L)   MCV 78 - 100 fL 100   MCH 26.5 - 33.0 pg 32.3   MCHC 31.5 - 36.5 g/dL 32.5   RDW 10.0 - 15.0 % 11.8   INR 0.85 - 1.15  1.00   PTT 22 - 38 Seconds 26   ABO/Rh(D)  A NEG   Antibody Screen Negative  Negative   SPECIMEN EXPIRATION DATE  [id number]   (H): Data is abnormally high  (L): Data is abnormally low      Assessment  Reinaldo Gordillo is a 77 year old male seen as a PAC referral for risk assessment and optimization for anesthesia.    Plan/Recommendations  Pt will be optimized for the proposed procedure.  See below for details on the assessment, risk, and preoperative recommendations    NEUROLOGY  - No history of TIA, CVA or seizure    - peripheral neuropathy in toes and feet with associated balance issues   ~ consideration for fall precautions.      -Post Op delirium risk factors:  Age    ENT  - No current airway concerns.  Will need to be reassessed day of surgery.  Mallampati: I  TM: > 3    CARDIAC  - CAD s/p DESX4 to RCA (20215) now with two vessel coronary artery disease involving the LAD and RCA.  Recent angiogram demonstrated severe calcification of the RCA and the long LAD lesion extending to the ostium of the vessel.   ~ cardiac testing above   ~ Above procedure scheduled   ~ Continue Statin therapy    ~ Per cardiology's instructions:    Take your last dose of Aspirin on  4/23/23    ~ preoperative labs  "completed by cardiology today.     - HTN    ~ managed with metoprolol and lisinopril    ~ continue metoprolol   ~ hold lisinopril DOS     - METS (Metabolic Equivalents)  Patient performs 4 or more METS exercise without symptoms            Total Score: 0      PULMONARY  - SKYLER Medium Risk            Total Score: 3    SKYLER: Hypertension    SKYLER: Over 50 ys old    SKYLER: Male      - Denies h/o asthma    - Tobacco History    History   Smoking Status     Never   Smokeless Tobacco     Never       GI  - GERD  Controlled on medications: Proton Pump Inhibitor     -PONV Medium Risk  Total Score: 2           1 AN PONV: Patient is not a current smoker    1 AN PONV: Intended Post Op Opioids        /RENAL  - Baseline Creatinine  See above     ENDOCRINE    - BMI: Estimated body mass index is 27.24 kg/m  as calculated from the following:    Height as of this encounter: 1.814 m (5' 11.4\").    Weight as of this encounter: 89.6 kg (197 lb 8 oz).  Overweight (BMI 25.0-29.9)     - A1C 6.2 today     HEME  - VTE Low Risk 0.5%            Total Score: 3    VTE: Greater than 59 yrs old    VTE: Male      - Platelet dysfunction second to Aspirin (Meg, many others) . See cardiology above for instructions.     - denies h/o anemia or previous blood transfusion.     Different anesthesia methods/types have been discussed with the patient, but they are aware that the final plan will be decided by the assigned anesthesia provider on the date of service.  Patient was discussed with Dr Lovell    The patient is optimized for their procedure. AVS with information on surgery time/arrival time, meds and NPO status given by nursing staff. No further diagnostic testing indicated.      On the day of service:     Prep time: 16 minutes  Visit time: 16 minutes  Documentation time: 25 minutes  ------------------------------------------  Total time: 57 minutes      SARITA Lara CNP  Preoperative Assessment Center  [facility]  Clinic and " Surgery Center  Phone: [phone]  Fax: [phone]

## 2023-04-12 NOTE — PATIENT INSTRUCTIONS
Preparing for Your Surgery      Name:  Reinaldo Gordillo   MRN:  [id number]   :  1945   Today's Date:  2023       Arriving for surgery:  Surgery date:  23  Arrival time:  5.00AM     Surgeries and procedures: Adult patients can have 2 visitors all through the surgery process.     Visiting hours: 8 a.m. to 8:30 p.m.     Hospital: Adult patients and children under age 18 can have 4 visitor at a time     No visitors under the age of 5 are allowed for hospital patients.  Double occupancy rooms: Patients can have only two visitors at a time.     Patients with disabilities: Can have a support person with them (family member, service provider     Or someone well informed about their needs) plus the allowed number of visitors     Patients confirmed or suspected to have symptoms of COVID 19 or flu:     No visitors allowed for adult patients.   Children (under age 18) can have 1 named visitor.     People who are sick or showing symptoms of COVID 19 or flu:    Are not allowed to visit patients--we can only make exceptions in special situations.       Please follow these guidelines for your visit:   Arrive wearing a mask over your mouth and nose; we will give you a medical mask to wear    If you arrive wearing a cloth mask.   Keep it on during your entire visit, even when in patient's room.   If you don't wear a mask we'll ask you to leave.     Clean your hands with alcohol hand . Do this when you arrive at and leave the building and patient room,    And again after you touch your mask or anything in the room.     You can t visit if you have a fever, cough, shortness of breath, muscle aches, headaches, sore throat    Or diarrhea      Stay 6 feet away from others during your visit and between visits     Go directly to and from the room you are visiting.     Stay in the patient s room during your visit. Limit going to other places in the hospital as much as possible     Leave bags and jackets at home or in  the car.     For everyone s health, please don t come and go during your visit. That includes for smoking   during your visit.     Please come to:     [facility] [address]  [address] [place]  [address]    -   Parking is available in the Patient Visitor Ramp on [facility].     -   When entering the hospital you will be asked COVID screening questions, you will then be directed to Registration.  Registration will direct you to the 3rd floor Surgery waiting room.     -   Please ask if you need an escort or a wheelchair to the Surgery Waiting Room.  Preop number- [phone] ?     What can I eat or drink?  -  You may eat and drink normally up to 8 hours prior to arrival time. (Until 9.00PM)  -  You may have clear liquids until 2 hours prior to arrival time. (Until 3.00AM)    Examples of clear liquids:  Water  Clear broth  Juices (apple, white grape, white cranberry  and cider) without pulp  Noncarbonated, powder based beverages  (lemonade and Aurelio-Aid)  Sodas (Sprite, 7-Up, ginger ale and seltzer)  Coffee or tea (without milk or cream)  Gatorade    -  No Alcohol for at least 24 hours before surgery.     Which medicines can I take?    Hold Aspirin for 1 day before surgery.   Hold Multivitamins for 7 days before surgery.  Hold Supplements for 7 days before surgery.  Hold Ibuprofen (Advil, Motrin) for 10 day(s) before surgery--unless otherwise directed by surgeon.  Hold Naproxen (Aleve) for 4 days before surgery.    -  DO NOT take these medications the day of surgery:  Lisinopril (Zestril),     -  PLEASE TAKE these medications the day of surgery:  Metoprolol (Toprol), Prilosec, Pravastatin.    How do I prepare myself?  - Please take 2 showers (one the night prior to surgery and one the morning of surgery) using Scrubcare or Hibiclens soap.   Use this soap only from the neck to your toes.   Leave the soap on your skin for one minute--then  rinse thoroughly.     You may use your own shampoo and conditioner. No other hair products.   - Please remove all jewelry and body piercings.  - No lotions, deodorants or fragrance.  - No makeup or fingernail polish.   - Bring your ID and insurance card.    -If you have a Deep Brain Stimulator, Spinal Cord Stimulator, or any Neuro Stimulator device---you must bring the remote control to the hospital.      ALL PATIENTS GOING HOME THE SAME DAY OF SURGERY ARE REQUIRED TO HAVE A RESPONSIBLE ADULT TO DRIVE AND BE IN ATTENDANCE WITH THEM FOR 24 HOURS FOLLOWING SURGERY.    Covid testing policy as of 12/06/2022  Your surgeon will notify and schedule you for a COVID test if one is needed before surgery--please direct any questions or COVID symptoms to your surgeon      Questions or Concerns:    - For any questions regarding the day of surgery or your hospital stay, please contact the Pre Admission Nursing Office at [phone].       - If you have health changes between today and your surgery, please call your surgeon.       - For questions after surgery, please call your surgeons office.

## 2023-04-13 LAB
ATRIAL RATE - MUSE: 56 BPM
DIASTOLIC BLOOD PRESSURE - MUSE: NORMAL MMHG
INTERPRETATION ECG - MUSE: NORMAL
P AXIS - MUSE: 38 DEGREES
PR INTERVAL - MUSE: 180 MS
PREALB SERPL IA-MCNC: 27 MG/DL (ref 15–45)
QRS DURATION - MUSE: 94 MS
QT - MUSE: 410 MS
QTC - MUSE: 395 MS
R AXIS - MUSE: -1 DEGREES
SYSTOLIC BLOOD PRESSURE - MUSE: NORMAL MMHG
T AXIS - MUSE: 3 DEGREES
VENTRICULAR RATE- MUSE: 56 BPM

## 2023-04-17 DIAGNOSIS — I25.10 CORONARY ARTERY DISEASE INVOLVING NATIVE CORONARY ARTERY OF NATIVE HEART, UNSPECIFIED WHETHER ANGINA PRESENT: Primary | ICD-10-CM

## 2023-04-18 ENCOUNTER — TELEPHONE (OUTPATIENT)
Dept: CARDIOLOGY | Facility: CLINIC | Age: 78
End: 2023-04-18
Payer: COMMERCIAL

## 2023-04-18 NOTE — TELEPHONE ENCOUNTER
This RNCC called and spoke with patient regarding sending imaging to [place]. Patient informed that [place] [facility] needs to contact our radiology to push images to their clinic for review and to contact medical records regarding notes/results that need to be sent. Phone number for radiology and medical records provided to patient; patient will call [facility] [place] regarding the information above. Patient verbalized understanding and agreed to the plan.

## 2023-04-18 NOTE — TELEPHONE ENCOUNTER
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  Specialty Access Center

## 2023-04-21 ENCOUNTER — LAB (OUTPATIENT)
Dept: LAB | Facility: CLINIC | Age: 78
End: 2023-04-21
Attending: SURGERY
Payer: COMMERCIAL

## 2023-04-21 DIAGNOSIS — I25.10 CORONARY ARTERY DISEASE INVOLVING NATIVE CORONARY ARTERY OF NATIVE HEART, UNSPECIFIED WHETHER ANGINA PRESENT: ICD-10-CM

## 2023-04-21 LAB — SARS-COV-2 RNA RESP QL NAA+PROBE: NEGATIVE

## 2023-04-21 PROCEDURE — U0005 INFEC AGEN DETEC AMPLI PROBE: HCPCS | Mod: 90 | Performed by: PATHOLOGY

## 2023-04-21 PROCEDURE — 99000 SPECIMEN HANDLING OFFICE-LAB: CPT | Performed by: PATHOLOGY

## 2023-04-21 PROCEDURE — U0003 INFECTIOUS AGENT DETECTION BY NUCLEIC ACID (DNA OR RNA); SEVERE ACUTE RESPIRATORY SYNDROME CORONAVIRUS 2 (SARS-COV-2) (CORONAVIRUS DISEASE [COVID-19]), AMPLIFIED PROBE TECHNIQUE, MAKING USE OF HIGH THROUGHPUT TECHNOLOGIES AS DESCRIBED BY CMS-2020-01-R: HCPCS | Mod: 90 | Performed by: PATHOLOGY

## 2023-04-22 ASSESSMENT — ENCOUNTER SYMPTOMS: DYSRHYTHMIAS: 1

## 2023-04-22 NOTE — ANESTHESIA PREPROCEDURE EVALUATION
Anesthesia Pre-Procedure Evaluation    Patient: Reinaldo Gordillo   MRN: [id number] : 1945        Procedure : Procedure(s):  CORONARY ARTERY BYPASS GRAFT (CABG) AND ANY associated procedures          Past Medical History:   Diagnosis Date     ASCVD (arteriosclerotic cardiovascular disease)      Coronary artery disease      Gastroesophageal reflux disease      Hypertension      Stented coronary artery       Past Surgical History:   Procedure Laterality Date     CATARACT IOL, RT/LT       COLONOSCOPY N/A 2019    Procedure: COLONOSCOPY;  Surgeon: Roque Givens MD;  Location: [facility] OR     CV CORONARY ANGIOGRAM N/A 3/21/2023    Procedure: Coronary Angiogram;  Surgeon: Pito Chapin MD;  Location: [facility] CARDIAC CATH LAB     CV PCI N/A 3/21/2023    Procedure: Percutaneous Coronary Intervention;  Surgeon: Pito Chapin MD;  Location: [facility] CARDIAC CATH LAB     ESOPHAGOSCOPY, GASTROSCOPY, DUODENOSCOPY (EGD), COMBINED N/A 2019    Procedure: ESOPHAGOGASTRODUODENOSCOPY, WITH BIOPSY;  Surgeon: Roque Givens MD;  Location: [facility] OR     HERNIA REPAIR  2000     MOHS MICROGRAPHIC PROCEDURE       PHACOEMULSIFICATION CLEAR CORNEA WITH STANDARD INTRAOCULAR LENS IMPLANT Right 2021    Procedure: RIGHT EYE CATARACT REMOVAL WITH INTRAOCULAR LENS IMPLANT;  Surgeon: Justa Liz MD;  Location: [facility] OR     PHACOEMULSIFICATION CLEAR CORNEA WITH STANDARD INTRAOCULAR LENS IMPLANT Left 2021    Procedure: LEFT EYE CATARACT REMOVAL WITH INTRAOCULAR LENS IMPLANT;  Surgeon: Justa Liz MD;  [place]: [facility] [place]     ZUNM [facility] CORONARY STENT PERCUT, EA ADDTL VESSEL        No Known Allergies   Social History     Tobacco Use     Smoking status: Never     Smokeless tobacco: Never   Vaping Use     Vaping status: Not on file   Substance Use Topics     Alcohol use: Yes     Comment: 1-2 drinks 5 days a week      Wt Readings from Last 1 Encounters:   23 91.1 kg (200 lb 13.4 oz)        Anesthesia Evaluation    Pt has had prior anesthetic.     No history of anesthetic complications       ROS/MED HX  ENT/Pulmonary: Comment: CT Chest (04/12/23):  IMPRESSION: Extensive atherosclerosis including multivessel coronary  artery disease. Possible small parapelvic left renal cyst versus  mildly prominent left renal calyces not completely imaged. Consider  renal ultrasound for more detail. Hiatal hernia. Old left rib trauma  at numbers 6 through 8.   (-) tobacco use, asthma and COPD   Neurologic:     (+) peripheral neuropathy, - toes/feet.  (-) no CVA and no TIA   Cardiovascular: Comment: -13 beats run of non sustained ventricular tachycardia on zio monitor (patient was asymptomatic) with some PAC (01/2023)  -Prasugrel for 3 years and stopped medication in 2018    US b/l Carotid (4/12/23):  IMPRESSION:     1. RIGHT ICA: Less than 50% diameter narrowing by grayscale imaging  and sonographic velocity criteria.     2. LEFT ICA:  Less than 50% diameter narrowing by grayscale imaging  and sonographic velocity criteria.    [facility] (03/2023):  Left Main  The LM is very short with the LCx emerging from a separate ostium.  Dist LM to Prox LAD lesion is 70% stenosed.    Left Anterior Descending  Heparin was given for ACT > 250 sec. An opsens wire was advanced to the distal LAD. The iFR in the distal LAD was 0.84. The iFR in the mid LAD was 0.9.  Prox LAD to Mid LAD lesion is 40% stenosed.  Mid LAD lesion is 50% stenosed.    First Diagonal Branch  The vessel is large.  1st Diag-1 lesion is 80% stenosed.  1st Diag-2 lesion is 70% stenosed.    Second Diagonal Branch  The vessel is small.  2nd Diag lesion is 90% stenosed.    Left Circumflex  Heparin was given for ACT > 250 sec. An opsens wire was advanced to the mid LCx. The iFR in the mid LCx was 1.0.  Ost Cx to Prox Cx lesion is 20% stenosed.    Right Coronary Artery  Previously placed Prox RCA to Mid RCA stent (unknown type) is widely patent.  Mid RCA to Dist RCA lesion is 80% stenosed.  Previously  placed Dist RCA stent (unknown type) is widely patent.    Right Posterior Atrioventricular Artery  Previously placed RPAV-1 stent (unknown type) is widely patent.  RPAV-2 lesion is 50% stenosed.  Previously placed RPAV-3 stent (unknown type) is widely patent.    Third Right Posterolateral Branch  Previously placed 3rd RPL stent (unknown type) is widely patent.      TTE (03/2023):  Left Ventricle  Global and regional left ventricular function is normal with an EF of 55-60%.  Left ventricular wall thickness is normal. Left ventricular size is normal.  Left ventricular diastolic function is not assessable.     Right Ventricle  The right ventricle is normal size. Global right ventricular function is  normal.     Atria  Both atria appear normal.     Mitral Valve  The mitral valve is normal.     Aortic Valve  Aortic valve sclerosis is present. Mild aortic stenosis is present. The mean  AoV pressure gradient is 12.0 mmHg. The peak aortic velocity is 2.4 m/sec. The  aortic valve area is 1.8 cm^2, by the continuity equation.     Tricuspid Valve  The tricuspid valve is normal. The peak velocity of the tricuspid regurgitant  jet is not obtainable.     Pulmonic Valve  The pulmonic valve is normal.     Vessels  The aorta root is normal. The thoracic aorta cannot be assessed. The inferior  vena cava was normal in size with preserved respiratory variability. IVC  diameter <2.1 cm collapsing >50% with sniff suggests a normal RA pressure of 3  mmHg.     Pericardium  No pericardial effusion is present.     Compared to Previous Study  This study was compared with the study from 10/29/2022 . No significant  changes noted.    EKG (04/2023): sinus aniceto    (+) Dyslipidemia hypertension--CAD --stent-4 Drug Eluting Stent. dysrhythmias, Other, valvular problems/murmurs type: AS mild AS.     METS/Exercise Tolerance:     Hematologic:     (+) anemia,     Musculoskeletal:   (+) arthritis,     GI/Hepatic:     (+) GERD,  (-) liver disease    Renal/Genitourinary:  - neg Renal ROS  (-) renal disease   Endo:  - neg endo ROS  (-) Type II DM and thyroid disease   Psychiatric/Substance Use:  - neg psychiatric ROS     Infectious Disease:       Malignancy:   (+) Malignancy, History of Skin.Skin CA Remission status post.        Other:  - neg other ROS          Physical Exam    Airway  airway exam normal      Mallampati: I   TM distance: > 3 FB   Neck ROM: full   Mouth opening: > 3 cm    Respiratory Devices and Support         Dental       (+) Minor Abnormalities - some fillings, tiny chips      Cardiovascular   cardiovascular exam normal       Rhythm and rate: regular and normal     Pulmonary   pulmonary exam normal        breath sounds clear to auscultation           OUTSIDE LABS:  CBC:   Lab Results   Component Value Date    WBC 5.8 04/12/2023    WBC 6.5 03/21/2023    HGB 12.9 (L) 04/12/2023    HGB 12.8 (L) 03/21/2023    HCT 39.7 (L) 04/12/2023    HCT 39.0 (L) 03/21/2023     04/12/2023     03/21/2023     BMP:   Lab Results   Component Value Date     04/12/2023     03/21/2023    POTASSIUM 4.3 04/12/2023    POTASSIUM 4.5 03/21/2023    CHLORIDE 105 04/12/2023    CHLORIDE 106 03/21/2023    CO2 27 04/12/2023    CO2 27 03/21/2023    BUN 13.6 04/12/2023    BUN 16.1 03/21/2023    CR 0.87 04/12/2023    CR 0.87 03/21/2023     (H) 04/12/2023     (H) 03/21/2023     COAGS:   Lab Results   Component Value Date    PTT 26 04/12/2023    INR 1.00 04/12/2023     POC: No results found for: BGM, HCG, HCGS  HEPATIC:   Lab Results   Component Value Date    ALBUMIN 4.0 04/12/2023    PROTTOTAL 6.6 04/12/2023    ALT 57 (H) 04/12/2023    AST 34 04/12/2023    ALKPHOS 62 04/12/2023    BILITOTAL 0.3 04/12/2023     OTHER:   Lab Results   Component Value Date    A1C 6.2 (H) 04/12/2023    CRISS 9.5 04/12/2023    TSH 1.82 08/29/2022    CRP <2.9 08/29/2022    SED 9 08/29/2022       Anesthesia Plan    ASA Status:  4   NPO Status:  NPO Appropriate     Anesthesia Type: General.     - Airway: ETT   Induction: Intravenous.   Maintenance: Balanced.   Techniques and Equipment:     - Lines/Monitors: Arterial Line, Central Line, PAC, CVP, BIS, NIRS, JOSE CRUZ            JOSE CRUZ Absolute Contra-indication: NONE            JOSE CRUZ Relative Contra-indication: NONE     - Blood: PRBC, Cell Saver, T&S     - Drips/Meds: Norepi, Epinephrine     Consents    Anesthesia Plan(s) and associated risks, benefits, and realistic alternatives discussed. Questions answered and patient/representative(s) expressed understanding.    - Discussed:     - Discussed with:  Patient      - Extended Intubation/Ventilatory Support Discussed: Yes.      - Patient is DNR/DNI Status: No    Use of blood products discussed: Yes.     - Discussed with: Patient.     - Consented: consented to blood products            Reason for refusal: other.     Postoperative Care    Pain management: Multi-modal analgesia.   PONV prophylaxis: Ondansetron (or other 5HT-3)     Comments:    Other Comments: 77M CAD s/p AREN x 4 RCA (2015) , mild Aortic Stenosis, non-sustained ventricular tachycardia, HTN, HLD, GERD, hiatal hernia, peripheral neuropathy presents for CABG.    Plan: GETA, A-line, CVC +/-JOSE CRUZ Blackburn MD

## 2023-04-24 ENCOUNTER — APPOINTMENT (OUTPATIENT)
Dept: GENERAL RADIOLOGY | Facility: CLINIC | Age: 78
DRG: 236 | End: 2023-04-24
Attending: PHYSICIAN ASSISTANT
Payer: COMMERCIAL

## 2023-04-24 ENCOUNTER — HOSPITAL ENCOUNTER (INPATIENT)
Facility: CLINIC | Age: 78
LOS: 6 days | Discharge: HOME | DRG: 236 | End: 2023-04-30
Attending: SURGERY | Admitting: SURGERY
Payer: COMMERCIAL

## 2023-04-24 ENCOUNTER — ANESTHESIA (OUTPATIENT)
Dept: SURGERY | Facility: CLINIC | Age: 78
DRG: 236 | End: 2023-04-24
Payer: COMMERCIAL

## 2023-04-24 DIAGNOSIS — I48.91 NEW ONSET A-FIB (H): ICD-10-CM

## 2023-04-24 DIAGNOSIS — I25.10 CORONARY ARTERY DISEASE INVOLVING NATIVE CORONARY ARTERY OF NATIVE HEART WITHOUT ANGINA PECTORIS: ICD-10-CM

## 2023-04-24 DIAGNOSIS — Z95.1 S/P CABG (CORONARY ARTERY BYPASS GRAFT): Primary | ICD-10-CM

## 2023-04-24 LAB
ALBUMIN SERPL BCG-MCNC: 3.4 G/DL (ref 3.5–5.2)
ALLEN'S TEST: ABNORMAL
ALLEN'S TEST: ABNORMAL
ALLEN'S TEST: NORMAL
ALP SERPL-CCNC: 44 U/L (ref 40–129)
ALT SERPL W P-5'-P-CCNC: 34 U/L (ref 10–50)
ANION GAP SERPL CALCULATED.3IONS-SCNC: 11 MMOL/L (ref 7–15)
ANION GAP SERPL CALCULATED.3IONS-SCNC: 12 MMOL/L (ref 7–15)
APTT PPP: 24 SECONDS (ref 22–38)
APTT PPP: 42 SECONDS (ref 22–38)
AST SERPL W P-5'-P-CCNC: 39 U/L (ref 10–50)
BASE EXCESS BLDA CALC-SCNC: -0.3 MMOL/L (ref -9.6–2)
BASE EXCESS BLDA CALC-SCNC: -0.4 MMOL/L (ref -9.6–2)
BASE EXCESS BLDA CALC-SCNC: -1 MMOL/L (ref -9.6–2)
BASE EXCESS BLDA CALC-SCNC: -1.4 MMOL/L (ref -9.6–2)
BASE EXCESS BLDA CALC-SCNC: -1.6 MMOL/L (ref -9.6–2)
BASE EXCESS BLDA CALC-SCNC: -1.7 MMOL/L (ref -9.6–2)
BASE EXCESS BLDA CALC-SCNC: -2.4 MMOL/L (ref -9.6–2)
BASE EXCESS BLDA CALC-SCNC: -2.4 MMOL/L (ref -9–1.8)
BASE EXCESS BLDA CALC-SCNC: -2.8 MMOL/L (ref -9–1.8)
BASE EXCESS BLDA CALC-SCNC: -4.3 MMOL/L (ref -9–1.8)
BASE EXCESS BLDA CALC-SCNC: 0.4 MMOL/L (ref -9.6–2)
BASE EXCESS BLDV CALC-SCNC: -0.4 MMOL/L (ref -7.7–1.9)
BASE EXCESS BLDV CALC-SCNC: -0.7 MMOL/L (ref -7.7–1.9)
BASE EXCESS BLDV CALC-SCNC: 0.7 MMOL/L (ref -8.1–1.9)
BILIRUB SERPL-MCNC: 0.5 MG/DL
BUN SERPL-MCNC: 12.7 MG/DL (ref 8–23)
BUN SERPL-MCNC: 13 MG/DL (ref 8–23)
CA-I BLD-MCNC: 4.1 MG/DL (ref 4.4–5.2)
CA-I BLD-MCNC: 4.2 MG/DL (ref 4.4–5.2)
CA-I BLD-MCNC: 4.4 MG/DL (ref 4.4–5.2)
CA-I BLD-MCNC: 4.4 MG/DL (ref 4.4–5.2)
CA-I BLD-MCNC: 4.9 MG/DL (ref 4.4–5.2)
CA-I BLD-MCNC: 5 MG/DL (ref 4.4–5.2)
CA-I BLD-MCNC: 5 MG/DL (ref 4.4–5.2)
CA-I BLD-MCNC: 5.4 MG/DL (ref 4.4–5.2)
CA-I BLD-MCNC: 5.6 MG/DL (ref 4.4–5.2)
CA-I BLD-MCNC: 5.9 MG/DL (ref 4.4–5.2)
CALCIUM SERPL-MCNC: 8.6 MG/DL (ref 8.8–10.2)
CALCIUM SERPL-MCNC: 9.3 MG/DL (ref 8.8–10.2)
CF REDUC 60M P MA LENFR BLD TEG: 0 % (ref 0–15)
CFT BLD TEG: 1.3 MINUTE (ref 1–3)
CHLORIDE SERPL-SCNC: 109 MMOL/L (ref 98–107)
CHLORIDE SERPL-SCNC: 110 MMOL/L (ref 98–107)
CI (COAGULATION INDEX)(Z) NON NATIVE: 0 (ref -3–3)
CLOT ANGLE BLD TEG: 70.5 DEGREES (ref 53–72)
CLOT INIT BLD TEG: 5.1 MINUTE (ref 5–10)
CLOT INIT KAOL IND TO POST HEP NEUT TRTO: 1 {RATIO}
CLOT INIT KAOL IND TO POST HEP NEUT TRTO: 1 {RATIO}
CLOT INIT KAOL IND TO POST HEP NEUT TRTO: ABNORMAL {RATIO}
CLOT INIT KAOLIN IND BLD US: 102 SEC (ref 113–166)
CLOT INIT KAOLIN IND BLD US: 103 SEC (ref 113–166)
CLOT INIT KAOLIN IND BLD US: ABNORMAL S
CLOT INIT KAOLIN IND P HEP NEUT BLD US: 108 SEC (ref 103–153)
CLOT INIT KAOLIN IND P HEP NEUT BLD US: 162 SEC (ref 103–153)
CLOT INIT KAOLIN IND P HEP NEUT BLD US: 99 SEC (ref 103–153)
CLOT LYSIS 30M P MA LENFR BLD TEG: 0 % (ref 0–8)
CLOT STIFF PLT CONT BLD CALC: 11.8 HPA (ref 11.9–29.8)
CLOT STIFF PLT CONT BLD CALC: 11.9 HPA (ref 11.9–29.8)
CLOT STIFF PLT CONT BLD CALC: 13.7 HPA (ref 11.9–29.8)
CLOT STIFF TF IND P HEP NEUT BLD US: 13.4 HPA (ref 13–33.2)
CLOT STIFF TF IND P HEP NEUT BLD US: 13.7 HPA (ref 13–33.2)
CLOT STIFF TF IND P HEP NEUT BLD US: 15.6 HPA (ref 13–33.2)
CLOT STIFF TF IND+IIB-IIIA INH P HEP NEU: 1.6 HPA (ref 1–3.7)
CLOT STIFF TF IND+IIB-IIIA INH P HEP NEU: 1.8 HPA (ref 1–3.7)
CLOT STIFF TF IND+IIB-IIIA INH P HEP NEU: 1.9 HPA (ref 1–3.7)
CLOT STRENGTH BLD TEG: 5.3 KD/SC (ref 4.5–11)
CREAT SERPL-MCNC: 0.8 MG/DL (ref 0.67–1.17)
CREAT SERPL-MCNC: 0.82 MG/DL (ref 0.67–1.17)
DEPRECATED HCO3 PLAS-SCNC: 20 MMOL/L (ref 22–29)
DEPRECATED HCO3 PLAS-SCNC: 21 MMOL/L (ref 22–29)
ERYTHROCYTE [DISTWIDTH] IN BLOOD BY AUTOMATED COUNT: 12.1 % (ref 10–15)
ERYTHROCYTE [DISTWIDTH] IN BLOOD BY AUTOMATED COUNT: 12.3 % (ref 10–15)
FIBRINOGEN PPP-MCNC: 168 MG/DL (ref 170–490)
GFR SERPL CREATININE-BSD FRML MDRD: >90 ML/MIN/1.73M2
GFR SERPL CREATININE-BSD FRML MDRD: >90 ML/MIN/1.73M2
GLUCOSE BLD-MCNC: 132 MG/DL (ref 70–99)
GLUCOSE BLD-MCNC: 148 MG/DL (ref 70–99)
GLUCOSE BLD-MCNC: 150 MG/DL (ref 70–99)
GLUCOSE BLD-MCNC: 157 MG/DL (ref 70–99)
GLUCOSE BLD-MCNC: 162 MG/DL (ref 70–99)
GLUCOSE BLD-MCNC: 163 MG/DL (ref 70–99)
GLUCOSE BLD-MCNC: 166 MG/DL (ref 70–99)
GLUCOSE BLD-MCNC: 167 MG/DL (ref 70–99)
GLUCOSE BLD-MCNC: 188 MG/DL (ref 70–99)
GLUCOSE BLDC GLUCOMTR-MCNC: 116 MG/DL (ref 70–99)
GLUCOSE BLDC GLUCOMTR-MCNC: 129 MG/DL (ref 70–99)
GLUCOSE BLDC GLUCOMTR-MCNC: 132 MG/DL (ref 70–99)
GLUCOSE BLDC GLUCOMTR-MCNC: 141 MG/DL (ref 70–99)
GLUCOSE BLDC GLUCOMTR-MCNC: 143 MG/DL (ref 70–99)
GLUCOSE BLDC GLUCOMTR-MCNC: 149 MG/DL (ref 70–99)
GLUCOSE BLDC GLUCOMTR-MCNC: 154 MG/DL (ref 70–99)
GLUCOSE BLDC GLUCOMTR-MCNC: 162 MG/DL (ref 70–99)
GLUCOSE BLDC GLUCOMTR-MCNC: 174 MG/DL (ref 70–99)
GLUCOSE SERPL-MCNC: 159 MG/DL (ref 70–99)
GLUCOSE SERPL-MCNC: 167 MG/DL (ref 70–99)
HCO3 BLD-SCNC: 21 MMOL/L (ref 21–28)
HCO3 BLD-SCNC: 23 MMOL/L (ref 21–28)
HCO3 BLD-SCNC: 23 MMOL/L (ref 21–28)
HCO3 BLDA-SCNC: 23 MMOL/L (ref 21–28)
HCO3 BLDA-SCNC: 24 MMOL/L (ref 21–28)
HCO3 BLDA-SCNC: 25 MMOL/L (ref 21–28)
HCO3 BLDA-SCNC: 26 MMOL/L (ref 21–28)
HCO3 BLDV-SCNC: 25 MMOL/L (ref 21–28)
HCO3 BLDV-SCNC: 26 MMOL/L (ref 21–28)
HCO3 BLDV-SCNC: 27 MMOL/L (ref 21–28)
HCT VFR BLD AUTO: 31.1 % (ref 40–53)
HCT VFR BLD AUTO: 31.3 % (ref 40–53)
HGB BLD-MCNC: 10.1 G/DL (ref 13.3–17.7)
HGB BLD-MCNC: 10.2 G/DL (ref 13.3–17.7)
HGB BLD-MCNC: 10.2 G/DL (ref 13.3–17.7)
HGB BLD-MCNC: 11.1 G/DL (ref 13.3–17.7)
HGB BLD-MCNC: 11.3 G/DL (ref 13.3–17.7)
HGB BLD-MCNC: 8.6 G/DL (ref 13.3–17.7)
HGB BLD-MCNC: 8.7 G/DL (ref 13.3–17.7)
HGB BLD-MCNC: 8.9 G/DL (ref 13.3–17.7)
HGB BLD-MCNC: 9 G/DL (ref 13.3–17.7)
HGB BLD-MCNC: 9.1 G/DL (ref 13.3–17.7)
INR PPP: 1.31 (ref 0.85–1.15)
INR PPP: 1.49 (ref 0.85–1.15)
LACTATE BLD-SCNC: 0.5 MMOL/L
LACTATE BLD-SCNC: 0.5 MMOL/L
LACTATE BLD-SCNC: 0.6 MMOL/L
LACTATE BLD-SCNC: 0.7 MMOL/L
LACTATE BLD-SCNC: 1.3 MMOL/L
LACTATE BLD-SCNC: 1.4 MMOL/L
LACTATE BLD-SCNC: 1.5 MMOL/L
LACTATE SERPL-SCNC: 1.5 MMOL/L (ref 0.7–2)
LACTATE SERPL-SCNC: 1.7 MMOL/L (ref 0.7–2)
LACTATE SERPL-SCNC: 1.9 MMOL/L (ref 0.7–2)
MAGNESIUM SERPL-MCNC: 1.9 MG/DL (ref 1.7–2.3)
MAGNESIUM SERPL-MCNC: 2.5 MG/DL (ref 1.7–2.3)
MCF BLD TEG: 51.4 MM (ref 50–70)
MCH RBC QN AUTO: 32.3 PG (ref 26.5–33)
MCH RBC QN AUTO: 32.3 PG (ref 26.5–33)
MCHC RBC AUTO-ENTMCNC: 32.6 G/DL (ref 31.5–36.5)
MCHC RBC AUTO-ENTMCNC: 32.8 G/DL (ref 31.5–36.5)
MCV RBC AUTO: 98 FL (ref 78–100)
MCV RBC AUTO: 99 FL (ref 78–100)
MRSA DNA SPEC QL NAA+PROBE: NEGATIVE
O2/TOTAL GAS SETTING VFR VENT: 1 %
O2/TOTAL GAS SETTING VFR VENT: 1 %
O2/TOTAL GAS SETTING VFR VENT: 100 %
O2/TOTAL GAS SETTING VFR VENT: 40 %
O2/TOTAL GAS SETTING VFR VENT: 50 %
O2/TOTAL GAS SETTING VFR VENT: 60 %
O2/TOTAL GAS SETTING VFR VENT: 70 %
O2/TOTAL GAS SETTING VFR VENT: 80 %
OXYHGB MFR BLD: 96 % (ref 92–100)
OXYHGB MFR BLD: 97 % (ref 92–100)
OXYHGB MFR BLD: 98 % (ref 92–100)
OXYHGB MFR BLDA: 98 % (ref 92–100)
OXYHGB MFR BLDV: 63 % (ref 70–75)
OXYHGB MFR BLDV: 75 % (ref 70–75)
PCO2 BLD: 36 MM HG (ref 35–45)
PCO2 BLD: 40 MM HG (ref 35–45)
PCO2 BLD: 43 MM HG (ref 35–45)
PCO2 BLDA: 39 MM HG (ref 35–45)
PCO2 BLDA: 41 MM HG (ref 35–45)
PCO2 BLDA: 43 MM HG (ref 35–45)
PCO2 BLDA: 43 MM HG (ref 35–45)
PCO2 BLDA: 44 MM HG (ref 35–45)
PCO2 BLDA: 46 MM HG (ref 35–45)
PCO2 BLDA: 46 MM HG (ref 35–45)
PCO2 BLDA: 49 MM HG (ref 35–45)
PCO2 BLDV: 46 MM HG (ref 40–50)
PCO2 BLDV: 48 MM HG (ref 40–50)
PCO2 BLDV: 50 MM HG (ref 40–50)
PH BLD: 7.34 [PH] (ref 7.35–7.45)
PH BLD: 7.36 [PH] (ref 7.35–7.45)
PH BLD: 7.36 [PH] (ref 7.35–7.45)
PH BLDA: 7.3 [PH] (ref 7.35–7.45)
PH BLDA: 7.35 [PH] (ref 7.35–7.45)
PH BLDA: 7.35 [PH] (ref 7.35–7.45)
PH BLDA: 7.36 [PH] (ref 7.35–7.45)
PH BLDA: 7.38 [PH] (ref 7.35–7.45)
PH BLDA: 7.39 [PH] (ref 7.35–7.45)
PH BLDV: 7.34 [PH] (ref 7.32–7.43)
PH BLDV: 7.34 [PH] (ref 7.32–7.43)
PH BLDV: 7.35 [PH] (ref 7.32–7.43)
PHOSPHATE SERPL-MCNC: 3 MG/DL (ref 2.5–4.5)
PHOSPHATE SERPL-MCNC: 3 MG/DL (ref 2.5–4.5)
PLATELET # BLD AUTO: 157 10E3/UL (ref 150–450)
PLATELET # BLD AUTO: 160 10E3/UL (ref 150–450)
PLATELET # BLD AUTO: 171 10E3/UL (ref 150–450)
PO2 BLD: 136 MM HG (ref 80–105)
PO2 BLD: 160 MM HG (ref 80–105)
PO2 BLD: 92 MM HG (ref 80–105)
PO2 BLDA: 127 MM HG (ref 80–105)
PO2 BLDA: 159 MM HG (ref 80–105)
PO2 BLDA: 166 MM HG (ref 80–105)
PO2 BLDA: 261 MM HG (ref 80–105)
PO2 BLDA: 357 MM HG (ref 80–105)
PO2 BLDA: 422 MM HG (ref 80–105)
PO2 BLDA: 427 MM HG (ref 80–105)
PO2 BLDA: 438 MM HG (ref 80–105)
PO2 BLDV: 35 MM HG (ref 25–47)
PO2 BLDV: 45 MM HG (ref 25–47)
PO2 BLDV: 51 MM HG (ref 25–47)
POTASSIUM BLD-SCNC: 3.7 MMOL/L (ref 3.5–5)
POTASSIUM BLD-SCNC: 3.8 MMOL/L (ref 3.5–5)
POTASSIUM BLD-SCNC: 3.9 MMOL/L (ref 3.5–5)
POTASSIUM BLD-SCNC: 4.4 MMOL/L (ref 3.5–5)
POTASSIUM BLD-SCNC: 4.5 MMOL/L (ref 3.5–5)
POTASSIUM BLD-SCNC: 4.7 MMOL/L (ref 3.5–5)
POTASSIUM BLD-SCNC: 4.9 MMOL/L (ref 3.5–5)
POTASSIUM SERPL-SCNC: 3.9 MMOL/L (ref 3.4–5.3)
POTASSIUM SERPL-SCNC: 4.1 MMOL/L (ref 3.4–5.3)
PROT SERPL-MCNC: 5.1 G/DL (ref 6.4–8.3)
RBC # BLD AUTO: 3.16 10E6/UL (ref 4.4–5.9)
RBC # BLD AUTO: 3.16 10E6/UL (ref 4.4–5.9)
SA TARGET DNA: NEGATIVE
SODIUM BLD-SCNC: 139 MMOL/L (ref 133–144)
SODIUM BLD-SCNC: 140 MMOL/L (ref 133–144)
SODIUM BLD-SCNC: 141 MMOL/L (ref 133–144)
SODIUM SERPL-SCNC: 141 MMOL/L (ref 136–145)
SODIUM SERPL-SCNC: 142 MMOL/L (ref 136–145)
WBC # BLD AUTO: 10.4 10E3/UL (ref 4–11)
WBC # BLD AUTO: 13.2 10E3/UL (ref 4–11)

## 2023-04-24 PROCEDURE — 999N000157 HC STATISTIC RCP TIME EA 10 MIN

## 2023-04-24 PROCEDURE — 82810 BLOOD GASES O2 SAT ONLY: CPT

## 2023-04-24 PROCEDURE — 82330 ASSAY OF CALCIUM: CPT | Performed by: PHYSICIAN ASSISTANT

## 2023-04-24 PROCEDURE — 94002 VENT MGMT INPAT INIT DAY: CPT

## 2023-04-24 PROCEDURE — 250N000013 HC RX MED GY IP 250 OP 250 PS 637: Performed by: PHYSICIAN ASSISTANT

## 2023-04-24 PROCEDURE — 83605 ASSAY OF LACTIC ACID: CPT | Performed by: STUDENT IN AN ORGANIZED HEALTH CARE EDUCATION/TRAINING PROGRAM

## 2023-04-24 PROCEDURE — 250N000009 HC RX 250: Performed by: SURGERY

## 2023-04-24 PROCEDURE — 85018 HEMOGLOBIN: CPT | Performed by: SURGERY

## 2023-04-24 PROCEDURE — 83735 ASSAY OF MAGNESIUM: CPT | Performed by: STUDENT IN AN ORGANIZED HEALTH CARE EDUCATION/TRAINING PROGRAM

## 2023-04-24 PROCEDURE — 999N000141 HC STATISTIC PRE-PROCEDURE NURSING ASSESSMENT: Performed by: SURGERY

## 2023-04-24 PROCEDURE — 33519 CABG ARTERY-VEIN THREE: CPT | Performed by: SURGERY

## 2023-04-24 PROCEDURE — 85730 THROMBOPLASTIN TIME PARTIAL: CPT | Performed by: PHYSICIAN ASSISTANT

## 2023-04-24 PROCEDURE — 250N000009 HC RX 250: Performed by: PHYSICIAN ASSISTANT

## 2023-04-24 PROCEDURE — 272N000088 HC PUMP APP ADULT PERFUSION: Performed by: SURGERY

## 2023-04-24 PROCEDURE — 84132 ASSAY OF SERUM POTASSIUM: CPT

## 2023-04-24 PROCEDURE — 250N000011 HC RX IP 250 OP 636

## 2023-04-24 PROCEDURE — 250N000011 HC RX IP 250 OP 636: Performed by: PHYSICIAN ASSISTANT

## 2023-04-24 PROCEDURE — 83735 ASSAY OF MAGNESIUM: CPT | Performed by: PHYSICIAN ASSISTANT

## 2023-04-24 PROCEDURE — 85610 PROTHROMBIN TIME: CPT | Performed by: PHYSICIAN ASSISTANT

## 2023-04-24 PROCEDURE — 85730 THROMBOPLASTIN TIME PARTIAL: CPT | Performed by: SURGERY

## 2023-04-24 PROCEDURE — 06BQ4ZZ EXCISION OF LEFT SAPHENOUS VEIN, PERCUTANEOUS ENDOSCOPIC APPROACH: ICD-10-PCS | Performed by: PHYSICIAN ASSISTANT

## 2023-04-24 PROCEDURE — 85027 COMPLETE CBC AUTOMATED: CPT | Performed by: SURGERY

## 2023-04-24 PROCEDURE — 84100 ASSAY OF PHOSPHORUS: CPT | Performed by: PHYSICIAN ASSISTANT

## 2023-04-24 PROCEDURE — 85384 FIBRINOGEN ACTIVITY: CPT | Performed by: SURGERY

## 2023-04-24 PROCEDURE — 410N000003 HC PER-PERFUSION 1ST 30 MIN: Performed by: SURGERY

## 2023-04-24 PROCEDURE — 93010 ELECTROCARDIOGRAM REPORT: CPT | Performed by: INTERNAL MEDICINE

## 2023-04-24 PROCEDURE — 250N000011 HC RX IP 250 OP 636: Performed by: SURGERY

## 2023-04-24 PROCEDURE — 87641 MR-STAPH DNA AMP PROBE: CPT | Performed by: SURGERY

## 2023-04-24 PROCEDURE — 84132 ASSAY OF SERUM POTASSIUM: CPT | Performed by: STUDENT IN AN ORGANIZED HEALTH CARE EDUCATION/TRAINING PROGRAM

## 2023-04-24 PROCEDURE — 258N000003 HC RX IP 258 OP 636

## 2023-04-24 PROCEDURE — 360N000079 HC SURGERY LEVEL 6, PER MIN: Performed by: SURGERY

## 2023-04-24 PROCEDURE — 83605 ASSAY OF LACTIC ACID: CPT | Performed by: PHYSICIAN ASSISTANT

## 2023-04-24 PROCEDURE — 82805 BLOOD GASES W/O2 SATURATION: CPT | Performed by: SURGERY

## 2023-04-24 PROCEDURE — 250N000024 HC ISOFLURANE, PER MIN: Performed by: SURGERY

## 2023-04-24 PROCEDURE — 85396 CLOTTING ASSAY WHOLE BLOOD: CPT | Performed by: SURGERY

## 2023-04-24 PROCEDURE — 258N000003 HC RX IP 258 OP 636: Performed by: STUDENT IN AN ORGANIZED HEALTH CARE EDUCATION/TRAINING PROGRAM

## 2023-04-24 PROCEDURE — 84132 ASSAY OF SERUM POTASSIUM: CPT | Performed by: PHYSICIAN ASSISTANT

## 2023-04-24 PROCEDURE — C1898 LEAD, PMKR, OTHER THAN TRANS: HCPCS | Performed by: SURGERY

## 2023-04-24 PROCEDURE — 85018 HEMOGLOBIN: CPT | Performed by: STUDENT IN AN ORGANIZED HEALTH CARE EDUCATION/TRAINING PROGRAM

## 2023-04-24 PROCEDURE — 272N000001 HC OR GENERAL SUPPLY STERILE: Performed by: SURGERY

## 2023-04-24 PROCEDURE — 33508 ENDOSCOPIC VEIN HARVEST: CPT | Mod: XU | Performed by: SURGERY

## 2023-04-24 PROCEDURE — 250N000011 HC RX IP 250 OP 636: Performed by: STUDENT IN AN ORGANIZED HEALTH CARE EDUCATION/TRAINING PROGRAM

## 2023-04-24 PROCEDURE — 021209W BYPASS CORONARY ARTERY, THREE ARTERIES FROM AORTA WITH AUTOLOGOUS VENOUS TISSUE, OPEN APPROACH: ICD-10-PCS | Performed by: PHYSICIAN ASSISTANT

## 2023-04-24 PROCEDURE — 33533 CABG ARTERIAL SINGLE: CPT | Performed by: SURGERY

## 2023-04-24 PROCEDURE — 85014 HEMATOCRIT: CPT | Performed by: PHYSICIAN ASSISTANT

## 2023-04-24 PROCEDURE — 36415 COLL VENOUS BLD VENIPUNCTURE: CPT | Performed by: SURGERY

## 2023-04-24 PROCEDURE — 250N000012 HC RX MED GY IP 250 OP 636 PS 637: Performed by: SURGERY

## 2023-04-24 PROCEDURE — 82805 BLOOD GASES W/O2 SATURATION: CPT

## 2023-04-24 PROCEDURE — 250N000009 HC RX 250

## 2023-04-24 PROCEDURE — 84100 ASSAY OF PHOSPHORUS: CPT | Performed by: STUDENT IN AN ORGANIZED HEALTH CARE EDUCATION/TRAINING PROGRAM

## 2023-04-24 PROCEDURE — 258N000003 HC RX IP 258 OP 636: Performed by: SURGERY

## 2023-04-24 PROCEDURE — 271N000002 HC RX 271: Performed by: STUDENT IN AN ORGANIZED HEALTH CARE EDUCATION/TRAINING PROGRAM

## 2023-04-24 PROCEDURE — 93005 ELECTROCARDIOGRAM TRACING: CPT

## 2023-04-24 PROCEDURE — 999N000065 XR CHEST PORT 1 VIEW

## 2023-04-24 PROCEDURE — 250N000013 HC RX MED GY IP 250 OP 250 PS 637: Performed by: SURGERY

## 2023-04-24 PROCEDURE — 83605 ASSAY OF LACTIC ACID: CPT

## 2023-04-24 PROCEDURE — 85610 PROTHROMBIN TIME: CPT | Performed by: SURGERY

## 2023-04-24 PROCEDURE — 5A1221Z PERFORMANCE OF CARDIAC OUTPUT, CONTINUOUS: ICD-10-PCS | Performed by: PHYSICIAN ASSISTANT

## 2023-04-24 PROCEDURE — 85396 CLOTTING ASSAY WHOLE BLOOD: CPT

## 2023-04-24 PROCEDURE — 250N000013 HC RX MED GY IP 250 OP 250 PS 637: Performed by: STUDENT IN AN ORGANIZED HEALTH CARE EDUCATION/TRAINING PROGRAM

## 2023-04-24 PROCEDURE — 410N000004: Performed by: SURGERY

## 2023-04-24 PROCEDURE — 02100Z9 BYPASS CORONARY ARTERY, ONE ARTERY FROM LEFT INTERNAL MAMMARY, OPEN APPROACH: ICD-10-PCS | Performed by: PHYSICIAN ASSISTANT

## 2023-04-24 PROCEDURE — 272N000085 HC PACK CELL SAVER CSP: Performed by: SURGERY

## 2023-04-24 PROCEDURE — 71045 X-RAY EXAM CHEST 1 VIEW: CPT | Mod: 26 | Performed by: RADIOLOGY

## 2023-04-24 PROCEDURE — 200N000002 HC R&B ICU UMMC

## 2023-04-24 PROCEDURE — 370N000017 HC ANESTHESIA TECHNICAL FEE, PER MIN: Performed by: SURGERY

## 2023-04-24 RX ORDER — SODIUM CHLORIDE, SODIUM GLUCONATE, SODIUM ACETATE, POTASSIUM CHLORIDE AND MAGNESIUM CHLORIDE 526; 502; 368; 37; 30 MG/100ML; MG/100ML; MG/100ML; MG/100ML; MG/100ML
INJECTION, SOLUTION INTRAVENOUS CONTINUOUS PRN
Status: DISCONTINUED | OUTPATIENT
Start: 2023-04-24 | End: 2023-04-24

## 2023-04-24 RX ORDER — PHENYLEPHRINE HCL IN 0.9% NACL 50MG/250ML
.1-6 PLASTIC BAG, INJECTION (ML) INTRAVENOUS CONTINUOUS
Status: DISCONTINUED | OUTPATIENT
Start: 2023-04-24 | End: 2023-04-24 | Stop reason: HOSPADM

## 2023-04-24 RX ORDER — HYDRALAZINE HYDROCHLORIDE 20 MG/ML
10 INJECTION INTRAMUSCULAR; INTRAVENOUS EVERY 30 MIN PRN
Status: DISCONTINUED | OUTPATIENT
Start: 2023-04-24 | End: 2023-04-30 | Stop reason: HOSPADM

## 2023-04-24 RX ORDER — CHLORHEXIDINE GLUCONATE ORAL RINSE 1.2 MG/ML
10 SOLUTION DENTAL ONCE
Status: COMPLETED | OUTPATIENT
Start: 2023-04-24 | End: 2023-04-24

## 2023-04-24 RX ORDER — AMOXICILLIN 250 MG
1 CAPSULE ORAL 2 TIMES DAILY
Status: DISCONTINUED | OUTPATIENT
Start: 2023-04-24 | End: 2023-04-24

## 2023-04-24 RX ORDER — ASPIRIN 81 MG/1
81 TABLET, CHEWABLE ORAL DAILY
Status: DISCONTINUED | OUTPATIENT
Start: 2023-04-25 | End: 2023-04-25

## 2023-04-24 RX ORDER — DEXTROSE MONOHYDRATE 25 G/50ML
25-50 INJECTION, SOLUTION INTRAVENOUS
Status: DISCONTINUED | OUTPATIENT
Start: 2023-04-24 | End: 2023-04-25

## 2023-04-24 RX ORDER — OXYCODONE HYDROCHLORIDE 5 MG/1
5 TABLET ORAL EVERY 4 HOURS PRN
Status: DISCONTINUED | OUTPATIENT
Start: 2023-04-24 | End: 2023-04-30 | Stop reason: HOSPADM

## 2023-04-24 RX ORDER — ACETAMINOPHEN 325 MG/1
650 TABLET ORAL EVERY 4 HOURS PRN
Status: DISCONTINUED | OUTPATIENT
Start: 2023-04-27 | End: 2023-04-30 | Stop reason: HOSPADM

## 2023-04-24 RX ORDER — NOREPINEPHRINE BITARTRATE 0.06 MG/ML
.01-.6 INJECTION, SOLUTION INTRAVENOUS CONTINUOUS
Status: DISCONTINUED | OUTPATIENT
Start: 2023-04-24 | End: 2023-04-24

## 2023-04-24 RX ORDER — ACETAMINOPHEN 325 MG/1
975 TABLET ORAL ONCE
Status: COMPLETED | OUTPATIENT
Start: 2023-04-24 | End: 2023-04-24

## 2023-04-24 RX ORDER — FIBRINOGEN (HUMAN) 700-1300MG
1 KIT INTRAVENOUS
Status: DISCONTINUED | OUTPATIENT
Start: 2023-04-24 | End: 2023-04-24

## 2023-04-24 RX ORDER — HEPARIN SODIUM 1000 [USP'U]/ML
INJECTION, SOLUTION INTRAVENOUS; SUBCUTANEOUS PRN
Status: DISCONTINUED | OUTPATIENT
Start: 2023-04-24 | End: 2023-04-24

## 2023-04-24 RX ORDER — LIDOCAINE 40 MG/G
CREAM TOPICAL
Status: DISCONTINUED | OUTPATIENT
Start: 2023-04-24 | End: 2023-04-30 | Stop reason: HOSPADM

## 2023-04-24 RX ORDER — NALOXONE HYDROCHLORIDE 0.4 MG/ML
0.4 INJECTION, SOLUTION INTRAMUSCULAR; INTRAVENOUS; SUBCUTANEOUS
Status: DISCONTINUED | OUTPATIENT
Start: 2023-04-24 | End: 2023-04-24 | Stop reason: HOSPADM

## 2023-04-24 RX ORDER — NOREPINEPHRINE BITARTRATE 0.06 MG/ML
.01-.4 INJECTION, SOLUTION INTRAVENOUS CONTINUOUS
Status: DISCONTINUED | OUTPATIENT
Start: 2023-04-24 | End: 2023-04-25

## 2023-04-24 RX ORDER — CEFAZOLIN SODIUM/WATER 2 G/20 ML
2 SYRINGE (ML) INTRAVENOUS
Status: COMPLETED | OUTPATIENT
Start: 2023-04-24 | End: 2023-04-24

## 2023-04-24 RX ORDER — PROPOFOL 10 MG/ML
INJECTION, EMULSION INTRAVENOUS CONTINUOUS PRN
Status: DISCONTINUED | OUTPATIENT
Start: 2023-04-24 | End: 2023-04-24

## 2023-04-24 RX ORDER — AMOXICILLIN 250 MG
2 CAPSULE ORAL 2 TIMES DAILY
Status: DISCONTINUED | OUTPATIENT
Start: 2023-04-24 | End: 2023-04-26

## 2023-04-24 RX ORDER — FENTANYL CITRATE 50 UG/ML
25-50 INJECTION, SOLUTION INTRAMUSCULAR; INTRAVENOUS
Status: DISCONTINUED | OUTPATIENT
Start: 2023-04-24 | End: 2023-04-24 | Stop reason: HOSPADM

## 2023-04-24 RX ORDER — CALCIUM GLUCONATE 20 MG/ML
2 INJECTION, SOLUTION INTRAVENOUS
Status: DISCONTINUED | OUTPATIENT
Start: 2023-04-24 | End: 2023-04-30 | Stop reason: HOSPADM

## 2023-04-24 RX ORDER — LIDOCAINE HYDROCHLORIDE 20 MG/ML
INJECTION, SOLUTION INFILTRATION; PERINEURAL PRN
Status: DISCONTINUED | OUTPATIENT
Start: 2023-04-24 | End: 2023-04-24

## 2023-04-24 RX ORDER — DEXMEDETOMIDINE HYDROCHLORIDE 4 UG/ML
.2-.7 INJECTION, SOLUTION INTRAVENOUS CONTINUOUS
Status: DISCONTINUED | OUTPATIENT
Start: 2023-04-24 | End: 2023-04-26

## 2023-04-24 RX ORDER — NALOXONE HYDROCHLORIDE 0.4 MG/ML
0.2 INJECTION, SOLUTION INTRAMUSCULAR; INTRAVENOUS; SUBCUTANEOUS
Status: DISCONTINUED | OUTPATIENT
Start: 2023-04-24 | End: 2023-04-24 | Stop reason: HOSPADM

## 2023-04-24 RX ORDER — ASPIRIN 81 MG/1
162 TABLET, CHEWABLE ORAL
Status: COMPLETED | OUTPATIENT
Start: 2023-04-24 | End: 2023-04-24

## 2023-04-24 RX ORDER — FLUMAZENIL 0.1 MG/ML
0.2 INJECTION, SOLUTION INTRAVENOUS
Status: DISCONTINUED | OUTPATIENT
Start: 2023-04-24 | End: 2023-04-24 | Stop reason: HOSPADM

## 2023-04-24 RX ORDER — PANTOPRAZOLE SODIUM 40 MG/1
40 TABLET, DELAYED RELEASE ORAL DAILY
Status: DISCONTINUED | OUTPATIENT
Start: 2023-04-25 | End: 2023-04-30 | Stop reason: HOSPADM

## 2023-04-24 RX ORDER — NOREPINEPHRINE BITARTRATE 0.06 MG/ML
.01-.1 INJECTION, SOLUTION INTRAVENOUS CONTINUOUS
Status: DISCONTINUED | OUTPATIENT
Start: 2023-04-24 | End: 2023-04-24 | Stop reason: HOSPADM

## 2023-04-24 RX ORDER — ONDANSETRON 4 MG/1
4 TABLET, ORALLY DISINTEGRATING ORAL EVERY 6 HOURS PRN
Status: DISCONTINUED | OUTPATIENT
Start: 2023-04-24 | End: 2023-04-30 | Stop reason: HOSPADM

## 2023-04-24 RX ORDER — CEFAZOLIN SODIUM/WATER 2 G/20 ML
2 SYRINGE (ML) INTRAVENOUS SEE ADMIN INSTRUCTIONS
Status: DISCONTINUED | OUTPATIENT
Start: 2023-04-24 | End: 2023-04-24 | Stop reason: HOSPADM

## 2023-04-24 RX ORDER — PROPOFOL 10 MG/ML
INJECTION, EMULSION INTRAVENOUS PRN
Status: DISCONTINUED | OUTPATIENT
Start: 2023-04-24 | End: 2023-04-24

## 2023-04-24 RX ORDER — NALOXONE HYDROCHLORIDE 0.4 MG/ML
0.2 INJECTION, SOLUTION INTRAMUSCULAR; INTRAVENOUS; SUBCUTANEOUS
Status: DISCONTINUED | OUTPATIENT
Start: 2023-04-24 | End: 2023-04-30 | Stop reason: HOSPADM

## 2023-04-24 RX ORDER — NALOXONE HYDROCHLORIDE 0.4 MG/ML
0.4 INJECTION, SOLUTION INTRAMUSCULAR; INTRAVENOUS; SUBCUTANEOUS
Status: DISCONTINUED | OUTPATIENT
Start: 2023-04-24 | End: 2023-04-30 | Stop reason: HOSPADM

## 2023-04-24 RX ORDER — ASPIRIN 81 MG/1
81 TABLET, CHEWABLE ORAL
Status: COMPLETED | OUTPATIENT
Start: 2023-04-24 | End: 2023-04-24

## 2023-04-24 RX ORDER — LIDOCAINE 40 MG/G
CREAM TOPICAL
Status: DISCONTINUED | OUTPATIENT
Start: 2023-04-24 | End: 2023-04-24 | Stop reason: HOSPADM

## 2023-04-24 RX ORDER — PROTAMINE SULFATE 10 MG/ML
INJECTION, SOLUTION INTRAVENOUS PRN
Status: DISCONTINUED | OUTPATIENT
Start: 2023-04-24 | End: 2023-04-24

## 2023-04-24 RX ORDER — HYDROMORPHONE HCL IN WATER/PF 6 MG/30 ML
0.2 PATIENT CONTROLLED ANALGESIA SYRINGE INTRAVENOUS
Status: DISCONTINUED | OUTPATIENT
Start: 2023-04-24 | End: 2023-04-30 | Stop reason: HOSPADM

## 2023-04-24 RX ORDER — DEXMEDETOMIDINE HYDROCHLORIDE 4 UG/ML
.2-1.2 INJECTION, SOLUTION INTRAVENOUS CONTINUOUS
Status: DISCONTINUED | OUTPATIENT
Start: 2023-04-24 | End: 2023-04-24 | Stop reason: HOSPADM

## 2023-04-24 RX ORDER — ONDANSETRON 2 MG/ML
4 INJECTION INTRAMUSCULAR; INTRAVENOUS EVERY 6 HOURS PRN
Status: DISCONTINUED | OUTPATIENT
Start: 2023-04-24 | End: 2023-04-30 | Stop reason: HOSPADM

## 2023-04-24 RX ORDER — PROCHLORPERAZINE MALEATE 5 MG
5 TABLET ORAL EVERY 6 HOURS PRN
Status: DISCONTINUED | OUTPATIENT
Start: 2023-04-24 | End: 2023-04-30 | Stop reason: HOSPADM

## 2023-04-24 RX ORDER — HEPARIN SODIUM 5000 [USP'U]/.5ML
5000 INJECTION, SOLUTION INTRAVENOUS; SUBCUTANEOUS EVERY 8 HOURS
Status: DISCONTINUED | OUTPATIENT
Start: 2023-04-25 | End: 2023-04-30 | Stop reason: HOSPADM

## 2023-04-24 RX ORDER — HYDROMORPHONE HCL IN WATER/PF 6 MG/30 ML
0.4 PATIENT CONTROLLED ANALGESIA SYRINGE INTRAVENOUS
Status: DISCONTINUED | OUTPATIENT
Start: 2023-04-24 | End: 2023-04-30 | Stop reason: HOSPADM

## 2023-04-24 RX ORDER — POLYETHYLENE GLYCOL 3350 17 G/17G
17 POWDER, FOR SOLUTION ORAL 2 TIMES DAILY
Status: DISCONTINUED | OUTPATIENT
Start: 2023-04-24 | End: 2023-04-26

## 2023-04-24 RX ORDER — VASOPRESSIN IN 0.9 % NACL 2 UNIT/2ML
SYRINGE (ML) INTRAVENOUS PRN
Status: DISCONTINUED | OUTPATIENT
Start: 2023-04-24 | End: 2023-04-24

## 2023-04-24 RX ORDER — ONDANSETRON 2 MG/ML
INJECTION INTRAMUSCULAR; INTRAVENOUS PRN
Status: DISCONTINUED | OUTPATIENT
Start: 2023-04-24 | End: 2023-04-24

## 2023-04-24 RX ORDER — OXYCODONE HYDROCHLORIDE 10 MG/1
10 TABLET ORAL EVERY 4 HOURS PRN
Status: DISCONTINUED | OUTPATIENT
Start: 2023-04-24 | End: 2023-04-30 | Stop reason: HOSPADM

## 2023-04-24 RX ORDER — BISACODYL 10 MG
10 SUPPOSITORY, RECTAL RECTAL DAILY PRN
Status: DISCONTINUED | OUTPATIENT
Start: 2023-04-24 | End: 2023-04-30 | Stop reason: HOSPADM

## 2023-04-24 RX ORDER — MUPIROCIN 20 MG/G
0.5 OINTMENT TOPICAL 2 TIMES DAILY
Status: DISCONTINUED | OUTPATIENT
Start: 2023-04-24 | End: 2023-04-27

## 2023-04-24 RX ORDER — NICOTINE POLACRILEX 4 MG
15-30 LOZENGE BUCCAL
Status: DISCONTINUED | OUTPATIENT
Start: 2023-04-24 | End: 2023-04-25

## 2023-04-24 RX ORDER — GABAPENTIN 100 MG/1
100 CAPSULE ORAL
Status: COMPLETED | OUTPATIENT
Start: 2023-04-24 | End: 2023-04-24

## 2023-04-24 RX ORDER — SODIUM CHLORIDE, SODIUM LACTATE, POTASSIUM CHLORIDE, CALCIUM CHLORIDE 600; 310; 30; 20 MG/100ML; MG/100ML; MG/100ML; MG/100ML
INJECTION, SOLUTION INTRAVENOUS CONTINUOUS PRN
Status: DISCONTINUED | OUTPATIENT
Start: 2023-04-24 | End: 2023-04-24

## 2023-04-24 RX ORDER — FAMOTIDINE 20 MG/1
20 TABLET, FILM COATED ORAL
Status: COMPLETED | OUTPATIENT
Start: 2023-04-24 | End: 2023-04-24

## 2023-04-24 RX ORDER — DEXAMETHASONE SODIUM PHOSPHATE 4 MG/ML
INJECTION, SOLUTION INTRA-ARTICULAR; INTRALESIONAL; INTRAMUSCULAR; INTRAVENOUS; SOFT TISSUE PRN
Status: DISCONTINUED | OUTPATIENT
Start: 2023-04-24 | End: 2023-04-24

## 2023-04-24 RX ORDER — FENTANYL CITRATE 50 UG/ML
INJECTION, SOLUTION INTRAMUSCULAR; INTRAVENOUS PRN
Status: DISCONTINUED | OUTPATIENT
Start: 2023-04-24 | End: 2023-04-24

## 2023-04-24 RX ORDER — POLYETHYLENE GLYCOL 3350 17 G/17G
17 POWDER, FOR SOLUTION ORAL DAILY
Status: DISCONTINUED | OUTPATIENT
Start: 2023-04-25 | End: 2023-04-24

## 2023-04-24 RX ORDER — CEFAZOLIN SODIUM 2 G/100ML
2 INJECTION, SOLUTION INTRAVENOUS EVERY 8 HOURS
Status: COMPLETED | OUTPATIENT
Start: 2023-04-24 | End: 2023-04-25

## 2023-04-24 RX ORDER — CALCIUM CHLORIDE 100 MG/ML
INJECTION INTRAVENOUS; INTRAVENTRICULAR PRN
Status: DISCONTINUED | OUTPATIENT
Start: 2023-04-24 | End: 2023-04-24

## 2023-04-24 RX ORDER — CALCIUM GLUCONATE 20 MG/ML
1 INJECTION, SOLUTION INTRAVENOUS
Status: DISCONTINUED | OUTPATIENT
Start: 2023-04-24 | End: 2023-04-30 | Stop reason: HOSPADM

## 2023-04-24 RX ORDER — ACETAMINOPHEN 325 MG/1
975 TABLET ORAL EVERY 8 HOURS
Status: COMPLETED | OUTPATIENT
Start: 2023-04-24 | End: 2023-04-27

## 2023-04-24 RX ADMIN — SENNOSIDES AND DOCUSATE SODIUM 2 TABLET: 50; 8.6 TABLET ORAL at 20:17

## 2023-04-24 RX ADMIN — NOREPINEPHRINE BITARTRATE 6.4 MCG: 1 INJECTION, SOLUTION, CONCENTRATE INTRAVENOUS at 10:33

## 2023-04-24 RX ADMIN — FENTANYL CITRATE 50 MCG: 50 INJECTION, SOLUTION INTRAMUSCULAR; INTRAVENOUS at 07:04

## 2023-04-24 RX ADMIN — ACETAMINOPHEN 975 MG: 325 TABLET ORAL at 17:00

## 2023-04-24 RX ADMIN — CHLORHEXIDINE GLUCONATE 10 ML: 1.2 SOLUTION ORAL at 06:40

## 2023-04-24 RX ADMIN — Medication 2 G: at 12:15

## 2023-04-24 RX ADMIN — NOREPINEPHRINE BITARTRATE 6.4 MCG: 1 INJECTION, SOLUTION, CONCENTRATE INTRAVENOUS at 10:32

## 2023-04-24 RX ADMIN — HEPARIN SODIUM 36000 UNITS: 1000 INJECTION INTRAVENOUS; SUBCUTANEOUS at 10:07

## 2023-04-24 RX ADMIN — CALCIUM CHLORIDE 1 G: 100 INJECTION INTRAVENOUS; INTRAVENTRICULAR at 12:26

## 2023-04-24 RX ADMIN — GABAPENTIN 100 MG: 100 CAPSULE ORAL at 06:32

## 2023-04-24 RX ADMIN — DEXAMETHASONE SODIUM PHOSPHATE 4 MG: 4 INJECTION, SOLUTION INTRA-ARTICULAR; INTRALESIONAL; INTRAMUSCULAR; INTRAVENOUS; SOFT TISSUE at 14:16

## 2023-04-24 RX ADMIN — Medication 20 MG: at 12:05

## 2023-04-24 RX ADMIN — HEPARIN SODIUM 2000 UNITS: 1000 INJECTION INTRAVENOUS; SUBCUTANEOUS at 09:28

## 2023-04-24 RX ADMIN — ASPIRIN 81 MG 162 MG: 81 TABLET ORAL at 06:33

## 2023-04-24 RX ADMIN — Medication 7.5 G: at 08:18

## 2023-04-24 RX ADMIN — PROPOFOL 40 MCG/KG/MIN: 10 INJECTION, EMULSION INTRAVENOUS at 14:04

## 2023-04-24 RX ADMIN — PROPOFOL 50 MG: 10 INJECTION, EMULSION INTRAVENOUS at 07:44

## 2023-04-24 RX ADMIN — Medication 30 MG: at 11:04

## 2023-04-24 RX ADMIN — CEFAZOLIN SODIUM 2 G: 2 INJECTION, SOLUTION INTRAVENOUS at 20:18

## 2023-04-24 RX ADMIN — NICARDIPINE HYDROCHLORIDE 2 MG/HR: 0.2 INJECTION, SOLUTION INTRAVENOUS at 09:28

## 2023-04-24 RX ADMIN — NOREPINEPHRINE BITARTRATE 6.4 MCG: 1 INJECTION, SOLUTION, CONCENTRATE INTRAVENOUS at 08:58

## 2023-04-24 RX ADMIN — HYDROMORPHONE HYDROCHLORIDE 0.4 MG: 0.2 INJECTION, SOLUTION INTRAMUSCULAR; INTRAVENOUS; SUBCUTANEOUS at 15:33

## 2023-04-24 RX ADMIN — SODIUM CHLORIDE, POTASSIUM CHLORIDE, SODIUM LACTATE AND CALCIUM CHLORIDE 500 ML: 600; 310; 30; 20 INJECTION, SOLUTION INTRAVENOUS at 16:19

## 2023-04-24 RX ADMIN — Medication 7 ML/HR: at 11:02

## 2023-04-24 RX ADMIN — SODIUM CHLORIDE, SODIUM GLUCONATE, SODIUM ACETATE, POTASSIUM CHLORIDE AND MAGNESIUM CHLORIDE: 526; 502; 368; 37; 30 INJECTION, SOLUTION INTRAVENOUS at 08:10

## 2023-04-24 RX ADMIN — Medication 0.03 MCG/KG/MIN: at 12:25

## 2023-04-24 RX ADMIN — ACETAMINOPHEN 975 MG: 325 TABLET ORAL at 06:34

## 2023-04-24 RX ADMIN — Medication 1 UNITS: at 12:28

## 2023-04-24 RX ADMIN — SODIUM CHLORIDE, POTASSIUM CHLORIDE, SODIUM LACTATE AND CALCIUM CHLORIDE: 600; 310; 30; 20 INJECTION, SOLUTION INTRAVENOUS at 08:18

## 2023-04-24 RX ADMIN — FAMOTIDINE 20 MG: 20 TABLET ORAL at 06:33

## 2023-04-24 RX ADMIN — EPINEPHRINE 0.04 MCG/KG/MIN: 1 INJECTION INTRAMUSCULAR; INTRAVENOUS; SUBCUTANEOUS at 12:27

## 2023-04-24 RX ADMIN — Medication 100 MG: at 07:45

## 2023-04-24 RX ADMIN — MUPIROCIN 0.5 G: 20 OINTMENT TOPICAL at 20:18

## 2023-04-24 RX ADMIN — HYDROMORPHONE HYDROCHLORIDE 0.5 MG: 1 INJECTION, SOLUTION INTRAMUSCULAR; INTRAVENOUS; SUBCUTANEOUS at 13:55

## 2023-04-24 RX ADMIN — HYDROMORPHONE HYDROCHLORIDE 0.5 MG: 1 INJECTION, SOLUTION INTRAMUSCULAR; INTRAVENOUS; SUBCUTANEOUS at 12:59

## 2023-04-24 RX ADMIN — SODIUM CHLORIDE 1.25 G/HR: 900 INJECTION, SOLUTION INTRAVENOUS at 09:13

## 2023-04-24 RX ADMIN — MIDAZOLAM 1 MG: 1 INJECTION INTRAMUSCULAR; INTRAVENOUS at 07:03

## 2023-04-24 RX ADMIN — HUMAN INSULIN 2 UNITS/HR: 100 INJECTION, SOLUTION SUBCUTANEOUS at 12:04

## 2023-04-24 RX ADMIN — LIDOCAINE HYDROCHLORIDE 100 MG: 20 INJECTION, SOLUTION INFILTRATION; PERINEURAL at 07:44

## 2023-04-24 RX ADMIN — FENTANYL CITRATE 250 MCG: 50 INJECTION, SOLUTION INTRAMUSCULAR; INTRAVENOUS at 07:44

## 2023-04-24 RX ADMIN — FENTANYL CITRATE 150 MCG: 50 INJECTION, SOLUTION INTRAMUSCULAR; INTRAVENOUS at 09:16

## 2023-04-24 RX ADMIN — Medication 2 G: at 08:19

## 2023-04-24 RX ADMIN — FENTANYL CITRATE 100 MCG: 50 INJECTION, SOLUTION INTRAMUSCULAR; INTRAVENOUS at 09:25

## 2023-04-24 RX ADMIN — PROTAMINE SULFATE 220 MG: 10 INJECTION, SOLUTION INTRAVENOUS at 12:45

## 2023-04-24 RX ADMIN — SUGAMMADEX 200 MG: 100 INJECTION, SOLUTION INTRAVENOUS at 15:15

## 2023-04-24 RX ADMIN — ONDANSETRON 4 MG: 2 INJECTION INTRAMUSCULAR; INTRAVENOUS at 14:15

## 2023-04-24 RX ADMIN — OXYCODONE HYDROCHLORIDE 10 MG: 10 TABLET ORAL at 17:00

## 2023-04-24 RX ADMIN — SODIUM CHLORIDE, POTASSIUM CHLORIDE, SODIUM LACTATE AND CALCIUM CHLORIDE 500 ML: 600; 310; 30; 20 INJECTION, SOLUTION INTRAVENOUS at 19:17

## 2023-04-24 RX ADMIN — Medication 50 MG: at 09:00

## 2023-04-24 RX ADMIN — NOREPINEPHRINE BITARTRATE 6.4 MCG: 1 INJECTION, SOLUTION, CONCENTRATE INTRAVENOUS at 10:38

## 2023-04-24 RX ADMIN — FENTANYL CITRATE 50 MCG: 50 INJECTION, SOLUTION INTRAMUSCULAR; INTRAVENOUS at 14:38

## 2023-04-24 ASSESSMENT — ACTIVITIES OF DAILY LIVING (ADL)
ADLS_ACUITY_SCORE: 20
ADLS_ACUITY_SCORE: 20
ADLS_ACUITY_SCORE: 31
ADLS_ACUITY_SCORE: 20
ADLS_ACUITY_SCORE: 20
ADLS_ACUITY_SCORE: 31
ADLS_ACUITY_SCORE: 20
ADLS_ACUITY_SCORE: 29
ADLS_ACUITY_SCORE: 29

## 2023-04-24 ASSESSMENT — COPD QUESTIONNAIRES: COPD: 0

## 2023-04-24 ASSESSMENT — LIFESTYLE VARIABLES: TOBACCO_USE: 0

## 2023-04-24 NOTE — PLAN OF CARE
Major Shift Events:  Arrived from OR at 1515. Neuro intact. Extubated to 4L NC at 1745. Epi remains on to keep MAP > 65. 1L LR given for CVP 7. UOP 60/hr. CT output appropriate. Passed bedside swallow. Up to chair.     Plan: Wean epi and O2 as tolerated. Continue with rehab and recovery.     For vital signs and complete assessments, please see documentation flowsheets.    ---------------------------------------  Admitted/transferred from: OR   Reason for admission/transfer: s/p CABG x4  2 RN skin assessment: completed by Yamila RIVERA and Vero COATES  Result of skin assessment and interventions/actions: Sacral mepi repositioned, Skin WDL ex. Expected surgical changes.   Height, weight, drug calc weight: Done  Patient belongings (see Flowsheet)  MDRO education added to care planN/A  ?

## 2023-04-24 NOTE — ANESTHESIA PROCEDURE NOTES
Arterial Line Procedure Note    Pre-Procedure   Staff -        Anesthesiologist:  Daniel Shay MD       Resident/Fellow: Pito Adams MD       Performed By: resident       Location: OR       Pre-Anesthestic Checklist: patient identified, IV checked, risks and benefits discussed, informed consent, monitors and equipment checked, pre-op evaluation and at physician/surgeon's request  Timeout:       Correct Patient: Yes        Correct Procedure: Yes        Correct Site: Yes        Correct Position: Yes   Line Placement:   This line was placed Pre Induction starting at 4/24/2023 7:35 AM and ending at 4/24/2023 7:45 AM  Procedure   Procedure: arterial line       Diagnosis: Hemodybamic monitoring       Laterality: left       Insertion Site: radial.  Sterile Prep        Standard elements of sterile barrier followed       Skin prep: Chloraprep  Insertion/Injection        Technique: ultrasound guided        1. Ultrasound was used to evaluate the access site.       2. Artery evaluated via ultrasound for patency/adequacy.       3. Using real-time ultrasound the needle/catheter was observed entering the artery/vein.       Catheter Type/Size: 20 G, 12 cm  Narrative         Secured by: suture       Tegaderm dressing used.       Complications: None apparent,        Arterial waveform: Yes        IBP within 10% of NIBP: Yes

## 2023-04-24 NOTE — BRIEF OP NOTE
[facility] Brief Operative Note    Pre-operative diagnosis: CAD (coronary artery disease) [I25.10]   Post-operative diagnosis CAD   Procedure: Procedure(s):  MEDIAN STERNOTOMY, HARVEST OF LEFT INTERNAL MAMMARY ARTERY, ENDOSCOPIC HARVEST OF LEFT GREATER SAPHENOUS VEIN, ON CARDIOPULMONARY BYPASS, CORONARY ARTERY BYPASS GRAFT (CABG) X4 . TRANSESOPHAGEAL ECHOCARDIOGRAM.   LIMA to LAD, SVG to OM, SVG to Diag, SVG to GERALDINE   Surgeon(s): Surgeon(s) and Role:     * Bro Almeida MD - Primary     * Amy Amaya PA-C - Assisting   Estimated blood loss: See anesthesia log    Specimens: * No specimens in log *     Drains: 36F mediastinal drains, 32F left pleural      Findings: see op report       Amy Oliveira PA-C  Cardiothoracic Surgery  Pager [phone]  April 24, 2023

## 2023-04-24 NOTE — ANESTHESIA CARE TRANSFER NOTE
Patient: Reinaldo Gordillo    Procedure: Procedure(s):  MEDIAN STERNOTOMY, HARVEST OF LEFT INTERNAL MAMMARY ARTERY, ENDOSCOPIC HARVEST OF LEFT GREATER SAPHENOUS VEIN, ON CARDIOPULMONARY BYPASS, CORONARY ARTERY BYPASS GRAFT (CABG) X4 . TRANSESOPHAGEAL ECHOCARDIOGRAM.       Diagnosis: CAD (coronary artery disease) [I25.10]  Diagnosis Additional Information: No value filed.    Anesthesia Type:   General     Note:    Oropharynx: ventilatory support  Level of Consciousness: iatrogenic sedation  Patient oxygen source: ETT.    Independent Airway: airway patency not satisfactory and stable  Dentition: dentition unchanged  Vital Signs Stable: post-procedure vital signs reviewed and stable  Report to RN Given: handoff report given  Patient transferred to: ICU  Comments: Discussed with ICU and RN staff at bedside. Patient reversed with Sugammadex. All questions answered.   ICU Handoff: Call for PAUSE to initiate/utilize ICU HANDOFF, Identified Patient, Identified Responsible Provider, Reviewed the Pertinent Medical History, Discussed Surgical Course, Reviewed Intra-OP Anesthesia Management and Issues during Anesthesia, Set Expectations for Post Procedure Period and Allowed Opportunity for Questions and Acknowledgement of Understanding      Vitals:  Vitals Value Taken Time   /48    Temp 35.9  C (96.62  F) 04/24/23 1520   Pulse 61 04/24/23 1520   Resp 19 04/24/23 1520   SpO2 100 % 04/24/23 1520   Vitals shown include unvalidated device data.    Electronically Signed By: Pito Adams MD  April 24, 2023  3:22 PM

## 2023-04-24 NOTE — ANESTHESIA PROCEDURE NOTES
Central Line/PA Catheter Placement    Pre-Procedure   Staff -        Anesthesiologist:  Daniel Shay MD       Resident/Fellow: Pito Adams MD       Performed By: resident       Location: OB       Pre-Anesthestic Checklist: patient identified, IV checked, site marked, risks and benefits discussed, informed consent, monitors and equipment checked, pre-op evaluation and at physician/surgeon's request  Timeout:       Correct Patient: Yes        Correct Procedure: Yes        Correct Site: Yes        Correct Position: Yes        Correct Laterality: Yes   Line Placement:   This line was placed Post Induction starting at 4/24/2023 7:50 AM and ending at 4/24/2023 8:15 AM    Procedure   Procedure: central line       Laterality: right       Insertion Site: internal jugular.       Patient Position: Trendelenburg  Sterile Prep        All elements of maximal sterile barrier technique followed       Patient Prep/Sterile Barriers: draped, hand hygiene, gloves , hat , mask , draped, gown, sterile gel and probe cover       Skin prep: Chloraprep  Insertion/Injection        Technique: ultrasound guided and Seldinger Technique        1. Ultrasound was used to evaluate the access site.       2. Vein evaluated via ultrasound for patency/adequacy.       3. Using real-time ultrasound the needle/catheter was observed entering the artery/vein.       Introducer Type: 9 Fr, 2-lumen MAC        Type: PA/CVC with Introducer       PA Catheter Type: CCO         Appropriate RV, RA and PA waveforms noted:  Yes            Withdrawn and Locked at cm: 57            Balloon down at end of the procedure:   Narrative         Secured by: suture       Tegaderm and Biopatch dressing used.       Complications: None apparent,        blood aspirated from all lumens,        Verification method: JOSE CRUZ

## 2023-04-24 NOTE — ANESTHESIA PROCEDURE NOTES
Perioperative JOSE CRUZ Procedure Note    Staff -        Anesthesiologist:  Daniel Shay MD       Resident/Fellow: Diony Rosa MD       Performed By: anesthesiologist and with fellow       Procedure performed by resident/fellow/CRNA in presence of a teaching physician.    Preanesthesia Checklist:  Patient identified, IV assessed, risks and benefits discussed, monitors and equipment assessed, procedure being performed at surgeon's request and anesthesia consent obtained.    JOSE CRUZ Probe Insertion  Probe Number: x8  Probe Status PRE Insertion: NO obvious damage  Probe type:  Adult 3D  Bite block used:   Molar  Insertion Technique: Easy, no oropharyngeal manipulation  Insertion complications: None obvious  Billing Report:JOSE CRUZ report by Anesthesiologist (See Separate Report note)  Probe Status POST Removal: NO obvious damage    JOSE CRUZ Report  General Procedure Information  Fellow/Resident Interpretation: I personally reviewed the images and the fellow/resident interpretation.  I agree with the fellow/resident interpretation as amended by myself.   Images for this study have been archived.  Modalities: 2D, 3D, CW Doppler, PW Doppler and Color flow mapping  Diagnostic Indications comments: Hemodynamic monitoring.  Echocardiographic and Doppler Measurements  Right Ventricle:  Cavity size normal.    Hypertrophy not present.   Thrombus not present.    Global function normal.     Left Ventricle:  Cavity size normal.    Hypertrophy not present.   Thrombus not present.   Global Function normal.   Ejection Fraction 60%.      Ventricular Regional Function:  1- Basal Anteroseptal:  normal  2- Basal Anterior:  normal  3- Basal Anterolateral:  normal  4- Basal Inferolateral:  normal  5- Basal Inferior:  normal  6- Basal Inferoseptal:  normal  7- Mid Anteroseptal:  normal  8- Mid Anterior:  normal  9- Mid Anterolateral:  normal  10- Mid Inferolateral:  normal  11- Mid Inferior:  normal  12- Mid Inferoseptal:  normal  13- Apical Anterior:   normal  14- Apical Lateral:  normal  15- Apical Inferior:  normal  16- Apical Septal:  normal  17- Carver:  normal    Valves  Aortic Valve: Annulus normal.  Stenosis mild.  Area: 1.57 cm .  Mean Gradient: 9 mmHg.  Regurgitation absent.  Leaflets calcified.  Leaflet motions normal.    Mitral Valve: Annulus normal.  Stenosis not present.  Regurgitation +1.  Leaflet motions normal.    Tricuspid Valve: Annulus normal.  Stenosis not present.  Regurgitation +1.  Leaflets normal.  Leaflet motions normal.    Pulmonic Valve: Annulus normal.  Stenosis not present.  Regurgitation absent.      Aorta: Ascending Aorta: Size normal.  Diameter 3.4 cm.  Dissection not present.  Plaque thickness less than 3 mm.  Mobile plaque not present.    Aortic Arch: Size normal.    Dissection not present.   Plaque thickness less than 3 mm.   Mobile plaque not present.    Descending Aorta: Size normal.    Dissection not present.   Plaque thickness greater than 3 mm.   Mobile plaque not present.      Right Atrium:  Size normal.   Spontaneous echo contrast not present.   Thrombus not present.   Tumor not present.   Device not present.     Left Atrium: Size normal.  Spontaneous echo contrast not present.  Thrombus not present.  Tumor not present.  Device not present.    Left atrial appendage normal.   Other Atria Findings:  CAT velocity 51.3 ccm/s  Atrial Septum: Intra-atrial septal morphology normal.       Ventricular Septum: Intra-ventricular septum morphology normal.      Diastolic Function Measurements:  Diastolic Dysfunction Grade=.  E=  57.6 ms.  A=  55.2 ms.  E/A Ratio=  1.  DT=  138 ms.  S/D= .  IVRT= ms.  Other Findings:   Pericardium:  normal. Pleural Effusion:  none. Pulmonary Arteries:  normal. Pulmonary Venous Flow:  normal. Cornoary sinus catheter present.    Post Intervention Findings  Procedure(s) performed:  CABG.  Regional wall motion:. Surgeon(s) notified of all postintervention findings: Yes.                 Echocardiogram  Comments  Echocardiogram comments:   PRE-CPB:  LV normal function, EF 55-60%, no RWMA's. Normal diastolic function. RV normal function. Tri-leaflet aortic valve, calcified leaflets, mild AS. Trace TR. Trace MR. No PFO by CFD. No pericardial effusion. No pleural effusion. No aortic dissection. No intracardiac thrombus. All findings communicated with surgeon.    POST-CPB:  S/p CABG x 3. LV normal function, EF 55-60%, no RWMA's noted. RV normal function. Mild AS. Mild TR. Trace MR. No pericardial effusion. No pleural effusion. No aortic dissection. No intracardiac thrombus. All findings communicated with surgeon..

## 2023-04-24 NOTE — ANESTHESIA PROCEDURE NOTES
Erector spinae Procedure Note    Pre-Procedure   Staff -        Anesthesiologist:  Silas Haider MD       Resident/Fellow: Chan Chicas MD       Performed By: resident       Location: pre-op       Pre-Anesthestic Checklist: patient identified, IV checked, site marked, risks and benefits discussed, informed consent, monitors and equipment checked, pre-op evaluation, at physician/surgeon's request and post-op pain management  Timeout:       Correct Patient: Yes        Correct Procedure: Yes        Correct Site: Yes        Correct Position: Yes        Correct Laterality: Yes        Site Marked: Yes  Procedure Documentation  Procedure: Erector spinae       Laterality: bilateral       Patient Position: prone       Patient Prep/Sterile Barriers: sterile gloves, mask, patient draped       Skin prep: Chloraprep       Local skin infiltrated with 5 mL of 1% lidocaine.        Insertion Site: T5-6.       Needle Type: Touhy needle       Needle Gauge: 17.        Needle Length (millimeters): 80        Catheter: 19 G.          Catheter threaded easily.             Ultrasound guided       1. Ultrasound was used to identify targeted nerve, plexus, vascular marker, or fascial plane and place a needle adjacent to it in real-time.       2. Ultrasound was used to visualize the spread of anesthetic in close proximity to the above referenced structure.       3. A permanent image is entered into the patient's record.       4. The visualized anatomic structures appeared normal.       5. There were no apparent abnormal pathologic findings.    Assessment/Narrative         The placement was negative for: blood aspirated, painful injection and site bleeding       Paresthesias: No.       Bolus given via catheter. no blood aspirated via catheter.        Secured via Tegaderm.        Insertion/Infusion Method: Continuous Infusion       Complications: none      FOR [facility] ([facility]) ONLY:   Pain Team Contact information: please  "page the Pain Team Via [facility]. Search \"Pain\". During daytime hours, please page the attending first. At night please page the resident first.      "

## 2023-04-24 NOTE — H&P
CV ICU H&P  4/24/2023      CO-MORBIDITIES:   Patient Active Problem List   Diagnosis     Nuclear sclerotic cataract of both eyes     Hyperglycemia     Hyperlipidemia     Stented coronary artery     Coronary artery disease involving native coronary artery without angina pectoris     Basal cell carcinoma (BCC) in situ of skin     Colon polyp     Hereditary and idiopathic peripheral neuropathy     Status post coronary angiogram     Coronary artery disease involving native coronary artery of native heart without angina pectoris       ASSESSMENT: Reinaldo Gordillo is a 77 year old male with PMH of CAD with VT s/p AREN x4 in RCA, mild aortic stenosis, HTN, HLD, GERD, who underwent CABG x 4 on 4/24 by Dr. Almeida. Grafts include: LIMA to LAD, SVG to OM, SVG to Diag, SVG to GERALDINE.    Patient presents to the CVICU intubated and sedated with propofol. Currently on 0.03 or epi and 0.04 of norepi. Patient is paced with is intrinsic rhythm but V wires function at 5mA. Patient received 550mcg of fentanyl throughout the case and 1mg of dilaudid post bypass, he also has bilateral CESAR catheters. Patient has been reversed. He received 1.7L of crystalloid, 175 of cell saver, and urinated 170 ml of urine. Lactate is 1.5    PLAN:  Neuro/ pain/ sedation:  Acute Postoperative pain  - Monitor neurological status. Notify the MD for any acute changes in exam.  - Pain: fentanyl gtt. Scheduled tylenol. PRN tylenol, oxycodone, dilaudid.  - CESAR Catheters in place bilaterally   - Sedation: propofol gtt wean as tolerated for extubation  - Hold PTA zolpidem     Pulmonary care:   Postoperative ventilation management    Resp: 16  Vent setting 450/5/50%  - Fast track, trail pressure support   - Intubated, ventilated  - Titrate supplemental oxygen to maintain saturation above 92%.  - Pulmonary hygiene: Incentive spirometer every 15- 30 minutes when awake, flutter valve, C&DB    Cardiovascular:    S/p CABG x 4 on 4/24 by Dr. Almeida  History of CAD, HTN, HLD  Pre  CPB: LV normal function, EF 55-60%, no RWMA's. Normal diastolic function. RV normal function. Tri-leaflet aortic valve, calcified leaflets, mild AS. Trace TR. Trace MR. No PFO by CFD. No pericardial fluid. No pleural effusion. No aortic dissection. No intracardiac thrombus noted.    Post CPB: S/p CABG x 4. LV normal function, EF 55-60%, no RWMA's noted. RV normal function. Mild AS. Mild TR. Trace MR. No pericardial effusion. No pleural effusion. No aortic dissection. No intracardiac thrombus noted.       - Monitor hemodynamic status.   - Goal MAP>65, SBP<140  - Hold PTA lisinopril, metoprolol, pravastatin  - Statin hold  - BB hold  - ASA: start tomorrow  - Epi, norepi, vaso gtt; wean as tolerated    GI care/ Nutrition:   - NPO   - PPI  - Continue bowel regimen: miralax, senna  - Swallow study once extubated     Renal/ Fluid Balance/ Electrolytes:   BL creat appears to be ~ 0.8-1.0  - Strict I/O, daily weights  - Avoid/limit nephrotoxins as able  - Replete lytes PRN per protocol    Endocrine:    Stress induced hyperglycemia  Preop A1c 6.2  - Insulin gtt  - Goal BG <180 for optimal healing  - PTA meds None    ID/ Antibiotics:  Stress induced leukocytosis  - To complete perioperative regimen  - Continue to monitor fever curve, WBC and inflammatory markers as appropriate    Heme:     Stress induced leukocytosis  Acute blood loss anemia  No s/sx active bleeding  - Continue to monitor  - CBC     MSK/ Skin:  Sternotomy  Surgical Incision  - Sternal precautions  - Postoperative incision management per protocol  - PT/OT/CR    Prophylaxis:    - Mechanical prophylaxis for DVT  - Chemical DVT prophylaxis - start subcutaneous heparin tomorrow  - PPI    Lines/ tubes/ drains:  - Arterial Line, ETT, CTs, PA catheter, RIJ, mary,    Disposition:  - CVICU    Patient seen, findings and plan discussed with CVICU staff    Konstantin Claudio MD  Anesthesiology CA-1/PGY-3    ====================================    HPI:       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  He reports that he had an unwitnessed fall in January of unclear circumstances. He presented to the ED. Head imaging was benign and labs were unremarkable. However, given the patient's history of CAD with ventricular tachycardia, a zio monitor was placed in the ED prior to patient's discharge. The Zio patch show a short , isolated episode of ventricular arrhythmia.  Given this finding, the patient underwent coronary angiogram that showed severe CAD.    He underwent an uncomplicated 4 vessel CABG. He returns intubated and sedated. Plan for fast track extubation.     PAST MEDICAL HISTORY:   Past Medical History:   Diagnosis Date     ASCVD (arteriosclerotic cardiovascular disease)      Coronary artery disease      Gastroesophageal reflux disease      Hypertension      Stented coronary artery        PAST SURGICAL HISTORY:   Past Surgical History:   Procedure Laterality Date     CATARACT IOL, RT/LT       COLONOSCOPY N/A 7/17/2019    Procedure: COLONOSCOPY;  Surgeon: Roque Givens MD;  Location: [facility] OR     CV CORONARY ANGIOGRAM N/A 3/21/2023    Procedure: Coronary Angiogram;  Surgeon: Pito Chapin MD;  Location: [facility] CARDIAC CATH LAB     CV PCI N/A 3/21/2023    Procedure: Percutaneous Coronary Intervention;  Surgeon: Pito Chapin MD;  Location: [facility] CARDIAC CATH LAB     ESOPHAGOSCOPY, GASTROSCOPY, DUODENOSCOPY (EGD), COMBINED N/A 7/17/2019    Procedure: ESOPHAGOGASTRODUODENOSCOPY, WITH BIOPSY;  Surgeon: Roque Givens MD;  Location: [facility] OR     HERNIA REPAIR  2000     MOHS MICROGRAPHIC PROCEDURE       PHACOEMULSIFICATION CLEAR CORNEA WITH STANDARD INTRAOCULAR LENS IMPLANT Right 7/2/2021    Procedure: RIGHT EYE CATARACT REMOVAL WITH INTRAOCULAR LENS IMPLANT;  Surgeon: Justa Liz MD;  [place] [facility] [place]     PHACOEMULSIFICATION CLEAR CORNEA WITH STANDARD INTRAOCULAR LENS  IMPLANT Left 7/9/2021    Procedure: LEFT EYE CATARACT REMOVAL WITH INTRAOCULAR LENS IMPLANT;  Surgeon: Justa Liz MD;  [place] [facility] [place]     [facility] CORONARY STENT PERCUT, EA ADDTL VESSEL         FAMILY HISTORY:   Family History   Problem Relation Age of Onset     Glaucoma No family hx of      Macular Degeneration No family hx of      Retinal detachment No family hx of      Amblyopia No family hx of      Melanoma No family hx of      Skin Cancer No family hx of        SOCIAL HISTORY:   Social History     Tobacco Use     Smoking status: Never     Smokeless tobacco: Never   Vaping Use     Vaping status: Not on file   Substance Use Topics     Alcohol use: Yes     Comment: 1-2 drinks 5 days a week         OBJECTIVE:   1. VITAL SIGNS:     Pulse:  [58-70] 62  Resp:  [12-18] 16  BP: (131-179)/(68-89) 150/85  SpO2:  [97 %-100 %] 100 %          2. INTAKE/ OUTPUT:      I/O last 3 completed shifts:  In: 1176 [I.V.:1001; Other:175]  Out: 775 [Urine:775]      3. PHYSICAL EXAMINATION:   General: sedated  Neuro: sedated  Resp: intubated, ventilated  CV: S1, S2, RRR, no m/r/g   Abdomen: Soft, non-distended, non-tender  Incisions: c/d/i  Extremities: warm and well perfused  CT: To suction, serosang output, no airleak, crepitus    4. INVESTIGATIONS:   Arterial Blood Gases   Recent Labs   Lab 04/24/23  1409 04/24/23  1325 04/24/23  1253 04/24/23  1149   PH 7.36 7.36 7.38 7.35   PCO2 43 43 39 44   PO2 261* 127* 357* 422*   HCO3 24 24 23 25     Complete Blood Count   Recent Labs   Lab 04/24/23  1409 04/24/23  1325 04/24/23  1255 04/24/23  1253   HGB 10.1* 9.1* 8.6* 9.0*   PLT  --   --  157  --      Basic Metabolic Panel  Recent Labs   Lab 04/24/23  1409 04/24/23  1325 04/24/23  1253 04/24/23  1149    140 140 139   POTASSIUM 3.8 3.9 3.8 4.5   * 162* 166* 188*     Liver Function Tests  Recent Labs   Lab 04/24/23  1255   INR 1.49*     Pancreatic Enzymes  No lab results found in last 7 days.  Coagulation Profile  Recent  Labs   Lab 04/24/23  1255   INR 1.49*   PTT 24         5. RADIOLOGY:   Recent Results (from the past 24 hour(s))   POC US Guidance Needle Placement    Impression    Bilateral erector spinae plane block       =========================================

## 2023-04-24 NOTE — ANESTHESIA PROCEDURE NOTES
Airway       Patient location during procedure: OR       Procedure Start/Stop Times: 4/24/2023 7:48 AM  Staff -        Anesthesiologist:  Daniel Shay MD       Resident/Fellow: Pito Adams MD       Performed By: resident  Consent for Airway        Urgency: elective  Indications and Patient Condition       Indications for airway management: grant-procedural       Induction type:intravenous       Mask difficulty assessment: 2 - vent by mask + OA or adjuvant +/- NMBA    Final Airway Details       Final airway type: endotracheal airway       Successful airway: ETT - single  Endotracheal Airway Details        ETT size (mm): 8.0       Cuffed: yes       Successful intubation technique: direct laryngoscopy       DL Blade Type: MAC 4       Grade View of Cords: 1       Adjucts: stylet       Position: Right       Measured from: lips       Secured at (cm): 24       Bite block used: None    Post intubation assessment        Placement verified by: capnometry, equal breath sounds and chest rise        Number of attempts at approach: 1       Number of other approaches attempted: 0       Secured with: pink tape       Ease of procedure: easy       Dentition: Intact and Unchanged    Medication(s) Administered   Medication Administration Time: 4/24/2023 7:48 AM

## 2023-04-24 NOTE — ANESTHESIA POSTPROCEDURE EVALUATION
Patient: Reinaldo Gordillo    Procedure: Procedure(s):  MEDIAN STERNOTOMY, HARVEST OF LEFT INTERNAL MAMMARY ARTERY, ENDOSCOPIC HARVEST OF LEFT GREATER SAPHENOUS VEIN, ON CARDIOPULMONARY BYPASS, CORONARY ARTERY BYPASS GRAFT (CABG) X4 . TRANSESOPHAGEAL ECHOCARDIOGRAM.       Anesthesia Type:  General    Note:  Disposition: ICU; Admission            ICU Sign Out: Anesthesiologist/ICU physician sign out WAS performed   Postop Pain Control: Uneventful            Sign Out: Well controlled pain   PONV: No   Neuro/Psych: Uneventful            Sign Out: PLANNED postop sedation   Airway/Respiratory: Uneventful            Sign Out: AIRWAY IN SITU/Resp. Support               Airway in situ/Resp. Support: ETT                 Reason: Planned Pre-op   CV/Hemodynamics: Uneventful            Sign Out: Acceptable CV status; No obvious hypovolemia; No obvious fluid overload   Other NRE: NONE   DID A NON-ROUTINE EVENT OCCUR? No    Event details/Postop Comments:  Patient transported from OR to ICU w/ O2, ambu, infusions, emergency medications and emergency airway equipment. VSS throughout transport and handoff           Last vitals:  Vitals:    04/24/23 0715 04/24/23 0720 04/24/23 0725   BP: 131/70 (!) 149/81 (!) 150/85   Pulse: 58 64 62   Resp: 14 14 16   SpO2: 99% 100% 100%       Electronically Signed By: Daniel Shay MD  April 24, 2023  3:41 PM

## 2023-04-25 ENCOUNTER — APPOINTMENT (OUTPATIENT)
Dept: OCCUPATIONAL THERAPY | Facility: CLINIC | Age: 78
DRG: 236 | End: 2023-04-25
Attending: PHYSICIAN ASSISTANT
Payer: COMMERCIAL

## 2023-04-25 ENCOUNTER — APPOINTMENT (OUTPATIENT)
Dept: GENERAL RADIOLOGY | Facility: CLINIC | Age: 78
DRG: 236 | End: 2023-04-25
Attending: PHYSICIAN ASSISTANT
Payer: COMMERCIAL

## 2023-04-25 DIAGNOSIS — Z95.1 S/P CABG (CORONARY ARTERY BYPASS GRAFT): Primary | ICD-10-CM

## 2023-04-25 LAB
ALBUMIN SERPL BCG-MCNC: 3.2 G/DL (ref 3.5–5.2)
ALLEN'S TEST: ABNORMAL
ALP SERPL-CCNC: 39 U/L (ref 40–129)
ALT SERPL W P-5'-P-CCNC: 29 U/L (ref 10–50)
ANION GAP SERPL CALCULATED.3IONS-SCNC: 10 MMOL/L (ref 7–15)
ANION GAP SERPL CALCULATED.3IONS-SCNC: 12 MMOL/L (ref 7–15)
AST SERPL W P-5'-P-CCNC: 45 U/L (ref 10–50)
ATRIAL RATE - MUSE: 65 BPM
BASE EXCESS BLDA CALC-SCNC: 0 MMOL/L (ref -9–1.8)
BASE EXCESS BLDV CALC-SCNC: 0.9 MMOL/L (ref -7.7–1.9)
BASE EXCESS BLDV CALC-SCNC: 1.3 MMOL/L (ref -7.7–1.9)
BASE EXCESS BLDV CALC-SCNC: 3.1 MMOL/L (ref -7.7–1.9)
BILIRUB SERPL-MCNC: 0.3 MG/DL
BUN SERPL-MCNC: 12.3 MG/DL (ref 8–23)
BUN SERPL-MCNC: 12.3 MG/DL (ref 8–23)
CA-I BLD-MCNC: 4.9 MG/DL (ref 4.4–5.2)
CALCIUM SERPL-MCNC: 8.7 MG/DL (ref 8.8–10.2)
CALCIUM SERPL-MCNC: 9.1 MG/DL (ref 8.8–10.2)
CHLORIDE SERPL-SCNC: 104 MMOL/L (ref 98–107)
CHLORIDE SERPL-SCNC: 107 MMOL/L (ref 98–107)
CREAT SERPL-MCNC: 0.78 MG/DL (ref 0.67–1.17)
CREAT SERPL-MCNC: 0.86 MG/DL (ref 0.67–1.17)
DEPRECATED HCO3 PLAS-SCNC: 22 MMOL/L (ref 22–29)
DEPRECATED HCO3 PLAS-SCNC: 23 MMOL/L (ref 22–29)
DIASTOLIC BLOOD PRESSURE - MUSE: NORMAL MMHG
ERYTHROCYTE [DISTWIDTH] IN BLOOD BY AUTOMATED COUNT: 12.4 % (ref 10–15)
GFR SERPL CREATININE-BSD FRML MDRD: 90 ML/MIN/1.73M2
GFR SERPL CREATININE-BSD FRML MDRD: >90 ML/MIN/1.73M2
GLUCOSE BLDC GLUCOMTR-MCNC: 100 MG/DL (ref 70–99)
GLUCOSE BLDC GLUCOMTR-MCNC: 101 MG/DL (ref 70–99)
GLUCOSE BLDC GLUCOMTR-MCNC: 106 MG/DL (ref 70–99)
GLUCOSE BLDC GLUCOMTR-MCNC: 106 MG/DL (ref 70–99)
GLUCOSE BLDC GLUCOMTR-MCNC: 107 MG/DL (ref 70–99)
GLUCOSE BLDC GLUCOMTR-MCNC: 112 MG/DL (ref 70–99)
GLUCOSE BLDC GLUCOMTR-MCNC: 117 MG/DL (ref 70–99)
GLUCOSE BLDC GLUCOMTR-MCNC: 118 MG/DL (ref 70–99)
GLUCOSE BLDC GLUCOMTR-MCNC: 119 MG/DL (ref 70–99)
GLUCOSE BLDC GLUCOMTR-MCNC: 123 MG/DL (ref 70–99)
GLUCOSE BLDC GLUCOMTR-MCNC: 148 MG/DL (ref 70–99)
GLUCOSE BLDC GLUCOMTR-MCNC: 168 MG/DL (ref 70–99)
GLUCOSE BLDC GLUCOMTR-MCNC: 91 MG/DL (ref 70–99)
GLUCOSE SERPL-MCNC: 129 MG/DL (ref 70–99)
GLUCOSE SERPL-MCNC: 129 MG/DL (ref 70–99)
HCO3 BLD-SCNC: 25 MMOL/L (ref 21–28)
HCO3 BLDV-SCNC: 27 MMOL/L (ref 21–28)
HCO3 BLDV-SCNC: 27 MMOL/L (ref 21–28)
HCO3 BLDV-SCNC: 28 MMOL/L (ref 21–28)
HCT VFR BLD AUTO: 29 % (ref 40–53)
HGB BLD-MCNC: 9.5 G/DL (ref 13.3–17.7)
INR PPP: 1.27 (ref 0.85–1.15)
INTERPRETATION ECG - MUSE: NORMAL
MAGNESIUM SERPL-MCNC: 1.9 MG/DL (ref 1.7–2.3)
MAGNESIUM SERPL-MCNC: 2.5 MG/DL (ref 1.7–2.3)
MCH RBC QN AUTO: 32.3 PG (ref 26.5–33)
MCHC RBC AUTO-ENTMCNC: 32.8 G/DL (ref 31.5–36.5)
MCV RBC AUTO: 99 FL (ref 78–100)
O2/TOTAL GAS SETTING VFR VENT: 1 %
O2/TOTAL GAS SETTING VFR VENT: 1 %
O2/TOTAL GAS SETTING VFR VENT: 21 %
O2/TOTAL GAS SETTING VFR VENT: 24 %
OXYHGB MFR BLD: 92 % (ref 92–100)
OXYHGB MFR BLDV: 60 % (ref 70–75)
OXYHGB MFR BLDV: 61 % (ref 70–75)
OXYHGB MFR BLDV: 61 % (ref 70–75)
P AXIS - MUSE: 46 DEGREES
PCO2 BLD: 41 MM HG (ref 35–45)
PCO2 BLDV: 45 MM HG (ref 40–50)
PCO2 BLDV: 46 MM HG (ref 40–50)
PCO2 BLDV: 48 MM HG (ref 40–50)
PH BLD: 7.4 [PH] (ref 7.35–7.45)
PH BLDV: 7.36 [PH] (ref 7.32–7.43)
PH BLDV: 7.38 [PH] (ref 7.32–7.43)
PH BLDV: 7.41 [PH] (ref 7.32–7.43)
PHOSPHATE SERPL-MCNC: 3.5 MG/DL (ref 2.5–4.5)
PLATELET # BLD AUTO: 159 10E3/UL (ref 150–450)
PO2 BLD: 65 MM HG (ref 80–105)
PO2 BLDV: 32 MM HG (ref 25–47)
PO2 BLDV: 33 MM HG (ref 25–47)
PO2 BLDV: 33 MM HG (ref 25–47)
POTASSIUM SERPL-SCNC: 4.3 MMOL/L (ref 3.4–5.3)
POTASSIUM SERPL-SCNC: 4.4 MMOL/L (ref 3.4–5.3)
PR INTERVAL - MUSE: 186 MS
PROT SERPL-MCNC: 5 G/DL (ref 6.4–8.3)
QRS DURATION - MUSE: 98 MS
QT - MUSE: 408 MS
QTC - MUSE: 424 MS
R AXIS - MUSE: 5 DEGREES
RBC # BLD AUTO: 2.94 10E6/UL (ref 4.4–5.9)
SODIUM SERPL-SCNC: 139 MMOL/L (ref 136–145)
SODIUM SERPL-SCNC: 139 MMOL/L (ref 136–145)
SYSTOLIC BLOOD PRESSURE - MUSE: NORMAL MMHG
T AXIS - MUSE: 16 DEGREES
VENTRICULAR RATE- MUSE: 65 BPM
WBC # BLD AUTO: 10.2 10E3/UL (ref 4–11)

## 2023-04-25 PROCEDURE — 85610 PROTHROMBIN TIME: CPT | Performed by: STUDENT IN AN ORGANIZED HEALTH CARE EDUCATION/TRAINING PROGRAM

## 2023-04-25 PROCEDURE — 82805 BLOOD GASES W/O2 SATURATION: CPT | Performed by: SURGERY

## 2023-04-25 PROCEDURE — 999N000045 HC STATISTIC DAILY SWAN MONITORING

## 2023-04-25 PROCEDURE — 84100 ASSAY OF PHOSPHORUS: CPT | Performed by: PHYSICIAN ASSISTANT

## 2023-04-25 PROCEDURE — 250N000011 HC RX IP 250 OP 636: Performed by: PHYSICIAN ASSISTANT

## 2023-04-25 PROCEDURE — 83735 ASSAY OF MAGNESIUM: CPT | Performed by: PHYSICIAN ASSISTANT

## 2023-04-25 PROCEDURE — 82805 BLOOD GASES W/O2 SATURATION: CPT

## 2023-04-25 PROCEDURE — 36415 COLL VENOUS BLD VENIPUNCTURE: CPT | Performed by: SURGERY

## 2023-04-25 PROCEDURE — 83735 ASSAY OF MAGNESIUM: CPT | Performed by: SURGERY

## 2023-04-25 PROCEDURE — 250N000013 HC RX MED GY IP 250 OP 250 PS 637: Performed by: PHYSICIAN ASSISTANT

## 2023-04-25 PROCEDURE — 82330 ASSAY OF CALCIUM: CPT | Performed by: PHYSICIAN ASSISTANT

## 2023-04-25 PROCEDURE — 85027 COMPLETE CBC AUTOMATED: CPT | Performed by: PHYSICIAN ASSISTANT

## 2023-04-25 PROCEDURE — 80053 COMPREHEN METABOLIC PANEL: CPT | Performed by: PHYSICIAN ASSISTANT

## 2023-04-25 PROCEDURE — 250N000011 HC RX IP 250 OP 636: Performed by: SURGERY

## 2023-04-25 PROCEDURE — 93005 ELECTROCARDIOGRAM TRACING: CPT

## 2023-04-25 PROCEDURE — 271N000002 HC RX 271: Performed by: PHYSICIAN ASSISTANT

## 2023-04-25 PROCEDURE — 250N000013 HC RX MED GY IP 250 OP 250 PS 637: Performed by: STUDENT IN AN ORGANIZED HEALTH CARE EDUCATION/TRAINING PROGRAM

## 2023-04-25 PROCEDURE — 97165 OT EVAL LOW COMPLEX 30 MIN: CPT | Mod: GO

## 2023-04-25 PROCEDURE — 200N000002 HC R&B ICU UMMC

## 2023-04-25 PROCEDURE — 97530 THERAPEUTIC ACTIVITIES: CPT | Mod: GO

## 2023-04-25 PROCEDURE — 999N000015 HC STATISTIC ARTERIAL MONITORING DAILY

## 2023-04-25 PROCEDURE — 71045 X-RAY EXAM CHEST 1 VIEW: CPT | Mod: 26 | Performed by: RADIOLOGY

## 2023-04-25 PROCEDURE — 999N000155 HC STATISTIC RAPCV CVP MONITORING

## 2023-04-25 PROCEDURE — 71045 X-RAY EXAM CHEST 1 VIEW: CPT

## 2023-04-25 PROCEDURE — 93010 ELECTROCARDIOGRAM REPORT: CPT | Mod: 59 | Performed by: INTERNAL MEDICINE

## 2023-04-25 PROCEDURE — 99232 SBSQ HOSP IP/OBS MODERATE 35: CPT | Performed by: NURSE PRACTITIONER

## 2023-04-25 RX ORDER — ASPIRIN 81 MG/1
162 TABLET, CHEWABLE ORAL DAILY
Status: DISCONTINUED | OUTPATIENT
Start: 2023-04-25 | End: 2023-04-26

## 2023-04-25 RX ORDER — DEXTROSE MONOHYDRATE 25 G/50ML
25-50 INJECTION, SOLUTION INTRAVENOUS
Status: DISCONTINUED | OUTPATIENT
Start: 2023-04-25 | End: 2023-04-30 | Stop reason: HOSPADM

## 2023-04-25 RX ORDER — FUROSEMIDE 10 MG/ML
20 INJECTION INTRAMUSCULAR; INTRAVENOUS ONCE
Status: DISCONTINUED | OUTPATIENT
Start: 2023-04-25 | End: 2023-04-25

## 2023-04-25 RX ORDER — HEPARIN SODIUM 5000 [USP'U]/.5ML
5000 INJECTION, SOLUTION INTRAVENOUS; SUBCUTANEOUS EVERY 8 HOURS
Status: DISCONTINUED | OUTPATIENT
Start: 2023-04-25 | End: 2023-04-25

## 2023-04-25 RX ORDER — MAGNESIUM SULFATE HEPTAHYDRATE 40 MG/ML
2 INJECTION, SOLUTION INTRAVENOUS ONCE
Status: COMPLETED | OUTPATIENT
Start: 2023-04-25 | End: 2023-04-25

## 2023-04-25 RX ORDER — NICOTINE POLACRILEX 4 MG
15-30 LOZENGE BUCCAL
Status: DISCONTINUED | OUTPATIENT
Start: 2023-04-25 | End: 2023-04-30 | Stop reason: HOSPADM

## 2023-04-25 RX ADMIN — PANTOPRAZOLE SODIUM 40 MG: 40 TABLET, DELAYED RELEASE ORAL at 08:07

## 2023-04-25 RX ADMIN — ACETAMINOPHEN 975 MG: 325 TABLET ORAL at 00:01

## 2023-04-25 RX ADMIN — Medication 500 MG: at 21:38

## 2023-04-25 RX ADMIN — CEFAZOLIN SODIUM 2 G: 2 INJECTION, SOLUTION INTRAVENOUS at 04:14

## 2023-04-25 RX ADMIN — POLYETHYLENE GLYCOL 3350 17 G: 17 POWDER, FOR SOLUTION ORAL at 19:57

## 2023-04-25 RX ADMIN — HEPARIN SODIUM 5000 UNITS: 5000 INJECTION, SOLUTION INTRAVENOUS; SUBCUTANEOUS at 19:57

## 2023-04-25 RX ADMIN — HEPARIN SODIUM 5000 UNITS: 5000 INJECTION, SOLUTION INTRAVENOUS; SUBCUTANEOUS at 11:57

## 2023-04-25 RX ADMIN — SENNOSIDES AND DOCUSATE SODIUM 2 TABLET: 50; 8.6 TABLET ORAL at 19:57

## 2023-04-25 RX ADMIN — ACETAMINOPHEN 975 MG: 325 TABLET ORAL at 16:04

## 2023-04-25 RX ADMIN — Medication 12.5 MG: at 19:57

## 2023-04-25 RX ADMIN — ACETAMINOPHEN 975 MG: 325 TABLET ORAL at 08:07

## 2023-04-25 RX ADMIN — POLYETHYLENE GLYCOL 3350 17 G: 17 POWDER, FOR SOLUTION ORAL at 08:07

## 2023-04-25 RX ADMIN — ASPIRIN 162 MG: 81 TABLET, CHEWABLE ORAL at 08:07

## 2023-04-25 RX ADMIN — CEFAZOLIN SODIUM 2 G: 2 INJECTION, SOLUTION INTRAVENOUS at 11:57

## 2023-04-25 RX ADMIN — MAGNESIUM SULFATE HEPTAHYDRATE 2 G: 40 INJECTION, SOLUTION INTRAVENOUS at 01:00

## 2023-04-25 RX ADMIN — SENNOSIDES AND DOCUSATE SODIUM 2 TABLET: 50; 8.6 TABLET ORAL at 08:07

## 2023-04-25 ASSESSMENT — ACTIVITIES OF DAILY LIVING (ADL)
ADLS_ACUITY_SCORE: 29
ADLS_ACUITY_SCORE: 29
PREVIOUS_RESPONSIBILITIES: MEAL PREP;HOUSEKEEPING;LAUNDRY;DRIVING
ADLS_ACUITY_SCORE: 29
ADLS_ACUITY_SCORE: 29
ADLS_ACUITY_SCORE: 28
DEPENDENT_IADLS:: INDEPENDENT
ADLS_ACUITY_SCORE: 29

## 2023-04-25 NOTE — PROGRESS NOTES
04/25/23 0945   Appointment Info   Signing Clinician's Name / Credentials (OT) Phuong Dewey OTR/L   Rehab Comments (OT) sternal precautions, assess PT needs       Present no   Living Environment   People in Home spouse   Current Living Arrangements house;other (see comments)  (townhouse)   Home Accessibility stairs to enter home;other (see comments)  (elevator access within home)   Number of Stairs, Main Entrance 3;other (see comments)  ('few')   Transportation Anticipated car, drives self;family or friend will provide   Living Environment Comments Pt reports living in [facility] with spouse, 'few stairs to enter,' 3-levels inside with elevator access.   Self-Care   Usual Activity Tolerance good   Current Activity Tolerance moderate   Equipment Currently Used at Home none   Fall history within last six months yes   Number of times patient has fallen within last six months 1   Activity/Exercise/Self-Care Comment Pt reports ADL IND at baseline, typically no AD for functional mobility.   Instrumental Activities of Daily Living (IADL)   Previous Responsibilities meal prep;housekeeping;laundry;driving   IADL Comments Pt reports IADL IND at baseline, retired orthopedic surgeon, drives self, active, enjoys walking.   General Information   Onset of Illness/Injury or Date of Surgery 04/24/23   Referring Physician Amy mAaya PA-C   Patient/Family Therapy Goal Statement (OT) Return home and to PLOF   Additional Occupational Profile Info/Pertinent History of Current Problem Per EMR, Reinaldo Gordillo is a 77 year old male with PMH of CAD with VT s/p AREN x4 in RCA, mild aortic stenosis, HTN, HLD, GERD, who underwent CABG x 4 on 4/24 by Dr. Almeida. Grafts include: LIMA to LAD, SVG to OM, SVG to Diag, SVG to GERALDINE.   Existing Precautions/Restrictions fall;cardiac;sternal   Limitations/Impairments safety/cognitive   Left Upper Extremity (Weight-bearing Status) partial weight-bearing (PWB)  (no > 10 lb)    Right Upper Extremity (Weight-bearing Status) partial weight-bearing (PWB)  (no > 10 lb)   Left Lower Extremity (Weight-bearing Status) full weight-bearing (FWB)   Right Lower Extremity (Weight-bearing Status) full weight-bearing (FWB)   General Observations and Info Activity: Ambulate with Assistance   Cognitive Status Examination   Orientation Status orientation to person, place and time   Cognitive Status Comments Cognition appears intact   Visual Perception   Visual Impairment/Limitations WFL;corrective lenses for reading   Impact of Vision Impairment on Function (Vision) Denies acute vision changes   Sensory   Sensory Comments Pt reports B LE neuropathy at baseline; Denies UE numbness/tingling   Pain Assessment   Patient Currently in Pain No   Posture   Posture not impaired   Posture Comments while sitting at EOB   Range of Motion Comprehensive   General Range of Motion bilateral upper extremity ROM WFL   Strength Comprehensive (MMT)   Comment, General Manual Muscle Testing (MMT) Assessment UE strength grossly deconditioned, pt reporting mostly proximal weakness in B LE   Muscle Tone Assessment   Muscle Tone Quick Adds No deficits were identified   Coordination   Upper Extremity Coordination No deficits were identified   Bed Mobility   Bed Mobility sit-supine   Supine-Sit [place] (Bed Mobility) minimum assist (75% patient effort);verbal cues   Comment (Bed Mobility) per clinical judgement   Transfers   Transfers sit-stand transfer;toilet transfer   Sit-Stand Transfer   Sit-Stand [place] (Transfers) contact guard;verbal cues   Sit/Stand Transfer Comments STS from EOB   Toilet Transfer   Type (Toilet Transfer) sit-stand   [place] Level (Toilet Transfer) contact guard;minimum assist (75% patient effort);verbal cues   Toilet Transfer Comments per clinical judgement   Balance   Balance Assessment standing balance: static   Balance Comments SBA static sitting balance, unsupported   Activities of  Daily Living   BADL Assessment/Intervention bathing;lower body dressing;toileting   Bathing Assessment/Intervention   [place] Level (Bathing) minimum assist (75% patient effort);set up   Comment, (Bathing) per clinical judgement   Lower Body Dressing Assessment/Training   Comment, (Lower Body Dressing) per clinical judgement   [place] Level (Lower Body Dressing) contact guard assist;verbal cues;set up   Toileting   Comment, (Toileting) per clinical judgement   [place] Level (Toileting) contact guard assist   Clinical Impression   Criteria for Skilled Therapeutic Interventions Met (OT) Yes, treatment indicated   OT Diagnosis Decreased ADL IND/safety, cardiovascular endurance, functional mobility, overall activity tolerance   OT Problem List-Impairments impacting ADL problems related to;activity tolerance impaired;balance;cognition;mobility;range of motion (ROM);strength;pain;post-surgical precautions   Assessment of Occupational Performance 5 or more Performance Deficits   Identified Performance Deficits FB bathing, FB dressing, toileting, home mgmt, leisure, IADL tasks, cardiovascular endurance   Planned Therapy Interventions (OT) ADL retraining;IADL retraining;balance training;cognition;ROM;strengthening;transfer training;progressive activity/exercise   Clinical Decision Making Complexity (OT) low complexity   Anticipated Equipment Needs Upon Discharge (OT) other (see comments)  (TBD)   Risk & Benefits of therapy have been explained evaluation/treatment results reviewed;care plan/treatment goals reviewed;participants included;patient   Clinical Impression Comments Pt below baseline function and will benefit from continued skilled OT/CR during IP stay to progress IND/safety and return to PLOF.   OT Total Evaluation Time   OT Eval, Low Complexity Minutes (29478) 5   OT Goals   Therapy Frequency (OT) 6 times/wk   OT Predicted Duration/Target Date for Goal Attainment 05/05/23   OT Goals Lower Body  Dressing;Toilet Transfer/Toileting;Cardiac Phase 1   OT: Lower Body Dressing Supervision/stand-by assist;within precautions;including set-up/clothing retrieval   OT: Toilet Transfer/Toileting Supervision/stand-by assist;toilet transfer;within precautions   OT: Understanding of cardiac education to maximize quality of life, condition management, and health outcomes Patient;Caregiver;Verbalize;Demonstrate   OT: Perform aerobic activity with stable cardiovascular response intermittent;5 minutes;10 minutes;ambulation;NuStep   OT: Functional/aerobic ambulation tolerance with stable cardiovascular response in order to return to home and community environment Supervision/SBA;Within precautions;200 feet;Greater than 300 feet   OT Discharge Planning   OT Plan OT/CR: review precautions, standing ADL tolerance, functional mobility (assess for PT needs), issue CR folder   OT Discharge Recommendation (DC Rec) home with assist;home with outpatient cardiac rehab   OT Rationale for DC Rec Pt below baseline function for ADL tasks, functional mobility, overall cardiovascular endurance however doing well POD #1. Pt IND with ADL/IADL tasks at baseline, has elevator access within home and spouse to assist prn. Anticipate with continued IP therapies, pt will be able to discharge home with assist and OP CR to progress IND/safety and overall cardiovascular function.   OT Brief overview of current status min A bed mobility, CGA-min A STS   Total Session Time   Total Session Time (sum of timed and untimed services) 5

## 2023-04-25 NOTE — PLAN OF CARE
Major Shift Events:  Neuro intact. Baseline neuropathy in BLEs. Afebrile. SR with rare ectopy. Temp pacer on standby.  Maintaining MAP goal > 65.  Epinephrine gtt weaned off this morning per Dr. Almeida. FRANK q4h, see flowsheet. 1LNC overnight. Advanced to regular diet. Insulin gtt. [place] with adequate UOP. CT x3 to -20 suction. Magnesium replaced per protocol.     Plan: Continue to monitor, notify team for changes or concerns.     For vital signs and complete assessments, please see documentation flowsheets.

## 2023-04-25 NOTE — CONSULTS
Care Management Initial Consult    General Information  Assessment completed with: Patient, Spouse or significant other, pt and wife Kassy  Type of CM/SW Visit: Initial Assessment    Primary Care Provider verified and updated as needed: Yes   Readmission within the last 30 days: no previous admission in last 30 days      Reason for Consult: discharge planning  Advance Care Planning: Advance Care Planning Reviewed: no concerns identified          Communication Assessment  Patient's communication style: spoken language (English or Bilingual)    Hearing Difficulty or Deaf: no   Wear Glasses or Blind: yes    Cognitive  Cognitive/Neuro/Behavioral: WDL  Level of Consciousness: alert  Arousal Level: opens eyes spontaneously  Orientation: oriented x 4  Mood/Behavior: calm, cooperative  Best Language: 0 - No aphasia  Speech: clear, spontaneous, logical    Living Environment:   People in home: spouse     Current living Arrangements: apartment (apartement has stairs and elevator available.)      Able to return to prior arrangements: yes       Family/Social Support:  Care provided by: self  Provides care for: no one  Marital Status:   Wife, Children, Other (specify) (friends)  Alexander       Description of Support System: Supportive, Involved    Support Assessment: Adequate family and caregiver support, Adequate social supports    Current Resources:   Patient receiving home care services: No     Community Resources: None  Equipment currently used at home: none  Supplies currently used at home: None    Employment/Financial:  Employment Status: retired        Financial Concerns: insurance, none   Referral to Financial Worker: No       Does the patient's insurance plan have a 3 day qualifying hospital stay waiver?  No    Lifestyle & Psychosocial Needs:  Social Determinants of Health     Tobacco Use: Low Risk  (4/25/2023)    Patient History      Smoking Tobacco Use: Never      Smokeless Tobacco Use: Never      Passive  Exposure: Not on file   Alcohol Use: Not on file   Financial Resource Strain: Not on file   Food Insecurity: Not on file   Transportation Needs: Not on file   Physical Activity: Not on file   Stress: Not on file   Social Connections: Not on file   Intimate Partner Violence: Not on file   Depression: Not at risk (4/12/2023)    PHQ-2      PHQ-2 Score: 0   Housing Stability: Not on file       Functional Status:  Prior to admission patient needed assistance:   Dependent ADLs:: Independent  Dependent IADLs:: Independent  Assesssment of Functional Status: Not at baseline with ADL Functioning    Mental Health Status:  Mental Health Status: No Current Concerns       Chemical Dependency Status:  Chemical Dependency Status: No Current Concerns             Values/Beliefs:  Spiritual, Cultural Beliefs, Sikh Practices, Values that affect care:                 Additional Information:    SW met with pt and wife bedside, pt was alert/oriented able to participate in assessment. Both pt and spouse state they have support from friends and their adult children who will be coming in from out of town to support. Spouse states she is willing/able to be primary care giver once pt returns home.   Initial assessment completed due to high risk readmission score. See details above. Care management will follow for any discharge needs.      GABRIELE Butler, JULIANNA  4A & 4E ICU   Pager: [phone]  Phone: [phone]

## 2023-04-25 NOTE — OP NOTE
Procedure Date: 04/24/2023    PREOPERATIVE DIAGNOSIS:  Coronary artery disease.    POSTOPERATIVE DIAGNOSIS:  Coronary artery disease.    PROCEDURES:   1.  Coronary artery bypass grafting x4 (left internal mammary artery to left anterior descending artery, saphenous vein graft to posterolateral artery, saphenous vein graft to obtuse marginal artery, saphenous vein graft to first diagonal artery).  2.  Endoscopic vein harvest.    SURGEON:  Bro Almeida M.D.     ASSISTANTS:  Amy Amaya    OPERATIVE INDICATIONS:  The patient is a 77-year-old gentleman with severe triple-vessel coronary artery disease.  Decision was made to proceed with coronary bypass grafting.  I discussed risks, benefits of surgery with the patient's family including risk of death, stroke, bleeding, wound failure, renal failure, arrhythmias.  They understood and agreed to proceed with surgery.    OPERATIVE FINDINGS:  The patient's sternum was of adequate quality.  Pericardial space was free of any adhesions.  Aorta was grossly free of plaques.  Vessels bypassed included the left anterior descending artery, a 2.25 mm proximal stenosis as well as a posterolateral obtuse marginal.  First diagonal artery was also 2 mm in diameter proximal stenosis.    DESCRIPTION OF PROCEDURE:  The patient was brought to the operating room in stable condition for the administration of general anesthesia. The patient's chest, abdomen, lower extremities were prepped and draped in usual sterile manner.  A long segment of saphenous vein was harvested from the left lower extremity via endoscopic vein harvest techniques using the left greater saphenous vein.  Simultaneously, a median sternotomy was performed.  The left internal mammary artery was harvested from its bed and dilated with topical papaverine.  Preparation for cardiopulmonary bypass included ACT-guided heparinization and the admission of [place].  Aorta was cannulated with a 20-Ukrainian arterial cannula via 3 o  tevdek pursestring pledgeted suture x2.  Dual stage venous cannula was inserted in right atrium.  Antegrade and retrograde cardioplegia cannulae were placed.  Full cardiopulmonary bypass was initiated.  Aortic pressure was temporarily reduced and the aorta was crossclamped.  One liter of cold blood cardioplegia was administered via antegrade and retrograde routes.  Subsequent doses of cardioplegia given at no greater than 20-minute intervals, via the retrograde route as well as via the completed vein grafts.  Reverse segment of saphenous vein was anastomosed end-to-side to an arteriotomy in the mid portion of the posterolateral artery using 7-0 Prolene.  A second reversed saphenous vein was anastomosed end-to-side to an arteriotomy in the mid portion of the obtuse marginal artery using 7-0 Prolene.  Third reversed segment of saphenous vein was anastomosed end-to-side to an arteriotomy mid portion of the first diagonal artery using 7-0 Prolene.  Distal end of the left internal mammary artery was anastomosed end-to-side to an arteriotomy in the mid portion of the left anterior descending artery using 7-0 Prolene.  Proximal ends of the 3 vein grafts were anastomosed to three 4 mm aortotomy using 6-0 Prolene.  Warm dose of retrograde hotshot cardioplegia was given and with high suction on the aortic root and the crossclamp was released.  Organized rhythm resumed without the need for defibrillations.  Rewarming reperfusion was allowed.  De-airing was confirmed by echography.  The patient gradually weaned off cardiopulmonary bypass with low-dose epinephrine.  Following termination of cardiopulmonary bypass, LV function within normal limits and the patient had normal sinus electrocardiogram.  Protamine was given.  All cannulas removed.  Careful hemostasis was obtained.  Two anterior mediastinal and 1 left pleural chest tube were placed.  Two right ventricle pacing wires were also placed.  The sternum was closed with  surgical steel wires.  Fascia, subcutaneous tissue, and skin of chest were closed in layers.  The patient transferred to ICU in stable, but critical condition.    Bro Almeida MD        D: 2023   T: 2023   MT: drea    Name:     RY KOENIG  MRN:      [id number]        Account:        [id number]   :      1945           Procedure Date: 2023     Document: [id number]    cc:  Pito Chapin MD, PhD

## 2023-04-25 NOTE — PROGRESS NOTES
CLINICAL NUTRITION SERVICES - BRIEF NOTE    Received provider consult for nutrition education with comments post op cardiovascular surgery (automatic consult on post-op order set). S/p CABGx4 on 4/24. Nutrition education to be completed as able/appropriate (as pt s/p CABG and/or initial VAD).    RD will follow per LOS protocol or if re-consulted.     Bel Natarajan MS, RD, LD  4E (CVICU) RD pager: [phone]  Ascom: [id number]  Weekend/Holiday RD pager: [phone]

## 2023-04-25 NOTE — PROGRESS NOTES
Major Shift Events:  A/Ox4. C/o incisional pain managed with scheduled tylenol. SR, BP stable. LS clear on RA. Not passing gas. Mary removed, voids spontaneously. Midsternal incision, dressing CDI. MONIKA RACHEL, art line, mary removed. CT x3 into with moderate output. Ropivicaine gtt in place, ins gtt stopped. Pacemaker capped. Up to chair, tolerated well. Wife at bedside, updated. No appetite.     Plan: Remove CT tomorrow. Transfer to  when bed available.     For vital signs and complete assessments, please see documentation flowsheets.

## 2023-04-25 NOTE — PROGRESS NOTES
"Pain Service Progress Note  [facility]  Date: 04/25/2023       Patient Name: Reinaldo Gordillo  MRN: [id number]  Age: 77 year old  Sex: male      Assessment:  Reinaldo Gordillo is a 77 year old male with PMH significant for CAD with VT s/p AREN x 4 in RCA, mild aortic stenosis, HTN, HLD, GERD    Procedure: s/p CABG x 4    Date of Surgery: 4/24/23     Date of Catheter Placement: 4/24/23     Plan/Recommendations:  1. Regional Anesthesia/Analgesia  -Continuous Catheter Type/Site: bilateral erector spinae (ES) T7-8  Infusate: 0.2% Ropivacaine  Programmed Intermittent Bolus (PIB) at 7 mL Q60 min via each catheter, total infusion rate of 14 mL/hr    Plan to maintain catheters while chest tubes in place, max of 7 days    2. Anticoagulation  -Please contact Inpatient Pain Service before ordering or making any anticoagulation changes       3. Multimodal Analgesia  - per primary service    Pain Service will continue to follow.    Discussed with attending anesthesiologist    SARITA Garza CNP  04/25/2023     Overnight Events: No acute events    Tubes/Drains: Yes  3 chest tubes    Subjective:  Tylenol and block catheters seem to work.  Reports good pain control. Chest wall pain and pain related to chest tubes is 3-4/10 and comfortably manageable with current nonopioid analgesia.   Nausea: No  Vomiting: No  Symptoms of LAST: No      Diet: Advance Diet as Tolerated: Regular Diet Adult    Relevant Labs:  Recent Labs   Lab Test 04/25/23  0645 04/25/23  0405 04/24/23  2142 04/24/23  1518   INR 1.27*  --   --  1.31*   PLT  --  159   < > 171   PTT  --   --   --  42*   BUN  --  12.3   < > 12.7    < > = values in this interval not displayed.       Physical Exam:  Vitals: /62   Pulse 79   Temp 98  F (36.7  C) (Axillary)   Resp 14   Ht 1.88 m (6' 2\")   Wt 93 kg (205 lb 0.4 oz)   SpO2 95%   BMI 26.32 kg/m      Physical Exam:   Orientation:  Alert, oriented, and in no acute distress: " Yes  Sedation: No    Motor Examination:  5/5 Strength in lower extremities: Yes    Catheter Site:   Catheter entry site is clean/dry/intact: Yes    Tender: No      Relevant Medications:  Current Pain Medications:  Medications related to Pain Management (From now, onward)    Start     Dose/Rate Route Frequency Ordered Stop    04/27/23 0000  acetaminophen (TYLENOL) tablet 650 mg         650 mg Oral EVERY 4 HOURS PRN 04/24/23 1515      04/25/23 0800  aspirin (ASA) chewable tablet 162 mg         162 mg Oral or NG Tube DAILY 04/25/23 0606      04/24/23 2000  senna-docusate (SENOKOT-S/PERICOLACE) 8.6-50 MG per tablet 2 tablet         2 tablet Oral 2 TIMES DAILY 04/24/23 1535      04/24/23 2000  polyethylene glycol (MIRALAX) Packet 17 g         17 g Oral or Feeding Tube 2 TIMES DAILY 04/24/23 1535      04/24/23 1600  acetaminophen (TYLENOL) tablet 975 mg         975 mg Oral or Feeding Tube EVERY 8 HOURS 04/24/23 1515 04/27/23 1559    04/24/23 1530  dexmedetomidine (PRECEDEX) 4 mcg/mL in NS infusion         0.2-0.7 mcg/kg/hr × 91.1 kg  4.6-15.9 mL/hr  Intravenous CONTINUOUS 04/24/23 1515      04/24/23 1515  HYDROmorphone (DILAUDID) injection 0.2 mg        See Hyperspace for full Linked Orders Report.    0.2 mg Intravenous EVERY 2 HOURS PRN 04/24/23 1515      04/24/23 1515  HYDROmorphone (DILAUDID) injection 0.4 mg        See Hyperspace for full Linked Orders Report.    0.4 mg Intravenous EVERY 2 HOURS PRN 04/24/23 1515      04/24/23 1515  oxyCODONE (ROXICODONE) tablet 5 mg        See Hyperspace for full Linked Orders Report.    5 mg Oral EVERY 4 HOURS PRN 04/24/23 1515      04/24/23 1515  oxyCODONE IR (ROXICODONE) tablet 10 mg        See Hyperspace for full Linked Orders Report.    10 mg Oral EVERY 4 HOURS PRN 04/24/23 1515      04/24/23 1515  magnesium hydroxide (MILK OF MAGNESIA) suspension 30 mL         30 mL Oral or Feeding Tube DAILY PRN 04/24/23 1515      04/24/23 1515  bisacodyl (DULCOLAX) suppository 10 mg          "10 mg Rectal DAILY PRN 04/24/23 1515      04/24/23 1515  lidocaine 1 % 0.1-1 mL         0.1-1 mL Other EVERY 1 HOUR PRN 04/24/23 1515      04/24/23 1515  lidocaine (LMX4) cream          Topical EVERY 1 HOUR PRN 04/24/23 1515      04/24/23 1030  ropivacaine 0.2% in NS perineural infusion (simple)          Perineural Continuous Nerve Block 04/24/23 1010      04/24/23 1030  ropivacaine 0.2% in NS perineural infusion (simple)          Perineural Continuous Nerve Block 04/24/23 1010            Primary Service Contacted with Recommendations? No      Please see A&P for additional details of medical decision making.      Acute Inpatient Pain Service [facility]  Hours of pain coverage 24/7   Page via [facility]- Please Page the Pain Team Via Amcom: \"PAIN MANAGEMENT ACUTE INPATIENT/ [facility] [place]\"             "

## 2023-04-25 NOTE — PROGRESS NOTES
04/25/23 0945   Appointment Info   Signing Clinician's Name / Credentials (OT) Phuong Dewey OTR/L   Rehab Comments (OT) sternal precautions, assess PT needs       Present no   Living Environment   People in Home spouse   Current Living Arrangements house;other (see comments)  (townhouse)   Home Accessibility stairs to enter home;other (see comments)  (elevator access within home)   Number of Stairs, Main Entrance 3;other (see comments)  ('few')   Transportation Anticipated car, drives self;family or friend will provide   Living Environment Comments Pt reports living in [facility] with spouse, 'few stairs to enter,' 3-levels inside with elevator access.   Self-Care   Usual Activity Tolerance good   Current Activity Tolerance moderate   Equipment Currently Used at Home none   Fall history within last six months yes   Number of times patient has fallen within last six months 1   Activity/Exercise/Self-Care Comment Pt reports ADL IND at baseline, typically no AD for functional mobility.   Instrumental Activities of Daily Living (IADL)   Previous Responsibilities meal prep;housekeeping;laundry;driving   IADL Comments Pt reports IADL IND at baseline, retired orthopedic surgeon, drives self, active, enjoys walking.   General Information   Onset of Illness/Injury or Date of Surgery 04/24/23   Referring Physician Amy Amaya PA-C   Patient/Family Therapy Goal Statement (OT) Return home and to PLOF   Additional Occupational Profile Info/Pertinent History of Current Problem Per EMR, Reinaldo Gordillo is a 77 year old male with PMH of CAD with VT s/p AREN x4 in RCA, mild aortic stenosis, HTN, HLD, GERD, who underwent CABG x 4 on 4/24 by Dr. Almeida. Grafts include: LIMA to LAD, SVG to OM, SVG to Diag, SVG to GERALDINE.   Existing Precautions/Restrictions fall;cardiac;sternal   Limitations/Impairments safety/cognitive   Left Upper Extremity (Weight-bearing Status) partial weight-bearing (PWB)  (no > 10 lb)    Right Upper Extremity (Weight-bearing Status) partial weight-bearing (PWB)  (no > 10 lb)   Left Lower Extremity (Weight-bearing Status) full weight-bearing (FWB)   Right Lower Extremity (Weight-bearing Status) full weight-bearing (FWB)   General Observations and Info Activity: Ambulate with Assistance   Cognitive Status Examination   Orientation Status orientation to person, place and time   Cognitive Status Comments Cognition appears intact   Visual Perception   Visual Impairment/Limitations WFL;corrective lenses for reading   Impact of Vision Impairment on Function (Vision) Denies acute vision changes   Sensory   Sensory Comments Pt reports B LE neuropathy at baseline; Denies UE numbness/tingling   Pain Assessment   Patient Currently in Pain No   Posture   Posture not impaired   Posture Comments while sitting at EOB   Range of Motion Comprehensive   General Range of Motion bilateral upper extremity ROM WFL   Strength Comprehensive (MMT)   Comment, General Manual Muscle Testing (MMT) Assessment UE strength grossly deconditioned, pt reporting mostly proximal weakness in B LE   Muscle Tone Assessment   Muscle Tone Quick Adds No deficits were identified   Coordination   Upper Extremity Coordination No deficits were identified   Bed Mobility   Bed Mobility sit-supine   Supine-Sit [place] (Bed Mobility) minimum assist (75% patient effort);verbal cues   Comment (Bed Mobility) per clinical judgement   Transfers   Transfers sit-stand transfer;toilet transfer   Sit-Stand Transfer   Sit-Stand [place] (Transfers) contact guard;verbal cues   Sit/Stand Transfer Comments STS from EOB   Toilet Transfer   Type (Toilet Transfer) sit-stand   [place] Level (Toilet Transfer) contact guard;minimum assist (75% patient effort);verbal cues   Toilet Transfer Comments per clinical judgement   Balance   Balance Assessment standing balance: static   Balance Comments SBA static sitting balance, unsupported   Activities of  Daily Living   BADL Assessment/Intervention bathing;lower body dressing;toileting   Bathing Assessment/Intervention   [place] Level (Bathing) minimum assist (75% patient effort);set up   Comment, (Bathing) per clinical judgement   Lower Body Dressing Assessment/Training   Comment, (Lower Body Dressing) per clinical judgement   [place] Level (Lower Body Dressing) contact guard assist;verbal cues;set up   Toileting   Comment, (Toileting) per clinical judgement   [place] Level (Toileting) contact guard assist   Clinical Impression   Criteria for Skilled Therapeutic Interventions Met (OT) Yes, treatment indicated   OT Diagnosis Decreased ADL IND/safety, cardiovascular endurance, functional mobility, overall activity tolerance   OT Problem List-Impairments impacting ADL problems related to;activity tolerance impaired;balance;cognition;mobility;range of motion (ROM);strength;pain;post-surgical precautions   Assessment of Occupational Performance 5 or more Performance Deficits   Identified Performance Deficits FB bathing, FB dressing, toileting, home mgmt, leisure, IADL tasks, cardiovascular endurance   Planned Therapy Interventions (OT) ADL retraining;IADL retraining;balance training;cognition;ROM;strengthening;transfer training;progressive activity/exercise   Clinical Decision Making Complexity (OT) low complexity   Anticipated Equipment Needs Upon Discharge (OT) other (see comments)  (TBD)   Risk & Benefits of therapy have been explained evaluation/treatment results reviewed;care plan/treatment goals reviewed;participants included;patient   Clinical Impression Comments Pt below baseline function and will benefit from continued skilled OT/CR during IP stay to progress IND/safety and return to PLOF.   OT Total Evaluation Time   OT Eval, Low Complexity Minutes (23733) 5   OT Goals   Therapy Frequency (OT) 6 times/wk   OT Predicted Duration/Target Date for Goal Attainment 05/05/23   OT Goals Lower Body  Dressing;Toilet Transfer/Toileting;Cardiac Phase 1   OT: Lower Body Dressing Supervision/stand-by assist;within precautions;including set-up/clothing retrieval   OT: Toilet Transfer/Toileting Supervision/stand-by assist;toilet transfer;within precautions   OT: Understanding of cardiac education to maximize quality of life, condition management, and health outcomes Patient;Caregiver;Verbalize;Demonstrate   OT: Perform aerobic activity with stable cardiovascular response intermittent;5 minutes;10 minutes;ambulation;NuStep   OT: Functional/aerobic ambulation tolerance with stable cardiovascular response in order to return to home and community environment Supervision/SBA;Within precautions;200 feet;Greater than 300 feet   OT Discharge Planning   OT Plan OT/CR: review precautions, standing ADL tolerance, functional mobility (assess for PT needs), issue CR folder   OT Discharge Recommendation (DC Rec) home with assist;home with outpatient cardiac rehab   OT Rationale for DC Rec Pt below baseline function for ADL tasks, functional mobility, overall cardiovascular endurance however doing well POD #1. Pt IND with ADL/IADL tasks at baseline, has elevator access within home and spouse to assist prn. Anticipate with continued IP therapies, pt will be able to discharge home with assist and OP CR to progress IND/safety and overall cardiovascular function.   OT Brief overview of current status min A bed mobility, CGA-min A STS   Total Session Time   Total Session Time (sum of timed and untimed services) 5

## 2023-04-26 ENCOUNTER — APPOINTMENT (OUTPATIENT)
Dept: GENERAL RADIOLOGY | Facility: CLINIC | Age: 78
DRG: 236 | End: 2023-04-26
Attending: SURGERY
Payer: COMMERCIAL

## 2023-04-26 ENCOUNTER — APPOINTMENT (OUTPATIENT)
Dept: PHYSICAL THERAPY | Facility: CLINIC | Age: 78
DRG: 236 | End: 2023-04-26
Attending: SURGERY
Payer: COMMERCIAL

## 2023-04-26 ENCOUNTER — APPOINTMENT (OUTPATIENT)
Dept: OCCUPATIONAL THERAPY | Facility: CLINIC | Age: 78
DRG: 236 | End: 2023-04-26
Attending: SURGERY
Payer: COMMERCIAL

## 2023-04-26 LAB
ALBUMIN SERPL BCG-MCNC: 3.1 G/DL (ref 3.5–5.2)
ALP SERPL-CCNC: 40 U/L (ref 40–129)
ALT SERPL W P-5'-P-CCNC: 20 U/L (ref 10–50)
ANION GAP SERPL CALCULATED.3IONS-SCNC: 10 MMOL/L (ref 7–15)
AST SERPL W P-5'-P-CCNC: 35 U/L (ref 10–50)
ATRIAL RATE - MUSE: 77 BPM
ATRIAL RATE - MUSE: 85 BPM
BILIRUB SERPL-MCNC: 0.2 MG/DL
BUN SERPL-MCNC: 13.6 MG/DL (ref 8–23)
CALCIUM SERPL-MCNC: 8.8 MG/DL (ref 8.8–10.2)
CHLORIDE SERPL-SCNC: 104 MMOL/L (ref 98–107)
CREAT SERPL-MCNC: 0.91 MG/DL (ref 0.67–1.17)
DEPRECATED HCO3 PLAS-SCNC: 26 MMOL/L (ref 22–29)
DIASTOLIC BLOOD PRESSURE - MUSE: NORMAL MMHG
DIASTOLIC BLOOD PRESSURE - MUSE: NORMAL MMHG
ERYTHROCYTE [DISTWIDTH] IN BLOOD BY AUTOMATED COUNT: 12.5 % (ref 10–15)
GFR SERPL CREATININE-BSD FRML MDRD: 88 ML/MIN/1.73M2
GLUCOSE BLDC GLUCOMTR-MCNC: 113 MG/DL (ref 70–99)
GLUCOSE BLDC GLUCOMTR-MCNC: 118 MG/DL (ref 70–99)
GLUCOSE BLDC GLUCOMTR-MCNC: 123 MG/DL (ref 70–99)
GLUCOSE BLDC GLUCOMTR-MCNC: 128 MG/DL (ref 70–99)
GLUCOSE SERPL-MCNC: 137 MG/DL (ref 70–99)
HCT VFR BLD AUTO: 30.8 % (ref 40–53)
HGB BLD-MCNC: 9.9 G/DL (ref 13.3–17.7)
INTERPRETATION ECG - MUSE: NORMAL
INTERPRETATION ECG - MUSE: NORMAL
MAGNESIUM SERPL-MCNC: 2.2 MG/DL (ref 1.7–2.3)
MCH RBC QN AUTO: 32.5 PG (ref 26.5–33)
MCHC RBC AUTO-ENTMCNC: 32.1 G/DL (ref 31.5–36.5)
MCV RBC AUTO: 101 FL (ref 78–100)
P AXIS - MUSE: 19 DEGREES
P AXIS - MUSE: 8 DEGREES
PHOSPHATE SERPL-MCNC: 2.8 MG/DL (ref 2.5–4.5)
PLATELET # BLD AUTO: 130 10E3/UL (ref 150–450)
POTASSIUM SERPL-SCNC: 4.6 MMOL/L (ref 3.4–5.3)
PR INTERVAL - MUSE: 146 MS
PR INTERVAL - MUSE: 172 MS
PROT SERPL-MCNC: 5.1 G/DL (ref 6.4–8.3)
QRS DURATION - MUSE: 82 MS
QRS DURATION - MUSE: 90 MS
QT - MUSE: 358 MS
QT - MUSE: 372 MS
QTC - MUSE: 420 MS
QTC - MUSE: 426 MS
R AXIS - MUSE: -12 DEGREES
R AXIS - MUSE: -2 DEGREES
RBC # BLD AUTO: 3.05 10E6/UL (ref 4.4–5.9)
SODIUM SERPL-SCNC: 140 MMOL/L (ref 136–145)
SYSTOLIC BLOOD PRESSURE - MUSE: NORMAL MMHG
SYSTOLIC BLOOD PRESSURE - MUSE: NORMAL MMHG
T AXIS - MUSE: -17 DEGREES
T AXIS - MUSE: -5 DEGREES
VENTRICULAR RATE- MUSE: 77 BPM
VENTRICULAR RATE- MUSE: 85 BPM
WBC # BLD AUTO: 10.2 10E3/UL (ref 4–11)

## 2023-04-26 PROCEDURE — 84100 ASSAY OF PHOSPHORUS: CPT | Performed by: SURGERY

## 2023-04-26 PROCEDURE — 97116 GAIT TRAINING THERAPY: CPT | Mod: GP | Performed by: PHYSICAL THERAPIST

## 2023-04-26 PROCEDURE — 250N000013 HC RX MED GY IP 250 OP 250 PS 637: Performed by: STUDENT IN AN ORGANIZED HEALTH CARE EDUCATION/TRAINING PROGRAM

## 2023-04-26 PROCEDURE — 97530 THERAPEUTIC ACTIVITIES: CPT | Mod: GP | Performed by: PHYSICAL THERAPIST

## 2023-04-26 PROCEDURE — 97161 PT EVAL LOW COMPLEX 20 MIN: CPT | Mod: GP | Performed by: PHYSICAL THERAPIST

## 2023-04-26 PROCEDURE — 36415 COLL VENOUS BLD VENIPUNCTURE: CPT | Performed by: STUDENT IN AN ORGANIZED HEALTH CARE EDUCATION/TRAINING PROGRAM

## 2023-04-26 PROCEDURE — 250N000011 HC RX IP 250 OP 636: Performed by: SURGERY

## 2023-04-26 PROCEDURE — 80053 COMPREHEN METABOLIC PANEL: CPT | Performed by: STUDENT IN AN ORGANIZED HEALTH CARE EDUCATION/TRAINING PROGRAM

## 2023-04-26 PROCEDURE — 99232 SBSQ HOSP IP/OBS MODERATE 35: CPT | Performed by: NURSE PRACTITIONER

## 2023-04-26 PROCEDURE — 250N000011 HC RX IP 250 OP 636: Performed by: PHYSICIAN ASSISTANT

## 2023-04-26 PROCEDURE — 85027 COMPLETE CBC AUTOMATED: CPT | Performed by: STUDENT IN AN ORGANIZED HEALTH CARE EDUCATION/TRAINING PROGRAM

## 2023-04-26 PROCEDURE — 258N000003 HC RX IP 258 OP 636: Performed by: STUDENT IN AN ORGANIZED HEALTH CARE EDUCATION/TRAINING PROGRAM

## 2023-04-26 PROCEDURE — 250N000011 HC RX IP 250 OP 636: Performed by: STUDENT IN AN ORGANIZED HEALTH CARE EDUCATION/TRAINING PROGRAM

## 2023-04-26 PROCEDURE — 97530 THERAPEUTIC ACTIVITIES: CPT | Mod: GO

## 2023-04-26 PROCEDURE — 84155 ASSAY OF PROTEIN SERUM: CPT | Performed by: STUDENT IN AN ORGANIZED HEALTH CARE EDUCATION/TRAINING PROGRAM

## 2023-04-26 PROCEDURE — 83735 ASSAY OF MAGNESIUM: CPT | Performed by: SURGERY

## 2023-04-26 PROCEDURE — 71045 X-RAY EXAM CHEST 1 VIEW: CPT

## 2023-04-26 PROCEDURE — 71045 X-RAY EXAM CHEST 1 VIEW: CPT | Mod: 26 | Performed by: RADIOLOGY

## 2023-04-26 PROCEDURE — 200N000002 HC R&B ICU UMMC

## 2023-04-26 PROCEDURE — 250N000013 HC RX MED GY IP 250 OP 250 PS 637: Performed by: PHYSICIAN ASSISTANT

## 2023-04-26 PROCEDURE — 36415 COLL VENOUS BLD VENIPUNCTURE: CPT | Performed by: SURGERY

## 2023-04-26 PROCEDURE — 250N000013 HC RX MED GY IP 250 OP 250 PS 637: Performed by: SURGERY

## 2023-04-26 RX ORDER — LISINOPRIL 20 MG/1
20 TABLET ORAL EVERY MORNING
Status: DISCONTINUED | OUTPATIENT
Start: 2023-04-26 | End: 2023-04-30 | Stop reason: HOSPADM

## 2023-04-26 RX ORDER — PRAVASTATIN SODIUM 20 MG
80 TABLET ORAL AT BEDTIME
Status: DISCONTINUED | OUTPATIENT
Start: 2023-04-26 | End: 2023-04-30 | Stop reason: HOSPADM

## 2023-04-26 RX ORDER — POLYETHYLENE GLYCOL 3350 17 G/17G
17 POWDER, FOR SOLUTION ORAL 2 TIMES DAILY
Status: DISCONTINUED | OUTPATIENT
Start: 2023-04-26 | End: 2023-04-30 | Stop reason: HOSPADM

## 2023-04-26 RX ORDER — AMOXICILLIN 250 MG
3 CAPSULE ORAL 2 TIMES DAILY
Status: DISCONTINUED | OUTPATIENT
Start: 2023-04-26 | End: 2023-04-30 | Stop reason: HOSPADM

## 2023-04-26 RX ORDER — MAGNESIUM SULFATE HEPTAHYDRATE 40 MG/ML
2 INJECTION, SOLUTION INTRAVENOUS ONCE
Status: COMPLETED | OUTPATIENT
Start: 2023-04-26 | End: 2023-04-26

## 2023-04-26 RX ORDER — NYSTATIN 100000 U/G
OINTMENT TOPICAL 2 TIMES DAILY
Status: DISCONTINUED | OUTPATIENT
Start: 2023-04-26 | End: 2023-04-30 | Stop reason: HOSPADM

## 2023-04-26 RX ORDER — ASPIRIN 81 MG/1
162 TABLET ORAL DAILY
Status: DISCONTINUED | OUTPATIENT
Start: 2023-04-27 | End: 2023-04-30 | Stop reason: HOSPADM

## 2023-04-26 RX ORDER — FUROSEMIDE 10 MG/ML
20 INJECTION INTRAMUSCULAR; INTRAVENOUS ONCE
Status: COMPLETED | OUTPATIENT
Start: 2023-04-26 | End: 2023-04-26

## 2023-04-26 RX ADMIN — PRAVASTATIN SODIUM 80 MG: 20 TABLET ORAL at 22:10

## 2023-04-26 RX ADMIN — HEPARIN SODIUM 5000 UNITS: 5000 INJECTION, SOLUTION INTRAVENOUS; SUBCUTANEOUS at 11:36

## 2023-04-26 RX ADMIN — ONDANSETRON 4 MG: 2 INJECTION INTRAMUSCULAR; INTRAVENOUS at 00:16

## 2023-04-26 RX ADMIN — ACETAMINOPHEN 975 MG: 325 TABLET ORAL at 07:33

## 2023-04-26 RX ADMIN — MAGNESIUM SULFATE HEPTAHYDRATE 2 G: 40 INJECTION, SOLUTION INTRAVENOUS at 01:15

## 2023-04-26 RX ADMIN — LISINOPRIL 20 MG: 20 TABLET ORAL at 09:57

## 2023-04-26 RX ADMIN — POLYETHYLENE GLYCOL 3350 17 G: 17 POWDER, FOR SOLUTION ORAL at 07:33

## 2023-04-26 RX ADMIN — PANTOPRAZOLE SODIUM 40 MG: 40 TABLET, DELAYED RELEASE ORAL at 07:33

## 2023-04-26 RX ADMIN — HEPARIN SODIUM 5000 UNITS: 5000 INJECTION, SOLUTION INTRAVENOUS; SUBCUTANEOUS at 04:40

## 2023-04-26 RX ADMIN — ASPIRIN 162 MG: 81 TABLET, CHEWABLE ORAL at 07:33

## 2023-04-26 RX ADMIN — Medication 12.5 MG: at 07:33

## 2023-04-26 RX ADMIN — SENNOSIDES AND DOCUSATE SODIUM 3 TABLET: 50; 8.6 TABLET ORAL at 09:57

## 2023-04-26 RX ADMIN — ACETAMINOPHEN 975 MG: 325 TABLET ORAL at 16:10

## 2023-04-26 RX ADMIN — SENNOSIDES AND DOCUSATE SODIUM 3 TABLET: 50; 8.6 TABLET ORAL at 19:36

## 2023-04-26 RX ADMIN — FUROSEMIDE 20 MG: 10 INJECTION, SOLUTION INTRAVENOUS at 09:58

## 2023-04-26 RX ADMIN — ACETAMINOPHEN 975 MG: 325 TABLET ORAL at 00:19

## 2023-04-26 RX ADMIN — MAGNESIUM HYDROXIDE 30 ML: 400 SUSPENSION ORAL at 10:01

## 2023-04-26 RX ADMIN — SODIUM CHLORIDE, POTASSIUM CHLORIDE, SODIUM LACTATE AND CALCIUM CHLORIDE 250 ML: 600; 310; 30; 20 INJECTION, SOLUTION INTRAVENOUS at 12:00

## 2023-04-26 RX ADMIN — NYSTATIN: 100000 OINTMENT TOPICAL at 19:37

## 2023-04-26 RX ADMIN — HEPARIN SODIUM 5000 UNITS: 5000 INJECTION, SOLUTION INTRAVENOUS; SUBCUTANEOUS at 19:36

## 2023-04-26 RX ADMIN — Medication 5 MG: at 22:10

## 2023-04-26 RX ADMIN — ONDANSETRON 4 MG: 2 INJECTION INTRAMUSCULAR; INTRAVENOUS at 21:08

## 2023-04-26 RX ADMIN — Medication 5 MG: at 00:19

## 2023-04-26 ASSESSMENT — ACTIVITIES OF DAILY LIVING (ADL)
ADLS_ACUITY_SCORE: 29

## 2023-04-26 NOTE — PLAN OF CARE
Major Shift Events: A&O, denies need for PRN pain medication. Ambulated in hallway twice Ax1, up in chair throughout shift. 20 Lasix given with minimal response. Lisinopril restarted this AM; soft blood pressures this afternoon following. 250mL LR bolus given and metoprolol and lisinopril held for now. Room air. No PO intake despite encouragement. 1 large BM following an increase in bowel regimen.   Plan: Increase activity. Remove chest tubes tomorrow AM. Transfer to floor when bed available.   For vital signs and complete assessments, please see documentation flowsheets.

## 2023-04-26 NOTE — PROGRESS NOTES
04/26/23 1029   Appointment Info   Signing Clinician's Name / Credentials (PT) Ana Rosa Villalobos, PT, DPT   Rehab Comments (PT) sternal precautions; monitor BPs,   Living Environment   People in Home spouse   Current Living Arrangements house;other (see comments)   Home Accessibility stairs to enter home;other (see comments)   Number of Stairs, Main Entrance 3;other (see comments)   Transportation Anticipated family or friend will provide   Living Environment Comments 3 floors, railings all stair sets; elevator access from garage. 24/7 assist available from family available upon discharge between family members (will space out daughter's coming in to stay with pt)   Self-Care   Usual Activity Tolerance good   Current Activity Tolerance moderate   Regular Exercise Yes   Activity/Exercise Type walking   Exercise Amount/Frequency 3-5 times/wk  (3-4 miles/time)   Equipment Currently Used at Home none  (commode and walker available but were not used by pt)   Fall history within last six months yes   Number of times patient has fallen within last six months 1   Activity/Exercise/Self-Care Comment typically no AD baseline for mobility; pt reports some baseline deficits in gait/balance related to neuropathy; has had some near falls at home   General Information   Onset of Illness/Injury or Date of Surgery 04/24/23   Referring Physician Amy Amaya PA-C   Patient/Family Therapy Goals Statement (PT) To be IND and not have to use a walker.   Pertinent History of Current Problem (include personal factors and/or comorbidities that impact the POC) Reinaldo Gordillo is a 77 year old male with PMH of CAD with VT s/p AREN x4 in RCA, mild aortic stenosis, HTN, HLD, GERD, who underwent CABG x 4 on 4/24 by Dr. Almeida. Grafts include: LIMA to LAD, SVG to OM, SVG to Diag, SVG to GERALDINE.   Existing Precautions/Restrictions cardiac;fall;sternal;weight bearing   Weight-Bearing Status - LUE partial weight-bearing (% in comments)  (10#)  "  Weight-Bearing Status - RUE partial weight-bearing (% in comments)  (10#)   General Observations activity: Ambulate with assistance  TID and PRN. Per RN team aware of pt having \"soft BPs\", ok to preceed per discussion and current vitals (BP 81/55, map 64) in standing) as long as pt asymptomatic.   Cognition   Cognitive Status Comments followed all commands, has good safety awareness   Pain Assessment   Patient Currently in Pain Yes, see Vital Sign flowsheet   Range of Motion (ROM)   ROM Comment no significant concerns noted   Strength (Manual Muscle Testing)   Strength Comments proximal weakness in LEs noted, see transfers   Bed Mobility   Comment, (Bed Mobility) MODA x 2 supine<>sit   Transfers   Comment, (Transfers) LAURA-MODA x 2 initial STS   Gait/Stairs (Locomotion)   Comment, (Gait/Stairs) Ambulated slowly with B UE support, slowed chema, reduced upright posture and step length   Balance   Balance Comments benefits from 1-2 UE support in standing due to reduced standing static and dynamic balance   Clinical Impression   Criteria for Skilled Therapeutic Intervention Yes, treatment indicated   PT Diagnosis (PT) impaired mobility   Influenced by the following impairments pain, reduced strength, ROM, balance, activity tolerance, safety   Functional limitations due to impairments below baseline bed mobility, transfers, gait   Clinical Presentation (PT Evaluation Complexity) Stable/Uncomplicated   Clinical Presentation Rationale clinical judgement, [facility]   Clinical Decision Making (Complexity) low complexity   Planned Therapy Interventions (PT) balance training;bed mobility training;gait training;home exercise program;patient/family education;neuromuscular re-education;postural re-education;ROM (range of motion);stair training;strengthening;stretching;transfer training;risk factor education;home program guidelines;progressive activity/exercise   Risk & Benefits of therapy have been explained evaluation/treatment " results reviewed;care plan/treatment goals reviewed;risks/benefits reviewed;current/potential barriers reviewed;participants voiced agreement with care plan;participants included;patient   PT Total Evaluation Time   PT Jarrod Low Complexity Minutes (45028) 6   Physical Therapy Goals   PT Frequency Daily   PT Predicted Duration/Target Date for Goal Attainment 05/03/23   PT Goals Bed Mobility;Transfers;Gait;Stairs   PT: Bed Mobility Independent;Rolling;Bridging;Within precautions   PT: Transfers Modified independent;Independent;Bed to/from chair;Sit to/from stand;Within precautions;Assistive device   PT: Gait Independent;Modified independent;Within precautions;150 feet   PT: Stairs 3 stairs;Within precautions;Modified independent   PT Discharge Planning   PT Plan progress gait, focus on further IND with bed mobility and transfers within precautions   PT Discharge Recommendation (DC Rec) home with assist;home with outpatient physical therapy   PT Rationale for DC Rec Pt appears motivated and is very cooperative. Provided further therapies during LOS and mobilization with nursing, anticipate gains in mobilty to allow safe discharge home. Pt will benefit from OP CR to progress status, maximize recovery.   PT Brief overview of current status Ax2 bed mobility, Ax1-2 transfers, gait with IV pole   Total Session Time   Total Session Time (sum of timed and untimed services) 6

## 2023-04-26 NOTE — PROGRESS NOTES
CV ICU Progress Note  4/26/2023      CO-MORBIDITIES:   Patient Active Problem List   Diagnosis     Nuclear sclerotic cataract of both eyes     Hyperglycemia     Hyperlipidemia     Stented coronary artery     Coronary artery disease involving native coronary artery without angina pectoris     Basal cell carcinoma (BCC) in situ of skin     Colon polyp     Hereditary and idiopathic peripheral neuropathy     Status post coronary angiogram     Coronary artery disease involving native coronary artery of native heart without angina pectoris       ASSESSMENT: Reinaldo Gordillo is a 77 year old male with PMH of CAD with VT s/p AREN x4 in RCA, mild aortic stenosis, HTN, HLD, GERD, who underwent CABG x 4 on 4/24 by Dr. Almeida. Grafts include: LIMA to LAD, SVG to OM, SVG to Diag, SVG to GERALDINE.      Interval Changes:  NAEO  Concern for Afib overnight. EKG was NSR with some Q waves in II, III aVF, stable from prior ekg.    PLAN:  Neuro/ pain/ sedation:  Acute Postoperative pain  - Monitor neurological status. Notify the MD for any acute changes in exam.  - Pain: fentanyl gtt. Scheduled tylenol. PRN tylenol, oxycodone, dilaudid.  - CESAR Catheters in place bilaterally   - Hold PTA zolpidem     Pulmonary care:   Postoperative ventilation management     Fast track, trail pressure support   - Extubated 4/24   - Titrate supplemental oxygen to maintain saturation above 92%.  - Pulmonary hygiene: Incentive spirometer every 15- 30 minutes when awake, flutter valve, C&DB    Cardiovascular:    S/p CABG x 4 on 4/24 by Dr. Almeida  History of CAD, HTN, HLD  Pre CPB: LV normal function, EF 55-60%, no RWMA's. Normal diastolic function. RV normal function. Tri-leaflet aortic valve, calcified leaflets, mild AS. Trace TR. Trace MR. No PFO by CFD. No pericardial fluid. No pleural effusion. No aortic dissection. No intracardiac thrombus noted.    Post CPB: S/p CABG x 4. LV normal function, EF 55-60%, no RWMA's noted. RV normal function. Mild AS. Mild TR.  Trace MR. No pericardial effusion. No pleural effusion. No aortic dissection. No intracardiac thrombus noted.     - Monitor hemodynamic status.   - Goal MAP>65, SBP<140  - Resume PTA lisinopril, metoprolol, pravastatin  - ASA: start today    GI care/ Nutrition:   - ADAT   - PPI  - Continue bowel regimen: miralax, senna    Renal/ Fluid Balance/ Electrolytes:   BL creat appears to be ~ 0.8-1.0  - Strict I/O, daily weights  - Avoid/limit nephrotoxins as able  - Replete lytes PRN per protocol    Endocrine:    Stress induced hyperglycemia  Preop A1c 6.2  - Goal BG <180 for optimal healing  - PTA meds None    ID/ Antibiotics:  Stress induced leukocytosis  - To complete perioperative regimen  - Continue to monitor fever curve, WBC and inflammatory markers as appropriate    Heme:     Stress induced leukocytosis  No s/sx active bleeding  - Continue to monitor  - CBC     MSK/ Skin:  Sternotomy  Surgical Incision  - Sternal precautions  - Postoperative incision management per protocol  - PT/OT/CR    Prophylaxis:    - Mechanical prophylaxis for DVT  - Chemical DVT prophylaxis - SQH  - PPI    Lines/ tubes/ drains:  - CT x 3, remove tomorrow    Disposition:  - 6C    Patient seen, findings and plan discussed with CVICU staff    Konstantin Claudio MD  Anesthesiology CA-1/PGY-3    ====================================    HPI:     NAEO. Sleeping okay, ready to transfer to floor.        PAST MEDICAL HISTORY:   Past Medical History:   Diagnosis Date     ASCVD (arteriosclerotic cardiovascular disease)      Coronary artery disease      Gastroesophageal reflux disease      Hypertension      Stented coronary artery        PAST SURGICAL HISTORY:   Past Surgical History:   Procedure Laterality Date     BYPASS GRAFT ARTERY CORONARY N/A 4/24/2023    Procedure: MEDIAN STERNOTOMY, HARVEST OF LEFT INTERNAL MAMMARY ARTERY, ENDOSCOPIC HARVEST OF LEFT GREATER SAPHENOUS VEIN, ON CARDIOPULMONARY BYPASS, CORONARY ARTERY BYPASS GRAFT (CABG) X4 .  TRANSESOPHAGEAL ECHOCARDIOGRAM.;  Surgeon: Bro Almeida MD;  Location: [facility] OR     CATARACT IOL, RT/LT       COLONOSCOPY N/A 7/17/2019    Procedure: COLONOSCOPY;  Surgeon: Roque Givens MD;  [facility]: [facility]     CV CORONARY ANGIOGRAM N/A 3/21/2023    Procedure: Coronary Angiogram;  Surgeon: Pito Chapin MD;  Location:  [facility] CARDIAC CATH LAB     CV PCI N/A 3/21/2023    Procedure: Percutaneous Coronary Intervention;  Surgeon: Pito Chapin MD;  Location: [facility] CARDIAC CATH LAB     ESOPHAGOSCOPY, GASTROSCOPY, DUODENOSCOPY (EGD), COMBINED N/A 7/17/2019    Procedure: ESOPHAGOGASTRODUODENOSCOPY, WITH BIOPSY;  Surgeon: Roque Givens MD;  Location: [facility] OR     HERNIA REPAIR  2000     MOHS MICROGRAPHIC PROCEDURE       PHACOEMULSIFICATION CLEAR CORNEA WITH STANDARD INTRAOCULAR LENS IMPLANT Right 7/2/2021    Procedure: RIGHT EYE CATARACT REMOVAL WITH INTRAOCULAR LENS IMPLANT;  Surgeon: Justa Liz MD;  [place] [facility] [place]     PHACOEMULSIFICATION CLEAR CORNEA WITH STANDARD INTRAOCULAR LENS IMPLANT Left 7/9/2021    Procedure: LEFT EYE CATARACT REMOVAL WITH INTRAOCULAR LENS IMPLANT;  Surgeon: Justa Liz MD;  [place] [facility] [place]     [facility] CORONARY STENT PERCUT, EA ADDTL VESSEL         FAMILY HISTORY:   Family History   Problem Relation Age of Onset     Glaucoma No family hx of      Macular Degeneration No family hx of      Retinal detachment No family hx of      Amblyopia No family hx of      Melanoma No family hx of      Skin Cancer No family hx of        SOCIAL HISTORY:   Social History     Tobacco Use     Smoking status: Never     Smokeless tobacco: Never   Vaping Use     Vaping status: Not on file   Substance Use Topics     Alcohol use: Yes     Comment: 1-2 drinks 5 days a week         OBJECTIVE:   1. VITAL SIGNS:     Temp:  [97.4  F (36.3  C)-98.4  F (36.9  C)] 98.4  F (36.9  C)  Pulse:  [70-99] 86  Resp:  [14-18] 15  BP: ()/(56-88) 109/69  MAP:  [76 mmHg-95 mmHg] 76 mmHg  Arterial Line  BP: (116-126)/(53-85) 119/53  SpO2:  [92 %-96 %] 95 %          2. INTAKE/ OUTPUT:      I/O last 3 completed shifts:  In: 478.58 [I.V.:478.58]  Out: 2760 [Urine:2010; Chest Tube:750]      3. PHYSICAL EXAMINATION:   General: alert and oriented  Resp: No increased WOB on NC  CV: S1, S2, RRR, no m/r/g   Abdomen: Soft, non-distended, non-tender  Incisions: c/d/i  Extremities: warm and well perfused  CT: To suction, serosang output, no airleak, crepitus    4. INVESTIGATIONS:   Arterial Blood Gases   Recent Labs   Lab 04/25/23 0405 04/24/23 2005 04/24/23  1653 04/24/23  1518   PH 7.40 7.36 7.34* 7.36   PCO2 41 36 43 40   PO2 65* 92 136* 160*   HCO3 25 21 23 23     Complete Blood Count   Recent Labs   Lab 04/25/23 0405 04/24/23 2142 04/24/23  1518 04/24/23  1409 04/24/23  1325 04/24/23  1255   WBC 10.2 10.4 13.2*  --   --   --    HGB 9.5* 10.2* 10.2* 10.1*   < > 8.6*    160 171  --   --  157    < > = values in this interval not displayed.     Basic Metabolic Panel  Recent Labs   Lab 04/25/23  2216 04/25/23  1942 04/25/23  1611 04/25/23  1156 04/25/23  0407 04/25/23  0405 04/24/23  2148 04/24/23 2142 04/24/23  1521 04/24/23  1518   NA  --  139  --   --   --  139  --  141  --  142   POTASSIUM  --  4.3  --   --   --  4.4  --  4.1  --  3.9   CHLORIDE  --  104  --   --   --  107  --  110*  --  109*   CO2  --  23  --   --   --  22  --  20*  --  21*   BUN  --  12.3  --   --   --  12.3  --  13.0  --  12.7   CR  --  0.86  --   --   --  0.78  --  0.82  --  0.80   * 129* 118* 112*   < > 129*   < > 159*   < > 167*    < > = values in this interval not displayed.     Liver Function Tests  Recent Labs   Lab 04/25/23  0645 04/25/23  0405 04/24/23  1518 04/24/23  1255   AST  --  45 39  --    ALT  --  29 34  --    ALKPHOS  --  39* 44  --    BILITOTAL  --  0.3 0.5  --    ALBUMIN  --  3.2* 3.4*  --    INR 1.27*  --  1.31* 1.49*     Pancreatic Enzymes  No lab results found in last 7 days.  Coagulation Profile  Recent Labs    Lab 04/25/23  0645 04/24/23  1518 04/24/23  1255   INR 1.27* 1.31* 1.49*   PTT  --  42* 24         5. RADIOLOGY:   Recent Results (from the past 24 hour(s))   XR Chest Port 1 View    Impression    RESIDENT PRELIMINARY INTERPRETATION  Impression:   1. Interval removal of Jefferson-Fausto catheter.  2. Stable pulmonary opacities representing atelectasis versus edema.       =========================================

## 2023-04-26 NOTE — PLAN OF CARE
Major Shift Events  Overnight pt remained in stable condition with issues of incision pain, and abnormal rhythm. @ 1900 it was reported that pt had been in SR w/o major ectopy throughout days. However, pt appeared to be having bigeminy upon assessment. CVTS MD informed and ECG 12 lead + Labs were ordered and collected. ECG resulted w/ sinus rhythm w/ inferior infarct. Labs resulted WDL ex. Mg=1.9 -> 2g Mg replaced. Pt continues to exhibit occasional PAC's/PVC's, otherwise no significant S/S reported at this time. Incisional pain was treated with tylenol scheduled w/ good effect.     Neuro: A&Ox4, following all commands, baseline peripheral neuropathy BLE's, MSK: 5/5, PERRLA 3mm  Cv: SR w/ PVC/PAC, pulses +1, cap refill <3, BP WDL (SYS<140), afebrile  Resp: RA, LS: clear bilaterally, SpO2 > 92%  GI/: Voids independently w/o issues, BM-, Flattus +, BS +  Skin: Generalized ecchymosis, Midsternal incision, CT x3, L-leg graft site (Ace wrapped)     Drips: TKO  Sedation: None     Plan: Follow POC. Monitor closely, notify MD of acute changes.  For vital signs and complete assessments, please see documentation flowsheets.

## 2023-04-27 ENCOUNTER — APPOINTMENT (OUTPATIENT)
Dept: PHYSICAL THERAPY | Facility: CLINIC | Age: 78
DRG: 236 | End: 2023-04-27
Attending: SURGERY
Payer: COMMERCIAL

## 2023-04-27 ENCOUNTER — APPOINTMENT (OUTPATIENT)
Dept: GENERAL RADIOLOGY | Facility: CLINIC | Age: 78
DRG: 236 | End: 2023-04-27
Attending: SURGERY
Payer: COMMERCIAL

## 2023-04-27 LAB
ALBUMIN SERPL BCG-MCNC: 3 G/DL (ref 3.5–5.2)
ALP SERPL-CCNC: 45 U/L (ref 40–129)
ALT SERPL W P-5'-P-CCNC: 16 U/L (ref 10–50)
ANION GAP SERPL CALCULATED.3IONS-SCNC: 10 MMOL/L (ref 7–15)
AST SERPL W P-5'-P-CCNC: 23 U/L (ref 10–50)
ATRIAL RATE - MUSE: 234 BPM
BILIRUB SERPL-MCNC: 0.2 MG/DL
BUN SERPL-MCNC: 14.8 MG/DL (ref 8–23)
CALCIUM SERPL-MCNC: 8.5 MG/DL (ref 8.8–10.2)
CHLORIDE SERPL-SCNC: 103 MMOL/L (ref 98–107)
CREAT SERPL-MCNC: 0.9 MG/DL (ref 0.67–1.17)
DEPRECATED HCO3 PLAS-SCNC: 25 MMOL/L (ref 22–29)
DIASTOLIC BLOOD PRESSURE - MUSE: NORMAL MMHG
ERYTHROCYTE [DISTWIDTH] IN BLOOD BY AUTOMATED COUNT: 12.5 % (ref 10–15)
GFR SERPL CREATININE-BSD FRML MDRD: 89 ML/MIN/1.73M2
GLUCOSE BLDC GLUCOMTR-MCNC: 108 MG/DL (ref 70–99)
GLUCOSE BLDC GLUCOMTR-MCNC: 108 MG/DL (ref 70–99)
GLUCOSE BLDC GLUCOMTR-MCNC: 119 MG/DL (ref 70–99)
GLUCOSE BLDC GLUCOMTR-MCNC: 192 MG/DL (ref 70–99)
GLUCOSE SERPL-MCNC: 113 MG/DL (ref 70–99)
HCT VFR BLD AUTO: 30.5 % (ref 40–53)
HGB BLD-MCNC: 9.8 G/DL (ref 13.3–17.7)
HOLD SPECIMEN: NORMAL
INTERPRETATION ECG - MUSE: NORMAL
MAGNESIUM SERPL-MCNC: 2 MG/DL (ref 1.7–2.3)
MCH RBC QN AUTO: 32.6 PG (ref 26.5–33)
MCHC RBC AUTO-ENTMCNC: 32.1 G/DL (ref 31.5–36.5)
MCV RBC AUTO: 101 FL (ref 78–100)
P AXIS - MUSE: 264 DEGREES
PLATELET # BLD AUTO: 161 10E3/UL (ref 150–450)
POTASSIUM SERPL-SCNC: 4.3 MMOL/L (ref 3.4–5.3)
PR INTERVAL - MUSE: NORMAL MS
PROT SERPL-MCNC: 4.8 G/DL (ref 6.4–8.3)
QRS DURATION - MUSE: 88 MS
QT - MUSE: 372 MS
QTC - MUSE: 439 MS
R AXIS - MUSE: -5 DEGREES
RBC # BLD AUTO: 3.01 10E6/UL (ref 4.4–5.9)
SODIUM SERPL-SCNC: 138 MMOL/L (ref 136–145)
SYSTOLIC BLOOD PRESSURE - MUSE: NORMAL MMHG
T AXIS - MUSE: -2 DEGREES
TROPONIN T SERPL HS-MCNC: 180 NG/L
VENTRICULAR RATE- MUSE: 84 BPM
WBC # BLD AUTO: 8.9 10E3/UL (ref 4–11)

## 2023-04-27 PROCEDURE — 250N000012 HC RX MED GY IP 250 OP 636 PS 637: Performed by: STUDENT IN AN ORGANIZED HEALTH CARE EDUCATION/TRAINING PROGRAM

## 2023-04-27 PROCEDURE — 250N000011 HC RX IP 250 OP 636: Performed by: PHYSICIAN ASSISTANT

## 2023-04-27 PROCEDURE — 84484 ASSAY OF TROPONIN QUANT: CPT | Performed by: STUDENT IN AN ORGANIZED HEALTH CARE EDUCATION/TRAINING PROGRAM

## 2023-04-27 PROCEDURE — 200N000002 HC R&B ICU UMMC

## 2023-04-27 PROCEDURE — 80053 COMPREHEN METABOLIC PANEL: CPT | Performed by: STUDENT IN AN ORGANIZED HEALTH CARE EDUCATION/TRAINING PROGRAM

## 2023-04-27 PROCEDURE — 250N000011 HC RX IP 250 OP 636: Performed by: STUDENT IN AN ORGANIZED HEALTH CARE EDUCATION/TRAINING PROGRAM

## 2023-04-27 PROCEDURE — 71045 X-RAY EXAM CHEST 1 VIEW: CPT | Mod: 26 | Performed by: RADIOLOGY

## 2023-04-27 PROCEDURE — 250N000013 HC RX MED GY IP 250 OP 250 PS 637: Performed by: STUDENT IN AN ORGANIZED HEALTH CARE EDUCATION/TRAINING PROGRAM

## 2023-04-27 PROCEDURE — 36415 COLL VENOUS BLD VENIPUNCTURE: CPT | Performed by: STUDENT IN AN ORGANIZED HEALTH CARE EDUCATION/TRAINING PROGRAM

## 2023-04-27 PROCEDURE — 250N000013 HC RX MED GY IP 250 OP 250 PS 637: Performed by: PHYSICIAN ASSISTANT

## 2023-04-27 PROCEDURE — 97530 THERAPEUTIC ACTIVITIES: CPT | Mod: GP

## 2023-04-27 PROCEDURE — 258N000003 HC RX IP 258 OP 636: Performed by: STUDENT IN AN ORGANIZED HEALTH CARE EDUCATION/TRAINING PROGRAM

## 2023-04-27 PROCEDURE — 250N000013 HC RX MED GY IP 250 OP 250 PS 637: Performed by: SURGERY

## 2023-04-27 PROCEDURE — 93010 ELECTROCARDIOGRAM REPORT: CPT | Mod: 59 | Performed by: INTERNAL MEDICINE

## 2023-04-27 PROCEDURE — 97116 GAIT TRAINING THERAPY: CPT | Mod: GP

## 2023-04-27 PROCEDURE — 85027 COMPLETE CBC AUTOMATED: CPT | Performed by: STUDENT IN AN ORGANIZED HEALTH CARE EDUCATION/TRAINING PROGRAM

## 2023-04-27 PROCEDURE — 271N000002 HC RX 271: Performed by: PHYSICIAN ASSISTANT

## 2023-04-27 PROCEDURE — 71045 X-RAY EXAM CHEST 1 VIEW: CPT

## 2023-04-27 PROCEDURE — 83735 ASSAY OF MAGNESIUM: CPT | Performed by: SURGERY

## 2023-04-27 PROCEDURE — 99233 SBSQ HOSP IP/OBS HIGH 50: CPT | Performed by: NURSE PRACTITIONER

## 2023-04-27 PROCEDURE — 93005 ELECTROCARDIOGRAM TRACING: CPT

## 2023-04-27 PROCEDURE — 36415 COLL VENOUS BLD VENIPUNCTURE: CPT | Performed by: SURGERY

## 2023-04-27 RX ORDER — LANOLIN ALCOHOL/MO/W.PET/CERES
3 CREAM (GRAM) TOPICAL
Status: ON HOLD | COMMUNITY
End: 2023-04-30

## 2023-04-27 RX ADMIN — INSULIN ASPART 2 UNITS: 100 INJECTION, SOLUTION INTRAVENOUS; SUBCUTANEOUS at 12:49

## 2023-04-27 RX ADMIN — ACETAMINOPHEN 975 MG: 325 TABLET ORAL at 00:05

## 2023-04-27 RX ADMIN — Medication 500 MG: at 08:52

## 2023-04-27 RX ADMIN — Medication 12.5 MG: at 08:31

## 2023-04-27 RX ADMIN — AMIODARONE HYDROCHLORIDE 150 MG: 1.5 INJECTION, SOLUTION INTRAVENOUS at 16:00

## 2023-04-27 RX ADMIN — SENNOSIDES AND DOCUSATE SODIUM 3 TABLET: 50; 8.6 TABLET ORAL at 08:31

## 2023-04-27 RX ADMIN — ACETAMINOPHEN 975 MG: 325 TABLET ORAL at 08:30

## 2023-04-27 RX ADMIN — SENNOSIDES AND DOCUSATE SODIUM 3 TABLET: 50; 8.6 TABLET ORAL at 19:54

## 2023-04-27 RX ADMIN — Medication 5 MG: at 21:58

## 2023-04-27 RX ADMIN — Medication 12.5 MG: at 19:54

## 2023-04-27 RX ADMIN — HEPARIN SODIUM 5000 UNITS: 5000 INJECTION, SOLUTION INTRAVENOUS; SUBCUTANEOUS at 19:55

## 2023-04-27 RX ADMIN — ASPIRIN 162 MG: 81 TABLET ORAL at 08:31

## 2023-04-27 RX ADMIN — AMIODARONE HYDROCHLORIDE 1 MG/MIN: 50 INJECTION, SOLUTION INTRAVENOUS at 16:48

## 2023-04-27 RX ADMIN — PRAVASTATIN SODIUM 80 MG: 20 TABLET ORAL at 21:58

## 2023-04-27 RX ADMIN — HEPARIN SODIUM 5000 UNITS: 5000 INJECTION, SOLUTION INTRAVENOUS; SUBCUTANEOUS at 04:49

## 2023-04-27 RX ADMIN — PANTOPRAZOLE SODIUM 40 MG: 40 TABLET, DELAYED RELEASE ORAL at 08:31

## 2023-04-27 RX ADMIN — HEPARIN SODIUM 5000 UNITS: 5000 INJECTION, SOLUTION INTRAVENOUS; SUBCUTANEOUS at 12:49

## 2023-04-27 ASSESSMENT — ACTIVITIES OF DAILY LIVING (ADL)
ADLS_ACUITY_SCORE: 26
ADLS_ACUITY_SCORE: 29
ADLS_ACUITY_SCORE: 26
ADLS_ACUITY_SCORE: 29
ADLS_ACUITY_SCORE: 25
ADLS_ACUITY_SCORE: 29
ADLS_ACUITY_SCORE: 26
ADLS_ACUITY_SCORE: 26
ADLS_ACUITY_SCORE: 29
ADLS_ACUITY_SCORE: 29

## 2023-04-27 NOTE — PROGRESS NOTES
CV ICU Progress Note  4/27/2023      CO-MORBIDITIES:   Patient Active Problem List   Diagnosis     Nuclear sclerotic cataract of both eyes     Hyperglycemia     Hyperlipidemia     Stented coronary artery     Coronary artery disease involving native coronary artery without angina pectoris     Basal cell carcinoma (BCC) in situ of skin     Colon polyp     Hereditary and idiopathic peripheral neuropathy     Status post coronary angiogram     Coronary artery disease involving native coronary artery of native heart without angina pectoris       ASSESSMENT: Reinaldo Gordillo is a 77 year old male with PMH of CAD with VT s/p AREN x4 in RCA, mild aortic stenosis, HTN, HLD, GERD, who underwent CABG x 4 on 4/24 by Dr. Almeida. Grafts include: LIMA to LAD, SVG to OM, SVG to Diag, SVG to GERALDINE.      Interval Changes:  New onset A flutter - VSS     PLAN:  Neuro/ pain/ sedation:  Acute Postoperative pain  - Monitor neurological status. Notify the MD for any acute changes in exam.  - Pain: fentanyl gtt. Scheduled tylenol. PRN tylenol, oxycodone, dilaudid.  - CESAR Catheters in place bilaterally   - Hold PTA zolpidem     Pulmonary care:   Postoperative ventilation management     Fast track, trail pressure support   - Extubated 4/24   - Titrate supplemental oxygen to maintain saturation above 92%.  - Pulmonary hygiene: Incentive spirometer every 15- 30 minutes when awake, flutter valve, C&DB    Cardiovascular:    S/p CABG x 4 on 4/24 by Dr. Almeida  History of CAD, HTN, HLD  New onset A flutter, paradoxical   Pre CPB: LV normal function, EF 55-60%, no RWMA's. Normal diastolic function. RV normal function. Tri-leaflet aortic valve, calcified leaflets, mild AS. Trace TR. Trace MR. No PFO by CFD. No pericardial fluid. No pleural effusion. No aortic dissection. No intracardiac thrombus noted.    Post CPB: S/p CABG x 4. LV normal function, EF 55-60%, no RWMA's noted. RV normal function. Mild AS. Mild TR. Trace MR. No pericardial effusion. No pleural  effusion. No aortic dissection. No intracardiac thrombus noted.     - Monitor hemodynamic status.   - Goal MAP>65, SBP<140  - Resume PTA lisinopril, metoprolol, pravastatin  - ASA   - EKG shows A flutter on 4/27, VSS   - Troponins   - If negative will consider amiodarone IV    GI care/ Nutrition:   - ADAT   - PPI  - Continue bowel regimen: miralax, senna    Renal/ Fluid Balance/ Electrolytes:   BL creat appears to be ~ 0.8-1.0  - Strict I/O, daily weights  - Avoid/limit nephrotoxins as able  - Replete lytes PRN per protocol  - goal net negative 500-1L    Endocrine:    Stress induced hyperglycemia  Preop A1c 6.2  - Goal BG <180 for optimal healing  - PTA meds None  - SSI    ID/ Antibiotics:  Stress induced leukocytosis  - To complete perioperative regimen  - Continue to monitor fever curve, WBC and inflammatory markers as appropriate    Heme:     Stress induced leukocytosis  No s/sx active bleeding  - Continue to monitor  - CBC     MSK/ Skin:  Sternotomy  Surgical Incision  - Sternal precautions  - Postoperative incision management per protocol  - PT/OT/CR    Prophylaxis:    - Mechanical prophylaxis for DVT  - Chemical DVT prophylaxis - SQH  - PPI    Lines/ tubes/ drains:  - CT x 3,     Disposition:  - 6C    Patient seen, findings and plan discussed with CVICU staff    Konstantin Claudio MD  Anesthesiology CA-1/PGY-2    ====================================    HPI:     A flutter this AM. Feels some palpitations but VSS. Stat troponins. A flutter now paradoxical.        PAST MEDICAL HISTORY:   Past Medical History:   Diagnosis Date     ASCVD (arteriosclerotic cardiovascular disease)      Coronary artery disease      Gastroesophageal reflux disease      Hypertension      Stented coronary artery        PAST SURGICAL HISTORY:   Past Surgical History:   Procedure Laterality Date     BYPASS GRAFT ARTERY CORONARY N/A 4/24/2023    Procedure: MEDIAN STERNOTOMY, HARVEST OF LEFT INTERNAL MAMMARY ARTERY, ENDOSCOPIC HARVEST OF LEFT  GREATER SAPHENOUS VEIN, ON CARDIOPULMONARY BYPASS, CORONARY ARTERY BYPASS GRAFT (CABG) X4 . TRANSESOPHAGEAL ECHOCARDIOGRAM.;  Surgeon: Bro Almeida MD;  Location: [facility] OR     CATARACT IOL, RT/LT       COLONOSCOPY N/A 7/17/2019    Procedure: COLONOSCOPY;  Surgeon: Roque Givens MD;  [facility] OR     CV CORONARY ANGIOGRAM N/A 3/21/2023    Procedure: Coronary Angiogram;  Surgeon: Pito Chapin MD;  Location:  [facility]     CV PCI N/A 3/21/2023    Procedure: Percutaneous Coronary Intervention;  Surgeon: Pito Chapin MD;  Location:  [facility] C[facility]     ESOPHAGOSCOPY, GASTROSCOPY, DUODENOSCOPY (EGD), COMBINED N/A 7/17/2019    Procedure: ESOPHAGOGASTRODUODENOSCOPY, WITH BIOPSY;  Surgeon: Roque Givens MD;  Location: [facility] OR     HERNIA REPAIR  2000     MOHS MICROGRAPHIC PROCEDURE       PHACOEMULSIFICATION CLEAR CORNEA WITH STANDARD INTRAOCULAR LENS IMPLANT Right 7/2/2021    Procedure: RIGHT EYE CATARACT REMOVAL WITH INTRAOCULAR LENS IMPLANT;  Surgeon: Justa Liz MD;  Location: [facility] [place]     PHACOEMULSIFICATION CLEAR CORNEA WITH STANDARD INTRAOCULAR LENS IMPLANT Left 7/9/2021    Procedure: LEFT EYE CATARACT REMOVAL WITH INTRAOCULAR LENS IMPLANT;  Surgeon: Justa Liz MD;  Location: [facility] [place]     [facility] CORONARY STENT PERCUT, EA ADDTL VESSEL         FAMILY HISTORY:   Family History   Problem Relation Age of Onset     Glaucoma No family hx of      Macular Degeneration No family hx of      Retinal detachment No family hx of      Amblyopia No family hx of      Melanoma No family hx of      Skin Cancer No family hx of        SOCIAL HISTORY:   Social History     Tobacco Use     Smoking status: Never     Smokeless tobacco: Never   Vaping Use     Vaping status: Not on file   Substance Use Topics     Alcohol use: Yes     Comment: 1-2 drinks 5 days a week         OBJECTIVE:   1. VITAL SIGNS:     Temp:  [97.6  F (36.4  C)-98.5  F (36.9  C)] 98.2  F (36.8  C)  Pulse:  [59-92]  61  Resp:  [12-14] 12  BP: ()/(46-68) 91/51  SpO2:  [92 %-98 %] 93 %          2. INTAKE/ OUTPUT:      I/O last 3 completed shifts:  In: 765 [P.O.:480; I.V.:285]  Out: 1280 [Urine:850; Chest Tube:430]      3. PHYSICAL EXAMINATION:   General: alert and oriented  Resp: No increased WOB on NC  CV: S1, S2, no m/r/g   Abdomen: Soft, non-distended, non-tender  Incisions: c/d/i  Extremities: warm and well perfused  CT: To suction, serosang output, no airleak, crepitus    4. INVESTIGATIONS:   Arterial Blood Gases   Recent Labs   Lab 04/25/23 0405 04/24/23 2005 04/24/23  1653 04/24/23  1518   PH 7.40 7.36 7.34* 7.36   PCO2 41 36 43 40   PO2 65* 92 136* 160*   HCO3 25 21 23 23     Complete Blood Count   Recent Labs   Lab 04/26/23  0728 04/25/23  0405 04/24/23 2142 04/24/23  1518   WBC 10.2 10.2 10.4 13.2*   HGB 9.9* 9.5* 10.2* 10.2*   * 159 160 171     Basic Metabolic Panel  Recent Labs   Lab 04/26/23 2219 04/26/23  1618 04/26/23  1233 04/26/23  0737 04/26/23  0609 04/25/23 2216 04/25/23  1942 04/25/23  0407 04/25/23  0405 04/24/23 2148 04/24/23 2142   NA  --   --   --   --  140  --  139  --  139  --  141   POTASSIUM  --   --   --   --  4.6  --  4.3  --  4.4  --  4.1   CHLORIDE  --   --   --   --  104  --  104  --  107  --  110*   CO2  --   --   --   --  26  --  23  --  22  --  20*   BUN  --   --   --   --  13.6  --  12.3  --  12.3  --  13.0   CR  --   --   --   --  0.91  --  0.86  --  0.78  --  0.82   * 118* 128* 123* 137*   < > 129*   < > 129*   < > 159*    < > = values in this interval not displayed.     Liver Function Tests  Recent Labs   Lab 04/26/23  0609 04/25/23  0645 04/25/23  0405 04/24/23  1518 04/24/23  1255   AST 35  --  45 39  --    ALT 20  --  29 34  --    ALKPHOS 40  --  39* 44  --    BILITOTAL 0.2  --  0.3 0.5  --    ALBUMIN 3.1*  --  3.2* 3.4*  --    INR  --  1.27*  --  1.31* 1.49*     Pancreatic Enzymes  No lab results found in last 7 days.  Coagulation Profile  Recent Labs   Lab  04/25/23  0645 04/24/23  1518 04/24/23  1255   INR 1.27* 1.31* 1.49*   PTT  --  42* 24         5. RADIOLOGY:   Recent Results (from the past 24 hour(s))   XR Chest Port 1 View    Impression    RESIDENT PRELIMINARY INTERPRETATION  Impression: Stable exam.       =========================================

## 2023-04-27 NOTE — PLAN OF CARE
Major Shift Events:  Neurologically intact. Pain managed via Ropivacaine infusion and scheduled tylenol. Patient converted into A-flutter this morning and then spontaneously converted back into NSR then back into A-flutter to A-fib. Amio bolus given and drip initiated. Hemodynamically stable. Voids spontaneously. 1 BM per this shift. Poor appetite, intake encouraged.    Plan: Transfer out of ICU. Monitor for changes in hemodynamic stability.   For vital signs and complete assessments, please see documentation flowsheets.       Goal Outcome Evaluation:      Plan of Care Reviewed With: patient, spouse    Overall Patient Progress: improvingOverall Patient Progress: improving

## 2023-04-27 NOTE — PLAN OF CARE
Major Shift Events  Overnight pt remained in stable condition with issues of incision pain, hypotension, and abnormal rhythm.  Incisional pain was treated with tylenol scheduled w/ good effect. At the beginning of the shift, and next few hours pt was hypotensive s/p lasix & metoprolol during day shift. MD was informed of this and was comfortable with MAP > 60 at this time. Ordered to continue monitoring and to push oral intake. Pt was given x2 cups of water w/ good intake and BP stabilized. @ 0645 pt converted into Atrial Flutter. CVTS MD immediatly informed as well abnormal lab results Ca: 8.5, & M.0. MD informed they will order 12 lead and assess lab replacements w/ day team.        Neuro: A&Ox4, following all commands, baseline peripheral neuropathy BLE's, MSK: 5/5, PERRLA 3mm  Cv: SR w/ PVC/PAC, pulses +1, cap refill <3, BP WDL (SYS<140), afebrile  Resp: RA, LS: clear bilaterally, SpO2 > 92%  GI/: Voids independently w/o issues, BM-, Flattus +, BS +  Skin: Generalized ecchymosis, Midsternal incision, CT x3, L-leg graft site WDL     Drips: TKO  Sedation: None     Plan: Follow POC. Monitor closely, notify MD of acute changes.  For vital signs and complete assessments, please see documentation flowsheets.

## 2023-04-27 NOTE — PROGRESS NOTES
"Pain Service Progress Note  [facility]  Date: 04/27/2023       Patient Name: Reinaldo Gordillo  MRN: [id number]  Age: 77 year old  Sex: male      Assessment:  Reinaldo Gordillo is a 77 year old male with PMH significant for CAD with VT s/p AREN x 4 in RCA, mild aortic stenosis, HTN, HLD, GERD    Procedure: s/p CABG x 4    Date of Surgery: 4/24/23     Date of Catheter Placement: 4/24/23     Plan/Recommendations:  1. Regional Anesthesia/Analgesia  -Continuous Catheter Type/Site: bilateral erector spinae (ES) T7-8. 4/27 Removed left catheter, found out to 1 cm from skin during dressing change today  Infusate: 0.2% Ropivacaine  Changed Programmed Intermittent Bolus (PIB) to 10 mL Q60 min via right catheter    Plan to maintain catheter while chest tubes in place, max of 7 days    2. Anticoagulation  -Please contact Inpatient Pain Service before ordering or making any anticoagulation changes     3. Multimodal Analgesia  - per primary service    Pain Service will continue to follow.    Discussed with attending anesthesiologist    SARITA Garza CNP  04/27/2023     Overnight Events: None    Tubes/Drains: Yes  3 chest tubes    Subjective: Denies pain. Tolerating oral Tylenol and perineural infusion.  Nausea: No  Symptoms of LAST: No    Pain Location:      Diet: Advance Diet as Tolerated: Regular Diet Adult  Snacks/Supplements Adult: Ensure Enlive; Between Meals    Relevant Labs:  Recent Labs   Lab Test 04/27/23  0715 04/27/23  0406 04/25/23  1942 04/25/23  0645 04/24/23  2142 04/24/23  1518   INR  --   --   --  1.27*  --  1.31*     --    < >  --    < > 171   PTT  --   --   --   --   --  42*   BUN  --  14.8   < >  --    < > 12.7    < > = values in this interval not displayed.       Physical Exam:  Vitals: /68   Pulse 90   Temp 97  F (36.1  C) (Axillary)   Resp 16   Ht 1.88 m (6' 2\")   Wt 92.1 kg (203 lb 0.7 oz)   SpO2 96%   BMI 26.07 kg/m      Physical Exam: "   Orientation:  Alert, oriented, and in no acute distress: Yes  Sedation: No    Motor Examination:  5/5 Strength in lower extremities: Yes    Catheter Site:   Catheter entry site is clean/dry/intact: Yes    Tender: No      Relevant Medications:  Current Pain Medications:  Medications related to Pain Management (From now, onward)    Start     Dose/Rate Route Frequency Ordered Stop    04/27/23 1330  ropivacaine 0.2% in NS perineural infusion (simple)          Perineural Continuous Nerve Block 04/27/23 1300      04/27/23 0800  aspirin EC tablet 162 mg         162 mg Oral DAILY 04/26/23 1457      04/27/23 0800  magnesium hydroxide (MILK OF MAGNESIA) suspension 30 mL         30 mL Oral DAILY 04/26/23 1457      04/27/23 0000  acetaminophen (TYLENOL) tablet 650 mg         650 mg Oral EVERY 4 HOURS PRN 04/24/23 1515      04/26/23 2000  polyethylene glycol (MIRALAX) Packet 17 g         17 g Oral 2 TIMES DAILY 04/26/23 1457      04/26/23 1000  senna-docusate (SENOKOT-S/PERICOLACE) 8.6-50 MG per tablet 3 tablet         3 tablet Oral 2 TIMES DAILY 04/26/23 0632      04/26/23 0631  magnesium hydroxide (MILK OF MAGNESIA) suspension 30 mL         30 mL Oral DAILY PRN 04/26/23 0632      04/24/23 1515  HYDROmorphone (DILAUDID) injection 0.2 mg        See Hyperspace for full Linked Orders Report.    0.2 mg Intravenous EVERY 2 HOURS PRN 04/24/23 1515      04/24/23 1515  HYDROmorphone (DILAUDID) injection 0.4 mg        See Hyperspace for full Linked Orders Report.    0.4 mg Intravenous EVERY 2 HOURS PRN 04/24/23 1515      04/24/23 1515  oxyCODONE (ROXICODONE) tablet 5 mg        See Hyperspace for full Linked Orders Report.    5 mg Oral EVERY 4 HOURS PRN 04/24/23 1515      04/24/23 1515  oxyCODONE IR (ROXICODONE) tablet 10 mg        See Hyperspace for full Linked Orders Report.    10 mg Oral EVERY 4 HOURS PRN 04/24/23 1515      04/24/23 1515  bisacodyl (DULCOLAX) suppository 10 mg         10 mg Rectal DAILY PRN 04/24/23 1517       "04/24/23 1515  lidocaine 1 % 0.1-1 mL         0.1-1 mL Other EVERY 1 HOUR PRN 04/24/23 1515      04/24/23 1515  lidocaine (LMX4) cream          Topical EVERY 1 HOUR PRN 04/24/23 1515            Primary Service Contacted with Recommendations? No      Please see A&P for additional details of medical decision making.      Acute Inpatient Pain Service [facility]  Hours of pain coverage 24/7   Page via e-volo- Please Page the Pain Team Via [facility]: \"PAIN MANAGEMENT ACUTE INPATIENT/ [facility] [place]\"             "

## 2023-04-27 NOTE — PHARMACY-ADMISSION MEDICATION HISTORY
Pharmacist Admission Medication History    Admission medication history is complete. The information provided in this note is only as accurate as the sources available at the time of the update.    Medication reconciliation/reorder completed by provider prior to medication history? No    Information Source(s): Patient and CareEverywhere/SureScripts via in-person    Pertinent Information: Patient is a good historian of his home medications.    Changes made to PTA medication list:    Added: Occasionally takes tylenol PM and melatonin together if he has trouble sleeping. Wants to avoid using ambien unless  he really has to (avoiding dependence)    Deleted: None    Changed: None    Allergies reviewed with patient and updates made in EHR: yes    Medication History Completed By: Chan Foster [facility] 4/27/2023 9:16 AM    Prior to Admission medications    Medication Sig Last Dose Taking? Auth Provider Long Term End Date   ASPIRIN LOW DOSE 81 MG EC tablet Take 81 mg by mouth every morning 4/23/2023 Yes Reported, Patient     clobetasol (TEMOVATE) 0.05 % external ointment Apply topically 2 times daily To the rash on the legs as needed  Yes Yonathan Randall MD     diphenhydrAMINE-acetaminophen (TYLENOL PM)  MG tablet Take 1 tablet by mouth nightly as needed for sleep  Yes Unknown, Entered By History     lisinopril (ZESTRIL) 5 MG tablet Take 20 mg by mouth every morning 4/23/2023 Yes Reported, Patient Yes    melatonin 3 MG tablet Take 3 mg by mouth nightly as needed for sleep  Yes Unknown, Entered By History     metoprolol succinate ER (TOPROL XL) 25 MG 24 hr tablet Take 1 tablet (25 mg) by mouth every morning 4/24/2023 Yes Joao Smiley MD Yes    omeprazole (PRILOSEC) 20 MG DR capsule Take 20 mg by mouth as needed 4/24/2023 Yes Reported, Patient     pravastatin (PRAVACHOL) 80 MG tablet Take 80 mg by mouth At Bedtime 4/23/2023 Yes Reported, Patient Yes    zolpidem (AMBIEN) 10 MG tablet  More than a month Yes  Reported, Patient

## 2023-04-28 ENCOUNTER — APPOINTMENT (OUTPATIENT)
Dept: PHYSICAL THERAPY | Facility: CLINIC | Age: 78
DRG: 236 | End: 2023-04-28
Attending: SURGERY
Payer: COMMERCIAL

## 2023-04-28 ENCOUNTER — APPOINTMENT (OUTPATIENT)
Dept: OCCUPATIONAL THERAPY | Facility: CLINIC | Age: 78
DRG: 236 | End: 2023-04-28
Attending: SURGERY
Payer: COMMERCIAL

## 2023-04-28 ENCOUNTER — APPOINTMENT (OUTPATIENT)
Dept: GENERAL RADIOLOGY | Facility: CLINIC | Age: 78
DRG: 236 | End: 2023-04-28
Attending: SURGERY
Payer: COMMERCIAL

## 2023-04-28 LAB
ALBUMIN SERPL BCG-MCNC: 2.9 G/DL (ref 3.5–5.2)
ALP SERPL-CCNC: 39 U/L (ref 40–129)
ALT SERPL W P-5'-P-CCNC: 15 U/L (ref 10–50)
ANION GAP SERPL CALCULATED.3IONS-SCNC: 10 MMOL/L (ref 7–15)
AST SERPL W P-5'-P-CCNC: 17 U/L (ref 10–50)
ATRIAL RATE - MUSE: NORMAL BPM
BILIRUB SERPL-MCNC: 0.3 MG/DL
BUN SERPL-MCNC: 12.6 MG/DL (ref 8–23)
CALCIUM SERPL-MCNC: 8.7 MG/DL (ref 8.8–10.2)
CHLORIDE SERPL-SCNC: 103 MMOL/L (ref 98–107)
CREAT SERPL-MCNC: 0.83 MG/DL (ref 0.67–1.17)
DEPRECATED HCO3 PLAS-SCNC: 25 MMOL/L (ref 22–29)
DIASTOLIC BLOOD PRESSURE - MUSE: NORMAL MMHG
ERYTHROCYTE [DISTWIDTH] IN BLOOD BY AUTOMATED COUNT: 12.4 % (ref 10–15)
GFR SERPL CREATININE-BSD FRML MDRD: >90 ML/MIN/1.73M2
GLUCOSE BLDC GLUCOMTR-MCNC: 112 MG/DL (ref 70–99)
GLUCOSE BLDC GLUCOMTR-MCNC: 119 MG/DL (ref 70–99)
GLUCOSE BLDC GLUCOMTR-MCNC: 157 MG/DL (ref 70–99)
GLUCOSE BLDC GLUCOMTR-MCNC: 157 MG/DL (ref 70–99)
GLUCOSE SERPL-MCNC: 119 MG/DL (ref 70–99)
HCT VFR BLD AUTO: 27.9 % (ref 40–53)
HGB BLD-MCNC: 9 G/DL (ref 13.3–17.7)
INTERPRETATION ECG - MUSE: NORMAL
MAGNESIUM SERPL-MCNC: 1.8 MG/DL (ref 1.7–2.3)
MCH RBC QN AUTO: 32.6 PG (ref 26.5–33)
MCHC RBC AUTO-ENTMCNC: 32.3 G/DL (ref 31.5–36.5)
MCV RBC AUTO: 101 FL (ref 78–100)
P AXIS - MUSE: NORMAL DEGREES
PHOSPHATE SERPL-MCNC: 2.6 MG/DL (ref 2.5–4.5)
PLATELET # BLD AUTO: 178 10E3/UL (ref 150–450)
POTASSIUM SERPL-SCNC: 4 MMOL/L (ref 3.4–5.3)
PR INTERVAL - MUSE: NORMAL MS
PROT SERPL-MCNC: 4.8 G/DL (ref 6.4–8.3)
QRS DURATION - MUSE: 90 MS
QT - MUSE: 342 MS
QTC - MUSE: 432 MS
R AXIS - MUSE: 2 DEGREES
RBC # BLD AUTO: 2.76 10E6/UL (ref 4.4–5.9)
SODIUM SERPL-SCNC: 138 MMOL/L (ref 136–145)
SYSTOLIC BLOOD PRESSURE - MUSE: NORMAL MMHG
T AXIS - MUSE: 9 DEGREES
TSH SERPL DL<=0.005 MIU/L-ACNC: 1.39 UIU/ML (ref 0.3–4.2)
VENTRICULAR RATE- MUSE: 96 BPM
WBC # BLD AUTO: 7.5 10E3/UL (ref 4–11)

## 2023-04-28 PROCEDURE — 250N000011 HC RX IP 250 OP 636: Performed by: PHYSICIAN ASSISTANT

## 2023-04-28 PROCEDURE — 97530 THERAPEUTIC ACTIVITIES: CPT | Mod: GP

## 2023-04-28 PROCEDURE — 999N000127 HC STATISTIC PERIPHERAL IV START W US GUIDANCE

## 2023-04-28 PROCEDURE — 83735 ASSAY OF MAGNESIUM: CPT | Performed by: SURGERY

## 2023-04-28 PROCEDURE — 85027 COMPLETE CBC AUTOMATED: CPT | Performed by: STUDENT IN AN ORGANIZED HEALTH CARE EDUCATION/TRAINING PROGRAM

## 2023-04-28 PROCEDURE — 71045 X-RAY EXAM CHEST 1 VIEW: CPT | Mod: 26 | Performed by: RADIOLOGY

## 2023-04-28 PROCEDURE — 250N000013 HC RX MED GY IP 250 OP 250 PS 637: Performed by: PHYSICIAN ASSISTANT

## 2023-04-28 PROCEDURE — 97116 GAIT TRAINING THERAPY: CPT | Mod: GP

## 2023-04-28 PROCEDURE — 200N000002 HC R&B ICU UMMC

## 2023-04-28 PROCEDURE — 250N000011 HC RX IP 250 OP 636: Performed by: NURSE PRACTITIONER

## 2023-04-28 PROCEDURE — 250N000013 HC RX MED GY IP 250 OP 250 PS 637: Performed by: STUDENT IN AN ORGANIZED HEALTH CARE EDUCATION/TRAINING PROGRAM

## 2023-04-28 PROCEDURE — 80053 COMPREHEN METABOLIC PANEL: CPT | Performed by: STUDENT IN AN ORGANIZED HEALTH CARE EDUCATION/TRAINING PROGRAM

## 2023-04-28 PROCEDURE — 97530 THERAPEUTIC ACTIVITIES: CPT | Mod: GO

## 2023-04-28 PROCEDURE — 71045 X-RAY EXAM CHEST 1 VIEW: CPT

## 2023-04-28 PROCEDURE — 36415 COLL VENOUS BLD VENIPUNCTURE: CPT

## 2023-04-28 PROCEDURE — 99222 1ST HOSP IP/OBS MODERATE 55: CPT | Mod: 24

## 2023-04-28 PROCEDURE — 250N000013 HC RX MED GY IP 250 OP 250 PS 637: Performed by: SURGERY

## 2023-04-28 PROCEDURE — 36415 COLL VENOUS BLD VENIPUNCTURE: CPT | Performed by: STUDENT IN AN ORGANIZED HEALTH CARE EDUCATION/TRAINING PROGRAM

## 2023-04-28 PROCEDURE — 99232 SBSQ HOSP IP/OBS MODERATE 35: CPT | Performed by: NURSE PRACTITIONER

## 2023-04-28 PROCEDURE — 84100 ASSAY OF PHOSPHORUS: CPT | Performed by: SURGERY

## 2023-04-28 PROCEDURE — 250N000011 HC RX IP 250 OP 636: Performed by: STUDENT IN AN ORGANIZED HEALTH CARE EDUCATION/TRAINING PROGRAM

## 2023-04-28 PROCEDURE — 97110 THERAPEUTIC EXERCISES: CPT | Mod: GP

## 2023-04-28 PROCEDURE — 36415 COLL VENOUS BLD VENIPUNCTURE: CPT | Performed by: SURGERY

## 2023-04-28 PROCEDURE — 97535 SELF CARE MNGMENT TRAINING: CPT | Mod: GO

## 2023-04-28 PROCEDURE — 84443 ASSAY THYROID STIM HORMONE: CPT

## 2023-04-28 PROCEDURE — 271N000002 HC RX 271: Performed by: NURSE PRACTITIONER

## 2023-04-28 RX ORDER — AMIODARONE HYDROCHLORIDE 200 MG/1
400 TABLET ORAL 2 TIMES DAILY
Status: DISCONTINUED | OUTPATIENT
Start: 2023-04-28 | End: 2023-04-28

## 2023-04-28 RX ORDER — AMIODARONE HYDROCHLORIDE 200 MG/1
400 TABLET ORAL DAILY
Status: DISCONTINUED | OUTPATIENT
Start: 2023-05-06 | End: 2023-04-30 | Stop reason: HOSPADM

## 2023-04-28 RX ORDER — AMIODARONE HYDROCHLORIDE 200 MG/1
400 TABLET ORAL 2 TIMES DAILY
Status: DISCONTINUED | OUTPATIENT
Start: 2023-04-28 | End: 2023-04-30 | Stop reason: HOSPADM

## 2023-04-28 RX ORDER — FUROSEMIDE 10 MG/ML
10 INJECTION INTRAMUSCULAR; INTRAVENOUS ONCE
Status: COMPLETED | OUTPATIENT
Start: 2023-04-28 | End: 2023-04-28

## 2023-04-28 RX ORDER — MAGNESIUM OXIDE 400 MG/1
400 TABLET ORAL EVERY 4 HOURS
Status: COMPLETED | OUTPATIENT
Start: 2023-04-28 | End: 2023-04-28

## 2023-04-28 RX ADMIN — HEPARIN SODIUM 5000 UNITS: 5000 INJECTION, SOLUTION INTRAVENOUS; SUBCUTANEOUS at 04:28

## 2023-04-28 RX ADMIN — Medication 5 MG: at 22:39

## 2023-04-28 RX ADMIN — POLYETHYLENE GLYCOL 3350 17 G: 17 POWDER, FOR SOLUTION ORAL at 20:17

## 2023-04-28 RX ADMIN — ASPIRIN 162 MG: 81 TABLET ORAL at 08:52

## 2023-04-28 RX ADMIN — PRAVASTATIN SODIUM 80 MG: 20 TABLET ORAL at 21:53

## 2023-04-28 RX ADMIN — Medication 500 MG: at 10:39

## 2023-04-28 RX ADMIN — SENNOSIDES AND DOCUSATE SODIUM 3 TABLET: 50; 8.6 TABLET ORAL at 08:54

## 2023-04-28 RX ADMIN — PANTOPRAZOLE SODIUM 40 MG: 40 TABLET, DELAYED RELEASE ORAL at 08:54

## 2023-04-28 RX ADMIN — HEPARIN SODIUM 5000 UNITS: 5000 INJECTION, SOLUTION INTRAVENOUS; SUBCUTANEOUS at 20:16

## 2023-04-28 RX ADMIN — Medication 12.5 MG: at 20:16

## 2023-04-28 RX ADMIN — ACETAMINOPHEN 650 MG: 325 TABLET ORAL at 01:31

## 2023-04-28 RX ADMIN — SENNOSIDES AND DOCUSATE SODIUM 3 TABLET: 50; 8.6 TABLET ORAL at 20:17

## 2023-04-28 RX ADMIN — POTASSIUM & SODIUM PHOSPHATES POWDER PACK 280-160-250 MG 1 PACKET: 280-160-250 PACK at 10:39

## 2023-04-28 RX ADMIN — Medication 400 MG: at 14:19

## 2023-04-28 RX ADMIN — AMIODARONE HYDROCHLORIDE 400 MG: 200 TABLET ORAL at 20:16

## 2023-04-28 RX ADMIN — HEPARIN SODIUM 5000 UNITS: 5000 INJECTION, SOLUTION INTRAVENOUS; SUBCUTANEOUS at 12:00

## 2023-04-28 RX ADMIN — POLYETHYLENE GLYCOL 3350 17 G: 17 POWDER, FOR SOLUTION ORAL at 08:55

## 2023-04-28 RX ADMIN — POTASSIUM & SODIUM PHOSPHATES POWDER PACK 280-160-250 MG 1 PACKET: 280-160-250 PACK at 14:19

## 2023-04-28 RX ADMIN — AMIODARONE HYDROCHLORIDE 400 MG: 200 TABLET ORAL at 10:39

## 2023-04-28 RX ADMIN — FUROSEMIDE 10 MG: 10 INJECTION, SOLUTION INTRAVENOUS at 11:59

## 2023-04-28 RX ADMIN — Medication 400 MG: at 10:40

## 2023-04-28 RX ADMIN — ACETAMINOPHEN 650 MG: 325 TABLET ORAL at 08:52

## 2023-04-28 RX ADMIN — INSULIN ASPART 1 UNITS: 100 INJECTION, SOLUTION INTRAVENOUS; SUBCUTANEOUS at 17:12

## 2023-04-28 RX ADMIN — Medication 12.5 MG: at 08:54

## 2023-04-28 ASSESSMENT — ACTIVITIES OF DAILY LIVING (ADL)
ADLS_ACUITY_SCORE: 25
ADLS_ACUITY_SCORE: 26

## 2023-04-28 NOTE — PLAN OF CARE
Major Shift Events:  A&Ox4, makes needs known. Moves all extremities spontaneously. Endorses neuropathy in bilateral toes/feet and L fifth finger. C/o minimal incisional pain managed with PRN tylenol and ropivicane nerve block. Afib/aflutter, will occasionally self-convert into NSR and back to afib/flutter. Amio gtt decreased to 0.5 mg/min this shift. Remains hemodynamically stable. Afebrile. Sating >92% on RA, diminished LS. Voids spontaneously in urinal. No BM. Poor appetite. Incisions cleaned with wound cleanser per incision protocol.    Plan: Transfer out of ICU to 6B/C/D when bed available. Monitor hemodynamics closely.    For vital signs and complete assessments, please see documentation flowsheets.      Goal Outcome Evaluation:      Plan of Care Reviewed With: patient    Overall Patient Progress: improvingOverall Patient Progress: improving

## 2023-04-28 NOTE — PROGRESS NOTES
CV ICU Progress Note  4/28/2023      CO-MORBIDITIES:   Patient Active Problem List   Diagnosis     Nuclear sclerotic cataract of both eyes     Hyperglycemia     Hyperlipidemia     Stented coronary artery     Coronary artery disease involving native coronary artery without angina pectoris     Basal cell carcinoma (BCC) in situ of skin     Colon polyp     Hereditary and idiopathic peripheral neuropathy     Status post coronary angiogram     Coronary artery disease involving native coronary artery of native heart without angina pectoris       ASSESSMENT: Reinaldo Gordillo is a 77 year old male with PMH of CAD with VT s/p AREN x4 in RCA, mild aortic stenosis, HTN, HLD, GERD, who underwent CABG x 4 on 4/24 by Dr. Almeida. Grafts include: LIMA to LAD, SVG to OM, SVG to Diag, SVG to GERALDINE.      Interval Changes:  Paroxsymal A flutter/fibrilation  EP consult     PLAN:  Neuro/ pain/ sedation:  Acute Postoperative pain  - Monitor neurological status. Notify the MD for any acute changes in exam.  - Pain: fentanyl gtt. Scheduled tylenol. PRN tylenol, oxycodone, dilaudid.  - CESAR Catheters in place bilaterally   - Hold PTA zolpidem     Pulmonary care:   Postoperative ventilation management     Fast track, trail pressure support   - Extubated 4/24   - Titrate supplemental oxygen to maintain saturation above 92%.  - Pulmonary hygiene: Incentive spirometer every 15- 30 minutes when awake, flutter valve, C&DB    Cardiovascular:    S/p CABG x 4 on 4/24 by Dr. Almeida  History of CAD, HTN, HLD  New onset A flutter, paradoxical   Pre CPB: LV normal function, EF 55-60%, no RWMA's. Normal diastolic function. RV normal function. Tri-leaflet aortic valve, calcified leaflets, mild AS. Trace TR. Trace MR. No PFO by CFD. No pericardial fluid. No pleural effusion. No aortic dissection. No intracardiac thrombus noted.    Post CPB: S/p CABG x 4. LV normal function, EF 55-60%, no RWMA's noted. RV normal function. Mild AS. Mild TR. Trace MR. No pericardial  effusion. No pleural effusion. No aortic dissection. No intracardiac thrombus noted.     - Monitor hemodynamic status.   - Goal MAP>65, SBP<140  - Hold lisinopril   - Resume PTA  metoprolol, pravastatin  - ASA   - EKG shows A flutter on 4/27, VSS   - Troponins negative  - Amiodarone load   - EP consulted for anticoagulation recs     GI care/ Nutrition:   - ADAT   - PPI  - Continue bowel regimen: miralax, senna    Renal/ Fluid Balance/ Electrolytes:   BL creat appears to be ~ 0.8-1.0  - Strict I/O, daily weights  - Avoid/limit nephrotoxins as able  - Replete lytes PRN per protocol  - goal net negative 500-1L    Endocrine:    Stress induced hyperglycemia  Preop A1c 6.2  - Goal BG <180 for optimal healing  - PTA meds None  - SSI    ID/ Antibiotics:  Stress induced leukocytosis  - To complete perioperative regimen  - Continue to monitor fever curve, WBC and inflammatory markers as appropriate    Heme:     Stress induced leukocytosis  No s/sx active bleeding  - Continue to monitor  - CBC     MSK/ Skin:  Sternotomy  Surgical Incision  - Sternal precautions  - Postoperative incision management per protocol  - PT/OT/CR    Prophylaxis:    - Mechanical prophylaxis for DVT  - Chemical DVT prophylaxis - SQH  - PPI    Lines/ tubes/ drains:  - CT x 3, remove today    Disposition:  - 6C    Patient seen, findings and plan discussed with CVICU staff    Konstantin Claudio MD  Anesthesiology CA-1/PGY-2    ====================================    HPI:     NAEO. Concerned about new cardiac rhythm. EP consulted. Pain well controlled. CT can come out today.      PAST MEDICAL HISTORY:   Past Medical History:   Diagnosis Date     ASCVD (arteriosclerotic cardiovascular disease)      Coronary artery disease      Gastroesophageal reflux disease      Hypertension      Stented coronary artery        PAST SURGICAL HISTORY:   Past Surgical History:   Procedure Laterality Date     BYPASS GRAFT ARTERY CORONARY N/A 4/24/2023    Procedure: MEDIAN  STERNOTOMY, HARVEST OF LEFT INTERNAL MAMMARY ARTERY, ENDOSCOPIC HARVEST OF LEFT GREATER SAPHENOUS VEIN, ON CARDIOPULMONARY BYPASS, CORONARY ARTERY BYPASS GRAFT (CABG) X4 . TRANSESOPHAGEAL ECHOCARDIOGRAM.;  Surgeon: Bro Almeida MD;  Location: [facility] OR     CATARACT IOL, RT/LT       COLONOSCOPY N/A 7/17/2019    Procedure: COLONOSCOPY;  Surgeon: Roque Givens MD;  Location: [facility] OR     CV CORONARY ANGIOGRAM N/A 3/21/2023    Procedure: Coronary Angiogram;  Surgeon: Pito Chapin MD;  Location:  [facility] CARDIAC CATH LAB     CV PCI N/A 3/21/2023    Procedure: Percutaneous Coronary Intervention;  Surgeon: Pito Chapin MD;  [facility]: [facility] CARDIAC C[facility] LAB     ESOPHAGOSCOPY, GASTROSCOPY, DUODENOSCOPY (EGD), COMBINED N/A 7/17/2019    Procedure: ESOPHAGOGASTRODUODENOSCOPY, WITH BIOPSY;  Surgeon: Roque Givens MD;  Location: [facility] OR     HERNIA REPAIR  2000     MOHS MICROGRAPHIC PROCEDURE       PHACOEMULSIFICATION CLEAR CORNEA WITH STANDARD INTRAOCULAR LENS IMPLANT Right 7/2/2021    Procedure: RIGHT EYE CATARACT REMOVAL WITH INTRAOCULAR LENS IMPLANT;  Surgeon: Justa Liz MD;  Location: [facility] OR     PHACOEMULSIFICATION CLEAR CORNEA WITH STANDARD INTRAOCULAR LENS IMPLANT Left 7/9/2021    Procedure: LEFT EYE CATARACT REMOVAL WITH INTRAOCULAR LENS IMPLANT;  Surgeon: Justa Liz MD;  Location: [facility] [place] [facility] OR     [facility] CORONARY STENT PERCUT, EA ADDTL VESSEL         FAMILY HISTORY:   Family History   Problem Relation Age of Onset     Glaucoma No family hx of      Macular Degeneration No family hx of      Retinal detachment No family hx of      Amblyopia No family hx of      Melanoma No family hx of      Skin Cancer No family hx of        SOCIAL HISTORY:   Social History     Tobacco Use     Smoking status: Never     Smokeless tobacco: Never   Vaping Use     Vaping status: Not on file   Substance Use Topics     Alcohol use: Yes     Comment: 1-2 drinks 5 days a week         OBJECTIVE:   1. VITAL SIGNS:     Temp:   [97  F (36.1  C)-98.6  F (37  C)] 98.3  F (36.8  C)  Pulse:  [67-99] 90  Resp:  [14-20] 20  BP: ()/(51-80) 136/79  SpO2:  [90 %-98 %] 95 %          2. INTAKE/ OUTPUT:      I/O last 3 completed shifts:  In: 1630.02 [P.O.:1220; I.V.:410.02]  Out: 1590 [Urine:1450; Chest Tube:140]      3. PHYSICAL EXAMINATION:   General: alert and oriented  Resp: No increased WOB on NC  CV: S1, S2, no m/r/g   Abdomen: Soft, non-distended, non-tender  Incisions: c/d/i  Extremities: warm and well perfused  CT: To suction, serosang output, no airleak, crepitus    4. INVESTIGATIONS:   Arterial Blood Gases   Recent Labs   Lab 04/25/23  0405 04/24/23 2005 04/24/23  1653 04/24/23  1518   PH 7.40 7.36 7.34* 7.36   PCO2 41 36 43 40   PO2 65* 92 136* 160*   HCO3 25 21 23 23     Complete Blood Count   Recent Labs   Lab 04/28/23  0434 04/27/23  0715 04/26/23  0728 04/25/23  0405   WBC 7.5 8.9 10.2 10.2   HGB 9.0* 9.8* 9.9* 9.5*    161 130* 159     Basic Metabolic Panel  Recent Labs   Lab 04/28/23  0900 04/28/23  0434 04/27/23  2207 04/27/23  1831 04/27/23  0828 04/27/23  0406 04/26/23  0737 04/26/23  0609 04/25/23  2216 04/25/23  1942   NA  --  138  --   --   --  138  --  140  --  139   POTASSIUM  --  4.0  --   --   --  4.3  --  4.6  --  4.3   CHLORIDE  --  103  --   --   --  103  --  104  --  104   CO2  --  25  --   --   --  25  --  26  --  23   BUN  --  12.6  --   --   --  14.8  --  13.6  --  12.3   CR  --  0.83  --   --   --  0.90  --  0.91  --  0.86   * 119* 119* 108*   < > 113*   < > 137*   < > 129*    < > = values in this interval not displayed.     Liver Function Tests  Recent Labs   Lab 04/28/23  0434 04/27/23  0406 04/26/23  0609 04/25/23  0645 04/25/23  0405 04/24/23  1518 04/24/23  1518 04/24/23  1255   AST 17 23 35  --  45   < > 39  --    ALT 15 16 20  --  29   < > 34  --    ALKPHOS 39* 45 40  --  39*   < > 44  --    BILITOTAL 0.3 0.2 0.2  --  0.3   < > 0.5  --    ALBUMIN 2.9* 3.0* 3.1*  --  3.2*   < > 3.4*  --     INR  --   --   --  1.27*  --   --  1.31* 1.49*    < > = values in this interval not displayed.     Pancreatic Enzymes  No lab results found in last 7 days.  Coagulation Profile  Recent Labs   Lab 04/25/23  0645 04/24/23  1518 04/24/23  1255   INR 1.27* 1.31* 1.49*   PTT  --  42* 24         5. RADIOLOGY:   Recent Results (from the past 24 hour(s))   XR Chest Port 1 View    Narrative    Exam: XR CHEST PORT 1 VIEW, 4/28/2023 1:30 AM    Indication: chest tubes s/p CABG x4    Comparison: Chest radiograph 4/27/2023, 4/26/2023, 4/25/2023    Findings:   Chest tubes remain in place. Stable perihilar/basilar streaky  opacities. No pneumothorax. Cardiac silhouette is stable in size.  Chronic left rib fractures.      Impression    Impression:   Stable exam.    I have personally reviewed the examination and initial interpretation  and I agree with the findings.    ARLETTE WEBB DO         SYSTEM ID:  [id number]       =========================================

## 2023-04-28 NOTE — CONSULTS
Electrophysiology Consultation Note   EP Attending: Dr. Saenz.   Reason for consultation: new onset AF/AFL s/p CABG, anticoagulation recs  Provider requesting consultation: CVTS  Date of Service: 4/28/2023      HPI:   Reinaldo Gordillo is a 77 year old male with past medical history significant for HLD, HTN, VT, CAD s/p CABG x4 LIMA to LAD, SVG to OM, SVG to Diag, SVG to GERALDINE on 4/24.   Prior to admission, patient had been following with CVTS as an outpatient after having a fall and suffering a mild concussion with brief memory loss. He presented to ED where they suspected likely concussion with negative workup. Given history of pervious CAD with VT, zio monitor was placed and he underwent angiogram that demonstrated severe CAD with recommendation for CABG.   EP was consulted after pt developed atrial fibrillation/flutter yesterday. Since mid-afternoon yesterday he has been converting in and out of atrial flutter/fib. It has not been continually persistent. Rates in AF/AFL 60-80, he has minimal symptoms with occasional palpitations. Amio bolus + gtt started yesterday afternoon (1600). He's been on lopressor 12.5 bid. He has no prior history of atrial arrhythmias.     Past Medical History:   Past Medical History:   Diagnosis Date     ASCVD (arteriosclerotic cardiovascular disease)      Coronary artery disease      Gastroesophageal reflux disease      Hypertension      Stented coronary artery      Past Surgical History:   Past Surgical History:   Procedure Laterality Date     BYPASS GRAFT ARTERY CORONARY N/A 4/24/2023    Procedure: MEDIAN STERNOTOMY, HARVEST OF LEFT INTERNAL MAMMARY ARTERY, ENDOSCOPIC HARVEST OF LEFT GREATER SAPHENOUS VEIN, ON CARDIOPULMONARY BYPASS, CORONARY ARTERY BYPASS GRAFT (CABG) X4 . TRANSESOPHAGEAL ECHOCARDIOGRAM.;  Surgeon: Bro Almeida MD;  Location: [facility] OR     CATARACT IOL, RT/LT       COLONOSCOPY N/A 7/17/2019    Procedure: COLONOSCOPY;  Surgeon: Roque Givens MD;  Location:   OR     CV CORONARY ANGIOGRAM N/A 3/21/2023    Procedure: Coronary Angiogram;  Surgeon: Pito Chapin MD;  Location:  HEART CARDIAC CATH LAB     CV PCI N/A 3/21/2023    Procedure: Percutaneous Coronary Intervention;  Surgeon: Pito Chapin MD;  Location: [facility] CARDIAC CATH LAB     ESOPHAGOSCOPY, GASTROSCOPY, DUODENOSCOPY (EGD), COMBINED N/A 7/17/2019    Procedure: ESOPHAGOGASTRODUODENOSCOPY, WITH BIOPSY;  Surgeon: Roque Givens MD;  Location:  OR     HERNIA REPAIR  2000     MOHS MICROGRAPHIC PROCEDURE       PHACOEMULSIFICATION CLEAR CORNEA WITH STANDARD INTRAOCULAR LENS IMPLANT Right 7/2/2021    Procedure: RIGHT EYE CATARACT REMOVAL WITH INTRAOCULAR LENS IMPLANT;  Surgeon: Justa Liz MD;  Location: [facility] OR     PHACOEMULSIFICATION CLEAR CORNEA WITH STANDARD INTRAOCULAR LENS IMPLANT Left 7/9/2021    Procedure: LEFT EYE CATARACT REMOVAL WITH INTRAOCULAR LENS IMPLANT;  Surgeon: Justa Liz MD;  Location: [facility] OR     [facility] CORONARY STENT PERCUT, EA ADDTL VESSEL       Allergies: Per MAR   No Known Allergies  Medications:   Per MAR current outpatient cardiovascular medications include:   Facility-Administered Medications Prior to Admission   Medication Dose Route Frequency Provider Last Rate Last Admin     lidocaine 1% with EPINEPHrine 1:100,000 injection 3 mL  3 mL Intradermal Once Yonathan Randall MD         Medications Prior to Admission   Medication Sig Dispense Refill Last Dose     ASPIRIN LOW DOSE 81 MG EC tablet Take 81 mg by mouth every morning   4/23/2023     clobetasol (TEMOVATE) 0.05 % external ointment Apply topically 2 times daily To the rash on the legs as needed 120 g 3      diphenhydrAMINE-acetaminophen (TYLENOL PM)  MG tablet Take 1 tablet by mouth nightly as needed for sleep        lisinopril (ZESTRIL) 5 MG tablet Take 20 mg by mouth every morning   4/23/2023     melatonin 3 MG tablet Take 3 mg by mouth nightly as needed for sleep        metoprolol succinate ER  "(TOPROL XL) 25 MG 24 hr tablet Take 1 tablet (25 mg) by mouth every morning 90 tablet 1 4/24/2023     omeprazole (PRILOSEC) 20 MG DR capsule Take 20 mg by mouth as needed   4/24/2023     pravastatin (PRAVACHOL) 80 MG tablet Take 80 mg by mouth At Bedtime   4/23/2023     zolpidem (AMBIEN) 10 MG tablet    More than a month     No current outpatient medications on file.     Current Facility-Administered Medications   Medication Dose Route Frequency     amiodarone  400 mg Oral BID     aspirin  162 mg Oral Daily     heparin ANTICOAGULANT  5,000 Units Subcutaneous Q8H     insulin aspart  1-7 Units Subcutaneous TID AC     insulin aspart  1-5 Units Subcutaneous At Bedtime     [Held by provider] lisinopril  20 mg Oral QAM     magnesium hydroxide  30 mL Oral Daily     magnesium oxide  400 mg Oral Q4H     metoprolol tartrate  12.5 mg Oral BID     nystatin   Topical BID     pantoprazole  40 mg Oral Daily     polyethylene glycol  17 g Oral BID     potassium & sodium phosphates  1 packet Oral or Feeding Tube Q4H     pravastatin  80 mg Oral At Bedtime     senna-docusate  3 tablet Oral BID     sodium chloride (PF)  3 mL Intracatheter Q8H     Family History:   Family History   Problem Relation Age of Onset     Glaucoma No family hx of      Macular Degeneration No family hx of      Retinal detachment No family hx of      Amblyopia No family hx of      Melanoma No family hx of      Skin Cancer No family hx of      Social History:   Social History     Tobacco Use     Smoking status: Never     Smokeless tobacco: Never   Vaping Use     Vaping status: Not on file   Substance Use Topics     Alcohol use: Yes     Comment: 1-2 drinks 5 days a week       ROS:   A comprehensive 10 point ROS was negative other than as mentioned in HPI.    Physical Examination:   VITALS: /79   Pulse 90   Temp 98.3  F (36.8  C) (Oral)   Resp 20   Ht 1.88 m (6' 2\")   Wt 92.1 kg (203 lb 0.7 oz)   SpO2 95%   BMI 26.07 kg/m      GENERAL APPEARANCE: AxO, " NAD   HEENT: NCAT, MMM.   NECK: Supple. No JVD or bruit. Good carotid upstroke.   CHEST: CTAB   CARDIOVASCULAR: S1S2, Reg, No m/r/g.   ABDOMEN: BS+, soft, No pulsatile masses or bruits.   EXTREMITIES: No pedal edema. Distal pulses intact.   NEURO: Grossly nonfocal.   PSYCH: Normal affect.  SKIN: Warm and dry. Sternal incision C/D/I  Data:   Labs:  BMP  Recent Labs   Lab 23  0900 23  0434 23  2207 23  1831 23  0828 23  0406 23  0737 23  0609 23  2216 23  1942   NA  --  138  --   --   --  138  --  140  --  139   POTASSIUM  --  4.0  --   --   --  4.3  --  4.6  --  4.3   CHLORIDE  --  103  --   --   --  103  --  104  --  104   CRISS  --  8.7*  --   --   --  8.5*  --  8.8  --  9.1   CO2  --  25  --   --   --  25  --  26  --  23   BUN  --  12.6  --   --   --  14.8  --  13.6  --  12.3   CR  --  0.83  --   --   --  0.90  --  0.91  --  0.86   * 119* 119* 108*   < > 113*   < > 137*   < > 129*    < > = values in this interval not displayed.     CBC  Recent Labs   Lab 23  0434 23  0715 23  0728 23  0405   WBC 7.5 8.9 10.2 10.2   RBC 2.76* 3.01* 3.05* 2.94*   HGB 9.0* 9.8* 9.9* 9.5*   HCT 27.9* 30.5* 30.8* 29.0*   * 101* 101* 99   MCH 32.6 32.6 32.5 32.3   MCHC 32.3 32.1 32.1 32.8   RDW 12.4 12.5 12.5 12.4    161 130* 159     INR  Recent Labs   Lab 23  0645 23  1518 23  1255   INR 1.27* 1.31* 1.49*     No results found for: CKTOTAL, CKMB, TROPN  Cholesterol (mg/dL)   Date Value   2022 165     Direct Measure HDL (mg/dL)   Date Value   2022 93     LDL Cholesterol Calculated (mg/dL)   Date Value   2022 63     EK23      EK23      ECHO: 3/21/23  Interpretation Summary  Global and regional left ventricular function is normal with an EF of 55-60%.  The right ventricle is normal size. Global right ventricular function is  normal.  Mild aortic stenosis is present. The aortic valve area  is 1.8 cm^2, by the  continuity equation.  No pericardial effusion is present.  This study was compared with the study from 10/29/2022. No significant changes  noted.      Assessment:   Reinaldo Gordillo is a 77 year old male with past medical history significant for HLD, HTN, VT, CAD s/p CABG x4 LIMA to LAD, SVG to OM, SVG to Diag, SVG to GERALDINE on 4/24.   Prior to admission, patient had been following with CVTS as an outpatient after having a fall and suffering a mild concussion with brief memory loss. He presented to ED where they suspected likely concussion with negative workup. Given history of pervious CAD with VT, zio monitor was placed and he underwent angiogram that demonstrated severe CAD with recommendation for CABG.   EP was consulted after pt developed atrial fibrillation/flutter yesterday. Since mid-afternoon yesterday he has been converting in and out of atrial flutter/fib. It has not been continually persistent. Rates in AF/AFL 60-80, he has minimal symptoms with occasional palpitations. Amio bolus + gtt started yesterday afternoon (1600). He's been on lopressor 12.5 bid. He has no prior history of atrial arrhythmias.     EP Recommendations:  New Onset Atrial Fibrillation/Flutter post op CABGx4   - Continue amio, once IV load completed transition to  BID x 7 days followed by 400 mg daily until seen as outpatient in EP clinic.    - CHADSVASc 4 (++age, +HTN, +CAD), indicating need for AC. Would recommend starting DOAC (Xarelto or Eliquis) when deemed ok by CVTS team. Continue AC until seen in clinic for follow up.    - Follow up in EP clinic 3 months following discharge for further AC and amio management with a 14 day ziopatch monitor completed prior to apt to reassess atrial arrhythmia burden. Will determine if long term anticoagulation is needed at this time.    - If he becomes persistent over the weekend, could consider JOSE CRUZ/DCCV next week before discharge.     The patient states understanding and is  agreeable with plan.   Thank you for allowing us to participate in the care of this patient.     The patient was discussed w/ Dr. Saenz.  The above note reflects our joint plan.    Kesha Peck PA-C  [facility]  Electrophysiology Consult Service  Pager: [phone]    I very much appreciated the opportunity to see and assess Dr Reinaldo Gordillo in the hospital with CV SATHYA Kesha Peck PA-C. I agree with the note above and discussed my recommendations with  and Mrs Gordillo on [address]. The note above summarizes my findings and current recommendations.  Please do not hesitate to contact my office if you have any questions or concerns.      Joao Saenz MD  Cardiac Arrhythmia Service  [facility]  [phone]

## 2023-04-28 NOTE — PROGRESS NOTES
Pain Service Progress Note  [facility]  Date: 04/28/2023       Patient Name: Reinaldo Gordillo  MRN: [id number]  Age: 77 year old  Sex: male      Assessment:  Reinaldo Gordillo is a 77 year old male with PMH significant for CAD with VT s/p AREN x 4 in RCA, mild aortic stenosis, HTN, HLD, GERD    Procedure: s/p CABG x 4    Date of Surgery: 4/24/23     Date of Catheter Placement: 4/24/23     Plan/Recommendations:  1. Regional Anesthesia/Analgesia  -Continuous Catheter Type/Site: bilateral erector spinae (ES) T7-8. Left catheter found out at 1 cm to skin so removed on 4/27  Infusate: 0.2% Ropivacaine  Continue programmed Intermittent Bolus (PIB) to 10 mL Q60 min via right catheter    Plan to maintain catheter while chest tubes in place, max of 7 days    2. Anticoagulation  Okay to continue subcutaneous heparin with plan to remove catheters at least 4 hrs after last dose is given.   -Please contact Inpatient Pain Service before ordering or making any anticoagulation changes     3. Multimodal Analgesia  - per primary service    Pain Service will continue to follow.    Discussed with attending anesthesiologist    Elena Vanessa, SARITA CNP  04/28/2023     Overnight Events: No acute events    Tubes/Drains: Yes  3 chest tubes    Subjective:  My pain is pretty well controlled, I didn't notice a difference since the catheter was removed  Reports minimal pain at chest tube sites, tolerating oral nonopioid analgesia and perineural infusion.  Nausea: No  Symptoms of LAST: No        Diet: Advance Diet as Tolerated: Regular Diet Adult  Snacks/Supplements Adult: Ensure Enlive; Between Meals    Relevant Labs:  Recent Labs   Lab Test 04/28/23  0434 04/25/23  1942 04/25/23  0645 04/24/23  2142 04/24/23  1518   INR  --   --  1.27*  --  1.31*      < >  --    < > 171   PTT  --   --   --   --  42*   BUN 12.6   < >  --    < > 12.7    < > = values in this interval not displayed.       Physical Exam:  Vitals:  "/61 (BP Location: Right arm)   Pulse 70   Temp 98.5  F (36.9  C) (Axillary)   Resp 19   Ht 1.88 m (6' 2\")   Wt 92.1 kg (203 lb 0.7 oz)   SpO2 96%   BMI 26.07 kg/m      Physical Exam:   Orientation:  Alert, oriented, and in no acute distress: Yes  Sedation: No    Motor Examination:  5/5 Strength in lower extremities: Yes    Catheter Site:   Catheter entry site is clean/dry/intact: Yes    Tender: No      Relevant Medications:  Current Pain Medications:  Medications related to Pain Management (From now, onward)    Start     Dose/Rate Route Frequency Ordered Stop    04/28/23 1200  ropivacaine 0.2% in NS perineural infusion (simple)          Perineural Continuous Nerve Block 04/28/23 1139      04/27/23 0800  aspirin EC tablet 162 mg         162 mg Oral DAILY 04/26/23 1457      04/27/23 0800  magnesium hydroxide (MILK OF MAGNESIA) suspension 30 mL         30 mL Oral DAILY 04/26/23 1457      04/27/23 0000  acetaminophen (TYLENOL) tablet 650 mg         650 mg Oral EVERY 4 HOURS PRN 04/24/23 1515      04/26/23 2000  polyethylene glycol (MIRALAX) Packet 17 g         17 g Oral 2 TIMES DAILY 04/26/23 1457      04/26/23 1000  senna-docusate (SENOKOT-S/PERICOLACE) 8.6-50 MG per tablet 3 tablet         3 tablet Oral 2 TIMES DAILY 04/26/23 0632      04/26/23 0631  magnesium hydroxide (MILK OF MAGNESIA) suspension 30 mL         30 mL Oral DAILY PRN 04/26/23 0632      04/24/23 1515  HYDROmorphone (DILAUDID) injection 0.2 mg        See Hyperspace for full Linked Orders Report.    0.2 mg Intravenous EVERY 2 HOURS PRN 04/24/23 1515      04/24/23 1515  HYDROmorphone (DILAUDID) injection 0.4 mg        See Hyperspace for full Linked Orders Report.    0.4 mg Intravenous EVERY 2 HOURS PRN 04/24/23 1515      04/24/23 1515  oxyCODONE (ROXICODONE) tablet 5 mg        See Hyperspace for full Linked Orders Report.    5 mg Oral EVERY 4 HOURS PRN 04/24/23 1515      04/24/23 1515  oxyCODONE IR (ROXICODONE) tablet 10 mg        See " "Hyperspace for full Linked Orders Report.    10 mg Oral EVERY 4 HOURS PRN 04/24/23 1515      04/24/23 1515  bisacodyl (DULCOLAX) suppository 10 mg         10 mg Rectal DAILY PRN 04/24/23 1515      04/24/23 1515  lidocaine 1 % 0.1-1 mL         0.1-1 mL Other EVERY 1 HOUR PRN 04/24/23 1515      04/24/23 1515  lidocaine (LMX4) cream          Topical EVERY 1 HOUR PRN 04/24/23 1515            Primary Service Contacted with Recommendations? No      Please see A&P for additional details of medical decision making.      Acute Inpatient Pain Service [facility]  Hours of pain coverage 24/7   Page via [employer]- Please Page the Pain Team Via [employer]: \"PAIN MANAGEMENT ACUTE INPATIENT/ [facility] [place] [facility]\"             "

## 2023-04-28 NOTE — DISCHARGE INSTRUCTIONS
AFTER YOU GO HOME FROM YOUR HEART SURGERY  (S/p coronary artery bypass grafting x4 by Dr. Almeida on 4/24/23)    You had a sternotomy, avoid lifting anything greater than ten pounds for 6 weeks after surgery and then less than 20 pounds for an additional 6 weeks. Do not reach backwards or use arms to push out of chair. Do not let people pull on your arms to assist with standing. Avoid twisting or reaching too far across your body.  Avoid strenuous activities such as bowling, vacuuming, raking, shoveling, golf or tennis for 12 weeks after your surgery. It is okay to resume sex if you feel comfortable in doing so. You may have to try different positions with your partner.  Splint your chest incision by hugging a pillow or bringing your arms across your chest when coughing or sneezing. Please try to sleep on your back for the first 4-6 weeks to avoid extra stress on your sternum (breastbone) while it is healing.     No driving for 4 weeks after surgery or while on pain medication.     Shower or wash your incisions twice daily with soap and water (or as instructed), pat dry. Keep wound clean and dry, showers are okay after discharge, but don't let spray hit directly on incision. No baths or swimming for 1 month. Cover chest tube sites with gauze until they stop draining, then leave open to air. It is not abnormal for chest tube sites to drain yellowish/clear fluid for up to 2-3 weeks after surgery.   Watch for signs of infection: increased redness, tenderness, warmth or any drainage from sternum incision.  Also a temperature > 100.5 F or chills. Call your surgeon or primary care provider's office immediately. Remove any skin glue left on incisions after 10-14 days. This will not affect your incision and can speed up healing.    Exercise is very important in your recovery. Please follow the guidelines set up for you in your cardiac rehab classes at the hospital. If outpatient cardiac rehab was ordered for you, we highly  recommend you participate. If you have problems arranging your cardiac rehab, please call [phone] for all locations, with the exception of [place], please call [phone] and [place], please call [phone].    Avoid sitting for prolonged periods of time, try to walk every hour during the day. If you have a leg incision, elevate your leg often when you are not walking.    Check your weight when you get home from the hospital and continue to check it daily through your recovery for at least a month. If you notice a weight gain of 2-3 pounds in a week, notify your primary care physician, cardiologist or surgeon.    Bowel activity may be slow after surgery. If necessary, you may take an over the counter laxative such as Milk of Magnesia or Miralax. You may have stool softeners prescribed (docusate sodium, Senokot). We recommend using stool softeners while using narcotics for pain (oxycodone/percocet, hydrocodone/vicodin, hydromorphone/dilaudid).      Wean OFF of narcotics (oxycodone, dilaudid, hydrocodone) as soon as possible. You should continue taking acetaminophen as long as you have any surgical pain as the first choice for pain control and add narcotics as necessary for pain to be tolerable.        DO NOT SMOKE.  IF YOU NEED HELP QUITTING, PLEASE TALK WITH YOUR CARDIOLOGIST OR PRIMARY DOCTOR.    REGARDING PRESCRIPTION REFILLS.  If you need a refill on your pain medication contact us to discuss your pain and a possible one time refill.   All other medications will be adjusted, discontinued and re-filled by your primary care physician and/or your cardiologist as they were prior to your surgery. We have given you enough for one to three month with possibly one refill.    POST-OPERATIVE CLINIC VISITS  You have a follow up visit with CVTS Surgery Advance Care Practitioners on 5/12/2023 at 3pm at the [facility].  You will then return to the care of your primary provider and your  cardiologist. Future medication refills should come from your PCP or Cardiologist.   You should see your primary care provider in 2-4 weeks after discharge.   It is important to see your cardiologist about 4-6 weeks after discharge.    If you do not hear from a  in 7 days, please call [phone] (choose option 1) and request to be seen with a general cardiologist or someone that you have seen in the past.   If there is a need to return to see CT Surgery, please call our  Lynn Espinal at [phone].     SURGICAL QUESTIONS  Please call Yony Cormier, Morena Geiger, Rosario Beavers, or Shahana Tillman with surgical recovery and medication questions; phone numbers are listed below.  They will assist you with your needs and contact other surgery care team members as indicated.    On weekends or after hours, please call [phone] and ask the  to page the Cardiothoracic Surgery fellow on call.      Thank you,    Your Cardiothoracic Surgery Team   Yony Cormier, RN Care Coordinator - [phone]  Morena Geiger, RN Care Coordinator - [phone]   Shahana Holloway, RN Care Coordinator - [phone]   Alyssia Beaevrs, RN Care Coordinator - [phone]

## 2023-04-28 NOTE — PLAN OF CARE
Major Shift Events: Aox4, SBA/assist x1 w/ GB. BLE neuropathy, pain controlled with tylenol. Up ambulating around unit with therapy. Temp pacer wires and CTs pulled. Amio gtt stopped and switched to PO. Converting in and out of Afib throughout shift, pressures stable. Afebrile. Regular diet- needs encouragement for intake. 1 BM. Spouse at bedside and updated.   Plan: Transfer to floor when bed available   For vital signs and complete assessments, please see documentation flowsheets.

## 2023-04-29 ENCOUNTER — APPOINTMENT (OUTPATIENT)
Dept: PHYSICAL THERAPY | Facility: CLINIC | Age: 78
DRG: 236 | End: 2023-04-29
Attending: SURGERY
Payer: COMMERCIAL

## 2023-04-29 LAB
ALBUMIN SERPL BCG-MCNC: 2.8 G/DL (ref 3.5–5.2)
ALP SERPL-CCNC: 39 U/L (ref 40–129)
ALT SERPL W P-5'-P-CCNC: 21 U/L (ref 10–50)
ANION GAP SERPL CALCULATED.3IONS-SCNC: 10 MMOL/L (ref 7–15)
AST SERPL W P-5'-P-CCNC: 23 U/L (ref 10–50)
BILIRUB SERPL-MCNC: 0.2 MG/DL
BUN SERPL-MCNC: 14.5 MG/DL (ref 8–23)
CALCIUM SERPL-MCNC: 8.5 MG/DL (ref 8.8–10.2)
CHLORIDE SERPL-SCNC: 102 MMOL/L (ref 98–107)
CREAT SERPL-MCNC: 0.85 MG/DL (ref 0.67–1.17)
DEPRECATED HCO3 PLAS-SCNC: 25 MMOL/L (ref 22–29)
ERYTHROCYTE [DISTWIDTH] IN BLOOD BY AUTOMATED COUNT: 12.4 % (ref 10–15)
GFR SERPL CREATININE-BSD FRML MDRD: >90 ML/MIN/1.73M2
GLUCOSE BLDC GLUCOMTR-MCNC: 114 MG/DL (ref 70–99)
GLUCOSE BLDC GLUCOMTR-MCNC: 165 MG/DL (ref 70–99)
GLUCOSE BLDC GLUCOMTR-MCNC: 200 MG/DL (ref 70–99)
GLUCOSE BLDC GLUCOMTR-MCNC: 94 MG/DL (ref 70–99)
GLUCOSE SERPL-MCNC: 115 MG/DL (ref 70–99)
HCT VFR BLD AUTO: 27.1 % (ref 40–53)
HGB BLD-MCNC: 8.8 G/DL (ref 13.3–17.7)
MAGNESIUM SERPL-MCNC: 1.8 MG/DL (ref 1.7–2.3)
MAGNESIUM SERPL-MCNC: 1.8 MG/DL (ref 1.7–2.3)
MCH RBC QN AUTO: 32.7 PG (ref 26.5–33)
MCHC RBC AUTO-ENTMCNC: 32.5 G/DL (ref 31.5–36.5)
MCV RBC AUTO: 101 FL (ref 78–100)
PHOSPHATE SERPL-MCNC: 3.3 MG/DL (ref 2.5–4.5)
PHOSPHATE SERPL-MCNC: 3.4 MG/DL (ref 2.5–4.5)
PLATELET # BLD AUTO: 186 10E3/UL (ref 150–450)
POTASSIUM SERPL-SCNC: 3.9 MMOL/L (ref 3.4–5.3)
POTASSIUM SERPL-SCNC: 3.9 MMOL/L (ref 3.4–5.3)
PROT SERPL-MCNC: 4.7 G/DL (ref 6.4–8.3)
RBC # BLD AUTO: 2.69 10E6/UL (ref 4.4–5.9)
SODIUM SERPL-SCNC: 137 MMOL/L (ref 136–145)
WBC # BLD AUTO: 6.7 10E3/UL (ref 4–11)

## 2023-04-29 PROCEDURE — 250N000013 HC RX MED GY IP 250 OP 250 PS 637: Performed by: PHYSICIAN ASSISTANT

## 2023-04-29 PROCEDURE — 250N000011 HC RX IP 250 OP 636: Performed by: STUDENT IN AN ORGANIZED HEALTH CARE EDUCATION/TRAINING PROGRAM

## 2023-04-29 PROCEDURE — 84100 ASSAY OF PHOSPHORUS: CPT | Performed by: SURGERY

## 2023-04-29 PROCEDURE — 250N000013 HC RX MED GY IP 250 OP 250 PS 637: Performed by: SURGERY

## 2023-04-29 PROCEDURE — 83735 ASSAY OF MAGNESIUM: CPT | Performed by: SURGERY

## 2023-04-29 PROCEDURE — 97116 GAIT TRAINING THERAPY: CPT | Mod: GP | Performed by: PHYSICAL THERAPIST

## 2023-04-29 PROCEDURE — 80053 COMPREHEN METABOLIC PANEL: CPT | Performed by: STUDENT IN AN ORGANIZED HEALTH CARE EDUCATION/TRAINING PROGRAM

## 2023-04-29 PROCEDURE — 93010 ELECTROCARDIOGRAM REPORT: CPT | Performed by: INTERNAL MEDICINE

## 2023-04-29 PROCEDURE — 83735 ASSAY OF MAGNESIUM: CPT | Performed by: THORACIC SURGERY (CARDIOTHORACIC VASCULAR SURGERY)

## 2023-04-29 PROCEDURE — 85027 COMPLETE CBC AUTOMATED: CPT | Performed by: STUDENT IN AN ORGANIZED HEALTH CARE EDUCATION/TRAINING PROGRAM

## 2023-04-29 PROCEDURE — 250N000013 HC RX MED GY IP 250 OP 250 PS 637: Performed by: STUDENT IN AN ORGANIZED HEALTH CARE EDUCATION/TRAINING PROGRAM

## 2023-04-29 PROCEDURE — 200N000002 HC R&B ICU UMMC

## 2023-04-29 PROCEDURE — 84132 ASSAY OF SERUM POTASSIUM: CPT | Performed by: THORACIC SURGERY (CARDIOTHORACIC VASCULAR SURGERY)

## 2023-04-29 PROCEDURE — 250N000011 HC RX IP 250 OP 636: Performed by: PHYSICIAN ASSISTANT

## 2023-04-29 PROCEDURE — 36415 COLL VENOUS BLD VENIPUNCTURE: CPT | Performed by: THORACIC SURGERY (CARDIOTHORACIC VASCULAR SURGERY)

## 2023-04-29 PROCEDURE — 97530 THERAPEUTIC ACTIVITIES: CPT | Mod: GP | Performed by: PHYSICAL THERAPIST

## 2023-04-29 PROCEDURE — 84100 ASSAY OF PHOSPHORUS: CPT | Performed by: THORACIC SURGERY (CARDIOTHORACIC VASCULAR SURGERY)

## 2023-04-29 PROCEDURE — 36415 COLL VENOUS BLD VENIPUNCTURE: CPT | Performed by: STUDENT IN AN ORGANIZED HEALTH CARE EDUCATION/TRAINING PROGRAM

## 2023-04-29 PROCEDURE — 99231 SBSQ HOSP IP/OBS SF/LOW 25: CPT | Mod: GC | Performed by: ANESTHESIOLOGY

## 2023-04-29 RX ORDER — FUROSEMIDE 10 MG/ML
20 INJECTION INTRAMUSCULAR; INTRAVENOUS ONCE
Status: COMPLETED | OUTPATIENT
Start: 2023-04-29 | End: 2023-04-29

## 2023-04-29 RX ORDER — MAGNESIUM OXIDE 400 MG/1
400 TABLET ORAL EVERY 4 HOURS
Status: COMPLETED | OUTPATIENT
Start: 2023-04-29 | End: 2023-04-29

## 2023-04-29 RX ADMIN — HEPARIN SODIUM 5000 UNITS: 5000 INJECTION, SOLUTION INTRAVENOUS; SUBCUTANEOUS at 12:17

## 2023-04-29 RX ADMIN — PRAVASTATIN SODIUM 80 MG: 20 TABLET ORAL at 21:49

## 2023-04-29 RX ADMIN — HEPARIN SODIUM 5000 UNITS: 5000 INJECTION, SOLUTION INTRAVENOUS; SUBCUTANEOUS at 03:42

## 2023-04-29 RX ADMIN — FUROSEMIDE 20 MG: 10 INJECTION, SOLUTION INTRAVENOUS at 08:39

## 2023-04-29 RX ADMIN — Medication 12.5 MG: at 08:38

## 2023-04-29 RX ADMIN — PANTOPRAZOLE SODIUM 40 MG: 40 TABLET, DELAYED RELEASE ORAL at 08:38

## 2023-04-29 RX ADMIN — SENNOSIDES AND DOCUSATE SODIUM 3 TABLET: 50; 8.6 TABLET ORAL at 19:38

## 2023-04-29 RX ADMIN — AMIODARONE HYDROCHLORIDE 400 MG: 200 TABLET ORAL at 08:37

## 2023-04-29 RX ADMIN — HEPARIN SODIUM 5000 UNITS: 5000 INJECTION, SOLUTION INTRAVENOUS; SUBCUTANEOUS at 19:38

## 2023-04-29 RX ADMIN — INSULIN ASPART 2 UNITS: 100 INJECTION, SOLUTION INTRAVENOUS; SUBCUTANEOUS at 12:17

## 2023-04-29 RX ADMIN — MAGNESIUM OXIDE TAB 400 MG (241.3 MG ELEMENTAL MG) 400 MG: 400 (241.3 MG) TAB at 08:38

## 2023-04-29 RX ADMIN — ASPIRIN 162 MG: 81 TABLET ORAL at 08:38

## 2023-04-29 RX ADMIN — AMIODARONE HYDROCHLORIDE 150 MG: 1.5 INJECTION, SOLUTION INTRAVENOUS at 19:05

## 2023-04-29 RX ADMIN — MAGNESIUM OXIDE TAB 400 MG (241.3 MG ELEMENTAL MG) 400 MG: 400 (241.3 MG) TAB at 05:14

## 2023-04-29 RX ADMIN — AMIODARONE HYDROCHLORIDE 400 MG: 200 TABLET ORAL at 19:37

## 2023-04-29 RX ADMIN — POLYETHYLENE GLYCOL 3350 17 G: 17 POWDER, FOR SOLUTION ORAL at 08:39

## 2023-04-29 RX ADMIN — Medication 12.5 MG: at 19:37

## 2023-04-29 RX ADMIN — Medication 5 MG: at 21:49

## 2023-04-29 RX ADMIN — SENNOSIDES AND DOCUSATE SODIUM 3 TABLET: 50; 8.6 TABLET ORAL at 08:38

## 2023-04-29 ASSESSMENT — ACTIVITIES OF DAILY LIVING (ADL)
ADLS_ACUITY_SCORE: 26
ADLS_ACUITY_SCORE: 24
ADLS_ACUITY_SCORE: 25
ADLS_ACUITY_SCORE: 26
ADLS_ACUITY_SCORE: 25
ADLS_ACUITY_SCORE: 26
ADLS_ACUITY_SCORE: 25
ADLS_ACUITY_SCORE: 26
ADLS_ACUITY_SCORE: 26
ADLS_ACUITY_SCORE: 25

## 2023-04-29 NOTE — PROGRESS NOTES
"Pain Service Progress Note  [facility]  Date: 04/29/2023       Patient Name: Reinaldo Gordillo  MRN: [id number]  Age: 77 year old  Sex: male      Assessment:  Reinaldo Gordillo is a 77 year old male with PMH significant for CAD with VT s/p AREN x 4 in RCA, mild aortic stenosis, HTN, HLD, GERD    Procedure: s/p CABG x 4    Date of Surgery: 4/24/23     Date of Catheter Placement: 4/24/23     Plan/Recommendations:  1. Regional Anesthesia/Analgesia  -Continuous Catheter Type/Site: bilateral erector spinae (ES) T7-8. Left catheter found out at 1 cm to skin so removed on 4/27  Infusate: 0.2% Ropivacaine  Continue programmed Intermittent Bolus (PIB) to 10 mL Q60 min via right catheter    Catheter pulled 4/29    Plan to maintain catheter while chest tubes in place, max of 7 days    2. Multimodal Analgesia  - per primary service    Pain Service will continue to follow.    Discussed with attending anesthesiologist    Aurelio Farias MD  Anesthesiology, CA-3, PGY4    Overnight Events: No acute events    Tubes/Drains: No    Subjective:  Pain well controlled    Nausea: No  Symptoms of LAST: No    Diet: Advance Diet as Tolerated: Regular Diet Adult  Snacks/Supplements Adult: Ensure Enlive; Between Meals  Discharge Instruction - Cardiac Diet    Relevant Labs:  Recent Labs   Lab Test 04/28/23  0434 04/25/23  1942 04/25/23  0645 04/24/23  2142 04/24/23  1518   INR  --   --  1.27*  --  1.31*      < >  --    < > 171   PTT  --   --   --   --  42*   BUN 12.6   < >  --    < > 12.7    < > = values in this interval not displayed.       Physical Exam:  Vitals: /70 (BP Location: Left arm, Cuff Size: Adult Regular)   Pulse 77   Temp 36.5  C (97.7  F) (Oral)   Resp 19   Ht 1.88 m (6' 2\")   Wt 92.1 kg (203 lb 0.7 oz)   SpO2 96%   BMI 26.07 kg/m      Physical Exam:   Orientation:  Alert, oriented, and in no acute distress: Yes  Sedation: No    Motor Examination:  5/5 Strength in lower extremities: " Yes    Catheter Site:   Catheter entry site is clean/dry/intact: Yes    Tender: No      Relevant Medications:  Current Pain Medications:  Medications related to Pain Management (From now, onward)    Start     Dose/Rate Route Frequency Ordered Stop    04/28/23 1200  ropivacaine 0.2% in NS perineural infusion (simple)          Perineural Continuous Nerve Block 04/28/23 1139      04/27/23 0800  aspirin EC tablet 162 mg         162 mg Oral DAILY 04/26/23 1457      04/27/23 0800  magnesium hydroxide (MILK OF MAGNESIA) suspension 30 mL         30 mL Oral DAILY 04/26/23 1457      04/27/23 0000  acetaminophen (TYLENOL) tablet 650 mg         650 mg Oral EVERY 4 HOURS PRN 04/24/23 1515      04/26/23 2000  polyethylene glycol (MIRALAX) Packet 17 g         17 g Oral 2 TIMES DAILY 04/26/23 1457      04/26/23 1000  senna-docusate (SENOKOT-S/PERICOLACE) 8.6-50 MG per tablet 3 tablet         3 tablet Oral 2 TIMES DAILY 04/26/23 0632      04/26/23 0631  magnesium hydroxide (MILK OF MAGNESIA) suspension 30 mL         30 mL Oral DAILY PRN 04/26/23 0632      04/24/23 1515  HYDROmorphone (DILAUDID) injection 0.2 mg        See Hyperspace for full Linked Orders Report.    0.2 mg Intravenous EVERY 2 HOURS PRN 04/24/23 1515      04/24/23 1515  HYDROmorphone (DILAUDID) injection 0.4 mg        See Hyperspace for full Linked Orders Report.    0.4 mg Intravenous EVERY 2 HOURS PRN 04/24/23 1515      04/24/23 1515  oxyCODONE (ROXICODONE) tablet 5 mg        See Hyperspace for full Linked Orders Report.    5 mg Oral EVERY 4 HOURS PRN 04/24/23 1515      04/24/23 1515  oxyCODONE IR (ROXICODONE) tablet 10 mg        See Hyperspace for full Linked Orders Report.    10 mg Oral EVERY 4 HOURS PRN 04/24/23 1515      04/24/23 1515  bisacodyl (DULCOLAX) suppository 10 mg         10 mg Rectal DAILY PRN 04/24/23 1515      04/24/23 1515  lidocaine 1 % 0.1-1 mL         0.1-1 mL Other EVERY 1 HOUR PRN 04/24/23 1515      04/24/23 1515  lidocaine (LMX4) cream       "    Topical EVERY 1 HOUR PRN 04/24/23 6120            Primary Service Contacted with Recommendations? No      Please see A&P for additional details of medical decision making.      Acute Inpatient Pain Service [facility]  Hours of pain coverage 24/7   Page via [employer]- Please Page the Pain Team Via [facility]: \"PAIN MANAGEMENT ACUTE INPATIENT/ [facility] [place], [place]\"             "

## 2023-04-29 NOTE — PLAN OF CARE
Major Shift Events:  No acute events overnight. AO x4. Baseline neuropathy present in BLE & L 5th finger. Nerve block remains in place. Pain controlled. Patient in NSR majority of shift, in rate controlled a-fib for very brief amounts of time, self converts to NSR quickly. A-febrile, normotensive. VSS RA. Regular diet. Up to commode x1. Voiding spontaneously w/ urinal.    Plan: Transfer to  when a bed is available.    For vital signs and complete assessments, please see documentation flowsheets.     Goal Outcome Evaluation:      Plan of Care Reviewed With: patient    Overall Patient Progress: improving

## 2023-04-29 NOTE — DISCHARGE SUMMARY
[facility]    Discharge Summary  CVICU Service    Date of Admission:  4/24/2023  Date of Discharge:  4/30/2023   Discharging Provider: Dr Woods    Primary Provider: Joao Smiley  Primary Care clinic: [address] [place] [id number]  Phone: [phone]  Fax number: [phone]     Discharge Diagnoses   CAD s/p CABG x4  HTN  HLD  Atrial flutter  Anticoagulated status  Acute post-op pain  Stress-induced hyperglycemia, resolved  Stress-induced leukocytosis, resolved  Acute blood loss anemia  Thrombocytopenia, resolved  Pre-diabetes      Hospital Course      Reinaldo Gordillo, 77 years old has a history of coronary artery disease (s/p AREN x4 to RCA in 2015), HTN, HLD and peripheral neuropathy in toes and feet.  He reports that he had an unwitnessed fall in January of unclear circumstances. He presented to the ED. Head imaging was benign and labs were unremarkable. However, given the patient's history of CAD with ventricular tachycardia, a Zio monitor was placed in the ED prior to patient's discharge. The Zio patch showed a short, isolated episode of ventricular arrhythmia.  Given this finding, the patient underwent coronary angiogram that showed severe CAD.  He is now s/p coronary artery bypass grafting x4 (left internal mammary artery to left anterior descending artery, saphenous vein graft to posterolateral artery, saphenous vein graft to obtuse marginal artery, saphenous vein graft to first diagonal artery) on 4/24 by Dr. Almeida.  EP was consulted after pt developed atrial fibrillation/flutter on 4/27.  It has not been continually persistent. Rates in AF/AFL 60-80, he has minimal symptoms with occasional palpitations. He is on PTA metoprolol, amiodarone 400mg PO BID 7 days --> 400mg PO daily until he sees EP on an outpatient basis. He was discharged on 4/30 and will begin DOAC on the evening of discharge.  He is to have Zio patch monitoring for 14 days  "and this will be placed in the outpatient setting.  He is to follow up with EP, Cardiology, and Cardiac Surgery.  He has also been encouraged to follow up with his PCP and Cardiac Rehab.      Significant Results and Procedures   DATE: 4/24/23  Procedure(s):  MEDIAN STERNOTOMY, HARVEST OF LEFT INTERNAL MAMMARY ARTERY, ENDOSCOPIC HARVEST OF LEFT GREATER SAPHENOUS VEIN, ON CARDIOPULMONARY BYPASS, CORONARY ARTERY BYPASS GRAFT (CABG) X4 . TRANSESOPHAGEAL ECHOCARDIOGRAM.  Surgeon(s): Surgeon(s) and Role:     * Bro Almeida MD - Primary     * Amy Amaya PA-C - Assisting    Code Status   Full Code    SUBJECTIVE:     Patient returned from an uncomplicated 4 vessel CABG with Dr. Almeida requiring minimal pressors. Patient was fast track and extubated 2 hours after coming up to the CVICU. Pressors were weaned and the patient was transferred to . Hospital course was complicated by new onset paradoxical atrial flutter and atrial fibrilation on 4/26. EP was consulted who recommended an amiodarone load and discharging the patient with PO amiodarone, starting a DOAC on discharge, and 14 day zio patch with 3 month follow up. Patients pain was well controlled with CESAR catheters. Chest tubes were removed 4/28. Patient was able to ambulate well with assistance and meets all discharge criteria.     Discharge exam per ICU providers.  I did not personally examine the patient on the day of discharge.    Discharge Disposition   Discharged to home  Condition at discharge: Stable  Discharge VS: Blood pressure (!) 135/123, pulse 72, temperature 97.8  F (36.6  C), temperature source Oral, resp. rate 16, height 1.88 m (6' 2\"), weight 92.1 kg (203 lb 0.7 oz), SpO2 95 %.    Consultations This Hospital Stay   NUTRITION SERVICES ADULT IP CONSULT  CARDIAC REHAB IP CONSULT  CARE MANAGEMENT / SOCIAL WORK IP CONSULT  PHYSICAL THERAPY ADULT IP CONSULT  NURSING TO CONSULT FOR VASCULAR ACCESS CARE IP CONSULT  CARDIOLOGY ELECTROPHYSIOLOGY (EP) IP " CONSULT  OCCUPATIONAL THERAPY ADULT IP CONSULT  SMOKING CESSATION PROGRAM IP CONSULT    Discharge Orders     Discharge Medications   Current Discharge Medication List      START taking these medications    Details   acetaminophen (TYLENOL) 325 MG tablet Take 2 tablets (650 mg) by mouth every 4 hours as needed for other (For optimal non-opioid multimodal pain management to improve pain control.)  Qty: 30 tablet, Refills: 1    Associated Diagnoses: S/P CABG (coronary artery bypass graft)      !! amiodarone (PACERONE) 400 MG tablet Take 1 tablet (400 mg) by mouth 2 times daily  Qty: 30 tablet, Refills: 1    Associated Diagnoses: S/P CABG (coronary artery bypass graft)      !! amiodarone (PACERONE) 400 MG tablet Take 1 tablet (400 mg) by mouth daily  Qty: 30 tablet, Refills: 1    Associated Diagnoses: S/P CABG (coronary artery bypass graft)      apixaban ANTICOAGULANT (ELIQUIS ANTICOAGULANT) 5 MG tablet Take 1 tablet (5 mg) by mouth 2 times daily  Qty: 60 tablet, Refills: 0    Associated Diagnoses: S/P CABG (coronary artery bypass graft)       !! - Potential duplicate medications found. Please discuss with provider.      CONTINUE these medications which have CHANGED    Details   melatonin 5 MG tablet Take 1 tablet (5 mg) by mouth nightly as needed for sleep  Qty: 15 tablet, Refills: 0    Associated Diagnoses: S/P CABG (coronary artery bypass graft)         CONTINUE these medications which have NOT CHANGED    Details   ASPIRIN LOW DOSE 81 MG EC tablet Take 81 mg by mouth every morning      metoprolol succinate ER (TOPROL XL) 25 MG 24 hr tablet Take 1 tablet (25 mg) by mouth every morning  Qty: 90 tablet, Refills: 1    Associated Diagnoses: Benign essential hypertension      omeprazole (PRILOSEC) 20 MG DR capsule Take 20 mg by mouth as needed      pravastatin (PRAVACHOL) 80 MG tablet Take 80 mg by mouth At Bedtime         STOP taking these medications       clobetasol (TEMOVATE) 0.05 % external ointment Comments:   Reason  for Stopping:         diphenhydrAMINE-acetaminophen (TYLENOL PM)  MG tablet Comments:   Reason for Stopping:         lisinopril (ZESTRIL) 5 MG tablet Comments:   Reason for Stopping:         zolpidem (AMBIEN) 10 MG tablet Comments:   Reason for Stopping:             Allergies   No Known Allergies  Data   Most Recent 3 CBCs:  Recent Labs   Lab Test 04/30/23 0438 04/29/23  0338 04/28/23  0434   WBC 6.6 6.7 7.5   HGB 9.3* 8.8* 9.0*   * 101* 101*    186 178      Most Recent 3 BMPs:  Recent Labs   Lab Test 04/30/23  0812 04/30/23  0438 04/29/23  2151 04/29/23  1812 04/29/23  0843 04/29/23 0338 04/28/23  0900 04/28/23  0434   NA  --  139  --   --   --  137  --  138   POTASSIUM  --  3.7  --  3.9  --  3.9  --  4.0   CHLORIDE  --  101  --   --   --  102  --  103   CO2  --  27  --   --   --  25  --  25   BUN  --  9.1  --   --   --  14.5  --  12.6   CR  --  0.88  --   --   --  0.85  --  0.83   ANIONGAP  --  11  --   --   --  10  --  10   CRISS  --  8.9  --   --   --  8.5*  --  8.7*   * 113* 165*  --    < > 115*   < > 119*    < > = values in this interval not displayed.     Most Recent 2 LFTs:  Recent Labs   Lab Test 04/30/23 0438 04/29/23 0338   AST 32 23   ALT 32 21   ALKPHOS 43 39*   BILITOTAL 0.2 0.2     Most Recent TSH, T4 and A1c Labs:  Recent Labs   Lab Test 04/28/23  1720 04/12/23  1000   TSH 1.39  --    A1C  --  6.2*     Results for orders placed or performed during the hospital encounter of 04/24/23   POC US Guidance Needle Placement    Impression    Bilateral erector spinae plane block   XR Chest Port 1 View    Narrative    Exam: XR CHEST PORT 1 VIEW, 4/24/2023 3:59 PM    Indication: Post Op CVTS Surgery    Comparison: 4/12/2023    Findings:   Single portable AP view of the chest. Post surgical changes of CABG  with intact midline sternotomy wires. Endotracheal tube in the  midthoracic trachea. Right IJ [place]-Fausto catheter with tip in the  proximal main pulmonary artery. Mediastinal drains  and left chest  tube. Gastric tube with tip and sidehole in the stomach. Chest wall  anesthesia catheters.    Trachea is midline. No pneumothorax. Silhouetting of the left  hemidiaphragm, likely small left pleural effusion with associated  atelectasis. Perihilar and basilar predominant hazy opacities of the  lungs. No focal consolidations. Normal cardiac silhouette size. No  acute osseous abnormalities. Unremarkable upper abdomen.      Impression    Impression:   1. [place]-Fausto catheter with the tip in the proximal main pulmonary  artery. Additional support devices as in the body of the report.  2. Postsurgical changes of CABG with intact midline sternotomy wires.  3. Small left pleural effusion and hazy opacities of likely pulmonary  edema and/or atelectasis.    I have personally reviewed the examination and initial interpretation  and I agree with the findings.    RIKY DIAL MD         SYSTEM ID:  [id number]   XR Chest Port 1 View    Narrative    Exam: XR CHEST PORT 1 VIEW, 4/25/2023 1:05 AM    Indication: Post Op CVTS Surgery    Comparison: Chest radiograph 4/24/2023, chest CT 4/12/2023    Findings:   Postsurgical changes of CABG with intact median sternotomy wires and  mediastinal surgical clips. Endotracheal and nasogastric tubes have  been removed. Right internal jugular approach [place]-Fausto catheter  terminates over the main pulmonary artery. Chest tubes and mediastinal  drain remain in place. Median sternotomy wires and retained epicardial  pacer leads.     The appearance of the cardiomediastinal silhouette. No new focal  airspace opacity. Unchanged perihilar and basilar opacities. Small  left pleural effusion. No definite pneumothorax. Osseous structures  are unchanged. Chronic left-sided rib fracture deformities.      Impression    Impression:   1. Interval removal of endotracheal tube and nasogastric tube.  Remaining support devices stable.  2. Unchanged perihilar/basilar opacities, atelectasis versus  edema.    I have personally reviewed the examination and initial interpretation  and I agree with the findings.    BIPIN NIX MD         SYSTEM ID:  [id number]   XR Chest Port 1 View    Narrative    Exam: XR CHEST PORT 1 VIEW, 4/26/2023 1:10 AM    Indication: chest tubes s/p CABG x4    Comparison: Chest radiograph 4/25/2023, 4/24/2023    Findings:   [place]-Fausto catheter has been removed. Chest tubes remain.    Unchanged perihilar/basilar pulmonary opacities. Cardiac silhouette is  stable in size. Chronic left rib fracture deformities.      Impression    Impression:   1. Interval removal of [place]-Fausto catheter.  2. Stable pulmonary opacities representing atelectasis versus edema.    I have personally reviewed the examination and initial interpretation  and I agree with the findings.    RIKY DIAL MD         SYSTEM ID:  [id number]   XR Chest Port 1 View    Narrative    Exam: XR CHEST PORT 1 VIEW, 4/27/2023 1:05 AM    Indication: chest tubes s/p CABG x4    Comparison: Chest radiograph 4/26/2023, 4/25/2023, 4/24/2023     Findings:   Chest tube/mediastinal drains remain in place. Stable  perihilar/basilar streaky opacities. No discernible pleural effusion  or pneumothorax. Left costophrenic angle, and out of view. Chronic  posterior left rib fracture deformities.      Impression    Impression: Stable exam.    I have personally reviewed the examination and initial interpretation  and I agree with the findings.    RIKY DIAL MD         SYSTEM ID:  [id number]   XR Chest Port 1 View    Narrative    Exam: XR CHEST PORT 1 VIEW, 4/28/2023 1:30 AM    Indication: chest tubes s/p CABG x4    Comparison: Chest radiograph 4/27/2023, 4/26/2023, 4/25/2023    Findings:   Chest tubes remain in place. Stable perihilar/basilar streaky  opacities. No pneumothorax. Cardiac silhouette is stable in size.  Chronic left rib fractures.      Impression    Impression:   Stable exam.    I have personally reviewed the examination and  initial interpretation  and I agree with the findings.    ARLETTE WEBB DO         SYSTEM ID:  [id number]       Time Spent on this Encounter   Greater than 30 minutes discharging this patient.    We appreciate the opportunity to care for your patient while in the hospital.  Should you have any questions about your patient's stay in the ICU or this discharge summary our contact information is below.    CVICU   [facility]   Department of Critical Care and Acute Care Surgery  [address] [place] [id number]  Office: [phone]  If you wish to discuss patient with a provider, contact the  at [phone].

## 2023-04-29 NOTE — PLAN OF CARE
Major Shift Events: Aox4, pt denying pain. Nerve block removed by Dr. Claudio, therapy complete. NSR for majority of shift, switching in and out of a 3:1 SR to PAC arrhythmia around 1700 when napping- sustained when awake. Asymptomatic, CVTS notified EKG obtained, labs ordered. Regular diet- tolerating. Voiding- given 20mg lasix. Ambulating SBA, ambulated around unit with nursing and therapy.   Plan: Transfer to / tentative discharge home tomorrow.  For vital signs and complete assessments, please see documentation flowsheets.

## 2023-04-29 NOTE — PROGRESS NOTES
CV ICU Progress Note  4/29/2023      CO-MORBIDITIES:   Patient Active Problem List   Diagnosis     Nuclear sclerotic cataract of both eyes     Hyperglycemia     Hyperlipidemia     Stented coronary artery     Coronary artery disease involving native coronary artery without angina pectoris     Basal cell carcinoma (BCC) in situ of skin     Colon polyp     Hereditary and idiopathic peripheral neuropathy     Status post coronary angiogram     Coronary artery disease involving native coronary artery of native heart without angina pectoris       ASSESSMENT: Reinaldo Gordillo is a 77 year old male with PMH of CAD with VT s/p AREN x4 in RCA, mild aortic stenosis, HTN, HLD, GERD, who underwent CABG x 4 on 4/24 by Dr. Almeida. Grafts include: LIMA to LAD, SVG to OM, SVG to Diag, SVG to GERALDINE.      Interval Changes:  NAEO    PLAN:  Neuro/ pain/ sedation:  Acute Postoperative pain  - Monitor neurological status. Notify the MD for any acute changes in exam.  - Pain: fentanyl gtt. Scheduled tylenol. PRN tylenol, oxycodone, dilaudid.  - CESAR Catheters in place bilaterally - remove today   - Hold PTA zolpidem     Pulmonary care:   Postoperative ventilation management     Fast track, trail pressure support   - Extubated 4/24   - Titrate supplemental oxygen to maintain saturation above 92%.  - Pulmonary hygiene: Incentive spirometer every 15- 30 minutes when awake, flutter valve, C&DB    Cardiovascular:    S/p CABG x 4 on 4/24 by Dr. Almeida  History of CAD, HTN, HLD  New onset A flutter, paradoxical   Pre CPB: LV normal function, EF 55-60%, no RWMA's. Normal diastolic function. RV normal function. Tri-leaflet aortic valve, calcified leaflets, mild AS. Trace TR. Trace MR. No PFO by CFD. No pericardial fluid. No pleural effusion. No aortic dissection. No intracardiac thrombus noted.    Post CPB: S/p CABG x 4. LV normal function, EF 55-60%, no RWMA's noted. RV normal function. Mild AS. Mild TR. Trace MR. No pericardial effusion. No pleural  effusion. No aortic dissection. No intracardiac thrombus noted.     - Monitor hemodynamic status.   - Goal MAP>65, SBP<140  - Hold lisinopril   - Resume PTA  metoprolol, pravastatin  - ASA   - EKG shows A flutter on 4/27, VSS   - Troponins negative  - Amiodarone load   - EP consulted for anticoagulation recs    - Amiodarone 400mg PO BID 7 days --> 400mg PO daily until see OP by EP   - Start DOAC on discharge   - Ziopatch for 14 days on discharge    - Follow up in 3 months    GI care/ Nutrition:   - ADAT   - PPI  - Continue bowel regimen: miralax, senna    Renal/ Fluid Balance/ Electrolytes:   BL creat appears to be ~ 0.8-1.0  - Strict I/O, daily weights  - Avoid/limit nephrotoxins as able  - Replete lytes PRN per protocol  - goal net negative 500-1L    Endocrine:    Stress induced hyperglycemia  Preop A1c 6.2  - Goal BG <180 for optimal healing  - PTA meds None  - SSI    ID/ Antibiotics:  Stress induced leukocytosis  - To complete perioperative regimen  - Continue to monitor fever curve, WBC and inflammatory markers as appropriate    Heme:     Stress induced leukocytosis  No s/sx active bleeding  - Continue to monitor  - CBC     MSK/ Skin:  Sternotomy  Surgical Incision  - Sternal precautions  - Postoperative incision management per protocol  - PT/OT/CR    Prophylaxis:    - Mechanical prophylaxis for DVT  - Chemical DVT prophylaxis - SQH  - PPI    Lines/ tubes/ drains:  - PIV  - CESAR cath- remove today     Disposition:  - 6C- home tomorrow     Patient seen, findings and plan discussed with CVICU staff    Konstantin Claudio MD  Anesthesiology CA-1/PGY-2    ====================================    HPI:     NAEO. Mostly in NSR overnight. Pain is well controlled. Plan to discharge tomorrow.     PAST MEDICAL HISTORY:   Past Medical History:   Diagnosis Date     ASCVD (arteriosclerotic cardiovascular disease)      Coronary artery disease      Gastroesophageal reflux disease      Hypertension      Stented coronary artery         PAST SURGICAL HISTORY:   Past Surgical History:   Procedure Laterality Date     BYPASS GRAFT ARTERY CORONARY N/A 4/24/2023    Procedure: MEDIAN STERNOTOMY, HARVEST OF LEFT INTERNAL MAMMARY ARTERY, ENDOSCOPIC HARVEST OF LEFT GREATER SAPHENOUS VEIN, ON CARDIOPULMONARY BYPASS, CORONARY ARTERY BYPASS GRAFT (CABG) X4 . TRANSESOPHAGEAL ECHOCARDIOGRAM.;  Surgeon: Bro Almeida MD;  Location: [facility] OR     CATARACT IOL, RT/LT       COLONOSCOPY N/A 7/17/2019    Procedure: COLONOSCOPY;  Surgeon: Roque Givens MD;  [facility]     CV CORONARY ANGIOGRAM N/A 3/21/2023    Procedure: Coronary Angiogram;  Surgeon: Pito Chapin MD;  Location:  [facility] CARDIAC CATH LAB     CV PCI N/A 3/21/2023    Procedure: Percutaneous Coronary Intervention;  Surgeon: Pito Chapin MD;  Location: [facility] CARDIAC CATH LAB     ESOPHAGOSCOPY, GASTROSCOPY, DUODENOSCOPY (EGD), COMBINED N/A 7/17/2019    Procedure: ESOPHAGOGASTRODUODENOSCOPY, WITH BIOPSY;  Surgeon: Roque Givens MD;  [facility]     HERNIA REPAIR  2000     MOHS MICROGRAPHIC PROCEDURE       PHACOEMULSIFICATION CLEAR CORNEA WITH STANDARD INTRAOCULAR LENS IMPLANT Right 7/2/2021    Procedure: RIGHT EYE CATARACT REMOVAL WITH INTRAOCULAR LENS IMPLANT;  Surgeon: Justa Liz MD;  [place] [facility] [place]     PHACOEMULSIFICATION CLEAR CORNEA WITH STANDARD INTRAOCULAR LENS IMPLANT Left 7/9/2021    Procedure: LEFT EYE CATARACT REMOVAL WITH INTRAOCULAR LENS IMPLANT;  Surgeon: Justa Liz MD;  [place] [facility] [place]     [facility] CORONARY STENT PERCUT, EA ADDTL VESSEL         FAMILY HISTORY:   Family History   Problem Relation Age of Onset     Glaucoma No family hx of      Macular Degeneration No family hx of      Retinal detachment No family hx of      Amblyopia No family hx of      Melanoma No family hx of      Skin Cancer No family hx of        SOCIAL HISTORY:   Social History     Tobacco Use     Smoking status: Never     Smokeless tobacco: Never   Vaping Use     Vaping  status: Not on file   Substance Use Topics     Alcohol use: Yes     Comment: 1-2 drinks 5 days a week         OBJECTIVE:   1. VITAL SIGNS:     Temp:  [98.1  F (36.7  C)-98.9  F (37.2  C)] 98.1  F (36.7  C)  Pulse:  [66-99] 68  Resp:  [16-20] 16  BP: ()/(52-85) 118/66  SpO2:  [93 %-99 %] 95 %          2. INTAKE/ OUTPUT:      I/O last 3 completed shifts:  In: 1560.03 [P.O.:1430; I.V.:130.03]  Out: 886 [Urine:825; Stool:1; Chest Tube:60]      3. PHYSICAL EXAMINATION:   General: alert and oriented  Resp: No increased WOB on NC  CV: S1, S2, no m/r/g   Abdomen: Soft, non-distended, non-tender  Incisions: c/d/i  Extremities: warm and well perfused  CT: To suction, serosang output, no airleak, crepitus    4. INVESTIGATIONS:   Arterial Blood Gases   Recent Labs   Lab 04/25/23  0405 04/24/23  2005 04/24/23  1653 04/24/23  1518   PH 7.40 7.36 7.34* 7.36   PCO2 41 36 43 40   PO2 65* 92 136* 160*   HCO3 25 21 23 23     Complete Blood Count   Recent Labs   Lab 04/29/23  0338 04/28/23  0434 04/27/23  0715 04/26/23  0728   WBC 6.7 7.5 8.9 10.2   HGB 8.8* 9.0* 9.8* 9.9*    178 161 130*     Basic Metabolic Panel  Recent Labs   Lab 04/29/23  0338 04/28/23  2243 04/28/23  1711 04/28/23  1206 04/28/23  0900 04/28/23  0434 04/27/23  0828 04/27/23  0406 04/26/23  0737 04/26/23  0609     --   --   --   --  138  --  138  --  140   POTASSIUM 3.9  --   --   --   --  4.0  --  4.3  --  4.6   CHLORIDE 102  --   --   --   --  103  --  103  --  104   CO2 25  --   --   --   --  25  --  25  --  26   BUN 14.5  --   --   --   --  12.6  --  14.8  --  13.6   CR 0.85  --   --   --   --  0.83  --  0.90  --  0.91   * 157* 157* 119*   < > 119*   < > 113*   < > 137*    < > = values in this interval not displayed.     Liver Function Tests  Recent Labs   Lab 04/29/23  0338 04/28/23  0434 04/27/23  0406 04/26/23  0609 04/25/23  0645 04/25/23  0405 04/24/23  1518 04/24/23  1255   AST 23 17 23 35  --    < > 39  --    ALT 21 15 16 20  --     < > 34  --    ALKPHOS 39* 39* 45 40  --    < > 44  --    BILITOTAL 0.2 0.3 0.2 0.2  --    < > 0.5  --    ALBUMIN 2.8* 2.9* 3.0* 3.1*  --    < > 3.4*  --    INR  --   --   --   --  1.27*  --  1.31* 1.49*    < > = values in this interval not displayed.     Pancreatic Enzymes  No lab results found in last 7 days.  Coagulation Profile  Recent Labs   Lab 04/25/23  0645 04/24/23  1518 04/24/23  1255   INR 1.27* 1.31* 1.49*   PTT  --  42* 24         5. RADIOLOGY:   No results found for this or any previous visit (from the past 24 hour(s)).    =========================================

## 2023-04-30 VITALS
WEIGHT: 203.04 LBS | OXYGEN SATURATION: 95 % | DIASTOLIC BLOOD PRESSURE: 123 MMHG | BODY MASS INDEX: 26.06 KG/M2 | TEMPERATURE: 97.8 F | HEART RATE: 72 BPM | RESPIRATION RATE: 16 BRPM | HEIGHT: 74 IN | SYSTOLIC BLOOD PRESSURE: 135 MMHG

## 2023-04-30 LAB
ALBUMIN SERPL BCG-MCNC: 3 G/DL (ref 3.5–5.2)
ALP SERPL-CCNC: 43 U/L (ref 40–129)
ALT SERPL W P-5'-P-CCNC: 32 U/L (ref 10–50)
ANION GAP SERPL CALCULATED.3IONS-SCNC: 11 MMOL/L (ref 7–15)
AST SERPL W P-5'-P-CCNC: 32 U/L (ref 10–50)
BILIRUB SERPL-MCNC: 0.2 MG/DL
BUN SERPL-MCNC: 9.1 MG/DL (ref 8–23)
CALCIUM SERPL-MCNC: 8.9 MG/DL (ref 8.8–10.2)
CHLORIDE SERPL-SCNC: 101 MMOL/L (ref 98–107)
CREAT SERPL-MCNC: 0.88 MG/DL (ref 0.67–1.17)
DEPRECATED HCO3 PLAS-SCNC: 27 MMOL/L (ref 22–29)
ERYTHROCYTE [DISTWIDTH] IN BLOOD BY AUTOMATED COUNT: 12.5 % (ref 10–15)
GFR SERPL CREATININE-BSD FRML MDRD: 90 ML/MIN/1.73M2
GLUCOSE BLDC GLUCOMTR-MCNC: 117 MG/DL (ref 70–99)
GLUCOSE BLDC GLUCOMTR-MCNC: 144 MG/DL (ref 70–99)
GLUCOSE SERPL-MCNC: 113 MG/DL (ref 70–99)
HCT VFR BLD AUTO: 29.2 % (ref 40–53)
HGB BLD-MCNC: 9.3 G/DL (ref 13.3–17.7)
MAGNESIUM SERPL-MCNC: 1.8 MG/DL (ref 1.7–2.3)
MCH RBC QN AUTO: 32.2 PG (ref 26.5–33)
MCHC RBC AUTO-ENTMCNC: 31.8 G/DL (ref 31.5–36.5)
MCV RBC AUTO: 101 FL (ref 78–100)
PHOSPHATE SERPL-MCNC: 3.7 MG/DL (ref 2.5–4.5)
PLATELET # BLD AUTO: 206 10E3/UL (ref 150–450)
POTASSIUM SERPL-SCNC: 3.7 MMOL/L (ref 3.4–5.3)
PROT SERPL-MCNC: 5.3 G/DL (ref 6.4–8.3)
RBC # BLD AUTO: 2.89 10E6/UL (ref 4.4–5.9)
SODIUM SERPL-SCNC: 139 MMOL/L (ref 136–145)
WBC # BLD AUTO: 6.6 10E3/UL (ref 4–11)

## 2023-04-30 PROCEDURE — 250N000013 HC RX MED GY IP 250 OP 250 PS 637: Performed by: STUDENT IN AN ORGANIZED HEALTH CARE EDUCATION/TRAINING PROGRAM

## 2023-04-30 PROCEDURE — 250N000011 HC RX IP 250 OP 636: Performed by: PHYSICIAN ASSISTANT

## 2023-04-30 PROCEDURE — 250N000013 HC RX MED GY IP 250 OP 250 PS 637: Performed by: PHYSICIAN ASSISTANT

## 2023-04-30 PROCEDURE — 80053 COMPREHEN METABOLIC PANEL: CPT | Performed by: STUDENT IN AN ORGANIZED HEALTH CARE EDUCATION/TRAINING PROGRAM

## 2023-04-30 PROCEDURE — 250N000013 HC RX MED GY IP 250 OP 250 PS 637: Performed by: SURGERY

## 2023-04-30 PROCEDURE — 36415 COLL VENOUS BLD VENIPUNCTURE: CPT | Performed by: STUDENT IN AN ORGANIZED HEALTH CARE EDUCATION/TRAINING PROGRAM

## 2023-04-30 PROCEDURE — 84100 ASSAY OF PHOSPHORUS: CPT | Performed by: SURGERY

## 2023-04-30 PROCEDURE — 85027 COMPLETE CBC AUTOMATED: CPT | Performed by: STUDENT IN AN ORGANIZED HEALTH CARE EDUCATION/TRAINING PROGRAM

## 2023-04-30 PROCEDURE — 83735 ASSAY OF MAGNESIUM: CPT | Performed by: SURGERY

## 2023-04-30 RX ORDER — AMIODARONE HYDROCHLORIDE 400 MG/1
400 TABLET ORAL DAILY
Qty: 30 TABLET | Refills: 1 | Status: SHIPPED | OUTPATIENT
Start: 2023-05-06 | End: 2023-05-22

## 2023-04-30 RX ORDER — ACETAMINOPHEN 325 MG/1
650 TABLET ORAL EVERY 4 HOURS PRN
Qty: 30 TABLET | Refills: 1 | Status: SHIPPED | OUTPATIENT
Start: 2023-04-30 | End: 2023-06-13

## 2023-04-30 RX ORDER — MAGNESIUM OXIDE 400 MG/1
400 TABLET ORAL EVERY 4 HOURS
Status: COMPLETED | OUTPATIENT
Start: 2023-04-30 | End: 2023-04-30

## 2023-04-30 RX ORDER — AMIODARONE HYDROCHLORIDE 400 MG/1
400 TABLET ORAL 2 TIMES DAILY
Qty: 30 TABLET | Refills: 1 | Status: SHIPPED | OUTPATIENT
Start: 2023-04-30 | End: 2023-06-20

## 2023-04-30 RX ORDER — POTASSIUM CHLORIDE 750 MG/1
20 TABLET, EXTENDED RELEASE ORAL ONCE
Status: COMPLETED | OUTPATIENT
Start: 2023-04-30 | End: 2023-04-30

## 2023-04-30 RX ADMIN — ASPIRIN 162 MG: 81 TABLET ORAL at 08:05

## 2023-04-30 RX ADMIN — Medication 400 MG: at 05:55

## 2023-04-30 RX ADMIN — HEPARIN SODIUM 5000 UNITS: 5000 INJECTION, SOLUTION INTRAVENOUS; SUBCUTANEOUS at 12:13

## 2023-04-30 RX ADMIN — PANTOPRAZOLE SODIUM 40 MG: 40 TABLET, DELAYED RELEASE ORAL at 08:05

## 2023-04-30 RX ADMIN — Medication 400 MG: at 09:26

## 2023-04-30 RX ADMIN — SENNOSIDES AND DOCUSATE SODIUM 3 TABLET: 50; 8.6 TABLET ORAL at 08:06

## 2023-04-30 RX ADMIN — AMIODARONE HYDROCHLORIDE 400 MG: 200 TABLET ORAL at 08:04

## 2023-04-30 RX ADMIN — POTASSIUM CHLORIDE 20 MEQ: 750 TABLET, EXTENDED RELEASE ORAL at 05:55

## 2023-04-30 RX ADMIN — Medication 12.5 MG: at 08:05

## 2023-04-30 RX ADMIN — HEPARIN SODIUM 5000 UNITS: 5000 INJECTION, SOLUTION INTRAVENOUS; SUBCUTANEOUS at 04:16

## 2023-04-30 ASSESSMENT — ACTIVITIES OF DAILY LIVING (ADL)
ADLS_ACUITY_SCORE: 26

## 2023-04-30 NOTE — PLAN OF CARE
Major Shift Events:  Patient converted into a-fib prior to shift change. Amio bolus hung during handoff. A-fib did not respond to Amio bolus. Team updated, no other interventions ordered. Patient self converted to SR at 2045. Patient converting between rate controlled a-fib/a-flutter and SR overnight, team aware. A-febrile, normotensive. Patient AO x4, calls appropriately and can make needs known. RA. Tolerating regular diet. Voiding in bedside urinal.    Plan: Potential discharge today.     For vital signs and complete assessments, please see documentation flowsheets.      Goal Outcome Evaluation:      Plan of Care Reviewed With: patient    Overall Patient Progress: improving

## 2023-04-30 NOTE — PLAN OF CARE
Occupational Therapy Discharge Summary    Reason for therapy discharge:    Discharged to home with home therapy.    Progress towards therapy goal(s). See goals on Care Plan in [facility] electronic health record for goal details.  Goals partially met.  Barriers to achieving goals:   discharge from facility.    Therapy recommendation(s):    Continued therapy is recommended.  Rationale/Recommendations:  Recommend HH OT to progress IND w/ ADLs/IADLs in home setting w/ progression to OP CR to progress cardiopulmonary health.

## 2023-04-30 NOTE — PROGRESS NOTES
Discharged to:home (place) at (time)   Belongings: sent with patient.  AVS (After Visit Summary) discussed with: patient and family

## 2023-05-01 ENCOUNTER — PATIENT OUTREACH (OUTPATIENT)
Dept: CARE COORDINATION | Facility: CLINIC | Age: 78
End: 2023-05-01
Payer: COMMERCIAL

## 2023-05-01 ENCOUNTER — TELEPHONE (OUTPATIENT)
Dept: CARDIOLOGY | Facility: CLINIC | Age: 78
End: 2023-05-01
Payer: COMMERCIAL

## 2023-05-01 LAB
ATRIAL RATE - MUSE: 70 BPM
DIASTOLIC BLOOD PRESSURE - MUSE: NORMAL MMHG
INTERPRETATION ECG - MUSE: NORMAL
P AXIS - MUSE: -13 DEGREES
PR INTERVAL - MUSE: 170 MS
QRS DURATION - MUSE: 94 MS
QT - MUSE: 406 MS
QTC - MUSE: 438 MS
R AXIS - MUSE: 7 DEGREES
SYSTOLIC BLOOD PRESSURE - MUSE: NORMAL MMHG
T AXIS - MUSE: 13 DEGREES
VENTRICULAR RATE- MUSE: 70 BPM

## 2023-05-01 NOTE — PROGRESS NOTES
Clinic Care Coordination Contact  [facility]: Post-Discharge Note  SITUATION                                                      Admission:    Admission Date: 04/24/23   Reason for Admission: You had a coronary artery bypass graft surgery  Discharge:   Discharge Date: 04/30/23  Discharge Diagnosis: You had a coronary artery bypass graft surgery    BACKGROUND                                                      Per hospital discharge summary and inpatient provider notes:     Duy has a history of coronary artery disease (s/p AREN x4 to RCA in 2015), HTN, HLD and peripheral neuropathy in toes and feet.  He reports that he had an unwitnessed fall in January of unclear circumstances. He presented to the ED. Head imaging was benign and labs were unremarkable. However, given the patient's history of CAD with ventricular tachycardia, a zio monitor was placed in the ED prior to patient's discharge. The Zio patch show a short , isolated episode of ventricular arrhythmia.  Given this finding, the patient underwent coronary angiogram that showed severe CAD.     He underwent an uncomplicated 4 vessel CABG. He returns intubated and sedated. Plan for fast track extubation.     ASSESSMENT           Discharge Assessment  How are you doing now that you are home?: I am doing well.  How are your symptoms? (Red Flag symptoms escalate to triage hotline per guidelines): Improved  Do you feel your condition is stable enough to be safe at home until your provider visit?: Yes  Does the patient have their discharge instructions? : Yes  Does the patient have questions regarding their discharge instructions? : No  Were you started on any new medications or were there changes to any of your previous medications? : Yes  Does the patient have all of their medications?: Yes  Do you have questions regarding any of your medications? : No  Do you have all of your needed medical supplies or equipment (DME)?  (i.e. oxygen tank, CPAP, cane,  etc.): Yes  Discharge follow-up appointment scheduled within 14 calendar days? : Yes  Discharge Follow Up Appointment Date: 05/09/23  Discharge Follow Up Appointment Scheduled with?: Primary Care Provider                  PLAN                                                      Outpatient Plan: Follow up with Yusef Chapin and Dany    Future Appointments   Date Time Provider Department [place]   5/4/2023  8:00 AM 1, Ur Cardiac Rehab URCR [place]   5/9/2023  3:30 PM Joao Smiley MD [facility]   5/12/2023  3:00 PM UC CVTS UCCTS [facility]   5/18/2023  2:00 PM  CVC MONITOR TECH UCCVSV [facility]   6/15/2023  2:00 PM Mike Nunes MD [facility]   6/20/2023  1:30 PM Joao Saenz MD [facility]         For any urgent concerns, please contact our 24 hour nurse triage line: [phone] ([phone])         LULY Lugo  [phone]  Connected Care [facility]

## 2023-05-02 ENCOUNTER — TELEPHONE (OUTPATIENT)
Dept: CARDIOLOGY | Facility: CLINIC | Age: 78
End: 2023-05-02
Payer: COMMERCIAL

## 2023-05-02 NOTE — TELEPHONE ENCOUNTER
ACTIVITY  How is your activity tolerance? Patient walking around the home several times per day; tolerating activity well.     POST OP MONITORING  How is your pain on a 0-10 scale, how are you managing your pain? Pain reported minimal, not taking medications at this time; pain managed well.      Are following your sternal precautions? Yes    Do you hear any clicking when you are moving or taking a deep breath?  No    Are you weighing yourself daily?  Yes weight staying the same    Are you using the inspirometer? No patient reports taking deep breaths and coughing; instructed to use spirometer 4-5 days per day.      SIGNS AND SYMPTOMS OF INFECTION  1. INCREASE IN PAIN No  2. FEVER No  3. DRAINAGE No    If Yes, color:                 5. REDNESS No    6. SWELLING No  Sternal incision is healing well, wound edges approximated well. Call your surgeon or primary care provider's office immediately if experiencing any of the symptoms mentioned above. Chest tube incision sites are healing well; no s/s of infection present. Drainage Non     ASSISTANCE  Do you have someone at home to assist you with your daily activities?  Yes      MEDICATIONS  Is someone helping you to set up your medications?  Yes  Do you have any questions about your medications?  No     FOLLOW UP  Are you scheduled for cardiac rehab? 5/4/23      You are scheduled to see our surgery advanced practice provider for post operative follow up on 5/12/23   You are scheduled to see your cardiologist on 6/15/23  You are scheduled to see your primary care physician on 5/9       CONTACT INFORMATION  Please feel free to call us with any other questions or symptoms that are concerning for you at , if it is after 4:30 in the afternoon, or a weekend please call [phone] and ask for the on call specialist.  We want to do everything we can to help prevent you needing to return to the ED, so please do not hesitate to call us.

## 2023-05-03 ENCOUNTER — TELEPHONE (OUTPATIENT)
Dept: CARDIOLOGY | Facility: CLINIC | Age: 78
End: 2023-05-03
Payer: COMMERCIAL

## 2023-05-03 NOTE — TELEPHONE ENCOUNTER
[facility] Call Center    Phone Message    May a detailed message be left on voicemail: no     Reason for Call: Appointment Intake    Referring Provider Name: Taina Christopher NP    Diagnosis and/or Symptoms: S/P CABG    Referred to [place], but pt is scheduled with Des.  Just verifying if add'l appt is needed or not.  Referral in system.     Action Taken: Other: cardio    Travel Screening: Not Applicable

## 2023-05-04 ENCOUNTER — HOSPITAL ENCOUNTER (OUTPATIENT)
Dept: CARDIAC REHAB | Facility: CLINIC | Age: 78
Discharge: HOME | End: 2023-05-04
Attending: PHYSICIAN ASSISTANT
Payer: COMMERCIAL

## 2023-05-04 DIAGNOSIS — Z95.1 S/P CABG (CORONARY ARTERY BYPASS GRAFT): ICD-10-CM

## 2023-05-04 PROCEDURE — 93797 PHYS/QHP OP CAR RHAB WO ECG: CPT | Mod: XU

## 2023-05-04 PROCEDURE — 93798 PHYS/QHP OP CAR RHAB W/ECG: CPT

## 2023-05-09 ENCOUNTER — OFFICE VISIT (OUTPATIENT)
Dept: INTERNAL MEDICINE | Facility: CLINIC | Age: 78
End: 2023-05-09
Payer: COMMERCIAL

## 2023-05-09 VITALS
OXYGEN SATURATION: 97 % | WEIGHT: 193.5 LBS | SYSTOLIC BLOOD PRESSURE: 127 MMHG | DIASTOLIC BLOOD PRESSURE: 81 MMHG | BODY MASS INDEX: 24.84 KG/M2 | HEART RATE: 68 BPM

## 2023-05-09 DIAGNOSIS — L29.9 ITCHING: Primary | ICD-10-CM

## 2023-05-09 PROCEDURE — 99214 OFFICE O/P EST MOD 30 MIN: CPT | Performed by: INTERNAL MEDICINE

## 2023-05-09 NOTE — PROGRESS NOTES
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 Overall doing quite well after his recent CABG. No chest pain. No breathing issues. His sternal wound is healing well. No complaints from LE graft harvest sites. He remains on his same medications He is getting cardiac rehab. He has a little constipation. No additional HEENT, cardiopulmonary, abdominal, , neurological, systemic, psychiatric, lymphatic, endocrine, vascular complaints.       Past Medical History:   Diagnosis Date     ASCVD (arteriosclerotic cardiovascular disease)      Coronary artery disease      Gastroesophageal reflux disease      Hypertension      Stented coronary artery        Past Surgical History:   Procedure Laterality Date     BYPASS GRAFT ARTERY CORONARY N/A 4/24/2023    Procedure: MEDIAN STERNOTOMY, HARVEST OF LEFT INTERNAL MAMMARY ARTERY, ENDOSCOPIC HARVEST OF LEFT GREATER SAPHENOUS VEIN, ON CARDIOPULMONARY BYPASS, CORONARY ARTERY BYPASS GRAFT (CABG) X4 . TRANSESOPHAGEAL ECHOCARDIOGRAM.;  Surgeon: Bro Almeida MD;  Location: [facility] OR     CATARACT IOL, RT/LT       COLONOSCOPY N/A 7/17/2019    Procedure: COLONOSCOPY;  Surgeon: Roque Givens MD;  [facility]     CV CORONARY ANGIOGRAM N/A 3/21/2023    Procedure: Coronary Angiogram;  Surgeon: Pito Chapin MD;  Location: [facility] CARDIAC CATH LAB     CV PCI N/A 3/21/2023    Procedure: Percutaneous Coronary Intervention;  Surgeon: Pito Chapin MD;  Location: [facility] CARDIAC CATH LAB     ESOPHAGOSCOPY, GASTROSCOPY, DUODENOSCOPY (EGD), COMBINED N/A 7/17/2019    Procedure: ESOPHAGOGASTRODUODENOSCOPY, WITH BIOPSY;  Surgeon: Roque Givens MD;  [facility]     HERNIA REPAIR  2000     MOHS MICROGRAPHIC PROCEDURE       PHACOEMULSIFICATION CLEAR CORNEA WITH STANDARD INTRAOCULAR LENS IMPLANT Right 7/2/2021    Procedure: RIGHT EYE CATARACT REMOVAL WITH INTRAOCULAR LENS IMPLANT;  Surgeon: Justa Liz MD;  Location: [facility] OR     PHACOEMULSIFICATION CLEAR CORNEA WITH STANDARD  INTRAOCULAR LENS IMPLANT Left 7/9/2021    Procedure: LEFT EYE CATARACT REMOVAL WITH INTRAOCULAR LENS IMPLANT;  Surgeon: Justa Liz MD;  [place] [facility] [place]     [facility] CORONARY STENT PERCUT, EA ADDTL VESSEL       PE:    Vitals noted, gen nad, cooperative, alert, neck supple nl rom, lungs with good air movement and clear, RRR, S1, S2, soft systolic murmur, healing sternal wound, abdomen, no acute findings. Grossly normal neurological exam.     A/P:    1. Immunizations; COVID Pfizer vaccine x 5 including the bi-valent Pfizer on 9/8/2022. He has completed the Shigrix vaccine series. Tdap 8/7/2015. Pneumococcal 23 done 9/6/2019. Prevnar 13 done 5/15/2018. Influenza vaccine done 9/9/2022  2. Dermatology; appt. With Dr. Randall seen 11/29/2022. Placed dermatology referral today 5/9/2023.   3. PSA; 0.58 on 8/29/2022  4. Colonoscopy; 7/17/2019  5. Increased lipids on Pravastatin; lipids 8/29/2022 TG's 43, HDL 93 and LDL 63. He could not tolerate Crestor (CK 1600) with muscle complaints.   6. Hgb on 4/30/2023 was 9.3. Will check in the future Post op Hgb   7. Neurology follow up with Dr. Begum on 12/3/2021 for neuropathy unclear etiology   8. A1c was 6.2% on 4/12/2023. Will follow  9. CAD; seen 12/15/2021, [facility] Dr. Madrigal, note in Care Everywhere and there is a detailed note in the chart. He had t 11/14/2022 appt. With Dr. Chapin. Resting echo 10/29/2022. He had CABG x 4 on 4/24/2023 and was discharged from the hospital on 4/20/2023. He has CVTS follow up tomorrow 4/12/2023. He sees Dr. Chapin Cardiology on 6/12/2023. He has 6/20/2023 Cardiology follow up with Dr. Saenz for aflutter. He is on Eliquis and Amiodarone. Soft murmur; last echo was 3/21/2023 and he had aortic sclerosis.   10. Endocrine  TSH (normal at 1.82 on 8/29/2022). He does not feel he has sleep apnea. Some mild depression? Low testosterone 2.20 on 8/29/2022 and he had an endocrinology appt. With Dr. White 1/3/2023.        30 minutes  spent on the date of the encounter doing chart review, history and exam, documentation and further activities as noted above exclusive of procedures and other billable interpretations

## 2023-05-09 NOTE — NURSING NOTE
Reinaldo Gordillo is a 78 year old male patient that presents today in clinic for the following:    Chief Complaint   Patient presents with     Follow Up     Hospital follow up, medication questions      The patient's allergies and medications were reviewed as noted. A set of vitals were recorded as noted without incident: /81 (BP Location: Right arm, Patient Position: Sitting, Cuff Size: Adult Regular)   Pulse 68   Wt 87.8 kg (193 lb 8 oz)   SpO2 97%   BMI 24.84 kg/m  . The patient does not have any other questions for the provider.    Judy Lopez, EMT at 3:22 PM on 5/9/2023

## 2023-05-11 ENCOUNTER — HOSPITAL ENCOUNTER (OUTPATIENT)
Dept: CARDIAC REHAB | Facility: CLINIC | Age: 78
Discharge: HOME | End: 2023-05-11
Attending: PHYSICIAN ASSISTANT
Payer: COMMERCIAL

## 2023-05-11 PROCEDURE — 93798 PHYS/QHP OP CAR RHAB W/ECG: CPT

## 2023-05-12 ENCOUNTER — OFFICE VISIT (OUTPATIENT)
Dept: CARDIOLOGY | Facility: CLINIC | Age: 78
End: 2023-05-12
Attending: SURGERY
Payer: COMMERCIAL

## 2023-05-12 ENCOUNTER — ANCILLARY PROCEDURE (OUTPATIENT)
Dept: CARDIOLOGY | Facility: CLINIC | Age: 78
End: 2023-05-12
Attending: SURGERY
Payer: COMMERCIAL

## 2023-05-12 VITALS
SYSTOLIC BLOOD PRESSURE: 135 MMHG | OXYGEN SATURATION: 97 % | HEIGHT: 74 IN | WEIGHT: 190.3 LBS | HEART RATE: 65 BPM | BODY MASS INDEX: 24.42 KG/M2 | DIASTOLIC BLOOD PRESSURE: 81 MMHG

## 2023-05-12 DIAGNOSIS — I48.91 NEW ONSET A-FIB (H): ICD-10-CM

## 2023-05-12 DIAGNOSIS — Z95.1 S/P CABG (CORONARY ARTERY BYPASS GRAFT): Primary | ICD-10-CM

## 2023-05-12 PROCEDURE — G0463 HOSPITAL OUTPT CLINIC VISIT: HCPCS | Mod: 25

## 2023-05-12 PROCEDURE — 93248 EXT ECG>7D<15D REV&INTERPJ: CPT | Performed by: INTERNAL MEDICINE

## 2023-05-12 PROCEDURE — 93246 EXT ECG>7D<15D RECORDING: CPT

## 2023-05-12 PROCEDURE — 99024 POSTOP FOLLOW-UP VISIT: CPT | Performed by: PHYSICIAN ASSISTANT

## 2023-05-12 ASSESSMENT — PAIN SCALES - GENERAL: PAINLEVEL: NO PAIN (0)

## 2023-05-12 NOTE — PATIENT INSTRUCTIONS
Your surgery was on 04/24/2023    Ok to start driving four weeks after the date of surgery as long as you are no longer taking narcotic pain medications.    From the date of surgery: no lifting greater than 10 pounds for 6-8 weeks, then can start to lift more and listen to your body stay with 20-30 pounds and ease into progression. Will likely still need to prohibit some big swinging motions with upper extremities such as golfing, fishing, and playing tennis for 4-6 months.     Do not soak your incisions until fully healed over with no scabbing; this includes hot tubs, baths, pools etc.    If you have questions or concerns, please call us:    Yony Cormier, RN Care Coordinator - [phone]   Morena Geiger RN Care Coordinator - [phone]  Shahana Holloway, RN Care Coordinator - [phone]

## 2023-05-12 NOTE — PROGRESS NOTES
"Per Torrey Christopher NP, patient to have 14 day ZIo monitor placed.  Diagnosis: Afib [148.91]  Monitor placed: {YES / NO:475776::\"Yes\"}  Patient Instructed: {YES / NO:962267::\"Yes\"}  Patient verbalized understanding: {YES / NO:638357::\"Yes\"}  Holter # [id number]    "

## 2023-05-12 NOTE — NURSING NOTE
Chief Complaint   Patient presents with     Follow Up     S/p CABG post-op follow up     Vitals were taken, medications reconciled.    Trudi Segura, EMT   2:20 PM

## 2023-05-12 NOTE — LETTER
5/12/2023      RE: Reinaldo Gordillo  [address]  [address]  [place] [address]       Dear Colleague,    Thank you for the opportunity to participate in the care of your patient, Reinaldo Gordillo, at the [facility]. Please see a copy of my visit note below.    CARDIOTHORACIC SURGERY FOLLOW-UP VISIT     Reinaldo Gordillo   1945   [id number]      Reason for visit: Post-Op CABGx4 with Dr. Almeida on 04/24/2023    ASSESSMENT/PLAN:  Reinaldo Gordillo is a 78 year old year old male status post CABG who returns to clinic for postop visit.     Coronary artery disease S/P CABGx4  HTN/HLD  Atrial flutter  Today in clinic patient sounds to be in a regular rhythm with HR <100 bpm.   Discharge weight 203 lbs; weight today 190 lbs; Appears euvolemic upon examinination with no appreciable JVD, BLE edema, abdominal bloating. LS are CTA.  Discussed the importance of nutrition in healing and staying adequately hydrated.   Midline sternal incision healing well with no noted erythema or drainage or signs of infection. Increasing activity and strength overall.    Hemodynamics are stable. Medications reviewed and no changes were needed today.  Follow up with your cardiologist as scheduled.  Continue Outpatient Cardiac Rehab until completed.   Continue sternal precautions for 12 weeks from surgery date.   No driving for 4 weeks from surgery date.     Postoperative restrictions have been reviewed and all of the patient's questions were answered. Our contact information was given to the patient if he should have any further questions/concerns. No further follow up is needed with us.  The total time spent with the patient was 25 minutes, > 50% of which was spent in counseling and coordination of care.    Angelia Rivers PA-C   Cardiothoracic Surgery  Pager: [phone]    HPI: Per discharge summary:    Returns to clinic today for postoperative  visit.    Since discharge, has been recovering well at home.    Patient is not on Coumadin.    He states pain is well controlled. Sleep is going ok but waking up a few times a night to urinate. Participating in therapy at cardiac rehab in [place]. Breathing has been stable/improving. Continues to work on C &DB, continued to use IS at discharge. Denies SOB at rest, PND, orthopnea. Non-productive cough. Patient denies any fever, chills, chest pain, palpitations, edema, SOB, lightheadedness, nausea and vomiting. Has had a good appetite. Having regular bowel movements and voiding without difficulty but again increased urination overnight.  Denies  sternal popping or clicking or incisional drainage/erythema. No psychiatric concerns.    PAST MEDICAL HISTORY:  Past Medical History:   Diagnosis Date    ASCVD (arteriosclerotic cardiovascular disease)     Coronary artery disease     Gastroesophageal reflux disease     Hypertension     Stented coronary artery        PAST SURGICAL HISTORY:  Past Surgical History:   Procedure Laterality Date    BYPASS GRAFT ARTERY CORONARY N/A 4/24/2023    Procedure: MEDIAN STERNOTOMY, HARVEST OF LEFT INTERNAL MAMMARY ARTERY, ENDOSCOPIC HARVEST OF LEFT GREATER SAPHENOUS VEIN, ON CARDIOPULMONARY BYPASS, CORONARY ARTERY BYPASS GRAFT (CABG) X4 . TRANSESOPHAGEAL ECHOCARDIOGRAM.;  Surgeon: Bro Almeida MD;  Location: [facility] OR    CATARACT IOL, RT/LT      COLONOSCOPY N/A 7/17/2019    Procedure: COLONOSCOPY;  Surgeon: Roque Givens MD;  [facility]    CV CORONARY ANGIOGRAM N/A 3/21/2023    Procedure: Coronary Angiogram;  Surgeon: Pito Chapin MD;  Location: [facility] CARDIAC CATH LAB    CV PCI N/A 3/21/2023    Procedure: Percutaneous Coronary Intervention;  Surgeon: Pito Chapin MD;  Location: [facility] CARDIAC CATH LAB    ESOPHAGOSCOPY, GASTROSCOPY, DUODENOSCOPY (EGD), COMBINED N/A 7/17/2019    Procedure: ESOPHAGOGASTRODUODENOSCOPY, WITH BIOPSY;  Surgeon: Roque Givens MD;   [facility] OR    HERNIA REPAIR  2000    MOHS MICROGRAPHIC PROCEDURE      PHACOEMULSIFICATION CLEAR CORNEA WITH STANDARD INTRAOCULAR LENS IMPLANT Right 7/2/2021    Procedure: RIGHT EYE CATARACT REMOVAL WITH INTRAOCULAR LENS IMPLANT;  Surgeon: Justa Liz MD;  Location: [facility] OR    PHACOEMULSIFICATION CLEAR CORNEA WITH STANDARD INTRAOCULAR LENS IMPLANT Left 7/9/2021    Procedure: LEFT EYE CATARACT REMOVAL WITH INTRAOCULAR LENS IMPLANT;  Surgeon: Justa Liz MD;  Location: [facility] OR    ZZHC CORONARY STENT PERCUT, EA ADDTL VESSEL         CURRENT MEDICATIONS:   Current Outpatient Medications   Medication    acetaminophen (TYLENOL) 325 MG tablet    amiodarone (PACERONE) 400 MG tablet    amiodarone (PACERONE) 400 MG tablet    apixaban ANTICOAGULANT (ELIQUIS ANTICOAGULANT) 5 MG tablet    ASPIRIN LOW DOSE 81 MG EC tablet    melatonin 5 MG tablet    metoprolol succinate ER (TOPROL XL) 25 MG 24 hr tablet    omeprazole (PRILOSEC) 20 MG DR capsule    pravastatin (PRAVACHOL) 80 MG tablet     No current facility-administered medications for this visit.       ALLERGIES:    No Known Allergies    ROS:  Review of symptoms otherwise negative unless commented about in HPI.     LABS:  Last Basic Metabolic Panel:  Lab Results   Component Value Date     04/30/2023      Lab Results   Component Value Date    POTASSIUM 3.7 04/30/2023    POTASSIUM 3.8 04/24/2023    POTASSIUM 4.9 08/29/2022     Lab Results   Component Value Date    CHLORIDE 101 04/30/2023    CHLORIDE 107 08/29/2022     Lab Results   Component Value Date    CRISS 8.9 04/30/2023     Lab Results   Component Value Date    CO2 27 04/30/2023    CO2 28 08/29/2022     Lab Results   Component Value Date    BUN 9.1 04/30/2023    BUN 40 08/29/2022     Lab Results   Component Value Date    CR 0.88 04/30/2023     Lab Results   Component Value Date     04/30/2023     08/29/2022       Last CBC:   Lab Results   Component Value Date    WBC 6.6 04/30/2023     Lab  "Results   Component Value Date    RBC 2.89 04/30/2023     Lab Results   Component Value Date    HGB 9.3 04/30/2023     Lab Results   Component Value Date    HCT 29.2 04/30/2023     No components found for: MCT  Lab Results   Component Value Date     04/30/2023     Lab Results   Component Value Date    MCH 32.2 04/30/2023     Lab Results   Component Value Date    MCHC 31.8 04/30/2023     Lab Results   Component Value Date    RDW 12.5 04/30/2023     Lab Results   Component Value Date     04/30/2023       INR:  Lab Results   Component Value Date    INR 1.27 04/25/2023    INR 1.31 04/24/2023    INR 1.49 04/24/2023    INR 1.00 04/12/2023    INR 1.02 03/21/2023         IMAGING:  None    PHYSICAL EXAM:   /81 (BP Location: Right arm, Patient Position: Sitting, Cuff Size: Adult Regular)   Pulse 65   Ht 1.88 m (6' 2.02\")   Wt 86.3 kg (190 lb 4.8 oz)   SpO2 97%   BMI 24.42 kg/m    General: alert and oriented x 3, pleasant, no acute distress, normal mood and affect  Neuro: no focal deficits   CV: S1 S2, no murmurs, rubs or gallops, regular rate and rhythm, no peripheral edema  Pulm: bilateral breath sounds, clear to auscultation, easy work of breathing  Incision: incisions clean dry and intact without erythema, swelling or drainage    PROCEDURES: None         NAOMI Smiley     "

## 2023-05-12 NOTE — PROGRESS NOTES
CARDIOTHORACIC SURGERY FOLLOW-UP VISIT     Reinaldo Gordillo   1945   [id number]      Reason for visit: Post-Op CABGx4 with Dr. Almeida on 04/24/2023    ASSESSMENT/PLAN:  Reinaldo Gordillo is a 78 year old year old male status post CABG who returns to clinic for postop visit.     Coronary artery disease S/P CABGx4  HTN/HLD  Atrial flutter  Today in clinic patient sounds to be in a regular rhythm with HR <100 bpm.   Discharge weight 203 lbs; weight today 190 lbs; Appears euvolemic upon examinination with no appreciable JVD, BLE edema, abdominal bloating. LS are CTA.  Discussed the importance of nutrition in healing and staying adequately hydrated.   Midline sternal incision healing well with no noted erythema or drainage or signs of infection. Increasing activity and strength overall.      Hemodynamics are stable. Medications reviewed and no changes were needed today.    Follow up with your cardiologist as scheduled.    Continue Outpatient Cardiac Rehab until completed.     Continue sternal precautions for 12 weeks from surgery date.     No driving for 4 weeks from surgery date.     Postoperative restrictions have been reviewed and all of the patient's questions were answered. Our contact information was given to the patient if he should have any further questions/concerns. No further follow up is needed with us.  The total time spent with the patient was 25 minutes, > 50% of which was spent in counseling and coordination of care.    Angelia Rivers PA-C   Cardiothoracic Surgery  Pager: [phone]    HPI: Per discharge summary:    Returns to clinic today for postoperative visit.    Since discharge, has been recovering well at home.    Patient is not on Coumadin.    He states pain is well controlled. Sleep is going ok but waking up a few times a night to urinate. Participating in therapy at cardiac rehab in [place]. Breathing has been stable/improving. Continues to work on C &DB, continued to use IS at discharge.  Denies SOB at rest, PND, orthopnea. Non-productive cough. Patient denies any fever, chills, chest pain, palpitations, edema, SOB, lightheadedness, nausea and vomiting. Has had a good appetite. Having regular bowel movements and voiding without difficulty but again increased urination overnight.  Denies  sternal popping or clicking or incisional drainage/erythema. No psychiatric concerns.    PAST MEDICAL HISTORY:  Past Medical History:   Diagnosis Date     ASCVD (arteriosclerotic cardiovascular disease)      Coronary artery disease      Gastroesophageal reflux disease      Hypertension      Stented coronary artery        PAST SURGICAL HISTORY:  Past Surgical History:   Procedure Laterality Date     BYPASS GRAFT ARTERY CORONARY N/A 4/24/2023    Procedure: MEDIAN STERNOTOMY, HARVEST OF LEFT INTERNAL MAMMARY ARTERY, ENDOSCOPIC HARVEST OF LEFT GREATER SAPHENOUS VEIN, ON CARDIOPULMONARY BYPASS, CORONARY ARTERY BYPASS GRAFT (CABG) X4 . TRANSESOPHAGEAL ECHOCARDIOGRAM.;  Surgeon: Bro Almeida MD;  Location: [facility] OR     CATARACT IOL, RT/LT       COLONOSCOPY N/A 7/17/2019    Procedure: COLONOSCOPY;  Surgeon: Roque Givens MD;  [facility]     CV CORONARY ANGIOGRAM N/A 3/21/2023    Procedure: Coronary Angiogram;  Surgeon: Pito Chapin MD;  Location: [facility] CARDIAC CATH LAB     CV PCI N/A 3/21/2023    Procedure: Percutaneous Coronary Intervention;  Surgeon: Pito Chapin MD;  Location: [facility] CARDIAC CATH LAB     ESOPHAGOSCOPY, GASTROSCOPY, DUODENOSCOPY (EGD), COMBINED N/A 7/17/2019    Procedure: ESOPHAGOGASTRODUODENOSCOPY, WITH BIOPSY;  Surgeon: Roque Givens MD;  [facility]     HERNIA REPAIR  2000     MOHS MICROGRAPHIC PROCEDURE       PHACOEMULSIFICATION CLEAR CORNEA WITH STANDARD INTRAOCULAR LENS IMPLANT Right 7/2/2021    Procedure: RIGHT EYE CATARACT REMOVAL WITH INTRAOCULAR LENS IMPLANT;  Surgeon: Justa Liz MD;  Location: [facility] OR     PHACOEMULSIFICATION CLEAR CORNEA WITH STANDARD  INTRAOCULAR LENS IMPLANT Left 7/9/2021    Procedure: LEFT EYE CATARACT REMOVAL WITH INTRAOCULAR LENS IMPLANT;  Surgeon: Justa Liz MD;  Location: [facility] [place]     [facility] CORONARY STENT PERCUT, ANGELA ADDTL VESSEL         CURRENT MEDICATIONS:   Current Outpatient Medications   Medication     acetaminophen (TYLENOL) 325 MG tablet     amiodarone (PACERONE) 400 MG tablet     amiodarone (PACERONE) 400 MG tablet     apixaban ANTICOAGULANT (ELIQUIS ANTICOAGULANT) 5 MG tablet     ASPIRIN LOW DOSE 81 MG EC tablet     melatonin 5 MG tablet     metoprolol succinate ER (TOPROL XL) 25 MG 24 hr tablet     omeprazole (PRILOSEC) 20 MG DR capsule     pravastatin (PRAVACHOL) 80 MG tablet     No current facility-administered medications for this visit.       ALLERGIES:    No Known Allergies    ROS:  Review of symptoms otherwise negative unless commented about in HPI.     LABS:  Last Basic Metabolic Panel:  Lab Results   Component Value Date     04/30/2023      Lab Results   Component Value Date    POTASSIUM 3.7 04/30/2023    POTASSIUM 3.8 04/24/2023    POTASSIUM 4.9 08/29/2022     Lab Results   Component Value Date    CHLORIDE 101 04/30/2023    CHLORIDE 107 08/29/2022     Lab Results   Component Value Date    CRISS 8.9 04/30/2023     Lab Results   Component Value Date    CO2 27 04/30/2023    CO2 28 08/29/2022     Lab Results   Component Value Date    BUN 9.1 04/30/2023    BUN 40 08/29/2022     Lab Results   Component Value Date    CR 0.88 04/30/2023     Lab Results   Component Value Date     04/30/2023     08/29/2022       Last CBC:   Lab Results   Component Value Date    WBC 6.6 04/30/2023     Lab Results   Component Value Date    RBC 2.89 04/30/2023     Lab Results   Component Value Date    HGB 9.3 04/30/2023     Lab Results   Component Value Date    HCT 29.2 04/30/2023     No components found for: MCT  Lab Results   Component Value Date     04/30/2023     Lab Results   Component Value Date    MCH 32.2  "04/30/2023     Lab Results   Component Value Date    MCHC 31.8 04/30/2023     Lab Results   Component Value Date    RDW 12.5 04/30/2023     Lab Results   Component Value Date     04/30/2023       INR:  Lab Results   Component Value Date    INR 1.27 04/25/2023    INR 1.31 04/24/2023    INR 1.49 04/24/2023    INR 1.00 04/12/2023    INR 1.02 03/21/2023         IMAGING:  None    PHYSICAL EXAM:   /81 (BP Location: Right arm, Patient Position: Sitting, Cuff Size: Adult Regular)   Pulse 65   Ht 1.88 m (6' 2.02\")   Wt 86.3 kg (190 lb 4.8 oz)   SpO2 97%   BMI 24.42 kg/m    General: alert and oriented x 3, pleasant, no acute distress, normal mood and affect  Neuro: no focal deficits   CV: S1 S2, no murmurs, rubs or gallops, regular rate and rhythm, no peripheral edema  Pulm: bilateral breath sounds, clear to auscultation, easy work of breathing  Incision: incisions clean dry and intact without erythema, swelling or drainage    PROCEDURES: None         NAOMI Smiley     "

## 2023-05-17 ENCOUNTER — HOSPITAL ENCOUNTER (OUTPATIENT)
Dept: CARDIAC REHAB | Facility: CLINIC | Age: 78
Discharge: HOME | End: 2023-05-17
Attending: SURGERY | Admitting: SURGERY
Payer: COMMERCIAL

## 2023-05-17 PROCEDURE — 93798 PHYS/QHP OP CAR RHAB W/ECG: CPT

## 2023-05-19 ENCOUNTER — HOSPITAL ENCOUNTER (OUTPATIENT)
Dept: CARDIAC REHAB | Facility: CLINIC | Age: 78
Discharge: HOME | End: 2023-05-19
Attending: SURGERY | Admitting: SURGERY
Payer: COMMERCIAL

## 2023-05-19 PROCEDURE — 93798 PHYS/QHP OP CAR RHAB W/ECG: CPT

## 2023-05-22 ENCOUNTER — HOSPITAL ENCOUNTER (OUTPATIENT)
Dept: CARDIAC REHAB | Facility: CLINIC | Age: 78
Discharge: HOME | End: 2023-05-22
Attending: SURGERY | Admitting: SURGERY
Payer: COMMERCIAL

## 2023-05-22 ENCOUNTER — MYC MEDICAL ADVICE (OUTPATIENT)
Dept: CARDIOLOGY | Facility: CLINIC | Age: 78
End: 2023-05-22
Payer: COMMERCIAL

## 2023-05-22 DIAGNOSIS — Z95.1 S/P CABG (CORONARY ARTERY BYPASS GRAFT): ICD-10-CM

## 2023-05-22 PROCEDURE — 93798 PHYS/QHP OP CAR RHAB W/ECG: CPT

## 2023-05-22 RX ORDER — AMIODARONE HYDROCHLORIDE 400 MG/1
400 TABLET ORAL DAILY
Qty: 30 TABLET | Refills: 1 | Status: SHIPPED | OUTPATIENT
Start: 2023-05-22 | End: 2023-06-29 | Stop reason: SINTOL

## 2023-05-24 ENCOUNTER — HOSPITAL ENCOUNTER (OUTPATIENT)
Dept: CARDIAC REHAB | Facility: CLINIC | Age: 78
Discharge: HOME | End: 2023-05-24
Attending: SURGERY | Admitting: SURGERY
Payer: COMMERCIAL

## 2023-05-24 PROCEDURE — 93798 PHYS/QHP OP CAR RHAB W/ECG: CPT

## 2023-05-25 ENCOUNTER — TELEPHONE (OUTPATIENT)
Dept: CARDIOLOGY | Facility: CLINIC | Age: 78
End: 2023-05-25
Payer: COMMERCIAL

## 2023-05-25 ENCOUNTER — ANCILLARY PROCEDURE (OUTPATIENT)
Dept: GENERAL RADIOLOGY | Facility: CLINIC | Age: 78
End: 2023-05-25
Attending: SURGERY
Payer: COMMERCIAL

## 2023-05-25 DIAGNOSIS — Z95.1 S/P CABG (CORONARY ARTERY BYPASS GRAFT): Primary | ICD-10-CM

## 2023-05-25 DIAGNOSIS — Z95.1 S/P CABG (CORONARY ARTERY BYPASS GRAFT): ICD-10-CM

## 2023-05-25 PROCEDURE — 71046 X-RAY EXAM CHEST 2 VIEWS: CPT | Mod: GC | Performed by: RADIOLOGY

## 2023-05-25 NOTE — TELEPHONE ENCOUNTER
Patient called to report that he has been experiencing chest pain/tenderness on the right side of the chest, 4 inches to the right of the sternal incision. Patient reports that pain has started 2 days ago and it is noticeable with cough, deep breath or when he is reaching. Reports no clicking or moving of the sternum when taking a deep breath or coughing; feels no sternal wires while palpating the tender area.  He reports no SOB, no fever, no heart palpitations, his HR regular and in sinus rhythm, BP are within normal limits; patient goes to cardiac rehab 3 x week. Reports that he is recovering well and otherwise feels great.   Plan is for patient to get a Chest X-Ray; this RNCC will place an order and patient will schedule. Patient verbalized understanding and agreed to the plan.

## 2023-05-26 ENCOUNTER — HOSPITAL ENCOUNTER (OUTPATIENT)
Dept: CARDIAC REHAB | Facility: CLINIC | Age: 78
Discharge: HOME | End: 2023-05-26
Attending: SURGERY | Admitting: SURGERY
Payer: COMMERCIAL

## 2023-05-26 PROCEDURE — 93798 PHYS/QHP OP CAR RHAB W/ECG: CPT

## 2023-05-31 ENCOUNTER — HOSPITAL ENCOUNTER (OUTPATIENT)
Dept: CARDIAC REHAB | Facility: CLINIC | Age: 78
Discharge: HOME | End: 2023-05-31
Attending: SURGERY | Admitting: SURGERY
Payer: COMMERCIAL

## 2023-05-31 PROCEDURE — 93798 PHYS/QHP OP CAR RHAB W/ECG: CPT

## 2023-06-01 ENCOUNTER — HEALTH MAINTENANCE LETTER (OUTPATIENT)
Age: 78
End: 2023-06-01

## 2023-06-01 ENCOUNTER — MYC MEDICAL ADVICE (OUTPATIENT)
Dept: INTERNAL MEDICINE | Facility: CLINIC | Age: 78
End: 2023-06-01
Payer: COMMERCIAL

## 2023-06-02 ENCOUNTER — TELEPHONE (OUTPATIENT)
Dept: INTERNAL MEDICINE | Facility: CLINIC | Age: 78
End: 2023-06-02

## 2023-06-02 ENCOUNTER — HOSPITAL ENCOUNTER (OUTPATIENT)
Dept: CARDIAC REHAB | Facility: CLINIC | Age: 78
Discharge: HOME | End: 2023-06-02
Attending: SURGERY | Admitting: SURGERY
Payer: COMMERCIAL

## 2023-06-02 ENCOUNTER — LAB (OUTPATIENT)
Dept: LAB | Facility: CLINIC | Age: 78
End: 2023-06-02
Payer: COMMERCIAL

## 2023-06-02 DIAGNOSIS — R82.90 ABNORMAL URINE: Primary | ICD-10-CM

## 2023-06-02 DIAGNOSIS — R30.0 DYSURIA: Primary | ICD-10-CM

## 2023-06-02 DIAGNOSIS — R30.0 DYSURIA: ICD-10-CM

## 2023-06-02 LAB
ALBUMIN UR-MCNC: 50 MG/DL
APPEARANCE UR: ABNORMAL
BACTERIA #/AREA URNS HPF: ABNORMAL /HPF
BILIRUB UR QL STRIP: NEGATIVE
COLOR UR AUTO: YELLOW
GLUCOSE UR STRIP-MCNC: NEGATIVE MG/DL
HGB UR QL STRIP: ABNORMAL
KETONES UR STRIP-MCNC: 40 MG/DL
LEUKOCYTE ESTERASE UR QL STRIP: ABNORMAL
MUCOUS THREADS #/AREA URNS LPF: PRESENT /LPF
NITRATE UR QL: NEGATIVE
PH UR STRIP: 5.5 [PH] (ref 5–7)
RBC URINE: 43 /HPF
SP GR UR STRIP: 1.02 (ref 1–1.03)
SQUAMOUS EPITHELIAL: 1 /HPF
UROBILINOGEN UR STRIP-MCNC: 2 MG/DL
WBC URINE: >182 /HPF

## 2023-06-02 PROCEDURE — 87088 URINE BACTERIA CULTURE: CPT | Mod: 90 | Performed by: PATHOLOGY

## 2023-06-02 PROCEDURE — 99000 SPECIMEN HANDLING OFFICE-LAB: CPT | Performed by: PATHOLOGY

## 2023-06-02 PROCEDURE — 87186 SC STD MICRODIL/AGAR DIL: CPT | Mod: 90 | Performed by: PATHOLOGY

## 2023-06-02 PROCEDURE — 93798 PHYS/QHP OP CAR RHAB W/ECG: CPT

## 2023-06-02 PROCEDURE — 87086 URINE CULTURE/COLONY COUNT: CPT | Mod: 90 | Performed by: PATHOLOGY

## 2023-06-02 PROCEDURE — 81001 URINALYSIS AUTO W/SCOPE: CPT | Performed by: PATHOLOGY

## 2023-06-02 RX ORDER — SULFAMETHOXAZOLE/TRIMETHOPRIM 800-160 MG
1 TABLET ORAL 2 TIMES DAILY
Qty: 14 TABLET | Refills: 0 | Status: SHIPPED | OUTPATIENT
Start: 2023-06-02 | End: 2023-06-13

## 2023-06-02 NOTE — TELEPHONE ENCOUNTER
[facility] Call Center    Phone Message    May a detailed message be left on voicemail: yes     Reason for Call: Order(s): Other: UA   Reason for requested: Pt would like to do UA in Lab on [facility], he is experiencing burning and frequency, he is post cardiac surgery.   Date needed: 06/02/2022  Provider name: Dr Smiley      Action Taken: Message routed to:  [facility]    Travel Screening: Not Applicable

## 2023-06-04 LAB — BACTERIA UR CULT: ABNORMAL

## 2023-06-05 ENCOUNTER — HOSPITAL ENCOUNTER (OUTPATIENT)
Dept: CARDIAC REHAB | Facility: CLINIC | Age: 78
Discharge: HOME | End: 2023-06-05
Attending: SURGERY | Admitting: SURGERY
Payer: COMMERCIAL

## 2023-06-05 PROCEDURE — 93798 PHYS/QHP OP CAR RHAB W/ECG: CPT

## 2023-06-07 ENCOUNTER — HOSPITAL ENCOUNTER (OUTPATIENT)
Dept: CARDIAC REHAB | Facility: CLINIC | Age: 78
Discharge: HOME | End: 2023-06-07
Attending: SURGERY | Admitting: SURGERY
Payer: COMMERCIAL

## 2023-06-07 DIAGNOSIS — E78.00 HIGH CHOLESTEROL: Primary | ICD-10-CM

## 2023-06-07 PROCEDURE — 93798 PHYS/QHP OP CAR RHAB W/ECG: CPT

## 2023-06-08 RX ORDER — PRAVASTATIN SODIUM 80 MG/1
80 TABLET ORAL AT BEDTIME
Qty: 90 TABLET | Refills: 3 | Status: SHIPPED | OUTPATIENT
Start: 2023-06-08 | End: 2024-08-30

## 2023-06-08 NOTE — TELEPHONE ENCOUNTER
pravastatin (PRAVACHOL) 80 MG tablet  Last Written Prescription Date:  unknown  Last Fill Quantity: unknown,   # refills: unknown  Last Office Visit : 5/9/23  Future Office visit:  7/31/23    Routing refill request to provider for review/approval because:  Medication is reported/historical

## 2023-06-09 ENCOUNTER — HOSPITAL ENCOUNTER (OUTPATIENT)
Dept: CARDIAC REHAB | Facility: CLINIC | Age: 78
Discharge: HOME | End: 2023-06-09
Attending: SURGERY | Admitting: SURGERY
Payer: COMMERCIAL

## 2023-06-09 PROCEDURE — 93798 PHYS/QHP OP CAR RHAB W/ECG: CPT

## 2023-06-12 ENCOUNTER — HOSPITAL ENCOUNTER (OUTPATIENT)
Dept: CARDIAC REHAB | Facility: CLINIC | Age: 78
Discharge: HOME | End: 2023-06-12
Attending: SURGERY | Admitting: SURGERY
Payer: COMMERCIAL

## 2023-06-12 ENCOUNTER — OFFICE VISIT (OUTPATIENT)
Dept: CARDIOLOGY | Facility: CLINIC | Age: 78
End: 2023-06-12
Attending: INTERNAL MEDICINE
Payer: COMMERCIAL

## 2023-06-12 VITALS
WEIGHT: 190.6 LBS | DIASTOLIC BLOOD PRESSURE: 84 MMHG | BODY MASS INDEX: 26.68 KG/M2 | HEIGHT: 71 IN | SYSTOLIC BLOOD PRESSURE: 153 MMHG | OXYGEN SATURATION: 97 % | HEART RATE: 66 BPM

## 2023-06-12 DIAGNOSIS — Z95.1 S/P CABG (CORONARY ARTERY BYPASS GRAFT): ICD-10-CM

## 2023-06-12 DIAGNOSIS — I47.29 PAROXYSMAL VENTRICULAR TACHYCARDIA (H): ICD-10-CM

## 2023-06-12 PROCEDURE — G0463 HOSPITAL OUTPT CLINIC VISIT: HCPCS | Performed by: INTERNAL MEDICINE

## 2023-06-12 PROCEDURE — 99214 OFFICE O/P EST MOD 30 MIN: CPT | Mod: GC | Performed by: INTERNAL MEDICINE

## 2023-06-12 PROCEDURE — 93798 PHYS/QHP OP CAR RHAB W/ECG: CPT

## 2023-06-12 ASSESSMENT — PAIN SCALES - GENERAL: PAINLEVEL: NO PAIN (0)

## 2023-06-12 NOTE — PROGRESS NOTES
[facility]  CARDIOVASCULAR MEDICINE CLINIC NOTE    Referring Provider: Pito Chapin   Primary Care Provider: Joao Smiley     Patient Name: Reinaldo Gordillo   MRN: [id number]     PERTINENT CLINICAL HISTORY:   Reinaldo Gordillo is a 78 year old male with history of coronary artery disease (s/p AREN x4 to RCA in 2015) and recent CABG x4 (4/2423), HTN, HLD who returns to clinic for follow up.     Patient was last seen in clinic on 2/20/23 after a syncope event (fall and post concussive transient amnesia) that occurred 3 weeks prior ( 1/21/22) with a subsequent zio monitor result that showed non sustained VT. Given his history of significant CAD, we recommended a coronary angiogram which was done on 3/21/23 and showed significant 2 vessel disease and he was referred to surgery for CABG. CABG was subsequently done on 4/24/23 with LIMA to LAD and SVG x3 to OM, Diag and GERALDINE. Post CABG, patient was enrolled in cardiac rehab which he is currently participating in and tolerating well. He reports that post operatively, his BP was normal and he Lisinopril was stopped prior to his discharged. He also had some post-op asymptomatic AFib and wore a zio monitor for 2 weeks post op.     In clinic today, patient reports that he is doing well. He is retired but remains active and has no complaints. He denies any chest pain, dyspnea at rest or with exertion, PND, orthopnea, peripheral edema, palpitations, lightheadedness      Cardiac medications:   - Aspirin 81 mg daily  - Amiodarone 400 mg bid  - Apixaban 5 mg bid  - Metoprolol Succinate 25 mg daily  - Pravastatin 80 mg daily     PAST MEDICAL HISTORY:     Past Medical History:   Diagnosis Date     ASCVD (arteriosclerotic cardiovascular disease)      Coronary artery disease      Gastroesophageal reflux disease      Hypertension      Stented coronary artery         PAST SURGICAL HISTORY:     Past Surgical History:   Procedure Laterality Date     BYPASS GRAFT ARTERY CORONARY  N/A 4/24/2023    Procedure: MEDIAN STERNOTOMY, HARVEST OF LEFT INTERNAL MAMMARY ARTERY, ENDOSCOPIC HARVEST OF LEFT GREATER SAPHENOUS VEIN, ON CARDIOPULMONARY BYPASS, CORONARY ARTERY BYPASS GRAFT (CABG) X4 . TRANSESOPHAGEAL ECHOCARDIOGRAM.;  Surgeon: Bro Almeida MD;  Location: [facility] OR     CATARACT IOL, RT/LT       COLONOSCOPY N/A 7/17/2019    Procedure: COLONOSCOPY;  Surgeon: Roque Givens MD;  Location: [facility] OR     CV CORONARY ANGIOGRAM N/A 3/21/2023    Procedure: Coronary Angiogram;  Surgeon: Pito Chapin MD;  Location: [facility] CARDIAC CATH LAB     CV PCI N/A 3/21/2023    Procedure: Percutaneous Coronary Intervention;  Surgeon: Pito Chapin MD;  Location: [facility] CARDIAC CATH LAB     ESOPHAGOSCOPY, GASTROSCOPY, DUODENOSCOPY (EGD), COMBINED N/A 7/17/2019    Procedure: ESOPHAGOGASTRODUODENOSCOPY, WITH BIOPSY;  Surgeon: Roque Givens MD;  Location: [facility] OR     HERNIA REPAIR  2000     MOHS MICROGRAPHIC PROCEDURE       PHACOEMULSIFICATION CLEAR CORNEA WITH STANDARD INTRAOCULAR LENS IMPLANT Right 7/2/2021    Procedure: RIGHT EYE CATARACT REMOVAL WITH INTRAOCULAR LENS IMPLANT;  Surgeon: Justa Liz MD;  Location: [facility] OR     PHACOEMULSIFICATION CLEAR CORNEA WITH STANDARD INTRAOCULAR LENS IMPLANT Left 7/9/2021    Procedure: LEFT EYE CATARACT REMOVAL WITH INTRAOCULAR LENS IMPLANT;  Surgeon: Justa Liz MD;  Location: [facility] OR     [facility] CORONARY STENT PERCUT, EA ADDTL VESSEL          CURRENT MEDICATIONS:     Current Outpatient Medications   Medication Sig Dispense Refill     acetaminophen (TYLENOL) 325 MG tablet Take 2 tablets (650 mg) by mouth every 4 hours as needed for other (For optimal non-opioid multimodal pain management to improve pain control.) 30 tablet 1     amiodarone (PACERONE) 400 MG tablet Take 1 tablet (400 mg) by mouth daily 30 tablet 1     amiodarone (PACERONE) 400 MG tablet Take 1 tablet (400 mg) by mouth 2 times daily 30 tablet 1     apixaban ANTICOAGULANT (ELIQUIS  "ANTICOAGULANT) 5 MG tablet Take 1 tablet (5 mg) by mouth 2 times daily 60 tablet 0     ASPIRIN LOW DOSE 81 MG EC tablet Take 81 mg by mouth every morning       melatonin 5 MG tablet Take 1 tablet (5 mg) by mouth nightly as needed for sleep 15 tablet 0     metoprolol succinate ER (TOPROL XL) 25 MG 24 hr tablet Take 1 tablet (25 mg) by mouth every morning 90 tablet 1     omeprazole (PRILOSEC) 20 MG DR capsule Take 20 mg by mouth as needed       pravastatin (PRAVACHOL) 80 MG tablet Take 1 tablet (80 mg) by mouth At Bedtime 90 tablet 3     sulfamethoxazole-trimethoprim (BACTRIM DS) 800-160 MG tablet Take 1 tablet by mouth 2 times daily (Patient not taking: Reported on 6/12/2023) 14 tablet 0        ALLERGIES:   No Known Allergies     FAMILY HISTORY:     Family History   Problem Relation Age of Onset     Glaucoma No family hx of      Macular Degeneration No family hx of      Retinal detachment No family hx of      Amblyopia No family hx of      Melanoma No family hx of      Skin Cancer No family hx of         SOCIAL HISTORY:     Social History     Socioeconomic History     Marital status:      Spouse name: None     Number of children: None     Years of education: None     Highest education level: None   Tobacco Use     Smoking status: Never     Smokeless tobacco: Never   Substance and Sexual Activity     Alcohol use: Yes     Comment: 1-2 drinks 5 days a week     Drug use: Never     Edward  reports current alcohol use. and  reports that he has never smoked. He has never used smokeless tobacco..     REVIEW OF SYSTEMS:   A comprehensive review of systems was performed and negative unless otherwise noted in the HPI above.      PHYSICAL EXAMINATION:   BP (!) 153/84 (BP Location: Right arm, Patient Position: Chair, Cuff Size: Adult Regular)   Pulse 66   Ht 1.803 m (5' 10.98\")   Wt 86.5 kg (190 lb 9.6 oz)   SpO2 97%   BMI 26.60 kg/m    Body mass index is 26.6 kg/m .  Wt Readings from Last 2 Encounters:   06/12/23 " 86.5 kg (190 lb 9.6 oz)   05/12/23 86.3 kg (190 lb 4.8 oz)     Constitutional: NAD  HEENT: anicteric sclera, mmm, eomi  Cardiovascular: RRR nl S1S2, JVP not elevated, extremities with no edema or cyanosis  Respiratory: CTAB  Gastrointestinal: soft, nontender, non distended, no hepatosplenomegaly or masses  Musculoskeletal: able to move all extremities  Skin: normal skin appearance without worrisome lesions.   Neurologic: Alert and oriented x3, no focal motor deficits  Psychiatric: appropriate affect, eye contact, intact thought and speech       LABORATORY DATA:     LIPID RESULTS:  Recent Labs   Lab Test 08/29/22  0842   CHOL 165   HDL 93   LDL 63   TRIG 43        LIVER ENZYME RESULTS:  Recent Labs   Lab Test 04/30/23 0438 04/29/23  0338   AST 32 23   ALT 32 21       CBC RESULTS:  Recent Labs   Lab Test 04/30/23 0438 04/29/23  0338   WBC 6.6 6.7   HGB 9.3* 8.8*   HCT 29.2* 27.1*    186       BMP RESULTS:  Recent Labs   Lab Test 04/30/23  1218 04/30/23  0812 04/30/23  0438 04/29/23  2151 04/29/23  1812 04/29/23  0843 04/29/23  0338   NA  --   --  139  --   --   --  137   POTASSIUM  --   --  3.7  --  3.9  --  3.9   CHLORIDE  --   --  101  --   --   --  102   CO2  --   --  27  --   --   --  25   ANIONGAP  --   --  11  --   --   --  10   * 117* 113*   < >  --    < > 115*   BUN  --   --  9.1  --   --   --  14.5   CR  --   --  0.88  --   --   --  0.85   CRISS  --   --  8.9  --   --   --  8.5*    < > = values in this interval not displayed.       A1C RESULTS:  Lab Results   Component Value Date    A1C 6.2 (H) 04/12/2023       INR RESULTS:  Recent Labs   Lab Test 04/25/23  0645 04/24/23  1518   INR 1.27* 1.31*          PROCEDURES & FURTHER ASSESSMENTS:     14 day Zio Monitor (5/12-26/23): 4 beats VT, some AFib noted in the first few days with gradual decrease in Josafat burden and essentially no AFib noted in the last 3 days.     Coronary Angiogram (3/1/23)  Two vessel coronary artery disease involving the LAD and  RCA.  The RCA lesion is highly calcified between stented regions.    Given the severe calcification of the RCA and the long LAD lesion extending to the ostium of the vessel, recommend CVTS consult and eval for CABG.  However, PCI is possible if the patient does not want CABG  Left Main   The LM is very short with the LCx emerging from a separate ostium.   Dist LM to Prox LAD lesion is 70% stenosed.      Left Anterior Descending   Heparin was given for ACT > 250 sec. An opsens wire was advanced to the distal LAD. The iFR in the distal LAD was 0.84. The iFR in the mid LAD was 0.9.   Prox LAD to Mid LAD lesion is 40% stenosed.   Mid LAD lesion is 50% stenosed.      First Diagonal Branch   The vessel is large.   1st Diag-1 lesion is 80% stenosed.   1st Diag-2 lesion is 70% stenosed.      Second Diagonal Branch   The vessel is small.   2nd Diag lesion is 90% stenosed.      Left Circumflex   Heparin was given for ACT > 250 sec. An opsens wire was advanced to the mid LCx. The iFR in the mid LCx was 1.0.   Ost Cx to Prox Cx lesion is 20% stenosed.      Right Coronary Artery   Previously placed Prox RCA to Mid RCA stent (unknown type) is widely patent.   Mid RCA to Dist RCA lesion is 80% stenosed.   Previously placed Dist RCA stent (unknown type) is widely patent.      Right Posterior Atrioventricular Artery   Previously placed RPAV-1 stent (unknown type) is widely patent.   RPAV-2 lesion is 50% stenosed.   Previously placed RPAV-3 stent (unknown type) is widely patent.      Third Right Posterolateral Branch   Previously placed 3rd RPL stent (unknown type) is widely patent.               Zio Monitor - (7 days):   13 beats run of non sustained ventricular tachycardia (patient was asymptomatic) with some PAC.      EK23: NSR at 65 bpm      Echocardiogram 10/29/2022:  Left ventricular size, wall motion and function are normal. The ejection fraction is 60-65%.  Global right ventricular function is  normal.  Aortic valve calcification without significant stenosis.  No pericardial effusion.    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    PLAN    #. Coronary artery disease (s/p PCIs and new CABG x4)  -He is currently asymptomatic.  -Continue aspirin 81 mg daily, pravastatin 80 mg daily, and Toprol-XL 25 mg daily,     # History of NSVT  # New Post Op paroxysmal AFib  - Ok to continue Amiodarone until clinic appointment with Dr. Hussein (defer decision on anti arrhythmic duration to EP)  - Continue Toprolol-XL 25 mg daily     #.  Hypertension  -Hypertensive in clinic today (to 153/84) but patient reports of good BP control since his CABG with regular checks at his cardiac rehab. He will like to hold off on restarting Lisinopril for now and will monitor his BP at home and at his cardiac rehab appointments.   -Continue Toprol-XL 25 mg daily  - If BP checks at home (and rehab center) is consistently high (>/= 140/80) will restart Lisinopril at 10 mg daily     #.  Hyperlipidemia  -LDL: 63 on 8/29/2022  -On Pravastatin 80 mg daily.  -Did not tolerate rosuvastatin in the past due to muscle cramps and elevated CK.    Follow-up: 1 year    Thank you for allowing us to take part in the care of this very pleasant patient.  Please do not hesitate to call if any further questions or concerns arise.    Patient seen and discussed with Dr. Chapin.     Adele Trimble MD, PhD  Cardiology Fellow    June 12, 2023      CC  Patient Care Team:  Joao Smiley MD as PCP - General  Bushra Begum MD as MD (Neurology)  Bushra Begum MD as Assigned Neuroscience Provider  Yonathan Randall MD as MD (Dermatology)  Joao Smiley MD as Assigned PCP  Salomon White MD as MD  (Endocrinology, Diabetes, and Metabolism)  Yonathan Randall MD as Assigned Surgical Provider  Salomon White MD as Assigned Endocrinology Provider  Yony Cormier RN as Specialty Care Coordinator (Cardiovascular & Thoracic Surgery)  Sarah Lemus, SARITA CNP as Nurse Practitioner (Anesthesiology)  Bro Almeida MD as MD (Cardiovascular & Thoracic Surgery)  Pito Chapin MD as Assigned Heart and Vascular Provider  PITO CHAPIN

## 2023-06-12 NOTE — NURSING NOTE
Chief Complaint   Patient presents with     Follow Up     S/P CABG X 4       Vitals were taken and medications reconciled.    Jose D Ortega, EMT  11:24 AM

## 2023-06-12 NOTE — LETTER
6/12/2023      RE: Reinaldo Gordillo  [address]  [address]  [facility] [address]       Dear Colleague,    Thank you for the opportunity to participate in the care of your patient, Reinaldo Gordillo, at the [facility] HEART [facility]IN[facility]. Please see a copy of my visit note below.    [facility]  CARDIOVASCULAR MEDICINE CLINIC NOTE    Referring Provider: Pito Chapin   Primary Care Provider: Joao Smiley     Patient Name: Reinaldo Gordillo   MRN: [id number]     PERTINENT CLINICAL HISTORY:   Reinaldo Gordillo is a 78 year old male with history of coronary artery disease (s/p AREN x4 to RCA in 2015) and recent CABG x4 (4/2423), HTN, HLD who returns to clinic for follow up.     Patient was last seen in clinic on 2/20/23 after a syncope event (fall and post concussive transient amnesia) that occurred 3 weeks prior ( 1/21/22) with a subsequent zio monitor result that showed non sustained VT. Given his history of significant CAD, we recommended a coronary angiogram which was done on 3/21/23 and showed significant 2 vessel disease and he was referred to surgery for CABG. CABG was subsequently done on 4/24/23 with LIMA to LAD and SVG x3 to OM, Diag and GERALDINE. Post CABG, patient was enrolled in cardiac rehab which he is currently participating in and tolerating well. He reports that post operatively, his BP was normal and he Lisinopril was stopped prior to his discharged. He also had some post-op asymptomatic AFib and wore a zio monitor for 2 weeks post op.     In clinic today, patient reports that he is doing well. He is retired but remains active and has no complaints. He denies any chest pain, dyspnea at rest or with exertion, PND, orthopnea, peripheral edema, palpitations, lightheadedness      Cardiac medications:   - Aspirin 81 mg daily  - Amiodarone 400 mg bid  - Apixaban 5 mg bid  - Metoprolol Succinate 25 mg daily  - Pravastatin 80 mg daily      PAST MEDICAL HISTORY:     Past Medical History:   Diagnosis Date    ASCVD (arteriosclerotic cardiovascular disease)     Coronary artery disease     Gastroesophageal reflux disease     Hypertension     Stented coronary artery         PAST SURGICAL HISTORY:     Past Surgical History:   Procedure Laterality Date    BYPASS GRAFT ARTERY CORONARY N/A 4/24/2023    Procedure: MEDIAN STERNOTOMY, HARVEST OF LEFT INTERNAL MAMMARY ARTERY, ENDOSCOPIC HARVEST OF LEFT GREATER SAPHENOUS VEIN, ON CARDIOPULMONARY BYPASS, CORONARY ARTERY BYPASS GRAFT (CABG) X4 . TRANSESOPHAGEAL ECHOCARDIOGRAM.;  Surgeon: Bro Almeida MD;  Location: [facility] OR    CATARACT IOL, RT/LT      COLONOSCOPY N/A 7/17/2019    Procedure: COLONOSCOPY;  Surgeon: Roque Givens MD;  Location: [facility] OR    CV CORONARY ANGIOGRAM N/A 3/21/2023    Procedure: Coronary Angiogram;  Surgeon: Pito Chapin MD;  Location:  HEART CARDIAC CATH LAB    CV PCI N/A 3/21/2023    Procedure: Percutaneous Coronary Intervention;  Surgeon: Pito Chapin MD;  Location: [facility] CARDIAC CATH LAB    ESOPHAGOSCOPY, GASTROSCOPY, DUODENOSCOPY (EGD), COMBINED N/A 7/17/2019    Procedure: ESOPHAGOGASTRODUODENOSCOPY, WITH BIOPSY;  Surgeon: Roque Givens MD;  Location: [facility] OR    HERNIA REPAIR  2000    MOHS MICROGRAPHIC PROCEDURE      PHACOEMULSIFICATION CLEAR CORNEA WITH STANDARD INTRAOCULAR LENS IMPLANT Right 7/2/2021    Procedure: RIGHT EYE CATARACT REMOVAL WITH INTRAOCULAR LENS IMPLANT;  Surgeon: Justa Liz MD;  [place] [facility] [place]    PHACOEMULSIFICATION CLEAR CORNEA WITH STANDARD INTRAOCULAR LENS IMPLANT Left 7/9/2021    Procedure: LEFT EYE CATARACT REMOVAL WITH INTRAOCULAR LENS IMPLANT;  Surgeon: Justa Liz MD;  [place] [facility] [place]    [facility] CORONARY STENT PERCUT, EA ADDTL VESSEL          CURRENT MEDICATIONS:     Current Outpatient Medications   Medication Sig Dispense Refill    acetaminophen (TYLENOL) 325 MG tablet Take 2 tablets (650 mg) by mouth every 4 hours as needed  for other (For optimal non-opioid multimodal pain management to improve pain control.) 30 tablet 1    amiodarone (PACERONE) 400 MG tablet Take 1 tablet (400 mg) by mouth daily 30 tablet 1    amiodarone (PACERONE) 400 MG tablet Take 1 tablet (400 mg) by mouth 2 times daily 30 tablet 1    apixaban ANTICOAGULANT (ELIQUIS ANTICOAGULANT) 5 MG tablet Take 1 tablet (5 mg) by mouth 2 times daily 60 tablet 0    ASPIRIN LOW DOSE 81 MG EC tablet Take 81 mg by mouth every morning      melatonin 5 MG tablet Take 1 tablet (5 mg) by mouth nightly as needed for sleep 15 tablet 0    metoprolol succinate ER (TOPROL XL) 25 MG 24 hr tablet Take 1 tablet (25 mg) by mouth every morning 90 tablet 1    omeprazole (PRILOSEC) 20 MG DR capsule Take 20 mg by mouth as needed      pravastatin (PRAVACHOL) 80 MG tablet Take 1 tablet (80 mg) by mouth At Bedtime 90 tablet 3    sulfamethoxazole-trimethoprim (BACTRIM DS) 800-160 MG tablet Take 1 tablet by mouth 2 times daily (Patient not taking: Reported on 6/12/2023) 14 tablet 0        ALLERGIES:   No Known Allergies     FAMILY HISTORY:     Family History   Problem Relation Age of Onset    Glaucoma No family hx of     Macular Degeneration No family hx of     Retinal detachment No family hx of     Amblyopia No family hx of     Melanoma No family hx of     Skin Cancer No family hx of         SOCIAL HISTORY:     Social History     Socioeconomic History    Marital status:      Spouse name: None    Number of children: None    Years of education: None    Highest education level: None   Tobacco Use    Smoking status: Never    Smokeless tobacco: Never   Substance and Sexual Activity    Alcohol use: Yes     Comment: 1-2 drinks 5 days a week    Drug use: Never     Reinaldo  reports current alcohol use. and  reports that he has never smoked. He has never used smokeless tobacco..     REVIEW OF SYSTEMS:   A comprehensive review of systems was performed and negative unless otherwise noted in the HPI  "above.      PHYSICAL EXAMINATION:   BP (!) 153/84 (BP Location: Right arm, Patient Position: Chair, Cuff Size: Adult Regular)   Pulse 66   Ht 1.803 m (5' 10.98\")   Wt 86.5 kg (190 lb 9.6 oz)   SpO2 97%   BMI 26.60 kg/m    Body mass index is 26.6 kg/m .  Wt Readings from Last 2 Encounters:   06/12/23 86.5 kg (190 lb 9.6 oz)   05/12/23 86.3 kg (190 lb 4.8 oz)     Constitutional: NAD  HEENT: anicteric sclera, mmm, eomi  Cardiovascular: RRR nl S1S2, JVP not elevated, extremities with no edema or cyanosis  Respiratory: CTAB  Gastrointestinal: soft, nontender, non distended, no hepatosplenomegaly or masses  Musculoskeletal: able to move all extremities  Skin: normal skin appearance without worrisome lesions.   Neurologic: Alert and oriented x3, no focal motor deficits  Psychiatric: appropriate affect, eye contact, intact thought and speech       LABORATORY DATA:     LIPID RESULTS:  Recent Labs   Lab Test 08/29/22  0842   CHOL 165   HDL 93   LDL 63   TRIG 43        LIVER ENZYME RESULTS:  Recent Labs   Lab Test 04/30/23  0438 04/29/23  0338   AST 32 23   ALT 32 21       CBC RESULTS:  Recent Labs   Lab Test 04/30/23 0438 04/29/23  0338   WBC 6.6 6.7   HGB 9.3* 8.8*   HCT 29.2* 27.1*    186       BMP RESULTS:  Recent Labs   Lab Test 04/30/23  1218 04/30/23  0812 04/30/23  0438 04/29/23  2151 04/29/23  1812 04/29/23  0843 04/29/23  0338   NA  --   --  139  --   --   --  137   POTASSIUM  --   --  3.7  --  3.9  --  3.9   CHLORIDE  --   --  101  --   --   --  102   CO2  --   --  27  --   --   --  25   ANIONGAP  --   --  11  --   --   --  10   * 117* 113*   < >  --    < > 115*   BUN  --   --  9.1  --   --   --  14.5   CR  --   --  0.88  --   --   --  0.85   CRISS  --   --  8.9  --   --   --  8.5*    < > = values in this interval not displayed.       A1C RESULTS:  Lab Results   Component Value Date    A1C 6.2 (H) 04/12/2023       INR RESULTS:  Recent Labs   Lab Test 04/25/23  0645 04/24/23  1518   INR 1.27* 1.31* "          PROCEDURES & FURTHER ASSESSMENTS:     14 day Zio Monitor (5/12-26/23): 4 beats VT, some AFib noted in the first few days with gradual decrease in Josafat burden and essentially no AFib noted in the last 3 days.     Coronary Angiogram (3/1/23)  Two vessel coronary artery disease involving the LAD and RCA.  The RCA lesion is highly calcified between stented regions.    Given the severe calcification of the RCA and the long LAD lesion extending to the ostium of the vessel, recommend CVTS consult and eval for CABG.  However, PCI is possible if the patient does not want CABG  Left Main   The LM is very short with the LCx emerging from a separate ostium.   Dist LM to Prox LAD lesion is 70% stenosed.      Left Anterior Descending   Heparin was given for ACT > 250 sec. An opsens wire was advanced to the distal LAD. The iFR in the distal LAD was 0.84. The iFR in the mid LAD was 0.9.   Prox LAD to Mid LAD lesion is 40% stenosed.   Mid LAD lesion is 50% stenosed.      First Diagonal Branch   The vessel is large.   1st Diag-1 lesion is 80% stenosed.   1st Diag-2 lesion is 70% stenosed.      Second Diagonal Branch   The vessel is small.   2nd Diag lesion is 90% stenosed.      Left Circumflex   Heparin was given for ACT > 250 sec. An opsens wire was advanced to the mid LCx. The iFR in the mid LCx was 1.0.   Ost Cx to Prox Cx lesion is 20% stenosed.      Right Coronary Artery   Previously placed Prox RCA to Mid RCA stent (unknown type) is widely patent.   Mid RCA to Dist RCA lesion is 80% stenosed.   Previously placed Dist RCA stent (unknown type) is widely patent.      Right Posterior Atrioventricular Artery   Previously placed RPAV-1 stent (unknown type) is widely patent.   RPAV-2 lesion is 50% stenosed.   Previously placed RPAV-3 stent (unknown type) is widely patent.      Third Right Posterolateral Branch   Previously placed 3rd RPL stent (unknown type) is widely patent.               Zio Monitor 1/21-28/23 (7 days):   13  beats run of non sustained ventricular tachycardia (patient was asymptomatic) with some PAC.      EK23: NSR at 65 bpm      Echocardiogram 10/29/2022:  Left ventricular size, wall motion and function are normal. The ejection fraction is 60-65%.  Global right ventricular function is normal.  Aortic valve calcification without significant stenosis.  No pericardial effusion.    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    PLAN    #. Coronary artery disease (s/p PCIs and new CABG x4)  -He is currently asymptomatic.  -Continue aspirin 81 mg daily, pravastatin 80 mg daily, and Toprol-XL 25 mg daily,     # History of NSVT  # New Post Op paroxysmal AFib  - Ok to continue Amiodarone until clinic appointment with Dr. Hussein (defer decision on anti arrhythmic duration to EP)  - Continue Toprolol-XL 25 mg daily     #.  Hypertension  -Hypertensive in clinic today (to 153/84) but patient reports of good BP control since his CABG with regular checks at his cardiac rehab. He will like to hold off on restarting Lisinopril for now and will monitor his BP at home and at his cardiac rehab appointments.   -Continue Toprol-XL 25 mg daily  - If BP checks at home (and rehab center) is consistently high (>/= 140/80) will restart Lisinopril at 10 mg daily     #.  Hyperlipidemia  -LDL: 63 on 2022  -On Pravastatin 80 mg daily.  -Did not tolerate rosuvastatin in the past due to muscle cramps and elevated CK.    Follow-up: 1 year    Thank you for allowing us to take part in the care of this very pleasant patient.  Please do not hesitate to call if any further questions or concerns arise.    Patient seen and discussed with Dr. Chapin.     Adele Trimble MD, PhD  Cardiology Fellow      2023        Patient Care Team:  Joao Smiley MD as PCP - General  Bushra Begum MD as MD (Neurology)  Bushra Begum MD as Assigned Neuroscience Provider  Yonathan Randall MD as MD (Dermatology)  Joao Smiley MD as Assigned PCP  Salomon White MD as MD (Endocrinology, Diabetes, and Metabolism)  Yonathan Randall MD as Assigned Surgical Provider  Salomon White MD as Assigned Endocrinology Provider  Yony Cormier, RN as Specialty Care Coordinator (Cardiovascular & Thoracic Surgery)  Sarah Lemus APRN CNP as Nurse Practitioner (Anesthesiology)  Bro Almeida MD as MD (Cardiovascular & Thoracic Surgery)  Pito Chapin MD as Assigned Heart and Vascular Provider  PITO CHAPIN    Attestation signed by Pito Chapin MD at 6/20/2023 10:26 PM:  ATTENDING ATTESTATION:   I personally examined and evaluated this patient on June 12, 2023.  I have personally reviewed today's vital signs, medications, all labs, and all imaging/cardiac studies described above. I have reviewed and edited, as necessary, the history, review of systems, physical examination, and assessment and plan.  I discussed the patient with Dr. Trimble and agree with the assessment and plan of care as documented in the note above.  I personally spent 30 min today reviewing the medical record, meeting with the patient, and completing this note.  Thank you for allowing us to take part in the care of this very pleasant patient.  Please do not hesitate to call if any further questions or concerns arise.    Pito Chapin MD, PhD  Interventional/Critical Care Cardiology  [phone]    June 12, 2023      Please do not hesitate to contact me if you have any questions/concerns.     Sincerely,     Pito Chapin MD

## 2023-06-12 NOTE — PATIENT INSTRUCTIONS
"Cardiology Providers you saw during your visit: Dr. Chapin    Medication changes:   None - check your BP at home and let us know if SBP remains >140         Follow up:  Follow up with cardiology in 1 year    Follow the American Heart Association Diet and Lifestyle recommendations:  Limit saturated fat, trans fat, sodium, red meat, sweets and sugar-sweetened beverages. If you choose to eat red meat, compare labels and select the leanest cuts available.  Aim for at least 150 minutes of moderate physical activity or 75 minutes of vigorous physical activity - or an equal combination of both - each week.      During business hours: [phone], press option # 1 to schedule or leave a message for your care team      After hours, weekends or holidays: On Call Cardiologist- [phone]   option #4 and ask to speak to the on-call Cardiologist.   Suly Xiong RN   Cardiology Care Coordinator   [facility]   Questions and [phone]   First press #1 for the [facility] \"To send a message to your care team\"    "

## 2023-06-13 ENCOUNTER — OFFICE VISIT (OUTPATIENT)
Dept: NEUROLOGY | Facility: CLINIC | Age: 78
End: 2023-06-13
Payer: COMMERCIAL

## 2023-06-13 VITALS
DIASTOLIC BLOOD PRESSURE: 80 MMHG | HEART RATE: 65 BPM | WEIGHT: 190.6 LBS | SYSTOLIC BLOOD PRESSURE: 139 MMHG | OXYGEN SATURATION: 96 % | BODY MASS INDEX: 26.6 KG/M2

## 2023-06-13 DIAGNOSIS — G60.9 HEREDITARY AND IDIOPATHIC PERIPHERAL NEUROPATHY: Primary | ICD-10-CM

## 2023-06-13 PROCEDURE — 99214 OFFICE O/P EST MOD 30 MIN: CPT | Mod: 24 | Performed by: PSYCHIATRY & NEUROLOGY

## 2023-06-13 ASSESSMENT — PAIN SCALES - GENERAL: PAINLEVEL: NO PAIN (0)

## 2023-06-13 NOTE — LETTER
6/13/2023       RE: Reinaldo Gordillo  [address]  [address]  [facility] [address]     Dear Colleague,    Thank you for referring your patient, Reinaldo Gordillo, to the [facility] NEUROLOGY CLINIC [facility]. Please see a copy of my visit note below.        [facility]    Reinaldo Gordillo MRN# [id number]   Age: 78 year old YOB: 1945     Requesting physician: No ref. 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  This suggests that his peripheral neuropathy has progressed slightly.  Unfortunately there is no clear etiology for this.  We did discuss that abnormal fasting glucose levels and alcohol consumption might make neuropathies worse and he should watch his diet and have very sparse alcohol intake.    We discussed that having impaired sensation in the feet does put him at risk for falls.  Continue to be active in a safe manner is important to maintain stability.  I also advised using night lights and picking up loose rugs at home.  Regular checks of the feet are also important to make sure there no nonhealing wounds.    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  No other severity truncal weakness most likely this is orthopedic in nature but we should monitor.  The reduced arm swing sounds is a longstanding issue but certainly we will monitor to make sure it does not worsen a heralding symptom of Parkinson's disease with a future appointment in 1 years time.  If the camptocormia becomes worse EMG specifically looking at paraspinal muscles could be undertaken.    35 minutes spent caring for the patient on the day of the visit that included visit time as well as documentation time.    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  It is fairly painless.  He presents today for recheck accompanied by his wife.  He notes that he is had slightly more paresthesias in the feet and lower legs.  He also questions whether this could be contributing to balance disturbance.  His wife reports that he has not really swung his arms but now she notes him to be leaning forward and leaning to the side more often.  If he is getting up at night or in the shower he feels less steady on his feet.  About 4 months ago he had an unwitnessed fall with a hit to the head.  Because he could not remember the cause of the fall he went to the emergency department and was worked up for syncope.  7 weeks ago he underwent coronary bypass and is currently recovering from this.           Physical Exam:     /80 (BP Location: Right arm, Patient Position: Sitting, Cuff Size: Adult Regular)   Pulse 65   Wt 86.5 kg (190 lb 9.6 oz)   SpO2 96%   BMI 26.60 kg/m     The patient is awake and alert.  He is oriented.  No aphasia or dysarthria  No evidence of any tremor.  Finger taps show lower amplitude and slightly slower movement on the right hand compared to the left.  Alternating hand movements are normal bilaterally.  I do not appreciate any increased muscle tone in any of the 4 extremities.  Reflexes at the knees are normal bilaterally symmetric.  Ankle jerks are absent even with reinforcement.  Plantar response is flexor on the left and equivocal on the right.  Vibration sensation is absent at the toes bilaterally absent at the left ankle but at the right ankle very early extinction.  Slightly early extinction of both knees and normal at the fingertips.  Joint position sense was normal at the great toes.  Light touch was reduced from about mid calf down to the toes  bilaterally pinprick sensation was impaired from just above the ankles down to the toes bilaterally.  When the patient walks he does lean forward and has minimal arm swing more so on his right arm than his left arm.  His Romberg is positive.  He has some difficulty walking on heels.  He was able to walk on toes.         Pertinent History/Data for appointment:       Most recent cardiology visit from Dr. Denise's was reviewed dated 6-.  Most recent note from Dr. Garcia dated 5-9-2023 reviewed.  It is noted that he had a hemoglobin A1c of 6.2 on April 12, 2023.

## 2023-06-13 NOTE — PROGRESS NOTES
[facility]    Reinaldo Gordillo MRN# [id number]   Age: 78 year old YOB: 1945     Requesting physician: No ref. 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  This suggests that his peripheral neuropathy has progressed slightly.  Unfortunately there is no clear etiology for this.  We did discuss that abnormal fasting glucose levels and alcohol consumption might make neuropathies worse and he should watch his diet and have very sparse alcohol intake.    We discussed that having impaired sensation in the feet does put him at risk for falls.  Continue to be active in a safe manner is important to maintain stability.  I also advised using night lights and picking up loose rugs at home.  Regular checks of the feet are also important to make sure there no nonhealing wounds.    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  No other severity truncal weakness most likely this is orthopedic in nature but we should monitor.  The reduced arm swing sounds is a longstanding issue but certainly we will monitor to make sure it does not worsen a heralding symptom of Parkinson's disease with a future appointment in 1 years time.  If the camptocormia becomes worse EMG specifically looking at paraspinal muscles could be undertaken.    35 minutes spent caring for the patient on the day of the visit that included visit time as well as documentation time.    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  It is fairly painless.  He presents today for recheck accompanied by his wife.  He notes that he is had slightly more  paresthesias in the feet and lower legs.  He also questions whether this could be contributing to balance disturbance.  His wife reports that he has not really swung his arms but now she notes him to be leaning forward and leaning to the side more often.  If he is getting up at night or in the shower he feels less steady on his feet.  About 4 months ago he had an unwitnessed fall with a hit to the head.  Because he could not remember the cause of the fall he went to the emergency department and was worked up for syncope.  7 weeks ago he underwent coronary bypass and is currently recovering from this.           Physical Exam:     /80 (BP Location: Right arm, Patient Position: Sitting, Cuff Size: Adult Regular)   Pulse 65   Wt 86.5 kg (190 lb 9.6 oz)   SpO2 96%   BMI 26.60 kg/m     The patient is awake and alert.  He is oriented.  No aphasia or dysarthria  No evidence of any tremor.  Finger taps show lower amplitude and slightly slower movement on the right hand compared to the left.  Alternating hand movements are normal bilaterally.  I do not appreciate any increased muscle tone in any of the 4 extremities.  Reflexes at the knees are normal bilaterally symmetric.  Ankle jerks are absent even with reinforcement.  Plantar response is flexor on the left and equivocal on the right.  Vibration sensation is absent at the toes bilaterally absent at the left ankle but at the right ankle very early extinction.  Slightly early extinction of both knees and normal at the fingertips.  Joint position sense was normal at the great toes.  Light touch was reduced from about mid calf down to the toes bilaterally pinprick sensation was impaired from just above the ankles down to the toes bilaterally.  When the patient walks he does lean forward and has minimal arm swing more so on his right arm than his left arm.  His Romberg is positive.  He has some difficulty walking on heels.  He was able to walk on toes.          Pertinent History/Data for appointment:       Most recent cardiology visit from Dr. Denise's was reviewed dated 6-.  Most recent note from Dr. Garcia dated 5-9-2023 reviewed.  It is noted that he had a hemoglobin A1c of 6.2 on April 12, 2023.

## 2023-06-14 ENCOUNTER — HOSPITAL ENCOUNTER (OUTPATIENT)
Dept: CARDIAC REHAB | Facility: CLINIC | Age: 78
Discharge: HOME | End: 2023-06-14
Attending: SURGERY | Admitting: SURGERY
Payer: COMMERCIAL

## 2023-06-14 PROCEDURE — 93798 PHYS/QHP OP CAR RHAB W/ECG: CPT

## 2023-06-16 ENCOUNTER — HOSPITAL ENCOUNTER (OUTPATIENT)
Dept: CARDIAC REHAB | Facility: CLINIC | Age: 78
Discharge: HOME | End: 2023-06-16
Attending: SURGERY | Admitting: SURGERY
Payer: COMMERCIAL

## 2023-06-16 PROCEDURE — 93798 PHYS/QHP OP CAR RHAB W/ECG: CPT

## 2023-06-18 DIAGNOSIS — Z95.1 S/P CABG (CORONARY ARTERY BYPASS GRAFT): ICD-10-CM

## 2023-06-19 ENCOUNTER — MYC MEDICAL ADVICE (OUTPATIENT)
Dept: INTERNAL MEDICINE | Facility: CLINIC | Age: 78
End: 2023-06-19
Payer: COMMERCIAL

## 2023-06-19 ENCOUNTER — HOSPITAL ENCOUNTER (OUTPATIENT)
Dept: CARDIAC REHAB | Facility: CLINIC | Age: 78
Discharge: HOME | End: 2023-06-19
Attending: SURGERY | Admitting: SURGERY
Payer: COMMERCIAL

## 2023-06-19 PROCEDURE — 93798 PHYS/QHP OP CAR RHAB W/ECG: CPT

## 2023-06-20 ENCOUNTER — OFFICE VISIT (OUTPATIENT)
Dept: CARDIOLOGY | Facility: CLINIC | Age: 78
End: 2023-06-20
Attending: SURGERY
Payer: COMMERCIAL

## 2023-06-20 ENCOUNTER — LAB (OUTPATIENT)
Dept: LAB | Facility: CLINIC | Age: 78
End: 2023-06-20
Payer: COMMERCIAL

## 2023-06-20 VITALS
DIASTOLIC BLOOD PRESSURE: 78 MMHG | OXYGEN SATURATION: 97 % | HEART RATE: 66 BPM | WEIGHT: 190.2 LBS | SYSTOLIC BLOOD PRESSURE: 144 MMHG | BODY MASS INDEX: 26.54 KG/M2

## 2023-06-20 DIAGNOSIS — Z95.1 S/P CABG (CORONARY ARTERY BYPASS GRAFT): ICD-10-CM

## 2023-06-20 DIAGNOSIS — Z51.81 ENCOUNTER FOR MONITORING AMIODARONE THERAPY: ICD-10-CM

## 2023-06-20 DIAGNOSIS — R82.90 ABNORMAL URINE: ICD-10-CM

## 2023-06-20 DIAGNOSIS — I48.0 PAROXYSMAL ATRIAL FIBRILLATION (H): Primary | ICD-10-CM

## 2023-06-20 DIAGNOSIS — Z79.899 ENCOUNTER FOR MONITORING AMIODARONE THERAPY: ICD-10-CM

## 2023-06-20 LAB
ALBUMIN UR-MCNC: 20 MG/DL
APPEARANCE UR: ABNORMAL
BACTERIA #/AREA URNS HPF: ABNORMAL /HPF
BILIRUB UR QL STRIP: NEGATIVE
COLOR UR AUTO: YELLOW
GLUCOSE UR STRIP-MCNC: NEGATIVE MG/DL
HGB UR QL STRIP: ABNORMAL
KETONES UR STRIP-MCNC: NEGATIVE MG/DL
LEUKOCYTE ESTERASE UR QL STRIP: ABNORMAL
MUCOUS THREADS #/AREA URNS LPF: PRESENT /LPF
NITRATE UR QL: POSITIVE
PH UR STRIP: 5 [PH] (ref 5–7)
RBC URINE: 100 /HPF
SP GR UR STRIP: 1.02 (ref 1–1.03)
SQUAMOUS EPITHELIAL: 1 /HPF
UROBILINOGEN UR STRIP-MCNC: NORMAL MG/DL
WBC CLUMPS #/AREA URNS HPF: PRESENT /HPF
WBC URINE: 95 /HPF

## 2023-06-20 PROCEDURE — 93010 ELECTROCARDIOGRAM REPORT: CPT | Performed by: INTERNAL MEDICINE

## 2023-06-20 PROCEDURE — G0463 HOSPITAL OUTPT CLINIC VISIT: HCPCS | Performed by: INTERNAL MEDICINE

## 2023-06-20 PROCEDURE — 99000 SPECIMEN HANDLING OFFICE-LAB: CPT | Performed by: PATHOLOGY

## 2023-06-20 PROCEDURE — 87086 URINE CULTURE/COLONY COUNT: CPT | Performed by: INTERNAL MEDICINE

## 2023-06-20 PROCEDURE — 99215 OFFICE O/P EST HI 40 MIN: CPT | Mod: 24 | Performed by: INTERNAL MEDICINE

## 2023-06-20 PROCEDURE — 81001 URINALYSIS AUTO W/SCOPE: CPT | Performed by: PATHOLOGY

## 2023-06-20 PROCEDURE — 93005 ELECTROCARDIOGRAM TRACING: CPT

## 2023-06-20 RX ORDER — AMIODARONE HYDROCHLORIDE 200 MG/1
200 TABLET ORAL DAILY
Qty: 90 TABLET | Refills: 3 | Status: SHIPPED | OUTPATIENT
Start: 2023-07-04 | End: 2023-06-29 | Stop reason: SINTOL

## 2023-06-20 RX ORDER — AMIODARONE HYDROCHLORIDE 400 MG/1
TABLET ORAL
Qty: 30 TABLET | Refills: 1 | OUTPATIENT
Start: 2023-06-20

## 2023-06-20 ASSESSMENT — PAIN SCALES - GENERAL: PAINLEVEL: NO PAIN (0)

## 2023-06-20 NOTE — LETTER
6/20/2023      RE: Dellchristi CARDONA Duy  [address]  [address]  [facility] [address]       Dear Colleague,    Thank you for the opportunity to participate in the care of your patient, Reinaldo Gordillo, at the [facility] HEART [facility] at [facility]. Please see a copy of my visit note below.    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  At that time he had new onset atrial fibrillation/flutter which was paroxysmal in nature.  He was started on amiodarone with Eliquis.  It was anticipated that he would remain on treatment for at least 6 months and potentially continue on anticoagulation if atrial fibrillation/flutter persisted in follow-up.    At today's visit Dr. Gordillo indicates that he is healed up well from his surgery and is at about group home through his physical rehabilitation.  He has no cardiac symptoms.  He is continuing to take amiodarone 400 mg daily with no evident side effects.  Review of a recent Zio patch showed low-frequency atrial fibrillation burden.    Physical examination shows patient to appear well and be slightly above normotensive (144/78) and in no distress.    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    Dr. Gordillo was concerned regarding plans for future anticoagulation given his worries about instability and a previous fall..  I indicated that I would prefer to keep him on his anticoagulants for now and reassess this fall.  We will obtain an ambulatory recording before our next visit.  He did not indicate that he had suffered a fall previously and was worried about chronic anticoagulation.  We ultimately agreed that we would revisit after he had been on at least 6 months.  I did explain to him that the guideline recommendations for postoperative atrial fibrillation are less  clear-cut than for conventional atrial fibrillation.      PAST MEDICAL HISTORY:  Past Medical History:   Diagnosis Date    ASCVD (arteriosclerotic cardiovascular disease)     Coronary artery disease     Gastroesophageal reflux disease     Hypertension     Stented coronary artery        CURRENT MEDICATIONS:  Current Outpatient Medications   Medication Sig Dispense Refill    amiodarone (PACERONE) 400 MG tablet Take 1 tablet (400 mg) by mouth daily 30 tablet 1    amiodarone (PACERONE) 400 MG tablet Take 1 tablet (400 mg) by mouth 2 times daily 30 tablet 1    apixaban ANTICOAGULANT (ELIQUIS ANTICOAGULANT) 5 MG tablet Take 1 tablet (5 mg) by mouth 2 times daily 60 tablet 0    ASPIRIN LOW DOSE 81 MG EC tablet Take 81 mg by mouth every morning      melatonin 5 MG tablet Take 1 tablet (5 mg) by mouth nightly as needed for sleep 15 tablet 0    metoprolol succinate ER (TOPROL XL) 25 MG 24 hr tablet Take 1 tablet (25 mg) by mouth every morning 90 tablet 1    omeprazole (PRILOSEC) 20 MG DR capsule Take 20 mg by mouth as needed      pravastatin (PRAVACHOL) 80 MG tablet Take 1 tablet (80 mg) by mouth At Bedtime 90 tablet 3       PAST SURGICAL HISTORY:  Past Surgical History:   Procedure Laterality Date    BYPASS GRAFT ARTERY CORONARY N/A 4/24/2023    Procedure: MEDIAN STERNOTOMY, HARVEST OF LEFT INTERNAL MAMMARY ARTERY, ENDOSCOPIC HARVEST OF LEFT GREATER SAPHENOUS VEIN, ON CARDIOPULMONARY BYPASS, CORONARY ARTERY BYPASS GRAFT (CABG) X4 . TRANSESOPHAGEAL ECHOCARDIOGRAM.;  Surgeon: Bro Almeida MD;  Location:  OR    CATARACT IOL, RT/LT      COLONOSCOPY N/A 7/17/2019    Procedure: COLONOSCOPY;  Surgeon: Roque Givens MD;  [facility]    CV CORONARY ANGIOGRAM N/A 3/21/2023    Procedure: Coronary Angiogram;  Surgeon: Pito Chapin MD;  Location: [facility] CARDIAC CATH LAB    CV PCI N/A 3/21/2023    Procedure: Percutaneous Coronary Intervention;  Surgeon: Pito Chapin MD;  Location: [facility] CARDIAC CATH LAB     ESOPHAGOSCOPY, GASTROSCOPY, DUODENOSCOPY (EGD), COMBINED N/A 7/17/2019    Procedure: ESOPHAGOGASTRODUODENOSCOPY, WITH BIOPSY;  Surgeon: Roque Givens MD;  [facility] OR    HERNIA REPAIR  2000    MOHS MICROGRAPHIC PROCEDURE      PHACOEMULSIFICATION CLEAR CORNEA WITH STANDARD INTRAOCULAR LENS IMPLANT Right 7/2/2021    Procedure: RIGHT EYE CATARACT REMOVAL WITH INTRAOCULAR LENS IMPLANT;  Surgeon: Justa Liz MD;  Location: [facility] OR    PHACOEMULSIFICATION CLEAR CORNEA WITH STANDARD INTRAOCULAR LENS IMPLANT Left 7/9/2021    Procedure: LEFT EYE CATARACT REMOVAL WITH INTRAOCULAR LENS IMPLANT;  Surgeon: Justa Liz MD;  Location: [facility] OR    ZZHC CORONARY STENT PERCUT, EA ADDTL VESSEL         ALLERGIES:   No Known Allergies    FAMILY HISTORY:  Family History   Problem Relation Age of Onset    Glaucoma No family hx of     Macular Degeneration No family hx of     Retinal detachment No family hx of     Amblyopia No family hx of     Melanoma No family hx of     Skin Cancer No family hx of        SOCIAL HISTORY:  Social History     Tobacco Use    Smoking status: Never    Smokeless tobacco: Never   Substance Use Topics    Alcohol use: Yes     Comment: 1-2 drinks 5 days a week    Drug use: Never       ROS:   Constitutional: No fever, chills, or sweats. Weight stable.   ENT: No visual disturbance, ear ache, epistaxis, sore throat.   Cardiovascular: As per HPI.   Respiratory: No cough, hemoptysis.    GI: No nausea, vomiting,  : No hematuria.   Integument: Negative.   Psychiatric: Negative.   Hematologic:  , no easy bleeding.  Neuro: Negative.  Past history of a fall for which she is concerned about long-term anticoagulation.  Endocrinology: No significant heat or cold intolerance   Musculoskeletal: No myalgia.    Exam:  BP (!) 144/78 (BP Location: Right arm, Patient Position: Chair, Cuff Size: Adult Regular)   Pulse 66   Wt 86.3 kg (190 lb 3.2 oz)   SpO2 97%   BMI 26.54 kg/m    GENERAL APPEARANCE: healthy,  alert and no distress  HEENT: no icterus,, no central cyanosis  NECK: no adenopathy, no asymmetry, , JVP not elevated  RESPIRATORY: lungs clear to auscultation - no rales, rhonchi or wheezes, no use of accessory muscles, no retractions, respirations are unlabored, normal respiratory rate  CARDIOVASCULAR: regular rhythm, normal S1 with physiologic split S2, no S3 or S4, click or rub, precordium quiet with normal PMI.  Grade 2 systolic ejection murmur in the aortic region  ABDOMEN: soft, non tender, without hepatosplenomegaly, no masses palpable, bowel sounds normal, aorta not enlarged by palpation, no abdominal bruits  EXTREMITIES: peripheral pulses normal, no edema, no bruits  NEURO: alert and oriented to person/place/time, normal speech, gait and affect  SKIN: no ecchymoses, no rashes    Labs:  CBC RESULTS:   Lab Results   Component Value Date    WBC 6.6 04/30/2023    RBC 2.89 (L) 04/30/2023    HGB 9.3 (L) 04/30/2023    HCT 29.2 (L) 04/30/2023     (H) 04/30/2023    MCH 32.2 04/30/2023    MCHC 31.8 04/30/2023    RDW 12.5 04/30/2023     04/30/2023       BMP RESULTS:  Lab Results   Component Value Date     04/30/2023    POTASSIUM 3.7 04/30/2023    POTASSIUM 3.8 04/24/2023    POTASSIUM 4.9 08/29/2022    CHLORIDE 101 04/30/2023    CHLORIDE 107 08/29/2022    CO2 27 04/30/2023    CO2 28 08/29/2022    ANIONGAP 11 04/30/2023    ANIONGAP 4 08/29/2022     (H) 04/30/2023     (H) 08/29/2022    BUN 9.1 04/30/2023    BUN 40 (H) 08/29/2022    CR 0.88 04/30/2023    GFRESTIMATED 90 04/30/2023    CRISS 8.9 04/30/2023        INR RESULTS:  Lab Results   Component Value Date    INR 1.27 (H) 04/25/2023    INR 1.31 (H) 04/24/2023    INR 1.49 (H) 04/24/2023    INR 1.00 04/12/2023       Procedures:  PULMONARY FUNCTION TESTS:        View : No data to display.                  ECHOCARDIOGRAM:   No results found for this or any previous visit (from the past 8760 hour(s)).      Assessment and Plan:   1.  Atrial  fibrillation/flutter-postoperative with low burden on follow-up Zio patch  2.  Status post CABG in April 2023  3.  History of a remote fall raises concern regarding long-term anticoagulation  4.  No current cardiovascular complaints    Plan  1.  Continue on amiodarone but reduce dose to 200 mg daily beginning after current supply is complete  2.  Continue Eliquis at current dose-we will reassess this fall given history of instability  3.  Repeat ambulatory ECG monitor at end of September prior to next clinic visit  4.  Send prescription to [facility] [address] for Amio 200 daily-3-month supply for refills  5.  Next clinic visit (video okay) about 6 months    Elapsed time today with chart review, clinic visit and documentation 40 minutes    I very much appreciated the opportunity to see and assess Dr Reinaldo Gordillo in the clinic today . Please do not hesitate to contact my office if you have any questions or concerns.      Joao Saenz MD  Cardiac Arrhythmia Service  [facility]  [phone]

## 2023-06-20 NOTE — NURSING NOTE
Chief Complaint   Patient presents with     New Patient     New Cardiology     Vitals were taken and medications reconciled.    Navjot Wellington, EMT  1:32 PM

## 2023-06-20 NOTE — PROGRESS NOTES
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  At that time he had new onset atrial fibrillation/flutter which was paroxysmal in nature.  He was started on amiodarone with Eliquis.  It was anticipated that he would remain on treatment for at least 6 months and potentially continue on anticoagulation if atrial fibrillation/flutter persisted in follow-up.    At today's visit Dr. Gordillo indicates that he is healed up well from his surgery and is at about detention through his physical rehabilitation.  He has no cardiac symptoms.  He is continuing to take amiodarone 400 mg daily with no evident side effects.  Review of a recent Zio patch showed low-frequency atrial fibrillation burden.    Physical examination shows patient to appear well and be slightly above normotensive (144/78) and in no distress.    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    Dr. Gordillo was concerned regarding plans for future anticoagulation given his worries about instability and a previous fall..  I indicated that I would prefer to keep him on his anticoagulants for now and reassess this fall.  We will obtain an ambulatory recording before our next visit.  He did not indicate that he had suffered a fall previously and was worried about chronic anticoagulation.  We ultimately agreed that we would revisit after he had been on at least 6 months.  I did explain to him that the guideline recommendations for postoperative atrial fibrillation are less clear-cut than for conventional atrial fibrillation.      PAST MEDICAL HISTORY:  Past Medical History:   Diagnosis Date     ASCVD (arteriosclerotic cardiovascular disease)      Coronary artery disease      Gastroesophageal reflux disease      Hypertension      Stented coronary artery        CURRENT MEDICATIONS:  Current Outpatient Medications    Medication Sig Dispense Refill     amiodarone (PACERONE) 400 MG tablet Take 1 tablet (400 mg) by mouth daily 30 tablet 1     amiodarone (PACERONE) 400 MG tablet Take 1 tablet (400 mg) by mouth 2 times daily 30 tablet 1     apixaban ANTICOAGULANT (ELIQUIS ANTICOAGULANT) 5 MG tablet Take 1 tablet (5 mg) by mouth 2 times daily 60 tablet 0     ASPIRIN LOW DOSE 81 MG EC tablet Take 81 mg by mouth every morning       melatonin 5 MG tablet Take 1 tablet (5 mg) by mouth nightly as needed for sleep 15 tablet 0     metoprolol succinate ER (TOPROL XL) 25 MG 24 hr tablet Take 1 tablet (25 mg) by mouth every morning 90 tablet 1     omeprazole (PRILOSEC) 20 MG DR capsule Take 20 mg by mouth as needed       pravastatin (PRAVACHOL) 80 MG tablet Take 1 tablet (80 mg) by mouth At Bedtime 90 tablet 3       PAST SURGICAL HISTORY:  Past Surgical History:   Procedure Laterality Date     BYPASS GRAFT ARTERY CORONARY N/A 4/24/2023    Procedure: MEDIAN STERNOTOMY, HARVEST OF LEFT INTERNAL MAMMARY ARTERY, ENDOSCOPIC HARVEST OF LEFT GREATER SAPHENOUS VEIN, ON CARDIOPULMONARY BYPASS, CORONARY ARTERY BYPASS GRAFT (CABG) X4 . TRANSESOPHAGEAL ECHOCARDIOGRAM.;  Surgeon: Bro Amleida MD;  Location: [facility] OR     CATARACT IOL, RT/LT       COLONOSCOPY N/A 7/17/2019    Procedure: COLONOSCOPY;  Surgeon: Roque Givens MD;  [facility]     CV CORONARY ANGIOGRAM N/A 3/21/2023    Procedure: Coronary Angiogram;  Surgeon: Pito Chapin MD;  Location: [facility] CARDIAC CATH LAB     CV PCI N/A 3/21/2023    Procedure: Percutaneous Coronary Intervention;  Surgeon: Pito Chapin MD;  Location: [facility] CARDIAC CATH LAB     ESOPHAGOSCOPY, GASTROSCOPY, DUODENOSCOPY (EGD), COMBINED N/A 7/17/2019    Procedure: ESOPHAGOGASTRODUODENOSCOPY, WITH BIOPSY;  Surgeon: Roque Givens MD;  [facility]     HERNIA REPAIR  2000     MOHS MICROGRAPHIC PROCEDURE       PHACOEMULSIFICATION CLEAR CORNEA WITH STANDARD INTRAOCULAR LENS IMPLANT Right 7/2/2021     Procedure: RIGHT EYE CATARACT REMOVAL WITH INTRAOCULAR LENS IMPLANT;  Surgeon: Justa Liz MD;  [place] [facility] [place]     PHACOEMULSIFICATION CLEAR CORNEA WITH STANDARD INTRAOCULAR LENS IMPLANT Left 7/9/2021    Procedure: LEFT EYE CATARACT REMOVAL WITH INTRAOCULAR LENS IMPLANT;  Surgeon: Justa Liz MD;  [place] [facility]     [facility] CORONARY STENT PERCUT, ANGELA ADDTL VESSEL         ALLERGIES:   No Known Allergies    FAMILY HISTORY:  Family History   Problem Relation Age of Onset     Glaucoma No family hx of      Macular Degeneration No family hx of      Retinal detachment No family hx of      Amblyopia No family hx of      Melanoma No family hx of      Skin Cancer No family hx of        SOCIAL HISTORY:  Social History     Tobacco Use     Smoking status: Never     Smokeless tobacco: Never   Substance Use Topics     Alcohol use: Yes     Comment: 1-2 drinks 5 days a week     Drug use: Never       ROS:   Constitutional: No fever, chills, or sweats. Weight stable.   ENT: No visual disturbance, ear ache, epistaxis, sore throat.   Cardiovascular: As per HPI.   Respiratory: No cough, hemoptysis.    GI: No nausea, vomiting,  : No hematuria.   Integument: Negative.   Psychiatric: Negative.   Hematologic:  , no easy bleeding.  Neuro: Negative.  Past history of a fall for which she is concerned about long-term anticoagulation.  Endocrinology: No significant heat or cold intolerance   Musculoskeletal: No myalgia.    Exam:  BP (!) 144/78 (BP Location: Right arm, Patient Position: Chair, Cuff Size: Adult Regular)   Pulse 66   Wt 86.3 kg (190 lb 3.2 oz)   SpO2 97%   BMI 26.54 kg/m    GENERAL APPEARANCE: healthy, alert and no distress  HEENT: no icterus,, no central cyanosis  NECK: no adenopathy, no asymmetry, , JVP not elevated  RESPIRATORY: lungs clear to auscultation - no rales, rhonchi or wheezes, no use of accessory muscles, no retractions, respirations are unlabored, normal respiratory rate  CARDIOVASCULAR: regular  rhythm, normal S1 with physiologic split S2, no S3 or S4, click or rub, precordium quiet with normal PMI.  Grade 2 systolic ejection murmur in the aortic region  ABDOMEN: soft, non tender, without hepatosplenomegaly, no masses palpable, bowel sounds normal, aorta not enlarged by palpation, no abdominal bruits  EXTREMITIES: peripheral pulses normal, no edema, no bruits  NEURO: alert and oriented to person/place/time, normal speech, gait and affect  SKIN: no ecchymoses, no rashes    Labs:  CBC RESULTS:   Lab Results   Component Value Date    WBC 6.6 04/30/2023    RBC 2.89 (L) 04/30/2023    HGB 9.3 (L) 04/30/2023    HCT 29.2 (L) 04/30/2023     (H) 04/30/2023    MCH 32.2 04/30/2023    MCHC 31.8 04/30/2023    RDW 12.5 04/30/2023     04/30/2023       BMP RESULTS:  Lab Results   Component Value Date     04/30/2023    POTASSIUM 3.7 04/30/2023    POTASSIUM 3.8 04/24/2023    POTASSIUM 4.9 08/29/2022    CHLORIDE 101 04/30/2023    CHLORIDE 107 08/29/2022    CO2 27 04/30/2023    CO2 28 08/29/2022    ANIONGAP 11 04/30/2023    ANIONGAP 4 08/29/2022     (H) 04/30/2023     (H) 08/29/2022    BUN 9.1 04/30/2023    BUN 40 (H) 08/29/2022    CR 0.88 04/30/2023    GFRESTIMATED 90 04/30/2023    CRISS 8.9 04/30/2023        INR RESULTS:  Lab Results   Component Value Date    INR 1.27 (H) 04/25/2023    INR 1.31 (H) 04/24/2023    INR 1.49 (H) 04/24/2023    INR 1.00 04/12/2023       Procedures:  PULMONARY FUNCTION TESTS:        View : No data to display.                  ECHOCARDIOGRAM:   No results found for this or any previous visit (from the past 8760 hour(s)).      Assessment and Plan:   1.  Atrial fibrillation/flutter-postoperative with low burden on follow-up Zio patch  2.  Status post CABG in April 2023  3.  History of a remote fall raises concern regarding long-term anticoagulation  4.  No current cardiovascular complaints    Plan  1.  Continue on amiodarone but reduce dose to 200 mg daily beginning  after current supply is complete  2.  Continue Eliquis at current dose-we will reassess this fall given history of instability  3.  Repeat ambulatory ECG monitor at end of September prior to next clinic visit  4.  Send prescription to [facility] on [address] for Amio 200 daily-3-month supply for refills  5.  Next clinic visit (video okay) about 6 months    Elapsed time today with chart review, clinic visit and documentation 40 minutes    I very much appreciated the opportunity to see and assess Dr Reinaldo Gordillo in the clinic today . Please do not hesitate to contact my office if you have any questions or concerns.      Joao Saenz MD  Cardiac Arrhythmia Service  [facility]  [phone]    CC  JOAO SAENZ

## 2023-06-20 NOTE — PATIENT INSTRUCTIONS
You were seen in the Electrophysiology Clinic today by: Dr Saenz    Plan:   Medication Changes:   Reduce Amiodarone to 200mg daily     Labs/Tests Needed:  Labs for Amiodarone monitoring- due around October 2023    Follow up Visit:   6 months with a 14 day zio patch heart monitor done 1 month prior         If you have further questions, please utilize evOLED to contact us.     Your Care Team:    EP Cardiology   Telephone Number     Nurse Line  Marcie Azevedo, RN   Ami Epps RN   [phone]     For scheduling appointments:   Luis Daniel   [phone]   For procedure scheduling:    Carmen Smalls     [phone]   For the Device Clinic (Pacemakers, ICDs, Loop Recorders)    During business hours: [phone]  After business hours:   [phone]- select option 4 and ask for job code [id number].       On-call cardiologist for after hours or on weekends:   [phone], option #4, and ask to speak to the on-call cardiologist.     Cardiovascular Clinic:   [address] [place] [address]. [place] [address]      As always, Thank you for trusting us with your health care needs!

## 2023-06-21 ENCOUNTER — HOSPITAL ENCOUNTER (OUTPATIENT)
Dept: CARDIAC REHAB | Facility: CLINIC | Age: 78
Discharge: HOME | End: 2023-06-21
Attending: SURGERY | Admitting: SURGERY
Payer: COMMERCIAL

## 2023-06-21 LAB
ATRIAL RATE - MUSE: 62 BPM
DIASTOLIC BLOOD PRESSURE - MUSE: NORMAL MMHG
INTERPRETATION ECG - MUSE: NORMAL
P AXIS - MUSE: 17 DEGREES
PR INTERVAL - MUSE: 190 MS
QRS DURATION - MUSE: 98 MS
QT - MUSE: 436 MS
QTC - MUSE: 442 MS
R AXIS - MUSE: -2 DEGREES
SYSTOLIC BLOOD PRESSURE - MUSE: NORMAL MMHG
T AXIS - MUSE: -24 DEGREES
VENTRICULAR RATE- MUSE: 62 BPM

## 2023-06-21 PROCEDURE — 93798 PHYS/QHP OP CAR RHAB W/ECG: CPT

## 2023-06-22 DIAGNOSIS — Z95.1 S/P CABG (CORONARY ARTERY BYPASS GRAFT): ICD-10-CM

## 2023-06-22 LAB — BACTERIA UR CULT: ABNORMAL

## 2023-06-23 ENCOUNTER — MYC MEDICAL ADVICE (OUTPATIENT)
Dept: CARDIOLOGY | Facility: CLINIC | Age: 78
End: 2023-06-23
Payer: COMMERCIAL

## 2023-06-23 ENCOUNTER — HOSPITAL ENCOUNTER (OUTPATIENT)
Dept: CARDIAC REHAB | Facility: CLINIC | Age: 78
Discharge: HOME | End: 2023-06-23
Attending: SURGERY | Admitting: SURGERY
Payer: COMMERCIAL

## 2023-06-23 ENCOUNTER — TELEPHONE (OUTPATIENT)
Dept: INTERNAL MEDICINE | Facility: CLINIC | Age: 78
End: 2023-06-23
Payer: COMMERCIAL

## 2023-06-23 ENCOUNTER — MYC MEDICAL ADVICE (OUTPATIENT)
Dept: INTERNAL MEDICINE | Facility: CLINIC | Age: 78
End: 2023-06-23
Payer: COMMERCIAL

## 2023-06-23 DIAGNOSIS — Z95.1 S/P CABG (CORONARY ARTERY BYPASS GRAFT): ICD-10-CM

## 2023-06-23 DIAGNOSIS — R82.90 ABNORMAL URINE: Primary | ICD-10-CM

## 2023-06-23 PROCEDURE — 93798 PHYS/QHP OP CAR RHAB W/ECG: CPT

## 2023-06-23 RX ORDER — SULFAMETHOXAZOLE/TRIMETHOPRIM 800-160 MG
1 TABLET ORAL 2 TIMES DAILY
Qty: 20 TABLET | Refills: 0 | Status: SHIPPED | OUTPATIENT
Start: 2023-06-23 | End: 2023-08-03

## 2023-06-23 RX ORDER — SULFAMETHOXAZOLE/TRIMETHOPRIM 800-160 MG
1 TABLET ORAL 2 TIMES DAILY
Qty: 30 TABLET | Refills: 0 | Status: SHIPPED | OUTPATIENT
Start: 2023-06-23 | End: 2023-06-23

## 2023-06-23 NOTE — TELEPHONE ENCOUNTER
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     Thanks, JESSICA Smiley

## 2023-06-23 NOTE — TELEPHONE ENCOUNTER
RN returned call to patient, informed of prescription for Bactrim. Also provided patient with scheduling number from urology referral. Pt inquiring about the particular urologist that Dr. Smiley recommends he schedules with: Dr. Smiley would like him to see first available. This information shared with pt via [employer].     SHIV MAY RN on 6/23/2023 at 9:09 AM

## 2023-06-24 NOTE — TELEPHONE ENCOUNTER
MEDICAL RECORDS REQUEST   [facility]  Urology Clinic  [address] [place] [address]  PHONE: [phone]  Fax: [phone]        FUTURE VISIT INFORMATION                                                     APPOINTMENT INFORMATION:    Date: 08/29/2023    Provider:  Sravanthi Callaway PA-C    Reason for Visit/Diagnosis: Abnormal urine    REFERRAL INFORMATION:    Referring provider:  Joao Smiley MD in [facility]    RECORDS REQUESTED FOR VISIT                                                     NOTES  STATUS/DETAILS   OFFICE NOTE from referring provider  yes   MEDICATION LIST  yes   LABS     URINALYSIS (UA)  yes     PRE-VISIT CHECKLIST      Joint diagnostic appointment coordinated correctly          (ensure right order & amount of time) Yes   RECORD COLLECTION COMPLETE Yes

## 2023-06-26 ENCOUNTER — HOSPITAL ENCOUNTER (OUTPATIENT)
Dept: CARDIAC REHAB | Facility: CLINIC | Age: 78
Discharge: HOME | End: 2023-06-26
Attending: SURGERY | Admitting: SURGERY
Payer: COMMERCIAL

## 2023-06-26 DIAGNOSIS — Z95.1 S/P CABG (CORONARY ARTERY BYPASS GRAFT): ICD-10-CM

## 2023-06-26 PROCEDURE — 93798 PHYS/QHP OP CAR RHAB W/ECG: CPT

## 2023-06-26 RX ORDER — LISINOPRIL 20 MG/1
TABLET ORAL
Qty: 90 TABLET | OUTPATIENT
Start: 2023-06-26

## 2023-06-26 RX ORDER — APIXABAN 5 MG/1
TABLET, FILM COATED ORAL
Qty: 60 TABLET | Refills: 0 | OUTPATIENT
Start: 2023-06-26

## 2023-06-26 NOTE — TELEPHONE ENCOUNTER
apixaban ANTICOAGULANT (ELIQUIS ANTICOAGULANT) 5 MG tablet    apixaban ANTICOAGULANT (ELIQUIS ANTICOAGULANT) 5 MG tablet 180 tablet 1 6/26/2023  No   Sig - Route: Take 1 tablet (5 mg) by mouth 2 times daily - Oral   Sent to pharmacy as: Apixaban 5 MG Oral Tablet (ELIQUIS ANTICOAGULANT)   Class: E-Prescribe   Order: [id number]   E-Prescribing Status: Receipt confirmed by pharmacy (6/26/2023 11:00 AM CDT)     Printout Tracking    External Result Report     Pharmacy    [facility] [id number] [facility] - [place], [place] - [address]

## 2023-06-26 NOTE — TELEPHONE ENCOUNTER
lisinopril (ZESTRIL) 5 MG tablet   Last Written Prescription Date:unknown  Last Fill Quantity: unknown,   # refills: unknown  Last Office Visit :5/9/23  Future Office visit: 7/31/23      Routing refill request to provider for review/approval because:  Historical.stopped at discharge

## 2023-06-26 NOTE — TELEPHONE ENCOUNTER
[facility] Call Center    Phone Message    May a detailed message be left on voicemail: yes     Reason for Call: Medication Refill Request    Has the patient contacted the pharmacy for the refill? Yes   Name of medication being requested: apixaban ANTICOAGULANT (ELIQUIS ANTICOAGULANT) 5 MG tablet [[id number]]    Provider who prescribed the medication: Dr Chapin  Pharmacy:    [facility] [id number] [place] [address]    Date medication is needed: 06/27/2023         Action Taken: Other: cardio    Travel Screening: Not Applicable   Thank you!  [facility]

## 2023-06-26 NOTE — TELEPHONE ENCOUNTER
Lisinopril was discontinued during hospitalization in April. Per cardiology note on 6/12, patient wanted to hold off on restarting lisinopril. If BP consistently elevated, dose would be restarted at 10 mg daily.    Sun Rosen RN ([place])

## 2023-06-28 ENCOUNTER — HOSPITAL ENCOUNTER (OUTPATIENT)
Dept: CARDIAC REHAB | Facility: CLINIC | Age: 78
Discharge: HOME | End: 2023-06-28
Attending: SURGERY | Admitting: SURGERY
Payer: COMMERCIAL

## 2023-06-28 PROCEDURE — 93798 PHYS/QHP OP CAR RHAB W/ECG: CPT

## 2023-06-29 ENCOUNTER — TELEPHONE (OUTPATIENT)
Dept: CARDIOLOGY | Facility: CLINIC | Age: 78
End: 2023-06-29
Payer: COMMERCIAL

## 2023-06-29 NOTE — TELEPHONE ENCOUNTER
[facility] Call Center    Phone Message    May a detailed message be left on voicemail: yes     Reason for Call: Other: amiodarone (PACERONE) 200 MG tablet - side effects - please call back as soon as possible     Action Taken: Message routed to:  [facility] cardio    Travel Screening: Not Applicable     Thank you!  [facility]

## 2023-07-05 ENCOUNTER — HOSPITAL ENCOUNTER (OUTPATIENT)
Dept: CARDIAC REHAB | Facility: CLINIC | Age: 78
Discharge: HOME | End: 2023-07-05
Attending: SURGERY | Admitting: SURGERY
Payer: COMMERCIAL

## 2023-07-05 PROCEDURE — 93798 PHYS/QHP OP CAR RHAB W/ECG: CPT

## 2023-07-06 ENCOUNTER — MYC MEDICAL ADVICE (OUTPATIENT)
Dept: FAMILY MEDICINE | Facility: CLINIC | Age: 78
End: 2023-07-06
Payer: COMMERCIAL

## 2023-07-07 ENCOUNTER — MYC MEDICAL ADVICE (OUTPATIENT)
Dept: CARDIOLOGY | Facility: CLINIC | Age: 78
End: 2023-07-07
Payer: COMMERCIAL

## 2023-07-07 ENCOUNTER — HOSPITAL ENCOUNTER (OUTPATIENT)
Dept: CARDIAC REHAB | Facility: CLINIC | Age: 78
Discharge: HOME | End: 2023-07-07
Attending: SURGERY | Admitting: SURGERY
Payer: COMMERCIAL

## 2023-07-07 PROCEDURE — 93798 PHYS/QHP OP CAR RHAB W/ECG: CPT

## 2023-07-10 ENCOUNTER — HOSPITAL ENCOUNTER (OUTPATIENT)
Dept: CARDIAC REHAB | Facility: CLINIC | Age: 78
Discharge: HOME | End: 2023-07-10
Attending: SURGERY | Admitting: SURGERY
Payer: COMMERCIAL

## 2023-07-10 PROCEDURE — 93798 PHYS/QHP OP CAR RHAB W/ECG: CPT

## 2023-07-12 ENCOUNTER — HOSPITAL ENCOUNTER (OUTPATIENT)
Dept: CARDIAC REHAB | Facility: CLINIC | Age: 78
Discharge: HOME | End: 2023-07-12
Attending: SURGERY | Admitting: SURGERY
Payer: COMMERCIAL

## 2023-07-12 PROCEDURE — 93798 PHYS/QHP OP CAR RHAB W/ECG: CPT

## 2023-07-14 ENCOUNTER — HOSPITAL ENCOUNTER (OUTPATIENT)
Dept: CARDIAC REHAB | Facility: CLINIC | Age: 78
Discharge: HOME | End: 2023-07-14
Attending: SURGERY | Admitting: SURGERY
Payer: COMMERCIAL

## 2023-07-14 ENCOUNTER — LAB (OUTPATIENT)
Dept: LAB | Facility: CLINIC | Age: 78
End: 2023-07-14
Payer: COMMERCIAL

## 2023-07-14 ENCOUNTER — OFFICE VISIT (OUTPATIENT)
Dept: DERMATOLOGY | Facility: CLINIC | Age: 78
End: 2023-07-14
Payer: COMMERCIAL

## 2023-07-14 DIAGNOSIS — L29.1 PRURITUS SCROTI: ICD-10-CM

## 2023-07-14 DIAGNOSIS — R82.90 ABNORMAL URINE: ICD-10-CM

## 2023-07-14 DIAGNOSIS — L57.0 AK (ACTINIC KERATOSIS): Primary | ICD-10-CM

## 2023-07-14 LAB
ALBUMIN UR-MCNC: NEGATIVE MG/DL
APPEARANCE UR: CLEAR
BILIRUB UR QL STRIP: NEGATIVE
COLOR UR AUTO: YELLOW
GLUCOSE UR STRIP-MCNC: NEGATIVE MG/DL
HGB UR QL STRIP: ABNORMAL
KETONES UR STRIP-MCNC: NEGATIVE MG/DL
LEUKOCYTE ESTERASE UR QL STRIP: NEGATIVE
NITRATE UR QL: NEGATIVE
PH UR STRIP: 5.5 [PH] (ref 5–7)
RBC URINE: 9 /HPF
SP GR UR STRIP: 1.02 (ref 1–1.03)
UROBILINOGEN UR STRIP-MCNC: NORMAL MG/DL
WBC URINE: <1 /HPF

## 2023-07-14 PROCEDURE — 17000 DESTRUCT PREMALG LESION: CPT | Mod: GC | Performed by: STUDENT IN AN ORGANIZED HEALTH CARE EDUCATION/TRAINING PROGRAM

## 2023-07-14 PROCEDURE — 93798 PHYS/QHP OP CAR RHAB W/ECG: CPT

## 2023-07-14 PROCEDURE — 99214 OFFICE O/P EST MOD 30 MIN: CPT | Mod: 25 | Performed by: STUDENT IN AN ORGANIZED HEALTH CARE EDUCATION/TRAINING PROGRAM

## 2023-07-14 PROCEDURE — 17003 DESTRUCT PREMALG LES 2-14: CPT | Mod: GC | Performed by: STUDENT IN AN ORGANIZED HEALTH CARE EDUCATION/TRAINING PROGRAM

## 2023-07-14 PROCEDURE — 81001 URINALYSIS AUTO W/SCOPE: CPT | Performed by: PATHOLOGY

## 2023-07-14 NOTE — PATIENT INSTRUCTIONS
- Do the field therapy as instructed by Dr. Randall last visit     For forehead, temples, nose these are low-risk precancers called actinic keratoses. We will treat them with an anti-cancer cream. Please start Efudex and calcipotriene mixed equally together. Apply twice daily to above areas for 4-10 days or until skin gets red. Stop applying when skin gets red.  Skin will get red and crusty (like hamburger).  Please keep Efudex away from pets and children. Wash hands thoroughly after application.   See handout below for more information about Efudex.        Efudex Treatment  Today, you are being prescribed Fluorouracil (Efudex) a topical cream used for the treatment of Actinic Keratosis (AKs).  The medication is working to eliminate the unhealthy cells.  This treatment may be unattractive and somewhat uncomfortable.  You may experience some mild discomfort while being treated.  You will want to stop any other creams such as glycolic acid products, retin A, Tazorac, etc. to the area. You may use bland makeup/cover-up as long as it doesn't sting or cause you discomfort.  Apply the cream at night as your physician recommends. Use a cotton-tipped applicator, or use gloves if applying it with your fingertips. If applied with unprotected fingertips, it is important to wash your hands well after you apply this medicine.   Keep this medication away from pets.  We recommend avoiding excessive sun exposure to the treated area  You may use moisturizing creams over bothersome areas such as Vanicream or Cetaphil cream if the reaction becomes too bothersome. Please, call the clinic if this occurs.   Potential Side Effects  Your treated areas may be unsightly during therapy.  This will improve slowly following the discontinuation of therapy.   During the first week of application, mild inflammation may occur.   During the following weeks, redness, and swelling may occur with some crusting and burning.   Lesions resolve as the skin  exfoliates.   Over 1 to 2 weeks, new skin grows into the treatment area.  Keep this medication away from pets  Specific side effects that usually do not require medical attention (report to your doctor or health care professional if they continue or are bothersome) include:  Red or dark-colored skin   Mild erosion (loss of upper layer of skin)   Mild eye irritation including burning, itching, sensitivity, stinging, or watering   Increased sensitivity of the skin to sun and ultraviolet light   Pain and burning of the affected area   Dryness, scaling or swelling of the affected area   Skin rash, itching of the affected area   Tenderness   If you have severe pain, please, call the clinic immediately and indicate that you have pain.  Ask for a call from the RN.     Who should I call with questions?  [facility]: [phone]  [facility]: [phone]  For urgent needs outside of business hours call the [facility] at [phone] and ask for the dermatology resident on call          Cryotherapy    What is it?  Use of a very cold liquid, such as liquid nitrogen, to freeze and destroy abnormal skin cells that need to be removed    What should I expect?  Tenderness and redness  A small blister that might grow and fill with dark purple blood. There may be crusting.  More than one treatment may be needed if the lesions do not go away.    How do I care for the treated area?  Gently wash the area with your hands when bathing.  Use a thin layer of Vaseline to help with healing. You may use a Band-Aid.   The area should heal within 7-10 days and may leave behind a pink or lighter color.   Do not use an antibiotic or Neosporin ointment.   You may take acetaminophen (Tylenol) for pain.     Call your doctor if you have:  Severe pain  Signs of infection (warmth, redness, cloudy yellow drainage, and or a bad smell)  Questions or concerns    Who should I call with  questions?      [facility]: [phone]      [facility]: [phone]      For urgent needs outside of business hours call the [facility] at [phone] and ask for the dermatology resident on call

## 2023-07-14 NOTE — NURSING NOTE
Dermatology Rooming Note    Reinaldo Gordillo's goals for this visit include:   Chief Complaint   Patient presents with     Skin Check     Spots of concern on patient's face.     Howard Alvarez, EMT-B

## 2023-07-14 NOTE — PROGRESS NOTES
Dermatology Rooming Note    Reinaldo Gordillo's goals for this visit include:   Chief Complaint   Patient presents with     Skin Check     Spots of concern on patient's face. Pt has questions of using fluorouracil ointment.     Howard Alvarez, EMT-B

## 2023-07-14 NOTE — LETTER
7/14/2023       RE: Reinaldo Gordillo  [address]  [place] [address]     Dear Colleague,    Thank you for referring your patient, Reinaldo Gordillo, to the [facility] DERMATOLOG[facility] [place] [facility]. Please see a copy of my visit note below.    Dermatology Rooming Note    Reinaldo CARDONA Duy's goals for this visit include:   Chief Complaint   Patient presents with    Skin Check     Spots of concern on patient's face. Pt has questions of using fluorouracil ointment.     Howard Alvarez, EMT-B      [facility] Dermatology Note  Encounter Date: Jul 14, 2023  Office Visit     Dermatology Problem List:  #. BCC - upper lip- s/p MMS in 2015 - performed in [place]  #. HAK left malar cheek, 1 cm erythematous and hyperkeratotic plaque, dDx: NMSC versus HAK, s/p shave biopsy on 7/23/2019  #. HAK, left lateral forehead, s/p shave biopsy on 7/23/2019  #. BCC left central chest, shave biopsy on 7/23/2019, excision on 8/6/19  #. Actinic keratoses on the face and ears              -s/p cryo 7/23/19, 11/4/22; fluorouracil 5% cream and calcipotriene 0.005% ointment BID x8 day 1/2023  #.  Eczematous Dermatitis of the lower legs              -clobetasol 0.05% ointment BID  #. Pruritus scroti with possible intertrigo              -desonide ointment BID PRN  #. Filiform wart on the L inner thigh - s/p cryo on 7/23/19     ____________________________________________    Assessment & Plan:   # Actinic keratosis  - Cryotherapy procedure as below for 6 lesions on face  - Discussed starting field therapy with 5 Fluorouracil and calicpotriene BID for 8 days sometime this Summer, ideally have healed before next Derm appointment, if just completes it prior to appointment and still red, recommended to reschedule September appointment to later  - start efudex 5% cream with calcipotriene BID x 8 days to ears, forehead, nose and cheeks  - counseled that patient should  expect erythema, scale, and some irritation to occur, but should discontinue this medication if significant oozing or pain greater than 5/10 is to occur; recommend the patient wash hands after use or use gloves to apply; keep medication away from pets; handout provided with administration instructions.    # Pruritus scroti  - improved with desonide, s/sx of steroid overuse discussed, advised to contact clinic for consideration of topical tacrolimus if using too regularly    Procedures Performed:   - Cryotherapy procedure note, location(s): forehead, temples, nose. After verbal consent and discussion of risks and benefits including, but not limited to, dyspigmentation/scar, blister, and pain, 6 lesion(s) was(were) treated with 1-2 mm freeze border for 1-2 cycles with liquid nitrogen. Post cryotherapy instructions were provided.    Follow-up: as scheduled, prn for new or changing lesions    Staff and Resident:     Lam De La Rosa MD  PGY-4 Dermatology  Pager: [phone]    ____________________________________________    CC: Skin Check (Spots of concern on patient's face. Pt has questions of using fluorouracil ointment.)    HPI:  Mr. Reinaldo Gordillo is a(n) 78 year old male who presents today as a return patient for AKs    - never did field therapy, had work and weddings  - has few questions about field therapy today  - on amiodarone which is photosensitizing  - no lesions that are tender or bleeding  - Patient is otherwise feeling well, without additional skin concerns.    Labs Reviewed:  N/A    Physical Exam:  Vitals: There were no vitals taken for this visit.  SKIN: Focused examination of face was performed.  - Erythematous papule with overyling adherent scale on the forehead, cheeks, temples and nose  - No other lesions of concern on areas examined.     Medications:  Current Outpatient Medications   Medication    apixaban ANTICOAGULANT (ELIQUIS ANTICOAGULANT) 5 MG tablet    ASPIRIN LOW DOSE 81 MG EC tablet    melatonin 5  MG tablet    metoprolol succinate ER (TOPROL XL) 25 MG 24 hr tablet    omeprazole (PRILOSEC) 20 MG DR capsule    pravastatin (PRAVACHOL) 80 MG tablet    sulfamethoxazole-trimethoprim (BACTRIM DS) 800-160 MG tablet     No current facility-administered medications for this visit.      Past Medical History:   Patient Active Problem List   Diagnosis    Nuclear sclerotic cataract of both eyes    Hyperglycemia    Hyperlipidemia    Stented coronary artery    Coronary artery disease involving native coronary artery without angina pectoris    Basal cell carcinoma (BCC) in situ of skin    Colon polyp    Hereditary and idiopathic peripheral neuropathy    Status post coronary angiogram    Coronary artery disease involving native coronary artery of native heart without angina pectoris     Past Medical History:   Diagnosis Date    ASCVD (arteriosclerotic cardiovascular disease)     Coronary artery disease     Gastroesophageal reflux disease     Hypertension     Stented coronary artery        CC No referring provider defined for this encounter. on close of this encounter.    Attestation signed by Edward Tovar MD at 7/14/2023  9:26 AM:  I have personally examined this patient and agree with the medical student's documentation and plan of care. I have reviewed and amended the medical student's note as necessary. I personally performed all procedure(s).The documentation accurately reflects my clinical observations, diagnoses, treatment and follow-up plans.     Edward Tovar M.D.   Dermatology Staff

## 2023-07-14 NOTE — PROGRESS NOTES
[facility] Dermatology Note  Encounter Date: Jul 14, 2023  Office Visit     Dermatology Problem List:  #. BCC - upper lip- s/p MMS in 2015 - performed in [place]  #. HAK left malar cheek, 1 cm erythematous and hyperkeratotic plaque, dDx: NMSC versus HAK, s/p shave biopsy on 7/23/2019  #. HAK, left lateral forehead, s/p shave biopsy on 7/23/2019  #. BCC left central chest, shave biopsy on 7/23/2019, excision on 8/6/19  #. Actinic keratoses on the face and ears              -s/p cryo 7/23/19, 11/4/22; fluorouracil 5% cream and calcipotriene 0.005% ointment BID x8 day 1/2023  #.  Eczematous Dermatitis of the lower legs              -clobetasol 0.05% ointment BID  #. Pruritus scroti with possible intertrigo              -desonide ointment BID PRN  #. Filiform wart on the L inner thigh - s/p cryo on 7/23/19     ____________________________________________    Assessment & Plan:   # Actinic keratosis  - Cryotherapy procedure as below for 6 lesions on face  - Discussed starting field therapy with 5 Fluorouracil and calicpotriene BID for 8 days sometime this Summer, ideally have healed before next Derm appointment, if just completes it prior to appointment and still red, recommended to reschedule September appointment to later  - start efudex 5% cream with calcipotriene BID x 8 days to ears, forehead, nose and cheeks  - counseled that patient should expect erythema, scale, and some irritation to occur, but should discontinue this medication if significant oozing or pain greater than 5/10 is to occur; recommend the patient wash hands after use or use gloves to apply; keep medication away from pets; handout provided with administration instructions.    # Pruritus scroti  - improved with desonide, s/sx of steroid overuse discussed, advised to contact clinic for consideration of topical tacrolimus if using too regularly    Procedures Performed:   - Cryotherapy procedure note, location(s): forehead, temples, nose.  After verbal consent and discussion of risks and benefits including, but not limited to, dyspigmentation/scar, blister, and pain, 6 lesion(s) was(were) treated with 1-2 mm freeze border for 1-2 cycles with liquid nitrogen. Post cryotherapy instructions were provided.    Follow-up: as scheduled, prn for new or changing lesions    Staff and Resident:     Lam De La Rosa MD  PGY-4 Dermatology  Pager: [phone]    ____________________________________________    CC: Skin Check (Spots of concern on patient's face. Pt has questions of using fluorouracil ointment.)    HPI:  Mr. Reinaldo Gordillo is a(n) 78 year old male who presents today as a return patient for AKs    - never did field therapy, had work and weddings  - has few questions about field therapy today  - on amiodarone which is photosensitizing  - no lesions that are tender or bleeding  - Patient is otherwise feeling well, without additional skin concerns.    Labs Reviewed:  N/A    Physical Exam:  Vitals: There were no vitals taken for this visit.  SKIN: Focused examination of face was performed.  - Erythematous papule with overyling adherent scale on the forehead, cheeks, temples and nose  - No other lesions of concern on areas examined.     Medications:  Current Outpatient Medications   Medication     apixaban ANTICOAGULANT (ELIQUIS ANTICOAGULANT) 5 MG tablet     ASPIRIN LOW DOSE 81 MG EC tablet     melatonin 5 MG tablet     metoprolol succinate ER (TOPROL XL) 25 MG 24 hr tablet     omeprazole (PRILOSEC) 20 MG DR capsule     pravastatin (PRAVACHOL) 80 MG tablet     sulfamethoxazole-trimethoprim (BACTRIM DS) 800-160 MG tablet     No current facility-administered medications for this visit.      Past Medical History:   Patient Active Problem List   Diagnosis     Nuclear sclerotic cataract of both eyes     Hyperglycemia     Hyperlipidemia     Stented coronary artery     Coronary artery disease involving native coronary artery without angina pectoris     Basal cell  carcinoma (BCC) in situ of skin     Colon polyp     Hereditary and idiopathic peripheral neuropathy     Status post coronary angiogram     Coronary artery disease involving native coronary artery of native heart without angina pectoris     Past Medical History:   Diagnosis Date     ASCVD (arteriosclerotic cardiovascular disease)      Coronary artery disease      Gastroesophageal reflux disease      Hypertension      Stented coronary artery        CC No referring provider defined for this encounter. on close of this encounter.

## 2023-07-15 ENCOUNTER — TELEPHONE (OUTPATIENT)
Dept: INTERNAL MEDICINE | Facility: CLINIC | Age: 78
End: 2023-07-15
Payer: COMMERCIAL

## 2023-07-15 NOTE — TELEPHONE ENCOUNTER
Dear Sravanthi;    You are seeing Dr. Gordillo 8/29/2023. He had recent cardiac by-pass surgery and now with urinary frequency, sxs. Urgency and two UC positive. I had him on a longer course of antibiotics this second time. Probably prostate related. Of note most recent urine does no suggest infection (7/14/2023) but with 9 RBC's    Thanks, Fabian COATES

## 2023-07-17 ENCOUNTER — HOSPITAL ENCOUNTER (OUTPATIENT)
Dept: CARDIAC REHAB | Facility: CLINIC | Age: 78
Discharge: HOME | End: 2023-07-17
Attending: SURGERY | Admitting: SURGERY
Payer: COMMERCIAL

## 2023-07-17 PROCEDURE — 93798 PHYS/QHP OP CAR RHAB W/ECG: CPT

## 2023-07-19 ENCOUNTER — HOSPITAL ENCOUNTER (OUTPATIENT)
Dept: CARDIAC REHAB | Facility: CLINIC | Age: 78
Discharge: HOME | End: 2023-07-19
Attending: SURGERY | Admitting: SURGERY
Payer: COMMERCIAL

## 2023-07-19 PROCEDURE — 93798 PHYS/QHP OP CAR RHAB W/ECG: CPT

## 2023-07-31 ENCOUNTER — TELEPHONE (OUTPATIENT)
Dept: INTERNAL MEDICINE | Facility: CLINIC | Age: 78
End: 2023-07-31

## 2023-07-31 NOTE — TELEPHONE ENCOUNTER
DRAFT OF PROGRESS NOTE (no show)     History of Present Illness:  Mr. Gordillo is a 79 yo. Male with past medical history significant for CAD s/p AREN x4 to RCA (2015) and recent CABG x4 (4/24/23), HTN, HLD, GERD and neuropathy, who presents today for three month follow up. Last visit 5/9/23 in person.     - EP visit 6/20/23 (Dr. Saenz) with zio patch showing low frequency a fib burden 9%, remains on amio (reduced dose to 200 mg) and eliquis  - some concerns for falls and anticoagulation, EP said address at their next visit (Jan 2024). Did have fall in 2/2023 where he hit head, ER visit.   - this fall prompted zio patch monitoring which showed NSVT and proceeded with coronary angiogram showing significant  vessel disase with subsequent CABG done in April   - neuro visit 6/13/23: Dr. Begum. hereditary and idiopathic neuropathy, maybe worse since last visit, rec diet and low alcohol intake. Arm swing (long standing) and parkinsons? Follow up scheduled 6/14/24.   - on any neuropathy meds?   - Dr. Chapin, cardiology 6/12/23: remains on BB, ACE, statin, ASA, anticoag for a fib, amio for a fib.   - should do CBC today to see if hgb increased (9.3 on 4/30 post op)   - could repeat A1c, was 6.2 on 4/12/23    Assessment and Plan:  1. Immunizations; COVID Pfizer vaccine x 5 with bi-valent Pfizer on 9/8/2022. He has completed the Shigrix vaccine series. Tdap 8/7/2015. Pneumococcal 23 done 9/6/2019. Prevnar 13 done 5/15/2018. Influenza vaccine done 9/9/2022.   2. Dermatology; seen on 11/29/2022 and more recently with Dr. Tovar on 7/14/23 for skin check. Follow up appt. 9/12/23.   3. PSA; 0.58 on 8/29/2022  4. Colonoscopy; 7/17/2019 with no specimens collected.   5. Increased lipids on Pravastatin 80 mg; lipids 8/29/2022 TG's 43, HDL 93 and LDL 63. He could not tolerate Crestor (CK 1600) with muscle complaints.   6. Hgb on 4/30/2023 was 9.3. Repeat CBC today.   7. Neuropathy: visit with Dr. Begum, Neurology on 6/13/23 and  discussed worsening neuropathy and reduced right arm swing (chronic) with follow up scheduled for 6/14/24. Not on any medications for neuropathy??   8. A1c was 6.2% on 4/12/2023. Repeat today.   9. CAD; seen 12/15/2021, [facility] Dr. Madrigal, note in Care Everywhere and there is a detailed note in the chart. He had 11/14/2022 appt. With Dr. Chapin. Resting echo 10/29/2022. He had CABG x 4 on 4/24/2023 and was discharged from the hospital on 4/30/2023. Visit with Dr. Chapin on 6/12/23 and remains on ASA, metoprolol, pravastatin, lisinopril. Soft murmur; last echo was 3/21/2023 and he had aortic sclerosis.   10. A fib/flutter: EP visit with Dr. Saenz on 6/20/23 for a fib/flutter after CABG (April 2023). Low burden PAF (9%) on recent zio patch 5/12/23. Remains on Eliquis and amiodarone (reduced dose 200 mg) and will reassess need for anticoagulation at next visit as Dr. Gordillo is concerned with fall risk. Next appt. 1/2/24 with Dr. Saenz.   11. Endocrine: TSH normal (1.39) on 4/28/23. He does not feel he has sleep apnea. Some mild depression? Low testosterone 2.20 on 8/29/2022 and he had an endocrinology appt. With Dr. White 1/3/2023.     LUIS ALBERTO Casanova-S2

## 2023-08-01 ENCOUNTER — MYC MEDICAL ADVICE (OUTPATIENT)
Dept: INTERNAL MEDICINE | Facility: CLINIC | Age: 78
End: 2023-08-01
Payer: COMMERCIAL

## 2023-08-03 ENCOUNTER — OFFICE VISIT (OUTPATIENT)
Dept: INTERNAL MEDICINE | Facility: CLINIC | Age: 78
End: 2023-08-03
Payer: COMMERCIAL

## 2023-08-03 ENCOUNTER — LAB (OUTPATIENT)
Dept: LAB | Facility: CLINIC | Age: 78
End: 2023-08-03
Payer: COMMERCIAL

## 2023-08-03 VITALS
WEIGHT: 196.7 LBS | SYSTOLIC BLOOD PRESSURE: 143 MMHG | BODY MASS INDEX: 27.45 KG/M2 | HEART RATE: 61 BPM | DIASTOLIC BLOOD PRESSURE: 73 MMHG | OXYGEN SATURATION: 97 %

## 2023-08-03 DIAGNOSIS — Z12.5 ENCOUNTER FOR SCREENING FOR MALIGNANT NEOPLASM OF PROSTATE: ICD-10-CM

## 2023-08-03 DIAGNOSIS — D64.9 LOW HEMOGLOBIN: ICD-10-CM

## 2023-08-03 DIAGNOSIS — R73.09 INCREASED GLUCOSE LEVEL: ICD-10-CM

## 2023-08-03 DIAGNOSIS — Z87.898 HISTORY OF PREDIABETES: ICD-10-CM

## 2023-08-03 DIAGNOSIS — I10 BENIGN ESSENTIAL HYPERTENSION: ICD-10-CM

## 2023-08-03 DIAGNOSIS — E87.8 ELECTROLYTE ABNORMALITY: ICD-10-CM

## 2023-08-03 DIAGNOSIS — E78.00 HIGH CHOLESTEROL: ICD-10-CM

## 2023-08-03 DIAGNOSIS — R35.0 URINARY FREQUENCY: ICD-10-CM

## 2023-08-03 DIAGNOSIS — R35.0 URINARY FREQUENCY: Primary | ICD-10-CM

## 2023-08-03 LAB
ALBUMIN SERPL BCG-MCNC: 4 G/DL (ref 3.5–5.2)
ALBUMIN UR-MCNC: NEGATIVE MG/DL
ALP SERPL-CCNC: 72 U/L (ref 40–129)
ALT SERPL W P-5'-P-CCNC: 37 U/L (ref 0–70)
ANION GAP SERPL CALCULATED.3IONS-SCNC: 7 MMOL/L (ref 7–15)
APPEARANCE UR: CLEAR
AST SERPL W P-5'-P-CCNC: 28 U/L (ref 0–45)
BASOPHILS # BLD AUTO: 0.1 10E3/UL (ref 0–0.2)
BASOPHILS NFR BLD AUTO: 1 %
BILIRUB SERPL-MCNC: 0.2 MG/DL
BILIRUB UR QL STRIP: NEGATIVE
BUN SERPL-MCNC: 19.1 MG/DL (ref 8–23)
CALCIUM SERPL-MCNC: 9.4 MG/DL (ref 8.8–10.2)
CHLORIDE SERPL-SCNC: 105 MMOL/L (ref 98–107)
CHOLEST SERPL-MCNC: 188 MG/DL
COLOR UR AUTO: ABNORMAL
CREAT SERPL-MCNC: 1.09 MG/DL (ref 0.67–1.17)
DEPRECATED HCO3 PLAS-SCNC: 27 MMOL/L (ref 22–29)
EOSINOPHIL # BLD AUTO: 0.2 10E3/UL (ref 0–0.7)
EOSINOPHIL NFR BLD AUTO: 3 %
ERYTHROCYTE [DISTWIDTH] IN BLOOD BY AUTOMATED COUNT: 13.2 % (ref 10–15)
GFR SERPL CREATININE-BSD FRML MDRD: 70 ML/MIN/1.73M2
GLUCOSE SERPL-MCNC: 104 MG/DL (ref 70–99)
GLUCOSE UR STRIP-MCNC: NEGATIVE MG/DL
HBA1C MFR BLD: 6.3 %
HCT VFR BLD AUTO: 39.6 % (ref 40–53)
HDLC SERPL-MCNC: 120 MG/DL
HGB BLD-MCNC: 12.6 G/DL (ref 13.3–17.7)
HGB UR QL STRIP: NEGATIVE
HYALINE CASTS: 1 /LPF
IMM GRANULOCYTES # BLD: 0 10E3/UL
IMM GRANULOCYTES NFR BLD: 0 %
KETONES UR STRIP-MCNC: NEGATIVE MG/DL
LDLC SERPL CALC-MCNC: 56 MG/DL
LEUKOCYTE ESTERASE UR QL STRIP: NEGATIVE
LYMPHOCYTES # BLD AUTO: 1.3 10E3/UL (ref 0.8–5.3)
LYMPHOCYTES NFR BLD AUTO: 21 %
MCH RBC QN AUTO: 31.4 PG (ref 26.5–33)
MCHC RBC AUTO-ENTMCNC: 31.8 G/DL (ref 31.5–36.5)
MCV RBC AUTO: 99 FL (ref 78–100)
MONOCYTES # BLD AUTO: 0.6 10E3/UL (ref 0–1.3)
MONOCYTES NFR BLD AUTO: 9 %
MUCOUS THREADS #/AREA URNS LPF: PRESENT /LPF
NEUTROPHILS # BLD AUTO: 4.1 10E3/UL (ref 1.6–8.3)
NEUTROPHILS NFR BLD AUTO: 66 %
NITRATE UR QL: NEGATIVE
NONHDLC SERPL-MCNC: 68 MG/DL
NRBC # BLD AUTO: 0 10E3/UL
NRBC BLD AUTO-RTO: 0 /100
PH UR STRIP: 5 [PH] (ref 5–7)
PLATELET # BLD AUTO: 216 10E3/UL (ref 150–450)
POTASSIUM SERPL-SCNC: 4.4 MMOL/L (ref 3.4–5.3)
PROT SERPL-MCNC: 6.4 G/DL (ref 6.4–8.3)
PSA SERPL DL<=0.01 NG/ML-MCNC: 0.63 NG/ML (ref 0–6.5)
RBC # BLD AUTO: 4.01 10E6/UL (ref 4.4–5.9)
RBC URINE: <1 /HPF
SODIUM SERPL-SCNC: 139 MMOL/L (ref 136–145)
SP GR UR STRIP: 1.02 (ref 1–1.03)
SQUAMOUS EPITHELIAL: <1 /HPF
TRIGL SERPL-MCNC: 60 MG/DL
UROBILINOGEN UR STRIP-MCNC: NORMAL MG/DL
WBC # BLD AUTO: 6.2 10E3/UL (ref 4–11)
WBC URINE: <1 /HPF

## 2023-08-03 PROCEDURE — G0103 PSA SCREENING: HCPCS | Performed by: PATHOLOGY

## 2023-08-03 PROCEDURE — 83036 HEMOGLOBIN GLYCOSYLATED A1C: CPT | Performed by: INTERNAL MEDICINE

## 2023-08-03 PROCEDURE — 99215 OFFICE O/P EST HI 40 MIN: CPT | Performed by: INTERNAL MEDICINE

## 2023-08-03 PROCEDURE — 80053 COMPREHEN METABOLIC PANEL: CPT | Performed by: PATHOLOGY

## 2023-08-03 PROCEDURE — 81001 URINALYSIS AUTO W/SCOPE: CPT | Performed by: PATHOLOGY

## 2023-08-03 PROCEDURE — 85025 COMPLETE CBC W/AUTO DIFF WBC: CPT | Performed by: PATHOLOGY

## 2023-08-03 PROCEDURE — 36415 COLL VENOUS BLD VENIPUNCTURE: CPT | Performed by: PATHOLOGY

## 2023-08-03 PROCEDURE — 80061 LIPID PANEL: CPT | Performed by: PATHOLOGY

## 2023-08-03 PROCEDURE — 99000 SPECIMEN HANDLING OFFICE-LAB: CPT | Performed by: PATHOLOGY

## 2023-08-03 RX ORDER — LISINOPRIL 5 MG/1
5 TABLET ORAL DAILY
Qty: 90 TABLET | Refills: 3 | Status: SHIPPED | OUTPATIENT
Start: 2023-08-03 | End: 2024-03-04

## 2023-08-03 NOTE — PROGRESS NOTES
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 Last visit 5/9/23 in person. His main concern today is urinary frequency. Did have recent UTIs in June 2023, treated with Bactrim. However, still noticing urinary frequency throughout the day, maybe getting up once per night. Denies pain, burning, hesitancy or dribbling. Feels like he is emptying completely. Not super bothersome overall and he does have appt. With Urology 8/29/23. Otherwise doing well today. No longer taking Lisinopril. Off Amiodarone due to suspicion that it was worsening his neuropathy, unsteady gait. He continues on Eliquis, Metoprolol, ASA and Pravastatin.      A detailed Review of Systems was performed, verified and is negative except as documented in the HPI.  All health questionnaires were reviewed, verified and relevant information documented above.    Past Medical History:  Past Medical History:   Diagnosis Date    ASCVD (arteriosclerotic cardiovascular disease)     Coronary artery disease     Gastroesophageal reflux disease     Hypertension     Stented coronary artery      Past Surgical History:  Past Surgical History:   Procedure Laterality Date    BYPASS GRAFT ARTERY CORONARY N/A 4/24/2023    Procedure: MEDIAN STERNOTOMY, HARVEST OF LEFT INTERNAL MAMMARY ARTERY, ENDOSCOPIC HARVEST OF LEFT GREATER SAPHENOUS VEIN, ON CARDIOPULMONARY BYPASS, CORONARY ARTERY BYPASS GRAFT (CABG) X4 . TRANSESOPHAGEAL ECHOCARDIOGRAM.;  Surgeon: Bro Almeida MD;  [facility]    CATARACT IOL, RT/LT      COLONOSCOPY N/A 7/17/2019    Procedure: COLONOSCOPY;  Surgeon: Roque Givens MD;  Location: [facility]    CV CORONARY ANGIOGRAM N/A 3/21/2023    Procedure: Coronary Angiogram;  Surgeon: Pito Chapin MD;  Location:  [facility]    CV PCI N/A 3/21/2023     Procedure: Percutaneous Coronary Intervention;  Surgeon: Pito Chapin MD;  Location:  [facility]    ESOPHAGOSCOPY, GASTROSCOPY, DUODENOSCOPY (EGD), COMBINED N/A 7/17/2019    Procedure: ESOPHAGOGASTRODUODENOSCOPY, WITH BIOPSY;  Surgeon: Roque Givens MD;  Location: [facility] OR    HERNIA REPAIR  2000    MOHS MICROGRAPHIC PROCEDURE      PHACOEMULSIFICATION CLEAR CORNEA WITH STANDARD INTRAOCULAR LENS IMPLANT Right 7/2/2021    Procedure: RIGHT EYE CATARACT REMOVAL WITH INTRAOCULAR LENS IMPLANT;  Surgeon: Justa Liz MD;  Location: [facility] OR    PHACOEMULSIFICATION CLEAR CORNEA WITH STANDARD INTRAOCULAR LENS IMPLANT Left 7/9/2021    Procedure: LEFT EYE CATARACT REMOVAL WITH INTRAOCULAR LENS IMPLANT;  Surgeon: Justa Liz MD;  Location: [facility] OR    [facility] CORONARY STENT PERCUT, EA ADDTL VESSEL       Active Meds:  Current Outpatient Medications   Medication    apixaban ANTICOAGULANT (ELIQUIS ANTICOAGULANT) 5 MG tablet    ASPIRIN LOW DOSE 81 MG EC tablet    lisinopril (ZESTRIL) 5 MG tablet    melatonin 5 MG tablet    metoprolol succinate ER (TOPROL XL) 25 MG 24 hr tablet    omeprazole (PRILOSEC) 20 MG DR capsule    pravastatin (PRAVACHOL) 80 MG tablet     No current facility-administered medications for this visit.      Allergies:  Patient has no known allergies.    Family History:  family history is not on file.    Social History:  Social History     Tobacco Use    Smoking status: Never    Smokeless tobacco: Never   Substance Use Topics    Alcohol use: Yes     Comment: 1-2 drinks 5 days a week    Drug use: Never     Physical Exam:  Vitals: BP (!) 143/73 (BP Location: Right arm, Patient Position: Sitting, Cuff Size: Adult Regular)   Pulse 61   Wt 89.2 kg (196 lb 11.2 oz)   SpO2 97%   BMI 27.45 kg/m    Constitutional: Alert, oriented, pleasant, no acute distress  Musculoskeletal: No edema, normal muscle tone, normal gait  Neurologic: Alert and oriented, cranial nerves 2-12 intact, grossly  non-focal  Skin: No rashes/lesions  Psychiatric: normal mentation, affect and mood    Diagnostics:  Labs reviewed in Epic   Bladder Scan today: 1 mL post void residual     Assessment and Plan:  1. Immunizations; COVID Pfizer vaccine x 5 with bi-valent Pfizer on 9/8/2022. He has completed the Shigrix vaccine series. Tdap 8/7/2015. Pneumococcal 23 done 9/6/2019. Prevnar 13 done 5/15/2018. Influenza vaccine done 9/9/2022.   2. Dermatology; seen on 11/29/2022 and more recently with Dr. Tovar on 7/14/23 for skin check. Follow up appt. 9/12/23.   3. PSA; 0.58 on 8/29/2022 and repeat today.   4. Colonoscopy; 7/17/2019 with no specimens collected.   5. Increased lipids on Pravastatin 80 mg; lipids today (8/3/23) with TG 60, LDL 56, . He could not tolerate Crestor (CK 1600) with muscle complaints.   6. Hgb on 4/30/2023 was 9.3. And repeat CBC today with Hgb increased to 12.6 (8/3/23).  7. Neuropathy: visit with Dr. Begum, Neurology on 6/13/23 and discussed worsening neuropathy and reduced right arm swing (chronic) with follow up scheduled for 12/22/23. Amiodarone stopped with thought that it was contributing to neuropathy and worsening gait.   8. A1c was 6.2% on 4/12/2023. Repeat today.   9. CAD; had PCI with AREN x4 in 2015. Seen 12/15/2021, [facility] Dr. Madrigal, note in Care Everywhere and there is a detailed note in the chart. He had 11/14/2022 appt. With Dr. Chapin. Resting echo 10/29/2022. He had CABG x4 on 4/24/2023. Visit with Dr. Chapin on 6/12/23 and remains on aspirin, metoprolol and pravastatin. Soft murmur; last echo was 3/21/2023 and he had aortic stenosis. Continues with cardiac rehab. Referral placed today for follow up appt. With Dr. Chapin.   10. A fib/flutter: EP visit with Dr. Saenz on 6/20/23 for a fib/flutter after CABG (April 2023). Low burden PAF (9%) on recent zio patch 5/12/23. 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 Next appt. 1/2/24 with Dr. Saenz.   11. Endocrine: TSH normal (1.39) on 4/28/23. He does not feel he has sleep apnea. Some mild depression? Low testosterone 2.20 on 8/29/2022 and he had an endocrinology appt. With Dr. White 1/3/2023.   12. Recurrent UTIs and hematuria: UA 7/14/23 with RBCs (9), no signs infection. UA 6/20/23 with nitrites, LE, WBCs, RBCs (100) and treated with Bactrim x 10 days. UA 6/2/23 with LE, WBCs and RBCs (43) and treated with Bactrim. CT abdomen/pelvis 10/20/22 with 5 mm non-obstructive right kidney stone, but he notes that is chronic. Post void bladder scan today in clinic with help of urology, 1 mL residual. Has appt. With Urology, Ms. Person on 8/29/23. Repeat UA today without RBCs.   13. HTN: was on lisinopril 20 mg up until recent discharge for CABG on 4/30/23. Was not interested in continuing lisinopril at that time. Dicussed with elevated blood pressure readings in clinic (manual 138/72) and with history of CAD, should consider restarting ACE inhibitor. He is comfortable with this and sent in Rx for 5 mg lisinopril. CMP today with stable electrolytes and Cr.     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 I agree with Ms. Meade's above assessment and plan.    JESSICA Smiley MD      40 minutes spent on the date of the encounter doing chart review, history and exam, documentation and further activities as noted above exclusive of procedures and other billable interpretations

## 2023-08-03 NOTE — NURSING NOTE
Reinaldo Gordillo is a 78 year old male patient that presents today in clinic for the following:    Chief Complaint   Patient presents with    RECHECK     UTI.     The patient's allergies and medications were reviewed as noted. A set of vitals were recorded as noted without incident: BP (!) 143/73 (BP Location: Right arm, Patient Position: Sitting, Cuff Size: Adult Regular)   Pulse 61   Wt 89.2 kg (196 lb 11.2 oz)   SpO2 97%   BMI 27.45 kg/m  . The patient does not have any other questions for the provider.    Tom Park, EMT at 1:55 PM on 8/3/2023.  Primary care clinic: [phone]

## 2023-08-15 ENCOUNTER — PRE VISIT (OUTPATIENT)
Dept: UROLOGY | Facility: CLINIC | Age: 78
End: 2023-08-15
Payer: COMMERCIAL

## 2023-08-29 ENCOUNTER — PRE VISIT (OUTPATIENT)
Dept: UROLOGY | Facility: CLINIC | Age: 78
End: 2023-08-29

## 2023-08-29 ENCOUNTER — OFFICE VISIT (OUTPATIENT)
Dept: UROLOGY | Facility: CLINIC | Age: 78
End: 2023-08-29
Attending: INTERNAL MEDICINE
Payer: COMMERCIAL

## 2023-08-29 VITALS
HEIGHT: 70 IN | WEIGHT: 196 LBS | HEART RATE: 66 BPM | DIASTOLIC BLOOD PRESSURE: 80 MMHG | BODY MASS INDEX: 28.06 KG/M2 | SYSTOLIC BLOOD PRESSURE: 148 MMHG

## 2023-08-29 DIAGNOSIS — R35.0 URINARY FREQUENCY: Primary | ICD-10-CM

## 2023-08-29 DIAGNOSIS — Z87.440 PERSONAL HISTORY OF URINARY TRACT INFECTION: ICD-10-CM

## 2023-08-29 LAB
ALBUMIN UR-MCNC: NEGATIVE MG/DL
APPEARANCE UR: CLEAR
BILIRUB UR QL STRIP: NEGATIVE
COLOR UR AUTO: ABNORMAL
GLUCOSE UR STRIP-MCNC: NEGATIVE MG/DL
HGB UR QL STRIP: NEGATIVE
KETONES UR STRIP-MCNC: NEGATIVE MG/DL
LEUKOCYTE ESTERASE UR QL STRIP: NEGATIVE
MUCOUS THREADS #/AREA URNS LPF: PRESENT /LPF
NITRATE UR QL: NEGATIVE
PH UR STRIP: 5 [PH] (ref 5–7)
RBC URINE: <1 /HPF
SP GR UR STRIP: 1.02 (ref 1–1.03)
UROBILINOGEN UR STRIP-MCNC: NORMAL MG/DL
WBC URINE: <1 /HPF

## 2023-08-29 PROCEDURE — 99204 OFFICE O/P NEW MOD 45 MIN: CPT | Performed by: PHYSICIAN ASSISTANT

## 2023-08-29 PROCEDURE — 81001 URINALYSIS AUTO W/SCOPE: CPT | Performed by: PATHOLOGY

## 2023-08-29 ASSESSMENT — PAIN SCALES - GENERAL: PAINLEVEL: NO PAIN (0)

## 2023-08-29 NOTE — PATIENT INSTRUCTIONS
UROLOGY CLINIC VISIT PATIENT INSTRUCTIONS    Bladder Retraining  In this packet, you will learn 3 steps to help you improve your bladder control. If you have any questions regarding any of these steps once you are home, please feel free to call the office.   The 3 steps you will learn are:   Double Voiding   Fluid Guidelines  Timed Voiding   Double Voiding   A technique called double voiding can be used to help ensure that you have fully emptied your bladder while on the toilet. The main idea behind double voiding is to try to void two or even three times during each trip to the bathroom.  By doing this you can reduce residual urine volumes and minimize your chance of having an accident, an infection, or leakage later on.   The technique is simple.  Some people find that they can void, remain on the toilet for a rest period of two to five to ten minutes, and void again.  Others find it useful to void, then stand up and sit back down and attempt to void again.   You can also compress the bladder in order to empty more fully.  To do the Crede maneuver, press firmly with one hand (or both hands) directly into the abdomen over the bladder.  You may also find it helpful to lean forward or rock while sitting on the toilet in order to help empty the bladder better.  Fluid Guidelines   Managing your fluid intake can also help you improve your bladder control.  It is VERY important that you drink at least 5 to 7 glasses of fluid each day. The bulk of this fluid should be water. Drinking appropriate amounts of fluid daily and emptying your bladder at regular intervals helps decrease bladder infections. Managing your problem by restricting fluid intake is counterproductive, and NOT recommended.     Suggestions Regarding Fluid Intake  Try to spread your fluid consumption out over the course of the day rather than consuming large amounts at one sitting and then going long periods of time without drinking.   Try to minimize fluid  consumption after your evening meal.   Try to minimize caffeine and alcohol consumption. Use only decaffeinated coffee, tea or soda when possible.      Timed Voiding    It might be a good idea to start this approach to managing your problem on a weekend or when you plan to be at home or near a bathroom facility. The purpose of timed voiding is to gradually:   increase the amount of time between emptying your bladder   increase the amount of fluid your bladder can hold, and hopefully,   diminish the sense of urgency and/or leakage associated with your problem.     Week 1 - After awakening, empty your bladder every half-hour on the hour (even if you do NOT feel the need to go). Make sure you are drinking frequently. During the night only go to the bathroom if you waken and find it necessary     Week 2 - Increase the time between emptying your bladder to once per hour, following the above fluid and night instructions.     Week 3 - Increase the time between emptying your bladder to every 1 1/2 hours, following the above fluid and night instructions.   Week 4 - Increase the time between emptying your bladder to every 2 hours, following the above fluid and night instructions.  Work up to voiding every 3 to 3   hours if you can.     If you can already hold your bladder longer than 1/2 hour, you do not need to start at Week 1. Start at the point that is appropriate for you and work up from there. Just remember to 1) increase your voiding intervals by no more than 30 minutes at a time, 2) void regularly even if you do not feel the need to go, and 3) during the night, only go to the bathroom if you waken and find it necessary.   You will be the best  of how quickly you can advance to the next step. These instructions are an outline which you can modify (for example, you may find it more comfortable to stretch from 1 to 1 1/4 hours).   You may also increase the pace of this sample schedule, depending on your individual  "symptoms and bladder capacity. For example, you may increase the hourly increments every 5 days, instead of every 7 days.     Don t Get Discouraged  This program works! The keys to success are self-motivation and gradual increases in the time interval between voids. If you try to progress too rapidly, you will exceed your capabilities and become frustrated.    Some Additional Behavioral Techniques to Help Bladder Control  Do not rush to the bathroom. Try to be calm and maintain control. Rushing to the bathroom can intensify the urge for the bladder to contract.  Do several quick contractions of the pelvic floor muscles. Use effort to keep from leaking. If possible, sit down for direct pressure on the pelvic floor.  Relax. If you have practiced diaphragmatic breathing in the past, use that skill to relax. (Take slow, deep breaths through your nose, and then slowly breathe out through pursed lips.) Use distraction techniques to try and make the urinary urge go away.  When you feel the urge subside, walk slowly and normally to the bathroom. You can repeat the above steps to gain control if the urge returns.  You can slowly proceed to the toilet room to empty your bladder once the urge has subsided.      Below is a list of foods that can irritate the bladder:.    Caffeinated soft drinks.  Coffee.  Tea.  Chocolate.  Tomato-based foods.  Acidic juices and fruits.  Alcohol.  Carbonated drinks.  Aspartame/Nutrasweet.      Patient Education   Kegel Exercises: Care Instructions  Overview     Kegel exercises strengthen muscles around the bladder. These muscles control the flow of urine. Kegel exercises are sometime called \"pelvic floor\" exercises. They can help prevent urine leakage and keep the pelvic organs in place.  Kegel exercises can strengthen pelvic muscles that have been weakened by age, pregnancy, childbirth, and surgery. They may help prevent or treat urine leakage.  You do Kegel exercises by squeezing your pelvic " "floor muscles. You will likely need to do these exercises for several weeks to get better.  Follow-up care is a key part of your treatment and safety. Be sure to make and go to all appointments, and call your doctor if you are having problems. It's also a good idea to know your test results and keep a list of the medicines you take.  How can you care for yourself at home?  To do Kegel exercises:  Squeeze your muscles as if you were trying not to pass gas. Or squeeze your muscles as if you were stopping the flow of urine. Your belly, legs, and buttocks shouldn't move.  Hold the squeeze for 3 seconds, then relax for 5 to 10 seconds.  Start with 3 seconds, then add 1 second each week until you are able to squeeze for 10 seconds.  Repeat the exercise 10 times a session. Do 3 to 8 sessions a day.  When learning what muscles to squeeze, you can try stopping the flow of urine a few times. But don't make it a practice to do Kegels while urinating.  If doing these exercises causes pain, stop doing them and talk with your doctor. Sometimes people have pelvic floor muscles that are too tight. In these cases, doing Kegel exercises may cause more problems.  Check with your doctor if you don't notice a difference after trying these exercises for several weeks. Your doctor may suggest getting help from a physical therapist or recommend other treatment.  Where can you learn more?  Go to https://www.[email or web address].net/patiented  Enter Q681 in the search box to learn more about \"Kegel Exercises: Care Instructions.\"  Current as of: March 1, 2023               Content Version: 13.7    0222-3177 [employer].   Care instructions adapted under license by your healthcare professional. If you have questions about a medical condition or this instruction, always ask your healthcare professional. [employer] disclaims any warranty or liability for your use of this information.             If you have any issues, questions " or concerns in the meantime, do not hesitate to contact us at [phone] or via [employer].     It was a pleasure meeting with you today.  Thank you for allowing me and my team the privilege of caring for you today.  YOU are the reason we are here, and I truly hope we provided you with the excellent service you deserve.  Please let us know if there is anything else we can do for you so that we can be sure you are leaving completely satisfied with your care experience.

## 2023-08-29 NOTE — NURSING NOTE
Chief Complaint   Patient presents with    Consult     Recurrent UTI's/hematuria     Sarah Garcia, [facility]

## 2023-08-29 NOTE — LETTER
8/29/2023       RE: Reinaldo Gordillo  [address]  [place] [address]     Dear Colleague,    Thank you for referring your patient, Reianldo Gordillo, to the [facility] [place] at [facility]. Please see a copy of my visit note below.      Name: Reinaldo Gordillo    MRN: [id number]   YOB: 1945                 Chief Complaint:   Urinary frequency         Assessment and Plan:   78 year old male with mild urinary frequency, chronic for the last year. Also with a recent Klebsiella UTI after he had Russell catheter placed during hospitalization following CABG. Took 2 courses of Bactrim to clear but his UTI symptoms have resolved and multiple repeat urinalyses have been negative for infection. He had one UA with low grade microscopic hematuria on 7/14/23, not long after the infection. But two subsequent UAs since then (including today's) have been completely negative with no evidence for infection or hematuria. We briefly discussed the option to complete cystoscopy for further workup, but this would seem relatively low yield at this time. We did review his CT scan from 10/2022 which did not reveal any bladder abnormalities and a small stable, non-obstructing right renal stone.     He continues to have mild urinary frequency which has been chronic and predates his recent UTIs. With lack of obstructive voiding symptoms, low PVR, and no history of BPH with a PSA < 1.0, favor overactive bladder rather than an obstructive process. Discussed management options to include lifestyle modifications, pharmacotherapy. Could also consider pelvic floor physical therapy. At this time, given that his symptoms are mild, he elects for lifestyle modifications.  -Limit bladder irritants.  -Bladder retraining exercises.    Follow up if mild LUTS become more burdensome, he develops recurrent UTIs or recurrent microscopic hematuria or new gross hematuria.      Sravanthi Callaway PA-C  August 29, 2023          History of Present Illness:   Reinaldo Gordillo is a 78 year old male with PMH of HTN, HLD, GERD, and CAD s/p recent CABG x 4 (4/24/2023) seen in consultation from Dr. Smiley for evaluation of urinary frequency. He had a Russell catheter while hospitalized in late April 2023 for CABG. Then in early June, he was diagnosed with a UTI (Klebsiella pneumoniae), treated with Bactrim x 7 days. Follow up urine culture on 6/20/23 remained positive with the same bacteria and same resistance profile, treated with 10 days of Bactrim. Repeat UA on 7/14/23 showed no evidence for UTI but microscopic hematuria (9 RBC). Another repeat UA on 8/3/23 was completely negative (< 1 RBC, < 1 WBC). PSA on that date was also low at 0.63.    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   He describes chronic daytime urinary frequency (7-8 times per day), ongoing for the last year or so. Denies nighttime urinary frequency; is typically up once at night to void.  The frequency is not particularly bothersome. He denies significant urinary urgency or incontinence. Also denies obstructive or retention symptoms. PVR on 8/3/23 at visit with Dr. Smiley was low (1 mL).   He denies constipation or diarrhea. Sometimes has flatus associated with waking up to void at night.   No significant past urologic history other than prostatitis 8 years ago and a non-obstructive right renal stone which has been present and stable for years.          Past Medical History:     Past Medical History:   Diagnosis Date    ASCVD (arteriosclerotic cardiovascular disease)     Coronary artery disease     Gastroesophageal reflux disease     Hypertension     Stented coronary artery             Past Surgical History:     Past Surgical History:   Procedure Laterality Date    BYPASS GRAFT ARTERY CORONARY N/A 4/24/2023    Procedure: MEDIAN STERNOTOMY, HARVEST OF LEFT INTERNAL MAMMARY  ARTERY, ENDOSCOPIC HARVEST OF LEFT GREATER SAPHENOUS VEIN, ON CARDIOPULMONARY BYPASS, CORONARY ARTERY BYPASS GRAFT (CABG) X4 . TRANSESOPHAGEAL ECHOCARDIOGRAM.;  Surgeon: Bro Almeida MD;  Location: [facility]    CATARACT IOL, RT/LT      COLONOSCOPY N/A 7/17/2019    Procedure: COLONOSCOPY;  Surgeon: Roque Givens MD;  [facility]    CV CORONARY ANGIOGRAM N/A 3/21/2023    Procedure: Coronary Angiogram;  Surgeon: Pito Chapin MD;  Location:  [facility] CARDIAC CATH LAB    CV PCI N/A 3/21/2023    Procedure: Percutaneous Coronary Intervention;  Surgeon: Pito Chapin MD;  Location: [facility] CARDIAC CATH LAB    ESOPHAGOSCOPY, GASTROSCOPY, DUODENOSCOPY (EGD), COMBINED N/A 7/17/2019    Procedure: ESOPHAGOGASTRODUODENOSCOPY, WITH BIOPSY;  Surgeon: Roque Givens MD;  Location: [facility] OR    HERNIA REPAIR  2000    MOHS MICROGRAPHIC PROCEDURE      PHACOEMULSIFICATION CLEAR CORNEA WITH STANDARD INTRAOCULAR LENS IMPLANT Right 7/2/2021    Procedure: RIGHT EYE CATARACT REMOVAL WITH INTRAOCULAR LENS IMPLANT;  Surgeon: Justa Liz MD;  Location: [facility] OR    PHACOEMULSIFICATION CLEAR CORNEA WITH STANDARD INTRAOCULAR LENS IMPLANT Left 7/9/2021    Procedure: LEFT EYE CATARACT REMOVAL WITH INTRAOCULAR LENS IMPLANT;  Surgeon: Justa Liz MD;  [place] [facility] [place]    [facility] CORONARY STENT PERCUT, EA ADDTL VESSEL              Social History:     Social History     Tobacco Use    Smoking status: Never    Smokeless tobacco: Never   Substance Use Topics    Alcohol use: Yes     Comment: 1-2 drinks 5 days a week            Family History:     Family History   Problem Relation Age of Onset    Glaucoma No family hx of     Macular Degeneration No family hx of     Retinal detachment No family hx of     Amblyopia No family hx of     Melanoma No family hx of     Skin Cancer No family hx of             Allergies:   No Known Allergies         Medications:     Current Outpatient Medications   Medication Sig    apixaban ANTICOAGULANT  "(ELIQUIS ANTICOAGULANT) 5 MG tablet Take 1 tablet (5 mg) by mouth 2 times daily    ASPIRIN LOW DOSE 81 MG EC tablet Take 81 mg by mouth every morning    lisinopril (ZESTRIL) 5 MG tablet Take 1 tablet (5 mg) by mouth daily    melatonin 5 MG tablet Take 1 tablet (5 mg) by mouth nightly as needed for sleep    metoprolol succinate ER (TOPROL XL) 25 MG 24 hr tablet Take 1 tablet (25 mg) by mouth every morning    omeprazole (PRILOSEC) 20 MG DR capsule Take 20 mg by mouth as needed    pravastatin (PRAVACHOL) 80 MG tablet Take 1 tablet (80 mg) by mouth At Bedtime     No current facility-administered medications for this visit.             Review of Systems:    ROS: 14 point ROS neg other than the symptoms noted above in the HPI and PMH.          Physical Exam:   BP (!) 148/80   Pulse 66   Ht 1.778 m (5' 10\")   Wt 88.9 kg (196 lb)   BMI 28.12 kg/m    General: age-appropriate appearing male in NAD.   HEENT: Head AT/NC, EOMI, CN Grossly intact  Resp: no respiratory distress.  : deferred  Rectal exam: deferred  Neuro: grossly intact  Motor: excellent strength throughout  Skin: clear of rashes or ecchymoses.        Labs:          Color Urine (no units)   Date Value   08/29/2023 Light Yellow     Appearance Urine (no units)   Date Value   08/29/2023 Clear     Glucose Urine (mg/dL)   Date Value   08/29/2023 Negative     Bilirubin Urine (no units)   Date Value   08/29/2023 Negative     Ketones Urine (mg/dL)   Date Value   08/29/2023 Negative     Specific Gravity Urine (no units)   Date Value   08/29/2023 1.016     pH Urine (no units)   Date Value   08/29/2023 5.0     Protein Albumin Urine (mg/dL)   Date Value   08/29/2023 Negative     Nitrite Urine (no units)   Date Value   08/29/2023 Negative     Leukocyte Esterase Urine (no units)   Date Value   08/29/2023 Negative       Lab Results   Component Value Date    CULTURE >100,000 CFU/mL Klebsiella pneumoniae 06/20/2023    CULTURE >100,000 CFU/mL Klebsiella pneumoniae 06/02/2023 "       Prostate Specific Antigen Screen   Date Value Ref Range Status   08/03/2023 0.63 0.00 - 6.50 ng/mL Final   08/29/2022 0.58 0.00 - 4.00 ug/L Final       Creatinine   Date Value Ref Range Status   08/03/2023 1.09 0.67 - 1.17 mg/dL Final       Lab Results   Component Value Date    A1C 6.3 08/03/2023    A1C 6.2 04/12/2023         Imaging:    CT ABDOMEN PELVIS W/O CONTRAST, 10/20/2022     FINDINGS:     Abdomen and pelvis:   5 mm non-obstructive stone in the inferior pole of the right kidney.  Parapelvic cysts in the left kidney. No hydronephrosis or hydroureter.  Urinary bladder is unremarkable.      Unremarkable noncontrast appearance of the liver. Cholelithiasis. No  intra or extrahepatic biliary ductal dilation. Unremarkable  noncontrast appearance of the spleen, adrenal glands and pancreas.  Prostatic calcifications. Small sliding hiatal hernia. The small and  large bowel is normal in caliber without evidence of bowel  obstruction. Extensive colonic diverticulosis without adjacent  inflammatory change. Appendix is normal. No intra-abdominal free air  or free fluid. Calcific atherosclerosis of the abdominal aorta and  major branches. No enlarged abdominal or pelvic lymph nodes.     Lung bases: Bibasilar and right middle lobe atelectasis. No pleural  effusion. Heart size is normal. Extensive coronary artery  calcification/stents. No pericardial effusion.     Bones and soft tissues: No acute or aggressive appearing osseous  lesion. Degenerative changes of the spine. Small fat-containing  periumbilical hernia.                                                                      IMPRESSION:   1. 5 mm non-obstructive stone in the right kidney. No hydronephrosis.  2. No acute pathology is visualized within the abdomen or pelvis.  3. Extensive colonic diverticulosis without CT findings of acute  diverticulitis.           40 minutes spent on the date of the encounter doing chart review, review of test results,  interpretation of tests, patient visit, and documentation    Sravanthi Callaway PA-C

## 2023-08-29 NOTE — PROGRESS NOTES
Name: Reinaldo Gordillo    MRN: [id number]   YOB: 1945                 Chief Complaint:   Urinary frequency         Assessment and Plan:   78 year old male with mild urinary frequency, chronic for the last year. Also with a recent Klebsiella UTI after he had Russell catheter placed during hospitalization following CABG. Took 2 courses of Bactrim to clear but his UTI symptoms have resolved and multiple repeat urinalyses have been negative for infection. He had one UA with low grade microscopic hematuria on 7/14/23, not long after the infection. But two subsequent UAs since then (including today's) have been completely negative with no evidence for infection or hematuria. We briefly discussed the option to complete cystoscopy for further workup, but this would seem relatively low yield at this time. We did review his CT scan from 10/2022 which did not reveal any bladder abnormalities and a small stable, non-obstructing right renal stone.     He continues to have mild urinary frequency which has been chronic and predates his recent UTIs. With lack of obstructive voiding symptoms, low PVR, and no history of BPH with a PSA < 1.0, favor overactive bladder rather than an obstructive process. Discussed management options to include lifestyle modifications, pharmacotherapy. Could also consider pelvic floor physical therapy. At this time, given that his symptoms are mild, he elects for lifestyle modifications.  -Limit bladder irritants.  -Bladder retraining exercises.    Follow up if mild LUTS become more burdensome, he develops recurrent UTIs or recurrent microscopic hematuria or new gross hematuria.     Sravanthi Callaway PA-C  August 29, 2023          History of Present Illness:   Reinaldo Gordillo is a 78 year old male with PMH of HTN, HLD, GERD, and CAD s/p recent CABG x 4 (4/24/2023) seen in consultation from Dr. Smiley for evaluation of urinary frequency. He had a Russell catheter while hospitalized in late April  2023 for CABG. Then in early June, he was diagnosed with a UTI (Klebsiella pneumoniae), treated with Bactrim x 7 days. Follow up urine culture on 6/20/23 remained positive with the same bacteria and same resistance profile, treated with 10 days of Bactrim. Repeat UA on 7/14/23 showed no evidence for UTI but microscopic hematuria (9 RBC). Another repeat UA on 8/3/23 was completely negative (< 1 RBC, < 1 WBC). PSA on that date was also low at 0.63.    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   He describes chronic daytime urinary frequency (7-8 times per day), ongoing for the last year or so. Denies nighttime urinary frequency; is typically up once at night to void.  The frequency is not particularly bothersome. He denies significant urinary urgency or incontinence. Also denies obstructive or retention symptoms. PVR on 8/3/23 at visit with Dr. Smiley was low (1 mL).   He denies constipation or diarrhea. Sometimes has flatus associated with waking up to void at night.   No significant past urologic history other than prostatitis 8 years ago and a non-obstructive right renal stone which has been present and stable for years.          Past Medical History:     Past Medical History:   Diagnosis Date    ASCVD (arteriosclerotic cardiovascular disease)     Coronary artery disease     Gastroesophageal reflux disease     Hypertension     Stented coronary artery             Past Surgical History:     Past Surgical History:   Procedure Laterality Date    BYPASS GRAFT ARTERY CORONARY N/A 4/24/2023    Procedure: MEDIAN STERNOTOMY, HARVEST OF LEFT INTERNAL MAMMARY ARTERY, ENDOSCOPIC HARVEST OF LEFT GREATER SAPHENOUS VEIN, ON CARDIOPULMONARY BYPASS, CORONARY ARTERY BYPASS GRAFT (CABG) X4 . TRANSESOPHAGEAL ECHOCARDIOGRAM.;  Surgeon: Bro Almeida MD;  Location: [facility] OR    CATARACT IOL, RT/LT      COLONOSCOPY N/A 7/17/2019    Procedure: COLONOSCOPY;  Surgeon: Roque Givens MD;   [facility]    CV CORONARY ANGIOGRAM N/A 3/21/2023    Procedure: Coronary Angiogram;  Surgeon: Pito Chapin MD;  Location:  [facility] CARDIAC CATH LAB    CV PCI N/A 3/21/2023    Procedure: Percutaneous Coronary Intervention;  Surgeon: Pito Chapin MD;  Location: [facility] CARDIAC CATH LAB    ESOPHAGOSCOPY, GASTROSCOPY, DUODENOSCOPY (EGD), COMBINED N/A 7/17/2019    Procedure: ESOPHAGOGASTRODUODENOSCOPY, WITH BIOPSY;  Surgeon: Roque Givens MD;  Location: [facility] OR    HERNIA REPAIR  2000    MOHS MICROGRAPHIC PROCEDURE      PHACOEMULSIFICATION CLEAR CORNEA WITH STANDARD INTRAOCULAR LENS IMPLANT Right 7/2/2021    Procedure: RIGHT EYE CATARACT REMOVAL WITH INTRAOCULAR LENS IMPLANT;  Surgeon: Justa Liz MD;  Location: [facility]    PHACOEMULSIFICATION CLEAR CORNEA WITH STANDARD INTRAOCULAR LENS IMPLANT Left 7/9/2021    Procedure: LEFT EYE CATARACT REMOVAL WITH INTRAOCULAR LENS IMPLANT;  Surgeon: Justa Lzi MD;  Location: [facility]    [facility] CORONARY STENT PERCUT, EA ADDTL VESSEL              Social History:     Social History     Tobacco Use    Smoking status: Never    Smokeless tobacco: Never   Substance Use Topics    Alcohol use: Yes     Comment: 1-2 drinks 5 days a week            Family History:     Family History   Problem Relation Age of Onset    Glaucoma No family hx of     Macular Degeneration No family hx of     Retinal detachment No family hx of     Amblyopia No family hx of     Melanoma No family hx of     Skin Cancer No family hx of             Allergies:   No Known Allergies         Medications:     Current Outpatient Medications   Medication Sig    apixaban ANTICOAGULANT (ELIQUIS ANTICOAGULANT) 5 MG tablet Take 1 tablet (5 mg) by mouth 2 times daily    ASPIRIN LOW DOSE 81 MG EC tablet Take 81 mg by mouth every morning    lisinopril (ZESTRIL) 5 MG tablet Take 1 tablet (5 mg) by mouth daily    melatonin 5 MG tablet Take 1 tablet (5 mg) by mouth nightly as needed for sleep    metoprolol  "succinate ER (TOPROL XL) 25 MG 24 hr tablet Take 1 tablet (25 mg) by mouth every morning    omeprazole (PRILOSEC) 20 MG DR capsule Take 20 mg by mouth as needed    pravastatin (PRAVACHOL) 80 MG tablet Take 1 tablet (80 mg) by mouth At Bedtime     No current facility-administered medications for this visit.             Review of Systems:    ROS: 14 point ROS neg other than the symptoms noted above in the HPI and PMH.          Physical Exam:   BP (!) 148/80   Pulse 66   Ht 1.778 m (5' 10\")   Wt 88.9 kg (196 lb)   BMI 28.12 kg/m    General: age-appropriate appearing male in NAD.   HEENT: Head AT/NC, EOMI, CN Grossly intact  Resp: no respiratory distress.  : deferred  Rectal exam: deferred  Neuro: grossly intact  Motor: excellent strength throughout  Skin: clear of rashes or ecchymoses.        Labs:          Color Urine (no units)   Date Value   08/29/2023 Light Yellow     Appearance Urine (no units)   Date Value   08/29/2023 Clear     Glucose Urine (mg/dL)   Date Value   08/29/2023 Negative     Bilirubin Urine (no units)   Date Value   08/29/2023 Negative     Ketones Urine (mg/dL)   Date Value   08/29/2023 Negative     Specific Gravity Urine (no units)   Date Value   08/29/2023 1.016     pH Urine (no units)   Date Value   08/29/2023 5.0     Protein Albumin Urine (mg/dL)   Date Value   08/29/2023 Negative     Nitrite Urine (no units)   Date Value   08/29/2023 Negative     Leukocyte Esterase Urine (no units)   Date Value   08/29/2023 Negative       Lab Results   Component Value Date    CULTURE >100,000 CFU/mL Klebsiella pneumoniae 06/20/2023    CULTURE >100,000 CFU/mL Klebsiella pneumoniae 06/02/2023       Prostate Specific Antigen Screen   Date Value Ref Range Status   08/03/2023 0.63 0.00 - 6.50 ng/mL Final   08/29/2022 0.58 0.00 - 4.00 ug/L Final       Creatinine   Date Value Ref Range Status   08/03/2023 1.09 0.67 - 1.17 mg/dL Final       Lab Results   Component Value Date    A1C 6.3 08/03/2023    A1C 6.2 " 04/12/2023         Imaging:    CT ABDOMEN PELVIS W/O CONTRAST, 10/20/2022     FINDINGS:     Abdomen and pelvis:   5 mm non-obstructive stone in the inferior pole of the right kidney.  Parapelvic cysts in the left kidney. No hydronephrosis or hydroureter.  Urinary bladder is unremarkable.      Unremarkable noncontrast appearance of the liver. Cholelithiasis. No  intra or extrahepatic biliary ductal dilation. Unremarkable  noncontrast appearance of the spleen, adrenal glands and pancreas.  Prostatic calcifications. Small sliding hiatal hernia. The small and  large bowel is normal in caliber without evidence of bowel  obstruction. Extensive colonic diverticulosis without adjacent  inflammatory change. Appendix is normal. No intra-abdominal free air  or free fluid. Calcific atherosclerosis of the abdominal aorta and  major branches. No enlarged abdominal or pelvic lymph nodes.     Lung bases: Bibasilar and right middle lobe atelectasis. No pleural  effusion. Heart size is normal. Extensive coronary artery  calcification/stents. No pericardial effusion.     Bones and soft tissues: No acute or aggressive appearing osseous  lesion. Degenerative changes of the spine. Small fat-containing  periumbilical hernia.                                                                      IMPRESSION:   1. 5 mm non-obstructive stone in the right kidney. No hydronephrosis.  2. No acute pathology is visualized within the abdomen or pelvis.  3. Extensive colonic diverticulosis without CT findings of acute  diverticulitis.           40 minutes spent on the date of the encounter doing chart review, review of test results, interpretation of tests, patient visit, and documentation

## 2023-09-12 ENCOUNTER — OFFICE VISIT (OUTPATIENT)
Dept: DERMATOLOGY | Facility: CLINIC | Age: 78
End: 2023-09-12
Payer: COMMERCIAL

## 2023-09-12 ENCOUNTER — ANCILLARY PROCEDURE (OUTPATIENT)
Dept: CARDIOLOGY | Facility: CLINIC | Age: 78
End: 2023-09-12
Attending: INTERNAL MEDICINE
Payer: COMMERCIAL

## 2023-09-12 DIAGNOSIS — D22.9 MULTIPLE BENIGN MELANOCYTIC NEVI: ICD-10-CM

## 2023-09-12 DIAGNOSIS — D18.01 CHERRY ANGIOMA: ICD-10-CM

## 2023-09-12 DIAGNOSIS — L82.1 SK (SEBORRHEIC KERATOSIS): ICD-10-CM

## 2023-09-12 DIAGNOSIS — I48.0 PAROXYSMAL ATRIAL FIBRILLATION (H): ICD-10-CM

## 2023-09-12 DIAGNOSIS — Z85.828 HISTORY OF NONMELANOMA SKIN CANCER: ICD-10-CM

## 2023-09-12 DIAGNOSIS — L29.1 PRURITUS SCROTI: ICD-10-CM

## 2023-09-12 DIAGNOSIS — L57.0 AK (ACTINIC KERATOSIS): Primary | ICD-10-CM

## 2023-09-12 DIAGNOSIS — L81.4 SOLAR LENTIGO: ICD-10-CM

## 2023-09-12 PROCEDURE — 93246 EXT ECG>7D<15D RECORDING: CPT

## 2023-09-12 PROCEDURE — 99214 OFFICE O/P EST MOD 30 MIN: CPT | Performed by: DERMATOLOGY

## 2023-09-12 PROCEDURE — 93248 EXT ECG>7D<15D REV&INTERPJ: CPT | Performed by: INTERNAL MEDICINE

## 2023-09-12 ASSESSMENT — PAIN SCALES - GENERAL: PAINLEVEL: NO PAIN (0)

## 2023-09-12 NOTE — PROGRESS NOTES
Per Dr. Saenz, patient to have 14 day ziopatch monitor placed.  Diagnosis: PAF  Monitor placed: Yes  Patient Instructed: Yes  Patient verbalized understanding: Yes  Holter # [id number]

## 2023-09-12 NOTE — LETTER
9/12/2023       RE: Reinaldo Gordillo  [address]  [facility] [address]     Dear Colleague,    Thank you for referring your patient, Reinaldo Gordillo, to the [facility] [place] [facility]. Please see a copy of my visit note below.    [facility] Dermatology Note  Encounter Date: Sep 12, 2023  Office Visit     Dermatology Problem List:  #. BCC - upper lip- s/p MMS in 2015 - performed in CT  #. HAK left malar cheek, 1 cm erythematous and hyperkeratotic plaque, dDx: NMSC versus HAK, s/p shave biopsy on 7/23/2019  #. HAK, left lateral forehead, s/p shave biopsy on 7/23/2019  #. BCC left central chest, shave biopsy on 7/23/2019, excision on 8/6/19  #. Actinic keratoses on the face and ears              -s/p cryo 7/23/19, 11/4/22; fluorouracil 5% cream and calcipotriene 0.005% ointment BID x8 day 1/2023  #.  Eczematous Dermatitis of the lower legs              -clobetasol 0.05% ointment BID  #. Pruritus scroti with possible intertrigo              -desonide ointment BID PRN  #. Filiform wart on the L inner thigh - s/p cryo on 7/23/19     ____________________________________________    Assessment & Plan:   # Actinic keratoses  - restart efudex 5% cream with calcipotriene BID x 8 days to ears, forehead, nose and cheeks in the fall/winter  - counseled that patient should expect erythema, scale, and some irritation to occur, but should discontinue this medication if significant oozing or pain greater than 5/10 is to occur; recommend the patient wash hands after use or use gloves to apply; keep medication away from pets; handout provided with administration instructions.    # Pruritus scroti with history of topical steroid application  - use tacrolimus 0.1% ointment BID PRN and avoid topical steroids given concern for red scrotum syndrome.    # History of NMSC  - Most recent carcinoma: BCC, left central chest, status post  excision 8/6/2019.  - No evidence of recurrence at any site previously diagnosed with carcinoma.    Procedures Performed:   None    Follow-up: as scheduled, prn for new or changing lesions    Staff only:    Yonathan Randall MD  Dermatology/Dermatopathology Staff Physician  , Department of Dermatology     ____________________________________________    CC: Derm Problem (BCC follow up and itchy skin on scrotum. )    HPI:  Mr. Reinaldo Gordillo is a(n) 78 year old male who presents today as a return patient for full body skin examination, actinic keratosis, and history of nonmelanoma skin cancer.     He has previously had a weak response to combined fluorouracil/calcipotriene creams twice daily for 4 days, and therefore he uses them for a day stretches.  He is due for an application in the fall or winter of this year.  He notes no additional lesions of concern today.    He continues to have scrotal pruritus, and has previously used topical steroids at this site.    Labs Reviewed:  N/A    Physical Exam:  Vitals: There were no vitals taken for this visit.  SKIN: Focused examination of face was performed.  - Erythematous papule with overyling adherent scale on the forehead, cheeks, temples and nose  - No other lesions of concern on areas examined.     Medications:  Current Outpatient Medications   Medication    ASPIRIN LOW DOSE 81 MG EC tablet    lisinopril (ZESTRIL) 5 MG tablet    melatonin 5 MG tablet    omeprazole (PRILOSEC) 20 MG DR capsule    pravastatin (PRAVACHOL) 80 MG tablet    benzonatate (TESSALON) 100 MG capsule    guaiFENesin-dextromethorphan (ROBITUSSIN DM) 100-10 MG/5ML syrup    metoprolol succinate ER (TOPROL XL) 25 MG 24 hr tablet    tacrolimus (PROTOPIC) 0.1 % external ointment    testosterone (ANDROGEL 1 % PUMP) 12.5 MG/ACT (1%) gel     No current facility-administered medications for this visit.      Past Medical History:   Patient Active Problem List   Diagnosis    Nuclear sclerotic  cataract of both eyes    Hyperglycemia    Hyperlipidemia    Stented coronary artery    Coronary artery disease involving native coronary artery without angina pectoris    Basal cell carcinoma (BCC) in situ of skin    Colon polyp    Hereditary and idiopathic peripheral neuropathy    Status post coronary angiogram    Coronary artery disease involving native coronary artery of native heart without angina pectoris     Past Medical History:   Diagnosis Date    ASCVD (arteriosclerotic cardiovascular disease)     Coronary artery disease     Gastroesophageal reflux disease     Hypertension     Stented coronary artery        CC No referring provider defined for this encounter. on close of this encounter.

## 2023-09-12 NOTE — NURSING NOTE
Dermatology Rooming Note    Reinaldo Gordillo's goals for this visit include:   Chief Complaint   Patient presents with    Derm Problem     BCC follow up and itchy skin on scrotum.      Haydee Stout

## 2023-09-23 NOTE — PROGRESS NOTES
[facility] Dermatology Note  Encounter Date: Sep 12, 2023  Office Visit     Dermatology Problem List:  #. BCC - upper lip- s/p MMS in 2015 - performed in CT  #. HAK left malar cheek, 1 cm erythematous and hyperkeratotic plaque, dDx: NMSC versus HAK, s/p shave biopsy on 7/23/2019  #. HAK, left lateral forehead, s/p shave biopsy on 7/23/2019  #. BCC left central chest, shave biopsy on 7/23/2019, excision on 8/6/19  #. Actinic keratoses on the face and ears              -s/p cryo 7/23/19, 11/4/22; fluorouracil 5% cream and calcipotriene 0.005% ointment BID x8 day 1/2023  #.  Eczematous Dermatitis of the lower legs              -clobetasol 0.05% ointment BID  #. Pruritus scroti with possible intertrigo              -desonide ointment BID PRN  #. Filiform wart on the L inner thigh - s/p cryo on 7/23/19     ____________________________________________    Assessment & Plan:   # Actinic keratoses  - restart efudex 5% cream with calcipotriene BID x 8 days to ears, forehead, nose and cheeks in the fall/winter  - counseled that patient should expect erythema, scale, and some irritation to occur, but should discontinue this medication if significant oozing or pain greater than 5/10 is to occur; recommend the patient wash hands after use or use gloves to apply; keep medication away from pets; handout provided with administration instructions.    # Pruritus scroti with history of topical steroid application  - use tacrolimus 0.1% ointment BID PRN and avoid topical steroids given concern for red scrotum syndrome.    # History of NMSC  - Most recent carcinoma: BCC, left central chest, status post excision 8/6/2019.  - No evidence of recurrence at any site previously diagnosed with carcinoma.    Procedures Performed:   None    Follow-up: as scheduled, prn for new or changing lesions    Staff only:    Yonathan Randall MD  Dermatology/Dermatopathology Staff Physician  , Department of  Dermatology     ____________________________________________    CC: Derm Problem (BCC follow up and itchy skin on scrotum. )    HPI:  Mr. Reinaldo Gordillo is a(n) 78 year old male who presents today as a return patient for full body skin examination, actinic keratosis, and history of nonmelanoma skin cancer.     He has previously had a weak response to combined fluorouracil/calcipotriene creams twice daily for 4 days, and therefore he uses them for a day stretches.  He is due for an application in the fall or winter of this year.  He notes no additional lesions of concern today.    He continues to have scrotal pruritus, and has previously used topical steroids at this site.    Labs Reviewed:  N/A    Physical Exam:  Vitals: There were no vitals taken for this visit.  SKIN: Focused examination of face was performed.  - Erythematous papule with overyling adherent scale on the forehead, cheeks, temples and nose  - No other lesions of concern on areas examined.     Medications:  Current Outpatient Medications   Medication     ASPIRIN LOW DOSE 81 MG EC tablet     lisinopril (ZESTRIL) 5 MG tablet     melatonin 5 MG tablet     omeprazole (PRILOSEC) 20 MG DR capsule     pravastatin (PRAVACHOL) 80 MG tablet     benzonatate (TESSALON) 100 MG capsule     guaiFENesin-dextromethorphan (ROBITUSSIN DM) 100-10 MG/5ML syrup     metoprolol succinate ER (TOPROL XL) 25 MG 24 hr tablet     tacrolimus (PROTOPIC) 0.1 % external ointment     testosterone (ANDROGEL 1 % PUMP) 12.5 MG/ACT (1%) gel     No current facility-administered medications for this visit.      Past Medical History:   Patient Active Problem List   Diagnosis     Nuclear sclerotic cataract of both eyes     Hyperglycemia     Hyperlipidemia     Stented coronary artery     Coronary artery disease involving native coronary artery without angina pectoris     Basal cell carcinoma (BCC) in situ of skin     Colon polyp     Hereditary and idiopathic peripheral neuropathy     Status  post coronary angiogram     Coronary artery disease involving native coronary artery of native heart without angina pectoris     Past Medical History:   Diagnosis Date     ASCVD (arteriosclerotic cardiovascular disease)      Coronary artery disease      Gastroesophageal reflux disease      Hypertension      Stented coronary artery        CC No referring provider defined for this encounter. on close of this encounter.

## 2023-10-03 ENCOUNTER — TELEPHONE (OUTPATIENT)
Dept: CARDIOLOGY | Facility: CLINIC | Age: 78
End: 2023-10-03
Payer: COMMERCIAL

## 2023-10-03 NOTE — TELEPHONE ENCOUNTER
Health Call Center    Phone Message    May a detailed message be left on voicemail: yes     Reason for Call: Medication Question or concern regarding medication   Prescription Clarification  Name of Medication: Eliquis  Prescribing Provider: Tavares   Pharmacy:    [facility] [id number] [place] [facility] [place], [place] - [address] [place] [address]    What on the order needs clarification? Patient would like to discuss getting on this medication. Please call patient back to further discuss, thank you.    Action Taken: Message routed to:  Other: Cardiology    Travel Screening: Not Applicable    Thank you!  [facility]

## 2023-10-03 NOTE — TELEPHONE ENCOUNTER
Recently notified pt that they can discontinue eliquis, no need for refill.  Marcie Azevedo RN on 10/3/2023 at 4:24 PM

## 2023-10-15 DIAGNOSIS — I10 BENIGN ESSENTIAL HYPERTENSION: ICD-10-CM

## 2023-10-16 RX ORDER — METOPROLOL SUCCINATE 25 MG/1
25 TABLET, EXTENDED RELEASE ORAL EVERY MORNING
Qty: 90 TABLET | Refills: 0 | Status: SHIPPED | OUTPATIENT
Start: 2023-10-16 | End: 2024-01-16

## 2023-10-16 NOTE — TELEPHONE ENCOUNTER
metoprolol succinate ER (TOPROL XL) 25 MG 24 hr tablet 90 tablet 1 4/18/2023     Last Office Visit : 8/3/2023   Future Office visit:  10/30/23    Routing refill request to provider for review/approval because:  Please review elevated BP's. Rx sent for 90 days.    BP Readings from Last 3 Encounters:   08/29/23 (!) 148/80   08/03/23 (!) 143/73   06/20/23 (!) 144/78

## 2023-10-23 ENCOUNTER — MYC MEDICAL ADVICE (OUTPATIENT)
Dept: DERMATOLOGY | Facility: CLINIC | Age: 78
End: 2023-10-23
Payer: COMMERCIAL

## 2023-10-23 DIAGNOSIS — L30.9 DERMATITIS: ICD-10-CM

## 2023-10-23 DIAGNOSIS — L29.1 PRURITUS SCROTI: Primary | ICD-10-CM

## 2023-10-29 NOTE — PROGRESS NOTES
HPI;      Overall stable. He has some fatigue and had low testosterone in the past (see labs from 1/28/2023) with elevated FSH. His cough is gone. His breathing is fine. He denies any syncope. No chest pain. No palpitations. He feels his BP is a little higher at home and is currently on only 5 mg Lisinopril. No other HEENT, cardiopulmonary, abdominal, , neurological, systemic, psychiatric, lymphatic, endocrine, vascular complaints.     Past Medical History:   Diagnosis Date    ASCVD (arteriosclerotic cardiovascular disease)     Coronary artery disease     Gastroesophageal reflux disease     Hypertension     Stented coronary artery      Past Surgical History:   Procedure Laterality Date    BYPASS GRAFT ARTERY CORONARY N/A 4/24/2023    Procedure: MEDIAN STERNOTOMY, HARVEST OF LEFT INTERNAL MAMMARY ARTERY, ENDOSCOPIC HARVEST OF LEFT GREATER SAPHENOUS VEIN, ON CARDIOPULMONARY BYPASS, CORONARY ARTERY BYPASS GRAFT (CABG) X4 . TRANSESOPHAGEAL ECHOCARDIOGRAM.;  Surgeon: Bro Almeida MD;  Location: [facility] OR    CATARACT IOL, RT/LT      COLONOSCOPY N/A 7/17/2019    Procedure: COLONOSCOPY;  Surgeon: Roque Givens MD;  Location: [facility] OR    CV CORONARY ANGIOGRAM N/A 3/21/2023    Procedure: Coronary Angiogram;  Surgeon: Pito Chapin MD;  Location: [facility] CARDIAC CATH LAB    CV PCI N/A 3/21/2023    Procedure: Percutaneous Coronary Intervention;  Surgeon: Pito Chapin MD;  Location: [facility] CARDIAC CATH LAB    ESOPHAGOSCOPY, GASTROSCOPY, DUODENOSCOPY (EGD), COMBINED N/A 7/17/2019    Procedure: ESOPHAGOGASTRODUODENOSCOPY, WITH BIOPSY;  Surgeon: Roque Givens MD;  Location: [facility] OR    HERNIA REPAIR  2000    MOHS MICROGRAPHIC PROCEDURE      PHACOEMULSIFICATION CLEAR CORNEA WITH STANDARD INTRAOCULAR LENS IMPLANT Right 7/2/2021    Procedure: RIGHT EYE CATARACT REMOVAL WITH INTRAOCULAR LENS IMPLANT;  Surgeon: Justa Liz MD;  Location: [facility] OR    PHACOEMULSIFICATION CLEAR CORNEA WITH STANDARD INTRAOCULAR LENS  IMPLANT Left 7/9/2021    Procedure: LEFT EYE CATARACT REMOVAL WITH INTRAOCULAR LENS IMPLANT;  Surgeon: Justa Liz MD;  Location: [facility] OR    [facility] CORONARY STENT PERCUT, EA ADDTL VESSEL       PE:    Vitals noted, gen, nad, cooperative, alert, neck supple nl rom, lungs with good air movement, RRR, S1, S2, murmur present, abdomen, no acute findings. Grossly normal neurological exam.     Results for orders placed or performed in visit on 10/30/23   TSH with free T4 reflex     Status: Normal   Result Value Ref Range    TSH 3.44 0.30 - 4.20 uIU/mL   AST     Status: Normal   Result Value Ref Range    AST 33 0 - 45 U/L   ALT     Status: Normal   Result Value Ref Range    ALT 39 0 - 70 U/L   CBC with platelets and differential     Status: Abnormal   Result Value Ref Range    WBC Count 5.8 4.0 - 11.0 10e3/uL    RBC Count 3.84 (L) 4.40 - 5.90 10e6/uL    Hemoglobin 12.7 (L) 13.3 - 17.7 g/dL    Hematocrit 38.2 (L) 40.0 - 53.0 %     78 - 100 fL    MCH 33.1 (H) 26.5 - 33.0 pg    MCHC 33.2 31.5 - 36.5 g/dL    RDW 12.8 10.0 - 15.0 %    Platelet Count 209 150 - 450 10e3/uL    % Neutrophils 65 %    % Lymphocytes 22 %    % Monocytes 9 %    % Eosinophils 3 %    % Basophils 1 %    % Immature Granulocytes 0 %    NRBCs per 100 WBC 0 <1 /100    Absolute Neutrophils 3.8 1.6 - 8.3 10e3/uL    Absolute Lymphocytes 1.3 0.8 - 5.3 10e3/uL    Absolute Monocytes 0.5 0.0 - 1.3 10e3/uL    Absolute Eosinophils 0.2 0.0 - 0.7 10e3/uL    Absolute Basophils 0.1 0.0 - 0.2 10e3/uL    Absolute Immature Granulocytes 0.0 <=0.4 10e3/uL    Absolute NRBCs 0.0 10e3/uL   CBC with platelets and differential     Status: Abnormal    Narrative    The following orders were created for panel order CBC with platelets and differential.  Procedure                               Abnormality         Status                     ---------                               -----------         ------                     CBC with platelets and d...[[id number]]  Abnormal             Final result                 Please view results for these tests on the individual orders.   Testosterone Bioavailable     Status: None (In process)    Narrative    The following orders were created for panel order Testosterone Bioavailable.  Procedure                               Abnormality         Status                     ---------                               -----------         ------                     Sex Hormone Binding Glob...[[id number]]                      In process                 Testosterone Free and Total[[id number]]                      In process                   Please view results for these tests on the individual orders.     A/P:    1. Immunizations; COVID Pfizer vaccine x 5 with bi-valent Pfizer on 9/8/2022. He has completed the Shigrix vaccine series. Tdap 8/7/2015. Pneumococcal 23 done 9/6/2019. Prevnar 13 done 5/15/2018. Influenza vaccine done 9/23/2023.   2. Dermatology; seen on 11/29/2022 and more recently with Dr. Tovar on 7/14/23 for skin check. He was seen   9/12/2023 by Dr. Prakash baptiste next manuel. 4/16/2024  3. PSA; 0.58 on 8/29/2022 and repeat 0.63 on 8/3/2023.   4. Colonoscopy; 7/17/2019 with no specimens collected.   5. Increased lipids on Pravastatin 80 mg; lipids (8/3/2023) with TG 60, LDL 56, . He could not tolerate Crestor (CK 1600) with muscle complaints.   6. Hgb on 4/30/2023 was 9.3. And repeat CBC  with Hgb increased to 12.6 (8/3/23).  7. Neuropathy: visit with Dr. Begum, Neurology on 6/13/2023 and discussed worsening neuropathy and reduced right arm swing (chronic) with follow up scheduled for 12/22/2023. Amiodarone stopped with thought that it was contributing to neuropathy and worsening gait.   8. A1c was 6.2% on 4/12/2023. Repeat 6.3% on 8/3/2023.   9. CAD; had PCI with AREN x4 in 2015. Seen 12/15/2021, [facility] Dr. Madrigal, note in Care Everywhere and there is a detailed note in the chart. He had 11/14/2022 appt. With Dr. Chapin. Resting echo  10/29/2022. He had CABG x4 on 4/24/2023. Visit with Dr. Chapin on 6/12/23 and remains on aspirin, metoprolol and pravastatin. Soft murmur; last echo was 3/21/2023 and he had aortic stenosis. Continues with cardiac rehab. He has 1/22/2024 cardiology follow up with Dr. Chapin. .   10. A fib/flutter: EP visit with Dr. Saenz on 6/20/23 for a fib/flutter after CABG (April 2023). Low burden PAF (9%) on recent zio patch 5/12/23. Remains on Eliquis, stopped amiodarone due to neuropathy complaints, and will reassess need for anticoagulation at next visit as Dr. Gordillo is concerned with fall risk. Next EP appt. 11/2/2023.   11. Endocrine: TSH normal (1.39) on 4/28/2023. He does not feel he has sleep apnea. Some mild depression? Low testosterone 2.20 on 8/29/2022 and he had an endocrinology appt. With Dr. White 1/3/2023. Reordered labs today and placed endocrinology referral.   12. Recurrent UTIs and hematuria: UA 7/14/23 with RBCs (9), no signs infection. UA 6/20/2023 with nitrites, LE, WBCs, RBCs (100) and treated with Bactrim x 10 days. UA 6/2/23 with LE, WBCs and RBCs (43) and treated with Bactrim. CT abdomen/pelvis 10/20/2022 with 5 mm non-obstructive right kidney stone, but he notes that is chronic. He was seen 8/29/2023 in Urology by Ms. Callaway.    13. HTN: was on lisinopril 20 mg up until recent discharge for CABG on 4/30/2023. Was not interested in continuing lisinopril at that time. Dicussed with elevated blood pressure readings in clinic (manual 138/72) and with history of CAD, he can increase his Lisinopril to 10 mg and will follow.       40 minutes spent on the date of the encounter doing chart review, history and exam, documentation and further activities as noted above exclusive of procedures and other billable interpretations

## 2023-10-30 ENCOUNTER — LAB (OUTPATIENT)
Dept: LAB | Facility: CLINIC | Age: 78
End: 2023-10-30
Payer: COMMERCIAL

## 2023-10-30 ENCOUNTER — MYC MEDICAL ADVICE (OUTPATIENT)
Dept: DERMATOLOGY | Facility: CLINIC | Age: 78
End: 2023-10-30

## 2023-10-30 ENCOUNTER — OFFICE VISIT (OUTPATIENT)
Dept: INTERNAL MEDICINE | Facility: CLINIC | Age: 78
End: 2023-10-30
Payer: COMMERCIAL

## 2023-10-30 VITALS
HEART RATE: 72 BPM | OXYGEN SATURATION: 98 % | DIASTOLIC BLOOD PRESSURE: 78 MMHG | BODY MASS INDEX: 28.73 KG/M2 | WEIGHT: 200.2 LBS | SYSTOLIC BLOOD PRESSURE: 127 MMHG

## 2023-10-30 DIAGNOSIS — I48.0 PAROXYSMAL ATRIAL FIBRILLATION (H): ICD-10-CM

## 2023-10-30 DIAGNOSIS — R53.83 OTHER FATIGUE: ICD-10-CM

## 2023-10-30 DIAGNOSIS — R53.83 OTHER FATIGUE: Primary | ICD-10-CM

## 2023-10-30 DIAGNOSIS — Z79.899 ENCOUNTER FOR MONITORING AMIODARONE THERAPY: ICD-10-CM

## 2023-10-30 DIAGNOSIS — Z51.81 ENCOUNTER FOR MONITORING AMIODARONE THERAPY: ICD-10-CM

## 2023-10-30 LAB
ALT SERPL W P-5'-P-CCNC: 39 U/L (ref 0–70)
AST SERPL W P-5'-P-CCNC: 33 U/L (ref 0–45)
BASOPHILS # BLD AUTO: 0.1 10E3/UL (ref 0–0.2)
BASOPHILS NFR BLD AUTO: 1 %
EOSINOPHIL # BLD AUTO: 0.2 10E3/UL (ref 0–0.7)
EOSINOPHIL NFR BLD AUTO: 3 %
ERYTHROCYTE [DISTWIDTH] IN BLOOD BY AUTOMATED COUNT: 12.8 % (ref 10–15)
HCT VFR BLD AUTO: 38.2 % (ref 40–53)
HGB BLD-MCNC: 12.7 G/DL (ref 13.3–17.7)
IMM GRANULOCYTES # BLD: 0 10E3/UL
IMM GRANULOCYTES NFR BLD: 0 %
LYMPHOCYTES # BLD AUTO: 1.3 10E3/UL (ref 0.8–5.3)
LYMPHOCYTES NFR BLD AUTO: 22 %
MCH RBC QN AUTO: 33.1 PG (ref 26.5–33)
MCHC RBC AUTO-ENTMCNC: 33.2 G/DL (ref 31.5–36.5)
MCV RBC AUTO: 100 FL (ref 78–100)
MONOCYTES # BLD AUTO: 0.5 10E3/UL (ref 0–1.3)
MONOCYTES NFR BLD AUTO: 9 %
NEUTROPHILS # BLD AUTO: 3.8 10E3/UL (ref 1.6–8.3)
NEUTROPHILS NFR BLD AUTO: 65 %
NRBC # BLD AUTO: 0 10E3/UL
NRBC BLD AUTO-RTO: 0 /100
PLATELET # BLD AUTO: 209 10E3/UL (ref 150–450)
RBC # BLD AUTO: 3.84 10E6/UL (ref 4.4–5.9)
TSH SERPL DL<=0.005 MIU/L-ACNC: 3.44 UIU/ML (ref 0.3–4.2)
WBC # BLD AUTO: 5.8 10E3/UL (ref 4–11)

## 2023-10-30 PROCEDURE — 83001 ASSAY OF GONADOTROPIN (FSH): CPT | Performed by: INTERNAL MEDICINE

## 2023-10-30 PROCEDURE — 99000 SPECIMEN HANDLING OFFICE-LAB: CPT | Performed by: PATHOLOGY

## 2023-10-30 PROCEDURE — 84270 ASSAY OF SEX HORMONE GLOBUL: CPT | Performed by: INTERNAL MEDICINE

## 2023-10-30 PROCEDURE — 83002 ASSAY OF GONADOTROPIN (LH): CPT | Performed by: INTERNAL MEDICINE

## 2023-10-30 PROCEDURE — 84450 TRANSFERASE (AST) (SGOT): CPT | Performed by: PATHOLOGY

## 2023-10-30 PROCEDURE — 84403 ASSAY OF TOTAL TESTOSTERONE: CPT | Performed by: INTERNAL MEDICINE

## 2023-10-30 PROCEDURE — 99215 OFFICE O/P EST HI 40 MIN: CPT | Performed by: INTERNAL MEDICINE

## 2023-10-30 PROCEDURE — 84460 ALANINE AMINO (ALT) (SGPT): CPT | Performed by: PATHOLOGY

## 2023-10-30 PROCEDURE — 84403 ASSAY OF TOTAL TESTOSTERONE: CPT | Mod: 91 | Performed by: INTERNAL MEDICINE

## 2023-10-30 PROCEDURE — 85025 COMPLETE CBC W/AUTO DIFF WBC: CPT | Performed by: PATHOLOGY

## 2023-10-30 PROCEDURE — 84443 ASSAY THYROID STIM HORMONE: CPT | Performed by: PATHOLOGY

## 2023-10-30 PROCEDURE — 36415 COLL VENOUS BLD VENIPUNCTURE: CPT | Performed by: PATHOLOGY

## 2023-10-30 NOTE — PROGRESS NOTES
Ed is a 78 year old that presents in clinic today for the following:     Chief Complaint   Patient presents with    Follow Up           10/30/2023     5:00 PM   Additional Questions   Roomed by YW   Accompanied by None       Screenings from encounters over the past 10 days    No data recorded       TRISH Villarreal at 5:01 PM on 10/30/2023

## 2023-10-31 LAB
FSH SERPL IRP2-ACNC: 65.1 MIU/ML (ref 1.5–12.4)
LH SERPL-ACNC: 35.8 MIU/ML (ref 1.7–8.6)
SHBG SERPL-SCNC: 85 NMOL/L (ref 11–80)

## 2023-10-31 NOTE — PROGRESS NOTES
ELECTROPHYSIOLOGY CLINIC VISIT    Assessment/Recommendations   Assessment/Plan:    Reinaldo Gordillo is a 78 year old male with past medical history for HLD, HTN, VT, CAD s/p CABG x4 LIMA to LAD, SVG to OM, SVG to Diag, SVG to GERALDINE on 23 and post-operative atrial AF/AFL.     EP Recommendations:  New Onset Atrial Fibrillation/Flutter post op CABGx4  We discussed in detail with the patient management/treatment options for [place] includin. Stroke Prophylaxis:  CHADSVASC=4 (++age, +HTN, +CAD) 4, corresponding to a 4.0% annual stroke / systemic emolism event rate. He was started on eliquis following surgery. Discussed since he does not have prior history of AF, can consider stopping AC as outpatient if he is without recurrence. Initial ziopatch - on discharge with 9% AF burden. Amiodarone stopped in  d/t peripheral neuropathy. Repeat zio done off amio -, without atrial arrhythmia recurrence. Ok'd to stop eliquis with understanding that should it recur outside of post-operative setting, we would recommend AC chronically. Patient asymptomatic with AF, will have him complete zio prior to follow up, if without recurrence, likely follow up prn.  2. Rhythm and Rate Control: Continue Toprol XL 25 mg daily.     Follow up 6 months with zio prior. If without recurrence, can follow up with EP as needed.     History of Present Illness/Subjective    Mr. Reinaldo Gordillo is a 78 year old male who comes in today for EP follow-up of post operative atrial fibrillation.    Reinaldo Gordillo is a 78 year old male with past medical history for HLD, HTN, VT, CAD s/p CABG x4 LIMA to LAD, SVG to OM, SVG to Diag, SVG to GERALDINE on 23 and post-operative atrial AF/AFL.     Patient was recently admitted from 23 for CABG x4. During admission, EP was consulted after pt developed atrial fibrillation/flutter 23. He had been converting in and out of atrial flutter/fib, arrhythmia was not persistent. Rates 60-80 bpm,  he had minimal symptoms with occasional palpitations. Amio bolus + gtt started. He has been on lopressor 12.5 mg bid. He had no prior history of atrial arrhythmias. At time of discharge, he completed 14 day ambulatory monitor from 5/12-5/26/23 with 9% AF/AFL burden  bpm.     He saw Dr Saenz in follow up 6/20/23, he voiced concern about instability and previous falls. His amiodarone was reduced to 200 mg daily. Shortly after the visit, his amiodarone was stopped in June due to new and worsening peripheral neuropathy. They discussed continuing anticoagulation for now until repeat ziopatch monitor is completed prior to future follow up. Repeat zio monitor completed from 9/12-9/26/23 off amiodarone without any atrial arrhythmias, blunted sinus rates noted. Following completion of monitor, ok'd to stop eliquis.     He presents today for follow up. He has been feeling well since surgery. His peripheral neuropathy has resolved since stopping amiodarone. He has no new cardiac concerns today, denies chest discomfort, palpitations, peripheral edema, shortness of breath, pre-syncope, or syncope. Presenting 12 lead ECG shows sinus Vent Rate 76 bpm,  ms, QRS 92 ms, QTc 443 ms.     I have reviewed and updated the patient's Past Medical History, Social History, Family History and Medication List.     Cardiographics (Personally Reviewed) :   Echo: 3/21/2023   Interpretation Summary  Global and regional left ventricular function is normal with an EF of 55-60%.  The right ventricle is normal size. Global right ventricular function is  normal.  Mild aortic stenosis is present. The aortic valve area is 1.8 cm^2, by the  continuity equation.  No pericardial effusion is present.  This study was compared with the study from 10/29/2022. No significant changes  noted.    Ziopatch:   5/12-5/26/23: sinus 45-93 bpm (average 59 bpm), 9% AF/AFL burden  bpm  9/12-9/26/23: sinus  bpm (average 59 bpm), no AF/AFL (off  amiodarone)    Coronary Angiogram: 3/21/23   Two vessel coronary artery disease involving the LAD and RCA.  The RCA lesion is highly calcified between stented regions.    Given the severe calcification of the RCA and the long LAD lesion extending to the ostium of the vessel, recommend CVTS consult and eval for CABG.  However, PCI is possible if the patient does not want CABG       Physical Examination   /81 (BP Location: Right arm, Patient Position: Chair, Cuff Size: Adult Regular)   Pulse 71   Wt 91 kg (200 lb 9.6 oz)   SpO2 97%   BMI 28.78 kg/m    Wt Readings from Last 3 Encounters:   11/02/23 91 kg (200 lb 9.6 oz)   10/30/23 90.8 kg (200 lb 3.2 oz)   08/29/23 88.9 kg (196 lb)     General Appearance:   Alert, well-appearing and in no acute distress.   HEENT: Atraumatic, normocephalic. MMM.   Chest/Lungs:   Respirations unlabored.  Lungs are clear to auscultation.   Cardiovascular:   Regular rate and rhythm.  S1/S2. No murmur.    Abdomen:  Soft, nontender, nondistended.   Extremities: No cyanosis or clubbing. No edema.    Musculoskeletal: Moves all extremities.     Skin: Warm, dry, intact.    Neurologic: Mood and affect are appropriate.  Alert and oriented to person, place, time, and situation.          Medications  Allergies   ASA 81 mg daily   Pravastatin 80 mg daily   Toprol XL 25 mg daily   Lisinopril 5 mg daily     Tessalon   Prilosec   Melatonin   Robitussin    No Known Allergies      Lab Results (Personally Reviewed)    Chemistry/lipid CBC Cardiac Enzymes/BNP/TSH/INR   Lab Results   Component Value Date    BUN 19.1 08/03/2023     08/03/2023    CO2 27 08/03/2023     Creatinine   Date Value Ref Range Status   08/03/2023 1.09 0.67 - 1.17 mg/dL Final       Lab Results   Component Value Date    CHOL 188 08/03/2023     08/03/2023    LDL 56 08/03/2023      Lab Results   Component Value Date    WBC 5.8 10/30/2023    HGB 12.7 (L) 10/30/2023    HCT 38.2 (L) 10/30/2023     10/30/2023      10/30/2023    Lab Results   Component Value Date    TSH 3.44 10/30/2023    INR 1.27 (H) 04/25/2023        The patient states understanding and is agreeable with the plan.     Kesha Peck PA-C  [facility]  Electrophysiology Consult Service  Pager: [phone]    I spent a total of 20 minutes face to face with Reinaldo Gordillo during today's office visit. I have spent an additional 20 minutes today on chart review and documentation.

## 2023-11-01 LAB
TESTOST FREE SERPL-MCNC: 2.51 NG/DL
TESTOST SERPL-MCNC: 255 NG/DL (ref 240–950)
TESTOST SERPL-MCNC: 267 NG/DL (ref 240–950)

## 2023-11-01 RX ORDER — TACROLIMUS 1 MG/G
OINTMENT TOPICAL 2 TIMES DAILY
Qty: 60 G | Refills: 3 | Status: SHIPPED | OUTPATIENT
Start: 2023-11-01

## 2023-11-02 ENCOUNTER — VIRTUAL VISIT (OUTPATIENT)
Dept: ENDOCRINOLOGY | Facility: CLINIC | Age: 78
End: 2023-11-02
Attending: INTERNAL MEDICINE
Payer: COMMERCIAL

## 2023-11-02 ENCOUNTER — OFFICE VISIT (OUTPATIENT)
Dept: CARDIOLOGY | Facility: CLINIC | Age: 78
End: 2023-11-02
Payer: COMMERCIAL

## 2023-11-02 VITALS
DIASTOLIC BLOOD PRESSURE: 81 MMHG | OXYGEN SATURATION: 97 % | BODY MASS INDEX: 28.78 KG/M2 | HEART RATE: 71 BPM | WEIGHT: 200.6 LBS | SYSTOLIC BLOOD PRESSURE: 128 MMHG

## 2023-11-02 DIAGNOSIS — E29.1 MALE HYPOGONADISM: Primary | ICD-10-CM

## 2023-11-02 DIAGNOSIS — I48.0 PAROXYSMAL ATRIAL FIBRILLATION (H): Primary | ICD-10-CM

## 2023-11-02 PROCEDURE — 93010 ELECTROCARDIOGRAM REPORT: CPT | Performed by: INTERNAL MEDICINE

## 2023-11-02 PROCEDURE — G0463 HOSPITAL OUTPT CLINIC VISIT: HCPCS

## 2023-11-02 PROCEDURE — 99214 OFFICE O/P EST MOD 30 MIN: CPT | Mod: 95 | Performed by: INTERNAL MEDICINE

## 2023-11-02 PROCEDURE — 93005 ELECTROCARDIOGRAM TRACING: CPT

## 2023-11-02 PROCEDURE — 99215 OFFICE O/P EST HI 40 MIN: CPT

## 2023-11-02 RX ORDER — TESTOSTERONE GEL, 1% 10 MG/G
GEL TRANSDERMAL
Qty: 75 G | Refills: 5 | Status: SHIPPED | OUTPATIENT
Start: 2023-11-02 | End: 2024-01-02

## 2023-11-02 RX ORDER — TESTOSTERONE GEL, 1% 10 MG/G
GEL TRANSDERMAL
Qty: 75 G | Refills: 5 | Status: SHIPPED | OUTPATIENT
Start: 2023-11-02 | End: 2023-11-02

## 2023-11-02 ASSESSMENT — PAIN SCALES - GENERAL: PAINLEVEL: NO PAIN (0)

## 2023-11-02 NOTE — Clinical Note
"    11/2/2023         RE: Reinaldo Gordillo  [address]  [place] [address]        Dear Colleague,    Thank you for referring your patient, Reinaldo Gordillo, to the [facility] [place]. Please see a copy of my visit note below.    Virtual Visit Details    Type of service:  Video Visit   Video Start Time: {video visit start/end time for provider to select:911861}  Video End Time:{video visit start/end time for provider to select:617004}    Originating Location (pt. Location): {video visit patient location:150380::\"Home\"}  {PROVIDER LOCATION On-site should be selected for visits conducted from your clinic location or adjoining [facility] hospital, academic office, or other nearby [facility] building. 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 Naresh Chandler/[facility]  8/2021. Lab testing showed low testosterone level, but management plan unclear  Previous  labs include:      Additional health history:   Testicular injury:    none            Testicular surgery:   vasectomy  Testosterone med use:     none        Fam Hx Hypogonadism: None    Previous [facility] labs include:   Latest Reference Range & Units 08/29/22 08:42   Testosterone Total 240 - 950 ng/dL 295      Latest Reference Range & Units 08/29/22 08:42   Free Testosterone Calculated ng/dL 2.20          Cheko 5: 4.1-23.9   Latest Reference Range & Units 08/29/22 " 08:42   Sex Hormone Binding Globulin 11 - 80 nmol/L 119 (H)   (H): Data is abnormally high     Lab Test 08/29/22  0842   PSA 0.58         1/3/23. Initial endocrinology evaluation with me at [place]  Reviewed health history and testosterone issues  Symptoms included low libido, muscle weakness, decreased axillary hair  Plan for repeat testosterone related labs and then followup evaluation    Previous [facility] testosterone labs include:   Latest Reference Range & Units 08/29/22 08:42 01/28/23 10:13 10/30/23 18:06   Testosterone Total 240 - 950 ng/dL  240 - 950 ng/dL 295 311 255  267      Latest Reference Range & Units 08/29/22 08:42 01/28/23 10:13 10/30/23 18:06   Free Testosterone Calculated ng/dL 2.20 2.73 2.51     Recent [facility] labs include:  Lab Results   Component Value Date    FSH 65.1 (H) 10/30/2023    TESTOSTTOTAL 267 10/30/2023    TESTOSTTOTAL 255 10/30/2023    FT 2.51 10/30/2023    TSH 3.44 10/30/2023    SG 1.016 08/29/2023          L[facility] in [facility]  Sees Dr. Joao Smiley[facility] for general medicine evaluations.    PMH/PSH:  Past Medical History:   Diagnosis Date    ASCVD (arteriosclerotic cardiovascular disease)     Coronary artery disease     Gastroesophageal reflux disease     Hypertension     Stented coronary artery      Past Surgical History:   Procedure Laterality Date    BYPASS GRAFT ARTERY CORONARY N/A 4/24/2023    Procedure: MEDIAN STERNOTOMY, HARVEST OF LEFT INTERNAL MAMMARY ARTERY, ENDOSCOPIC HARVEST OF LEFT GREATER SAPHENOUS VEIN, ON CARDIOPULMONARY BYPASS, CORONARY ARTERY BYPASS GRAFT (CABG) X4 . TRANSESOPHAGEAL ECHOCARDIOGRAM.;  Surgeon: Bro Almeida MD;  Location:  OR    CATARACT IOL, RT/LT      COLONOSCOPY N/A 7/17/2019    Procedure: COLONOSCOPY;  Surgeon: Roque Givens MD;  [facility]    CV CORONARY ANGIOGRAM N/A 3/21/2023    Procedure: Coronary Angiogram;  Surgeon: Pito Chapin MD;  Location:  HEART CARDIAC CATH LAB    CV PCI N/A 3/21/2023    Procedure:  Percutaneous Coronary Intervention;  Surgeon: Pito Chapin MD;  Location:  HEART CARDIAC CATH LAB    ESOPHAGOSCOPY, GASTROSCOPY, DUODENOSCOPY (EGD), COMBINED N/A 7/17/2019    Procedure: ESOPHAGOGASTRODUODENOSCOPY, WITH BIOPSY;  Surgeon: Roque Givens MD;  Location:  OR    HERNIA REPAIR  2000    MOHS MICROGRAPHIC PROCEDURE      PHACOEMULSIFICATION CLEAR CORNEA WITH STANDARD INTRAOCULAR LENS IMPLANT Right 7/2/2021    Procedure: RIGHT EYE CATARACT REMOVAL WITH INTRAOCULAR LENS IMPLANT;  Surgeon: Justa Liz MD;  [place] [facility] [place]    PHACOEMULSIFICATION CLEAR CORNEA WITH STANDARD INTRAOCULAR LENS IMPLANT Left 7/9/2021    Procedure: LEFT EYE CATARACT REMOVAL WITH INTRAOCULAR LENS IMPLANT;  Surgeon: Justa Liz MD;  [place] [facility] [place]    [facility] CORONARY STENT PERCUT, EA ADDTL VESSEL         Family Hx:  Family History   Problem Relation Age of Onset    Glaucoma No family hx of     Macular Degeneration No family hx of     Retinal detachment No family hx of     Amblyopia No family hx of     Melanoma No family hx of     Skin Cancer No family hx of          Social Hx:  Social History     Socioeconomic History    Marital status:      Spouse name: Not on file    Number of children: Not on file    Years of education: Not on file    Highest education level: Not on file   Occupational History    Not on file   Tobacco Use    Smoking status: Never    Smokeless tobacco: Never   Substance and Sexual Activity    Alcohol use: Yes     Comment: 1-2 drinks 5 days a week    Drug use: Never    Sexual activity: Not on file   Other Topics Concern    Parent/sibling w/ CABG, MI or angioplasty before 65F 55M? Not Asked   Social History Narrative    Not on file     Social Determinants of Health     Financial Resource Strain: Low Risk  (10/23/2023)    Financial Resource Strain     Within the past 12 months, have you or your family members you live with been unable to get utilities (heat, electricity) when it was  really needed?: No   Food Insecurity: Low Risk  (10/23/2023)    Food Insecurity     Within the past 12 months, did you worry that your food would run out before you got money to buy more?: No     Within the past 12 months, did the food you bought just not last and you didn t have money to get more?: No   Transportation Needs: Low Risk  (10/23/2023)    Transportation Needs     Within the past 12 months, has lack of transportation kept you from medical appointments, getting your medicines, non-medical meetings or appointments, work, or from getting things that you need?: No   Physical Activity: Not on file   Stress: Not on file   Social Connections: Not on file   Interpersonal Safety: Low Risk  (10/30/2023)    Interpersonal Safety     Do you feel physically and emotionally safe where you currently live?: Yes     Within the past 12 months, have you been hit, slapped, kicked or otherwise physically hurt by someone?: No     Within the past 12 months, have you been humiliated or emotionally abused in other ways by your partner or ex-partner?: No   Housing Stability: Low Risk  (10/23/2023)    Housing Stability     Do you have housing? : Yes     Are you worried about losing your housing?: No          MEDICATIONS:  has a current medication list which includes the following prescription(s): aspirin low dose, benzonatate, guaifenesin-dextromethorphan, lisinopril, melatonin, metoprolol succinate er, omeprazole, pravastatin, and tacrolimus.    ROS:     ROS: 10 point ROS neg other than the symptoms noted above in the HPI.    GENERAL: no fatigue, wt stable; denies fevers, chills, night sweats.    HEENT: normal sense of smell; no dysphagia, odonophagia, diplopia, neck pain  THYROID:  no apparent hyper or hypothyroid symptoms  CV: no chest pain, pressure, palpitations  LUNGS: no SOB, ANTHONY, cough, wheezing   ABDOMEN: no diarrhea, constipation, abdominal pain  EXTREMITIES: no rashes, ulcers, edema  NEUROLOGY: no headaches, denies  changes in vision, tingling, extremitiy numbness   MSK: decreased muscle strength, some myalgias as noted; no arthralgias  SKIN: decreased axillary hair growth; no rashes or lesions  : decreased libido and erectile function  PSYCH:  stable mood, no significant anxiety or depression  ENDOCRINE: no heat or cold intolerance    Physical Exam (visual exam)  VS:  no vital signs taken for video visit  CONSTITUTIONAL: healthy, alert and NAD, well dressed, answering questions appropriately  ENT: no nose swelling or nasal discharge, mouth redness or gum changes.  EYES: eyes grossly normal to inspection, conjunctivae and sclerae normal, no exophthalmos or proptosis  THYROID:  no apparent nodules or goiter  LUNGS: no audible wheeze, cough or visible cyanosis, no visible retractions or increased work of breathing  ABDOMEN: abdomen not evaluated  EXTREMITIES: no hand tremors, limited exam  NEUROLOGY: CN grossly intact, mentation intact and speech normal   SKIN:  no apparent skin lesions, rash, or edema with visualized skin appearance  PSYCH: mentation appears normal, affect normal/bright, judgement and insight intact,   normal speech and appearance well groomed      LABS:    All pertinent notes, labs, and images personally reviewed by me.     A/P:  No diagnosis found.      Comments:  Reviewed health history and hypogonadism issues.  Suspect symptoms and low free testosterone correlate with male hypogonadism    Plan:  Discussed general issues with the hypogonadism diagnosis and management  We discussed lab tests to assess testosterone axis hormone levels.  Reviewed treatment options with topical, nasal, and injectable testosterone medication use.    Recommend:  Advise additional lab testing to assess low testosterone level   Check pituitary and gonad, related lab tests   Consider nearby [facility] clinic   Lab orders placed  Monitor for symptom changes  No testicular or pituitary imaging needed at this time  Discussed potential  treatment options with testosterone medication   We focused on testosterone gel med option  Plan repeat testosterone lab testing 2-4 weeks after starting medication treatment  Will summarize test results and recommendations when labs available    Plan followup for the anemia condition with PCP  Addressed patient questions today    There are no Patient Instructions on file for this visit.    Future labs ordered today: No orders of the defined types were placed in this encounter.    Radiology/Consults ordered today: None    Total time spent on day of encounter:  ***    Follow-up:  ***    ISHMAEL White MD, MS  Endocrinology  [facility]    CC: Joao Smiley        Virtual Visit Details    Type of service:  Video Visit   Video Start Time: 1:03 PM  Video End Time:{video visit start/end time for provider to select:026478}    Originating Location (pt. 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 Naresh Chandler/[facility]  8/2021. Lab testing showed low testosterone level, but management plan unclear  Previous  labs include:      Additional health history:   Testicular injury:    none            Testicular surgery:   vasectomy  Testosterone med use:     none        Fam Hx Hypogonadism: none    Previous [facility] labs include:   Latest Reference Range & Units 08/29/22 08:42   Testosterone Total 240 - 950 ng/dL 295       Latest Reference Range & Units 08/29/22 08:42   Free Testosterone Calculated ng/dL 2.20          Cheko 5: 4.1-23.9   Latest Reference Range & Units 08/29/22 08:42   Sex Hormone Binding Globulin 11 - 80 nmol/L 119 (H)      Latest Reference Range & Units 08/29/22 08:42   PSA 0.00 - 4.00 ug/L 0.58      Latest Reference Range & Units 08/03/23 15:34   PSA Tumor Marker 0.00 - 6.50 ng/mL 0.63         1/3/23. Initial endocrinology evaluation with me at [place]  Reviewed health history and testosterone issues  Symptoms included low libido, muscle weakness, decreased axillary hair  Plan for repeat testosterone related labs and then followup evaluation  Previous [facility] testosterone labs include:   Latest Reference Range & Units 08/29/22 08:42 01/28/23 10:13 10/30/23 18:06   Testosterone Total 240 - 950 ng/dL  240 - 950 ng/dL 295 311 255  267      Latest Reference Range & Units 08/29/22 08:42 01/28/23 10:13 10/30/23 18:06   Free Testosterone Calculated ng/dL 2.20 2.73 2.51     Recent [facility] labs include:  Lab Results   Component Value Date    FSH 65.1 (H) 10/30/2023    TESTOSTTOTAL 267 10/30/2023    TESTOSTTOTAL 255 10/30/2023    FT 2.51 10/30/2023    TSH 3.44 10/30/2023    SG 1.016 08/29/2023          Lives in [facility]  Sees Dr. Joao Sm[facility] for general medicine evaluations.    PMH/PSH:  Past Medical History:   Diagnosis Date     ASCVD (arteriosclerotic cardiovascular disease)      Coronary artery disease      Gastroesophageal reflux disease      Hypertension      Stented coronary artery      Past Surgical History:   Procedure Laterality Date     BYPASS GRAFT ARTERY CORONARY N/A 4/24/2023    Procedure: MEDIAN STERNOTOMY, HARVEST OF LEFT INTERNAL MAMMARY ARTERY, ENDOSCOPIC HARVEST OF LEFT GREATER SAPHENOUS VEIN, ON CARDIOPULMONARY BYPASS, CORONARY ARTERY BYPASS GRAFT (CABG) X4 . TRANSESOPHAGEAL ECHOCARDIOGRAM.;  Surgeon: Bro Almeida MD;  [facility] OR     CATARACT IOL, RT/LT       COLONOSCOPY N/A  7/17/2019    Procedure: COLONOSCOPY;  Surgeon: Roque Givens MD;  [facility]     CV CORONARY ANGIOGRAM N/A 3/21/2023    Procedure: Coronary Angiogram;  Surgeon: Pito Chapin MD;  Location:  HEART CARDIAC CATH LAB     CV PCI N/A 3/21/2023    Procedure: Percutaneous Coronary Intervention;  Surgeon: Pito Chapin MD;  Location: [facility] CARDIAC CATH LAB     ESOPHAGOSCOPY, GASTROSCOPY, DUODENOSCOPY (EGD), COMBINED N/A 7/17/2019    Procedure: ESOPHAGOGASTRODUODENOSCOPY, WITH BIOPSY;  Surgeon: Roque Givens MD;  [facility]     HERNIA REPAIR  2000     MOHS MICROGRAPHIC PROCEDURE       PHACOEMULSIFICATION CLEAR CORNEA WITH STANDARD INTRAOCULAR LENS IMPLANT Right 7/2/2021    Procedure: RIGHT EYE CATARACT REMOVAL WITH INTRAOCULAR LENS IMPLANT;  Surgeon: Justa Liz MD;  Location: [facility]     PHACOEMULSIFICATION CLEAR CORNEA WITH STANDARD INTRAOCULAR LENS IMPLANT Left 7/9/2021    Procedure: LEFT EYE CATARACT REMOVAL WITH INTRAOCULAR LENS IMPLANT;  Surgeon: Justa Liz MD;  Location: [facility]     [facility] CORONARY STENT PERCUT, EA ADDTL VESSEL         Family Hx:  Family History   Problem Relation Age of Onset     Glaucoma No family hx of      Macular Degeneration No family hx of      Retinal detachment No family hx of      Amblyopia No family hx of      Melanoma No family hx of      Skin Cancer No family hx of          Social Hx:  Social History     Socioeconomic History     Marital status:      Spouse name: Not on file     Number of children: Not on file     Years of education: Not on file     Highest education level: Not on file   Occupational History     Not on file   Tobacco Use     Smoking status: Never     Smokeless tobacco: Never   Substance and Sexual Activity     Alcohol use: Yes     Comment: 1-2 drinks 5 days a week     Drug use: Never     Sexual activity: Not on file   Other Topics Concern     Parent/sibling w/ CABG, MI or angioplasty before 65F 55M? Not Asked   Social  History Narrative     Not on file     Social Determinants of Health     Financial Resource Strain: Low Risk  (10/23/2023)    Financial Resource Strain      Within the past 12 months, have you or your family members you live with been unable to get utilities (heat, electricity) when it was really needed?: No   Food Insecurity: Low Risk  (10/23/2023)    Food Insecurity      Within the past 12 months, did you worry that your food would run out before you got money to buy more?: No      Within the past 12 months, did the food you bought just not last and you didn t have money to get more?: No   Transportation Needs: Low Risk  (10/23/2023)    Transportation Needs      Within the past 12 months, has lack of transportation kept you from medical appointments, getting your medicines, non-medical meetings or appointments, work, or from getting things that you need?: No   Physical Activity: Not on file   Stress: Not on file   Social Connections: Not on file   Interpersonal Safety: Low Risk  (10/30/2023)    Interpersonal Safety      Do you feel physically and emotionally safe where you currently live?: Yes      Within the past 12 months, have you been hit, slapped, kicked or otherwise physically hurt by someone?: No      Within the past 12 months, have you been humiliated or emotionally abused in other ways by your partner or ex-partner?: No   Housing Stability: Low Risk  (10/23/2023)    Housing Stability      Do you have housing? : Yes      Are you worried about losing your housing?: No          MEDICATIONS:  has a current medication list which includes the following prescription(s): aspirin low dose, lisinopril, melatonin, metoprolol succinate er, omeprazole, pravastatin, testosterone, benzonatate, guaifenesin-dextromethorphan, and tacrolimus.    ROS:     ROS: 10 point ROS neg other than the symptoms noted above in the HPI.    GENERAL: some fatigue, wt stable; denies fevers, chills, night sweats.    HEENT: normal sense of  smell; no dysphagia, odonophagia, diplopia, neck pain  THYROID:  no apparent hyper or hypothyroid symptoms  CV: no chest pain, pressure, palpitations  LUNGS: no SOB, ANTHONY, cough, wheezing   ABDOMEN: no diarrhea, constipation, abdominal pain  EXTREMITIES: no rashes, ulcers, edema  NEUROLOGY: decreased sensation at feet; no headaches, denies changes in vision, tingling  MSK: decreased muscle strength, some myalgias as noted; no arthralgias  SKIN: decreased axillary hair growth; no rashes or lesions  : ?irritable bladder, some decreased libido and erectile function  PSYCH:  stable mood, no significant anxiety or depression  ENDOCRINE: no heat or cold intolerance    Physical Exam (visual exam)  VS:  no vital signs taken for video visit  CONSTITUTIONAL: healthy, alert and NAD, well dressed, answering questions appropriately  ENT: no nose swelling or nasal discharge, mouth redness or gum changes.  EYES: eyes grossly normal to inspection, conjunctivae and sclerae normal, no exophthalmos or proptosis  THYROID:  no apparent nodules or goiter  LUNGS: no audible wheeze, cough or visible cyanosis, no visible retractions or increased work of breathing  ABDOMEN: abdomen not evaluated  EXTREMITIES: no hand tremors, limited exam  NEUROLOGY: CN grossly intact, mentation intact and speech normal   SKIN:  no apparent skin lesions, rash, or edema with visualized skin appearance  PSYCH: mentation appears normal, affect normal/bright, judgement and insight intact,   normal speech and appearance well groomed      LABS:    All pertinent notes, labs, and images personally reviewed by me.     A/P:  Encounter Diagnoses   Name Primary?     Other fatigue      Male hypogonadism Yes         Comments:  Reviewed health history and hypogonadism issues.  Suspect symptoms and low free testosterone correlate with male hypogonadism    Plan:  Reviewed general issues with the hypogonadism diagnosis and management  Discussed lab tests to assess  testosterone axis hormone levels.  We have reviewed treatment options with topical, nasal, and injectable testosterone medication use.    Recommend:  Advised starting low dose testosterone medication  Reviewed the testosterone gel med option, dosing, potential benefits and SE's  Start testosterone gel 1% as 2-pumps topically to shoulders daily  Monitor for symptom changes  Plan repeat lab testing in 4-6 weeks   Consider nearby [facility] or [facility]   Lab orders placed  No testicular or pituitary imaging needed at this time  Contact me if questions regarding treatment plan    Keep regular follow-up appointments with PCP, cardiologist also  Addressed patient questions today    There are no Patient Instructions on file for this visit.    Future labs ordered today:   Orders Placed This Encounter   Procedures     CBC with platelets     Prostate Specific Antigen Screen     Testosterone Free and Total     Radiology/Consults ordered today:     Total time spent on day of encounter:  35 min    Follow-up:  1/9/24 at 2:30 pm, Paula White MD, MS  Endocrinology  [facility]    CC: Joao Smiley          Again, thank you for allowing me to participate in the care of your patient.        Sincerely,        Salomon White MD

## 2023-11-02 NOTE — NURSING NOTE
Chief Complaint   Patient presents with    Follow Up     5 mo follow up for PAF     Vitals were taken, medications reconciled, and EKG was performed.    Jordan Jeffery EMT  11:07 AM

## 2023-11-02 NOTE — PROGRESS NOTES
Virtual Visit Details    Type of service:  Video Visit   Video Start Time: 1:03 PM  Video End Time:  1:28 PM    Originating Location (pt. 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 Naresh Chandler/[facility]  8/2021. Lab testing showed low testosterone level, but management plan unclear  Previous  labs include:      Additional health history:   Testicular injury:    none            Testicular surgery:   vasectomy  Testosterone med use:     none        Fam Hx Hypogonadism: none    Previous [facility] labs include:   Latest Reference Range & Units 08/29/22 08:42   Testosterone Total 240 - 950 ng/dL 295      Latest Reference Range & Units 08/29/22 08:42   Free Testosterone Calculated ng/dL 2.20          Cheko 5: 4.1-23.9   Latest Reference Range & Units 08/29/22 08:42   Sex Hormone Binding Globulin 11 - 80 nmol/L 119 (H)      Latest Reference Range & Units 08/29/22 08:42   PSA 0.00 - 4.00 ug/L 0.58      Latest Reference Range & Units 08/03/23 15:34   PSA Tumor Marker 0.00 - 6.50 ng/mL 0.63         1/3/23. Initial endocrinology evaluation with me at [place]  Reviewed health history and testosterone issues  Symptoms included low libido, muscle weakness, decreased axillary hair  Plan for repeat testosterone related labs and then followup evaluation  Previous [facility] testosterone labs include:   Latest Reference Range & Units  08/29/22 08:42 01/28/23 10:13 10/30/23 18:06   Testosterone Total 240 - 950 ng/dL  240 - 950 ng/dL 295 311 255  267      Latest Reference Range & Units 08/29/22 08:42 01/28/23 10:13 10/30/23 18:06   Free Testosterone Calculated ng/dL 2.20 2.73 2.51     Recent [facility] labs include:  Lab Results   Component Value Date    FSH 65.1 (H) 10/30/2023    TESTOSTTOTAL 267 10/30/2023    TESTOSTTOTAL 255 10/30/2023    FT 2.51 10/30/2023    TSH 3.44 10/30/2023    SG 1.016 08/29/2023          Lives in [facility]  Sees Dr. Joao Smiley/[facility] for general medicine evaluations.    PMH/PSH:  Past Medical History:   Diagnosis Date    ASCVD (arteriosclerotic cardiovascular disease)     Coronary artery disease     Gastroesophageal reflux disease     Hypertension     Stented coronary artery      Past Surgical History:   Procedure Laterality Date    BYPASS GRAFT ARTERY CORONARY N/A 4/24/2023    Procedure: MEDIAN STERNOTOMY, HARVEST OF LEFT INTERNAL MAMMARY ARTERY, ENDOSCOPIC HARVEST OF LEFT GREATER SAPHENOUS VEIN, ON CARDIOPULMONARY BYPASS, CORONARY ARTERY BYPASS GRAFT (CABG) X4 . TRANSESOPHAGEAL ECHOCARDIOGRAM.;  Surgeon: Bro Almeida MD;  Location: [facility] OR    CATARACT IOL, RT/LT      COLONOSCOPY N/A 7/17/2019    Procedure: COLONOSCOPY;  Surgeon: Roque Givens MD;  [facility] OR    CV CORONARY ANGIOGRAM N/A 3/21/2023    Procedure: Coronary Angiogram;  Surgeon: Pito Chapin MD;  Location:  HEART CARDIAC CATH LAB    CV PCI N/A 3/21/2023    Procedure: Percutaneous Coronary Intervention;  Surgeon: Pito Chapin MD;  Location:  HEART CARDIAC CATH LAB    ESOPHAGOSCOPY, GASTROSCOPY, DUODENOSCOPY (EGD), COMBINED N/A 7/17/2019    Procedure: ESOPHAGOGASTRODUODENOSCOPY, WITH BIOPSY;  Surgeon: Roque Givens MD;  [facility] OR    HERNIA REPAIR  2000    MOHS MICROGRAPHIC PROCEDURE      PHACOEMULSIFICATION CLEAR CORNEA WITH STANDARD INTRAOCULAR LENS IMPLANT Right 7/2/2021    Procedure: RIGHT EYE CATARACT  REMOVAL WITH INTRAOCULAR LENS IMPLANT;  Surgeon: Justa Liz MD;  [facility]    PHACOEMULSIFICATION CLEAR CORNEA WITH STANDARD INTRAOCULAR LENS IMPLANT Left 7/9/2021    Procedure: LEFT EYE CATARACT REMOVAL WITH INTRAOCULAR LENS IMPLANT;  Surgeon: Justa Liz MD;  [facility]    [facility] CORONARY STENT PERCUT, ANGELA ADDTL VESSEL         Family Hx:  Family History   Problem Relation Age of Onset    Glaucoma No family hx of     Macular Degeneration No family hx of     Retinal detachment No family hx of     Amblyopia No family hx of     Melanoma No family hx of     Skin Cancer No family hx of          Social Hx:  Social History     Socioeconomic History    Marital status:      Spouse name: Not on file    Number of children: Not on file    Years of education: Not on file    Highest education level: Not on file   Occupational History    Not on file   Tobacco Use    Smoking status: Never    Smokeless tobacco: Never   Substance and Sexual Activity    Alcohol use: Yes     Comment: 1-2 drinks 5 days a week    Drug use: Never    Sexual activity: Not on file   Other Topics Concern    Parent/sibling w/ CABG, MI or angioplasty before 65F 55M? Not Asked   Social History Narrative    Not on file     Social Determinants of Health     Financial Resource Strain: Low Risk  (10/23/2023)    Financial Resource Strain     Within the past 12 months, have you or your family members you live with been unable to get utilities (heat, electricity) when it was really needed?: No   Food Insecurity: Low Risk  (10/23/2023)    Food Insecurity     Within the past 12 months, did you worry that your food would run out before you got money to buy more?: No     Within the past 12 months, did the food you bought just not last and you didn t have money to get more?: No   Transportation Needs: Low Risk  (10/23/2023)    Transportation Needs     Within the past 12 months, has lack of transportation kept you from medical appointments,  getting your medicines, non-medical meetings or appointments, work, or from getting things that you need?: No   Physical Activity: Not on file   Stress: Not on file   Social Connections: Not on file   Interpersonal Safety: Low Risk  (10/30/2023)    Interpersonal Safety     Do you feel physically and emotionally safe where you currently live?: Yes     Within the past 12 months, have you been hit, slapped, kicked or otherwise physically hurt by someone?: No     Within the past 12 months, have you been humiliated or emotionally abused in other ways by your partner or ex-partner?: No   Housing Stability: Low Risk  (10/23/2023)    Housing Stability     Do you have housing? : Yes     Are you worried about losing your housing?: No          MEDICATIONS:  has a current medication list which includes the following prescription(s): aspirin low dose, lisinopril, melatonin, metoprolol succinate er, omeprazole, pravastatin, testosterone, benzonatate, guaifenesin-dextromethorphan, and tacrolimus.    ROS:     ROS: 10 point ROS neg other than the symptoms noted above in the HPI.    GENERAL: some fatigue, wt stable; denies fevers, chills, night sweats.    HEENT: normal sense of smell; no dysphagia, odonophagia, diplopia, neck pain  THYROID:  no apparent hyper or hypothyroid symptoms  CV: no chest pain, pressure, palpitations  LUNGS: no SOB, ANTHONY, cough, wheezing   ABDOMEN: no diarrhea, constipation, abdominal pain  EXTREMITIES: no rashes, ulcers, edema  NEUROLOGY: decreased sensation at feet; no headaches, denies changes in vision, tingling  MSK: decreased muscle strength, some myalgias as noted; no arthralgias  SKIN: decreased axillary hair growth; no rashes or lesions  : ?irritable bladder, some decreased libido and erectile function  PSYCH:  stable mood, no significant anxiety or depression  ENDOCRINE: no heat or cold intolerance    Physical Exam (visual exam)  VS:  no vital signs taken for video visit  CONSTITUTIONAL: healthy,  alert and NAD, well dressed, answering questions appropriately  ENT: no nose swelling or nasal discharge, mouth redness or gum changes.  EYES: eyes grossly normal to inspection, conjunctivae and sclerae normal, no exophthalmos or proptosis  THYROID:  no apparent nodules or goiter  LUNGS: no audible wheeze, cough or visible cyanosis, no visible retractions or increased work of breathing  ABDOMEN: abdomen not evaluated  EXTREMITIES: no hand tremors, limited exam  NEUROLOGY: CN grossly intact, mentation intact and speech normal   SKIN:  no apparent skin lesions, rash, or edema with visualized skin appearance  PSYCH: mentation appears normal, affect normal/bright, judgement and insight intact,   normal speech and appearance well groomed      LABS:    All pertinent notes, labs, and images personally reviewed by me.     A/P:  Encounter Diagnoses   Name Primary?    Other fatigue     Male hypogonadism Yes         Comments:  Reviewed health history and hypogonadism issues.  Suspect symptoms and low free testosterone correlate with male hypogonadism    Plan:  Reviewed general issues with the hypogonadism diagnosis and management  Discussed lab tests to assess testosterone axis hormone levels.  We have reviewed treatment options with topical, nasal, and injectable testosterone medication use.    Recommend:  Advised starting low dose testosterone medication  Reviewed the testosterone gel med option, dosing, potential benefits and SE's  Start testosterone gel 1% as 2-pumps topically to shoulders daily  Monitor for symptom changes  Plan repeat lab testing in 4-6 weeks   Consider [facility]   Lab orders placed  No testicular or pituitary imaging needed at this time  Contact me if questions regarding treatment plan    Keep regular follow-up appointments with PCP, cardiologist also  Addressed patient questions today    There are no Patient Instructions on file for this visit.    Future labs ordered today:   Orders  Placed This Encounter   Procedures    CBC with platelets    Prostate Specific Antigen Screen    Testosterone Free and Total     Radiology/Consults ordered today:     Total time spent on day of encounter:  35 min    Follow-up:  1/9/24 at 2:30 pm, Paula White MD, MS  Endocrinology  [facility]    CC: Joao Smiley

## 2023-11-02 NOTE — PATIENT INSTRUCTIONS
You were seen in the Electrophysiology Clinic today by: Kesha SAHU    Plan:     Follow up Visit:  Dr Saenz in 6 months with a 2 week zio prior           If you have further questions, please utilize [employer] to contact us.     Your Care Team:    EP Cardiology   Telephone Number     Nurse Line  Marcie Azevedo, RN   Ami Epps, RN  Mor Dunlap, SEAN   [phone]     For scheduling appointments:   Luis Daniel   [phone]   For procedure scheduling:    Carmen Smlals     [phone]   For the Device Clinic (Pacemakers, ICDs, Loop Recorders)    During business hours: [phone]  After business hours:   [phone]- select option 4 and ask for job code [id number].       On-call cardiologist for after hours or on weekends:   [phone], option #4, and ask to speak to the on-call cardiologist.     Cardiovascular Clinic:   [address] [place] [address]. [place] [address]      As always, Thank you for trusting us with your health care needs!

## 2023-11-02 NOTE — LETTER
2023      RE: Reinaldo Gordillo  [address]  [facility] [address]       Dear Colleague,    Thank you for the opportunity to participate in the care of your patient, Reinaldo Gordillo, at the [facility] [place] [facility]. Please see a copy of my visit note below.        ELECTROPHYSIOLOGY CLINIC VISIT    Assessment/Recommendations   Assessment/Plan:    Reinaldo Gordillo is a 78 year old male with past medical history for HLD, HTN, VT, CAD s/p CABG x4 LIMA to LAD, SVG to OM, SVG to Diag, SVG to GERALDINE on 23 and post-operative atrial AF/AFL.     EP Recommendations:  New Onset Atrial Fibrillation/Flutter post op CABGx4  We discussed in detail with the patient management/treatment options for Duncan includin. Stroke Prophylaxis:  CHADSVASC=4 (++age, +HTN, +CAD) 4, corresponding to a 4.0% annual stroke / systemic emolism event rate. He was started on eliquis following surgery. Discussed since he does not have prior history of AF, can consider stopping AC as outpatient if he is without recurrence. Initial ziopatch - on discharge with 9% AF burden. Amiodarone stopped in  d/t peripheral neuropathy. Repeat zio done off amio -, without atrial arrhythmia recurrence. Ok'd to stop eliquis with understanding that should it recur outside of post-operative setting, we would recommend AC chronically. Patient asymptomatic with AF, will have him complete zio prior to follow up, if without recurrence, likely follow up prn.  2. Rhythm and Rate Control: Continue Toprol XL 25 mg daily.     Follow up 6 months with zio prior. If without recurrence, can follow up with EP as needed.     History of Present Illness/Subjective    Mr. Reinaldo Gordillo is a 78 year old male who comes in today for EP follow-up of post operative atrial fibrillation.    Reinaldo Gordillo is a 78 year old male with past medical history for HLD, HTN, VT, CAD s/p CABG x4 LIMA  to LAD, SVG to OM, SVG to Diag, SVG to GERALDINE on 4/24/23 and post-operative atrial AF/AFL.     Patient was recently admitted from 4/24/23 for CABG x4. During admission, EP was consulted after pt developed atrial fibrillation/flutter 4/27/23. He had been converting in and out of atrial flutter/fib, arrhythmia was not persistent. Rates 60-80 bpm, he had minimal symptoms with occasional palpitations. Amio bolus + gtt started. He has been on lopressor 12.5 mg bid. He had no prior history of atrial arrhythmias. At time of discharge, he completed 14 day ambulatory monitor from 5/12-5/26/23 with 9% AF/AFL burden  bpm.     He saw Dr Saenz in follow up 6/20/23, he voiced concern about instability and previous falls. His amiodarone was reduced to 200 mg daily. Shortly after the visit, his amiodarone was stopped in June due to new and worsening peripheral neuropathy. They discussed continuing anticoagulation for now until repeat ziopatch monitor is completed prior to future follow up. Repeat zio monitor completed from 9/12-9/26/23 off amiodarone without any atrial arrhythmias, blunted sinus rates noted. Following completion of monitor, ok'd to stop eliquis.     He presents today for follow up. He has been feeling well since surgery. His peripheral neuropathy has resolved since stopping amiodarone. He has no new cardiac concerns today, denies chest discomfort, palpitations, peripheral edema, shortness of breath, pre-syncope, or syncope. Presenting 12 lead ECG shows sinus Vent Rate 76 bpm,  ms, QRS 92 ms, QTc 443 ms.     I have reviewed and updated the patient's Past Medical History, Social History, Family History and Medication List.     Cardiographics (Personally Reviewed) :   Echo: 3/21/2023   Interpretation Summary  Global and regional left ventricular function is normal with an EF of 55-60%.  The right ventricle is normal size. Global right ventricular function is  normal.  Mild aortic stenosis is present. The  aortic valve area is 1.8 cm^2, by the  continuity equation.  No pericardial effusion is present.  This study was compared with the study from 10/29/2022. No significant changes  noted.    Ziopatch:   5/12-5/26/23: sinus 45-93 bpm (average 59 bpm), 9% AF/AFL burden  bpm  9/12-9/26/23: sinus  bpm (average 59 bpm), no AF/AFL (off amiodarone)    Coronary Angiogram: 3/21/23   Two vessel coronary artery disease involving the LAD and RCA.  The RCA lesion is highly calcified between stented regions.    Given the severe calcification of the RCA and the long LAD lesion extending to the ostium of the vessel, recommend CVTS consult and eval for CABG.  However, PCI is possible if the patient does not want CABG       Physical Examination   /81 (BP Location: Right arm, Patient Position: Chair, Cuff Size: Adult Regular)   Pulse 71   Wt 91 kg (200 lb 9.6 oz)   SpO2 97%   BMI 28.78 kg/m    Wt Readings from Last 3 Encounters:   11/02/23 91 kg (200 lb 9.6 oz)   10/30/23 90.8 kg (200 lb 3.2 oz)   08/29/23 88.9 kg (196 lb)     General Appearance:   Alert, well-appearing and in no acute distress.   HEENT: Atraumatic, normocephalic. MMM.   Chest/Lungs:   Respirations unlabored.  Lungs are clear to auscultation.   Cardiovascular:   Regular rate and rhythm.  S1/S2. No murmur.    Abdomen:  Soft, nontender, nondistended.   Extremities: No cyanosis or clubbing. No edema.    Musculoskeletal: Moves all extremities.     Skin: Warm, dry, intact.    Neurologic: Mood and affect are appropriate.  Alert and oriented to person, place, time, and situation.          Medications  Allergies   ASA 81 mg daily   Pravastatin 80 mg daily   Toprol XL 25 mg daily   Lisinopril 5 mg daily     Tessalon   Prilosec   Melatonin   Robitussin    No Known Allergies      Lab Results (Personally Reviewed)    Chemistry/lipid CBC Cardiac Enzymes/BNP/TSH/INR   Lab Results   Component Value Date    BUN 19.1 08/03/2023     08/03/2023    CO2 27  08/03/2023     Creatinine   Date Value Ref Range Status   08/03/2023 1.09 0.67 - 1.17 mg/dL Final       Lab Results   Component Value Date    CHOL 188 08/03/2023     08/03/2023    LDL 56 08/03/2023      Lab Results   Component Value Date    WBC 5.8 10/30/2023    HGB 12.7 (L) 10/30/2023    HCT 38.2 (L) 10/30/2023     10/30/2023     10/30/2023    Lab Results   Component Value Date    TSH 3.44 10/30/2023    INR 1.27 (H) 04/25/2023        The patient states understanding and is agreeable with the plan.     Kesha Peck PA-C  [facility]  Electrophysiology Consult Service  Pager: [phone]    I spent a total of 20 minutes face to face with Reinaldo Gordillo during today's office visit. I have spent an additional 20 minutes today on chart review and documentation.

## 2023-11-03 ENCOUNTER — MYC MEDICAL ADVICE (OUTPATIENT)
Dept: ENDOCRINOLOGY | Facility: CLINIC | Age: 78
End: 2023-11-03
Payer: COMMERCIAL

## 2023-11-03 ENCOUNTER — MYC MEDICAL ADVICE (OUTPATIENT)
Dept: DERMATOLOGY | Facility: CLINIC | Age: 78
End: 2023-11-03
Payer: COMMERCIAL

## 2023-11-03 ENCOUNTER — MYC MEDICAL ADVICE (OUTPATIENT)
Dept: INTERNAL MEDICINE | Facility: CLINIC | Age: 78
End: 2023-11-03
Payer: COMMERCIAL

## 2023-11-03 ENCOUNTER — TELEPHONE (OUTPATIENT)
Dept: DERMATOLOGY | Facility: CLINIC | Age: 78
End: 2023-11-03

## 2023-11-03 DIAGNOSIS — R01.1 HEART MURMUR: Primary | ICD-10-CM

## 2023-11-03 NOTE — TELEPHONE ENCOUNTER
Writer contacted pharmacy: tacrolimus RX requires PA    Writer will initiate this     Anne Marie Waller LPN

## 2023-11-06 ENCOUNTER — MYC MEDICAL ADVICE (OUTPATIENT)
Dept: DERMATOLOGY | Facility: CLINIC | Age: 78
End: 2023-11-06
Payer: COMMERCIAL

## 2023-11-06 ENCOUNTER — TELEPHONE (OUTPATIENT)
Dept: ENDOCRINOLOGY | Facility: CLINIC | Age: 78
End: 2023-11-06

## 2023-11-06 DIAGNOSIS — E29.1 MALE HYPOGONADISM: ICD-10-CM

## 2023-11-06 LAB
ATRIAL RATE - MUSE: 76 BPM
DIASTOLIC BLOOD PRESSURE - MUSE: NORMAL MMHG
INTERPRETATION ECG - MUSE: NORMAL
P AXIS - MUSE: 18 DEGREES
PR INTERVAL - MUSE: 180 MS
QRS DURATION - MUSE: 92 MS
QT - MUSE: 394 MS
QTC - MUSE: 443 MS
R AXIS - MUSE: 10 DEGREES
SYSTOLIC BLOOD PRESSURE - MUSE: NORMAL MMHG
T AXIS - MUSE: 34 DEGREES
VENTRICULAR RATE- MUSE: 76 BPM

## 2023-11-07 NOTE — TELEPHONE ENCOUNTER
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  He has had 3 previous lab tests (8/29/22, 1/28/23, and 10/30/23) indicating significantly low (free) testosterone levels and he has characteristic symptoms of low testosterone with hypogonadism.  I have advised starting testosterone gel 1% as 2-pumps amount topically to shoulder areas daily.  This medication is medically necessary.  Please assist with the medication PA, thanks.    ISHMAEL White MD, MS  Endocrinology  [facility]

## 2023-11-08 NOTE — TELEPHONE ENCOUNTER
Prior Authorization Approval    Medication: TACROLIMUS 0.1 % EX OINT  Authorization Effective Date: 10/9/2023  Authorization Expiration Date: 11/7/2024  Approved Dose/Quantity:   Reference #:     Insurance Company: [employer] Non-Specialty PA's - Phone [phone] Fax [phone]  Expected CoPay: $    CoPay Card Available:      Financial Assistance Needed:   Which Pharmacy is filling the prescription: [facility] [id number] [place] [facility] [place], [place] - [address] [place] [address]  Pharmacy Notified: Yes  Patient Notified: **Instructed pharmacy to notify patient when script is ready to /ship.**

## 2023-11-09 NOTE — TELEPHONE ENCOUNTER
PRIOR AUTHORIZATION DENIED    Medication: TESTOSTERONE 12.5 MG/ACT (1%) TD GEL  Insurance Company: [employer] Non-Specialty PA's - Phone [phone] Fax [phone]  Denial Date: 11/9/2023  Denial Rational: Doesn't meet criteria for coverage          Appeal Information:   Patient Notified: No

## 2023-11-30 ENCOUNTER — MYC MEDICAL ADVICE (OUTPATIENT)
Dept: ENDOCRINOLOGY | Facility: CLINIC | Age: 78
End: 2023-11-30
Payer: COMMERCIAL

## 2023-12-06 ENCOUNTER — TELEPHONE (OUTPATIENT)
Dept: CARDIOLOGY | Facility: CLINIC | Age: 78
End: 2023-12-06
Payer: COMMERCIAL

## 2023-12-06 NOTE — TELEPHONE ENCOUNTER
12/6 spoke to patient and scheduled a Zio Patch   Adv pt that he has an appt that he needs to reschedule w/ Bartos and provided w/ sonnest available 2/5  Pt stated that he'll be gone the entire month of feb and wont be back until march   Pt stated he could be seen in Jan   Adv will check w/ nurse to see if theres any availability

## 2023-12-06 NOTE — TELEPHONE ENCOUNTER
Patient Contacted for the patient to call back and schedule the following:    Appointment type: Return Cardiology  Provider: Dr. Chapin  Return date: 1 year follow-up (6/12/24)  Specialty phone number: [phone] option 1  Additional appointment(s) needed: NA  Additonal Notes: 12/6 Spoke w/ pt- pt said nurse would see him on 1/22. I sent a staff message to Jackelyn Contreras for scheduling guidance. MB

## 2023-12-07 ENCOUNTER — MYC MEDICAL ADVICE (OUTPATIENT)
Dept: ENDOCRINOLOGY | Facility: CLINIC | Age: 78
End: 2023-12-07
Payer: COMMERCIAL

## 2023-12-07 NOTE — TELEPHONE ENCOUNTER
Pt seen by Dr. Martinez 11/2/23 for eval of hypogonadism and labs showing significantly low testosterone. Recommended starting testosterone gel 1% as 2 pumps topically to shoulders daily.     11/9/23 - Prior auth was denied. Does not meet criteria for coverage. PA team notified Dr. Martinez pt needs letter of necessity.     11/9/23 - Pt reached out via [employer] asking for next steps.    11/30/23 - Pt reached out via [employer] asking status of next steps.     Chart review doesn't indicate any letter of necessity filled out.   Routing to Dr. Martinez to advise.   Bel Lopez RN on 12/7/2023 at 10:43 AM

## 2023-12-14 ENCOUNTER — MYC MEDICAL ADVICE (OUTPATIENT)
Dept: INTERNAL MEDICINE | Facility: CLINIC | Age: 78
End: 2023-12-14
Payer: COMMERCIAL

## 2023-12-15 NOTE — TELEPHONE ENCOUNTER
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    Last visit with me 10/30/2023 and he has a small murmur and resting echo to follow up on this. I do recommend he get the cardiac echo    ThanksJESSICA

## 2023-12-19 ENCOUNTER — ANCILLARY PROCEDURE (OUTPATIENT)
Dept: CARDIOLOGY | Facility: CLINIC | Age: 78
End: 2023-12-19
Attending: INTERNAL MEDICINE
Payer: COMMERCIAL

## 2023-12-19 DIAGNOSIS — R01.1 HEART MURMUR: ICD-10-CM

## 2023-12-19 LAB — LVEF ECHO: NORMAL

## 2023-12-19 PROCEDURE — 93306 TTE W/DOPPLER COMPLETE: CPT | Mod: GC | Performed by: INTERNAL MEDICINE

## 2023-12-22 ENCOUNTER — OFFICE VISIT (OUTPATIENT)
Dept: NEUROLOGY | Facility: CLINIC | Age: 78
End: 2023-12-22
Payer: COMMERCIAL

## 2023-12-22 VITALS
HEIGHT: 70 IN | WEIGHT: 200.62 LBS | SYSTOLIC BLOOD PRESSURE: 134 MMHG | OXYGEN SATURATION: 97 % | BODY MASS INDEX: 28.72 KG/M2 | HEART RATE: 77 BPM | DIASTOLIC BLOOD PRESSURE: 76 MMHG

## 2023-12-22 DIAGNOSIS — G60.9 HEREDITARY AND IDIOPATHIC PERIPHERAL NEUROPATHY: Primary | ICD-10-CM

## 2023-12-22 PROCEDURE — 99213 OFFICE O/P EST LOW 20 MIN: CPT | Performed by: PSYCHIATRY & NEUROLOGY

## 2023-12-22 NOTE — TELEPHONE ENCOUNTER
Messages reviewed.  I have made inquiries on the PA options given the patient's unusual hormone test results, initiated a formal [facility] appeal, and sent him a summary message today.  See other message for details.    ISHMAEL White MD, MS  Endocrinology  [facility]

## 2023-12-22 NOTE — TELEPHONE ENCOUNTER
Messages reviewed.  I have made inquiries on the PA options given the patient's unusual hormone test results, initiated a formal [facility] appeal, and sent him a summary message today.  See other message for details.    ISHMAEL White MD, MS  Endocrinology  [facility]

## 2023-12-22 NOTE — TELEPHONE ENCOUNTER
Message noted.  Please submit an appeal for the testosterone gel 1% medication.  I have completed a PA letter (in his chart) to substantiate this request.  Thanks.    ISHMAEL White MD, MS  Endocrinology  [facility]

## 2023-12-22 NOTE — TELEPHONE ENCOUNTER
Medication Appeal Initiation    Medication: TESTOSTERONE 12.5 MG/ACT (1%) TD GEL  Appeal Start Date:  12/22/2023  Insurance Company: [employer]  Insurance Phone: [phone]  Insurance Fax: [phone]  Comments:    Faxed over Letter of Appeal 12/

## 2023-12-22 NOTE — NURSING NOTE
"Chief Complaint   Patient presents with    RECHECK     Peripheral neuropathy      /76 (BP Location: Right arm, Patient Position: Chair, Cuff Size: Adult Large)   Pulse 77   Ht 1.778 m (5' 10\")   Wt 91 kg (200 lb 9.9 oz)   SpO2 97%   BMI 28.79 kg/m      Shady Downing, EMT  " Attending note   Category 1 tracing   SROM at 630 am clear fluid   on exam noted 7 /70 /-2   continue Pitocin

## 2023-12-22 NOTE — PROGRESS NOTES
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 Not unexpected in setting of a chronic progressive disease. Counseled about good foot care and fall prevention.   He will get some orthotics to help his feet. No other interventions at this time.  Plan: Orthotics and Prosthetics Order Orthotic; Foot Orthotics; Bilateral  Advised weight training and scheduled exercise to recondition legs.           20 minutes spent caring for the patient on the day of the visit.    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 He returns for a annual follow up. He reports no falls since last visit. He notes his balance is worse with going up and down stairs or at night. He has no pain. He monitors his feet and reports no wounds. His wife raises concern regarding his hammer toes and them rubbing on his toe.     He is no longer taking amiodarone. He had contacted the office in June to report he was taking amiodarone and had found it to be worsening his gait stability and neuropathy. After cessation symptoms quickly returned to normal.    He notes some weakness with hip flexors and quads when trying to stand up from low seating. Acknowledges he has been more sedentary.         Physical Exam:     /76 (BP Location: Right arm, Patient Position: Chair, Cuff Size: Adult Large)   Pulse 77   Ht 1.778 m (5' 10\")   Wt 91 kg (200 lb 9.9 oz)   SpO2 97%   BMI 28.79 kg/m    Gen: awake, alert NAD  Neuro:  Normal strength in lower ext  Vibration sensation absent at toes and ankles normal at the knees  Pinprick patchy from knees down  Some hammer toes and higher arches. "            Pertinent History/Data for appointment:

## 2023-12-22 NOTE — LETTER
12/22/2023       RE: Reinaldo Gordillo  [address]  [facility] [address]       Dear Colleague,    Thank you for referring your patient, Reinaldo Gordillo, to the [facility] NEUROLOGY CLINIC [place] at [facility]. Please see a copy of my visit note below.    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 Not unexpected in setting of a chronic progressive disease. Counseled about good foot care and fall prevention.   He will get some orthotics to help his feet. No other interventions at this time.  Plan: Orthotics and Prosthetics Order Orthotic; Foot Orthotics; Bilateral  Advised weight training and scheduled exercise to recondition legs.           20 minutes spent caring for the patient on the day of the visit.    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 He returns for a annual follow up. He reports no falls since last visit. He notes his balance is worse with going up and down stairs or at night. He has no pain. He monitors his feet and reports no wounds. His wife raises concern regarding his hammer toes and them rubbing on his toe.     He is no longer taking amiodarone. He had contacted the office in June to report he was taking amiodarone and had found it to be worsening his gait stability and neuropathy. After cessation symptoms quickly returned to normal.    He notes some weakness with hip flexors and quads when trying to stand up from low seating. Acknowledges he has been more sedentary.         Physical Exam:     /76 (BP Location: Right arm, Patient Position:  "Chair, Cuff Size: Adult Large)   Pulse 77   Ht 1.778 m (5' 10\")   Wt 91 kg (200 lb 9.9 oz)   SpO2 97%   BMI 28.79 kg/m    Gen: awake, alert NAD  Neuro:  Normal strength in lower ext  Vibration sensation absent at toes and ankles normal at the knees  Pinprick patchy from knees down  Some hammer toes and higher arches.            Pertinent History/Data for appointment:           Again, thank you for allowing me to participate in the care of your patient.      Sincerely,    Bushra Begum MD    "

## 2023-12-23 NOTE — TELEPHONE ENCOUNTER
MEDICATION APPEAL APPROVED    Medication: TESTOSTERONE 12.5 MG/ACT (1%) TD GEL  Authorization Effective Date: 12/22/2023  Authorization Expiration Date: 12/21/2024  Approved Dose/Quantity: 75/60  Reference #: [id number]   [employer] Company:   Expected CoPay: $       CoPay Card Available:    Financial Assistance Needed:   Filling Pharmacy: [facility] [id number] [place] [address]  Patient Notified: Yes  Comments:

## 2023-12-28 ENCOUNTER — MYC MEDICAL ADVICE (OUTPATIENT)
Dept: ENDOCRINOLOGY | Facility: CLINIC | Age: 78
End: 2023-12-28
Payer: COMMERCIAL

## 2024-01-02 RX ORDER — TESTOSTERONE GEL, 1% 10 MG/G
GEL TRANSDERMAL
Qty: 150 G | Refills: 3 | Status: SHIPPED | OUTPATIENT
Start: 2024-01-02 | End: 2024-09-12

## 2024-01-05 ENCOUNTER — TELEPHONE (OUTPATIENT)
Dept: CARDIOLOGY | Facility: CLINIC | Age: 79
End: 2024-01-05
Payer: COMMERCIAL

## 2024-01-05 DIAGNOSIS — I48.0 PAROXYSMAL ATRIAL FIBRILLATION (H): ICD-10-CM

## 2024-01-05 DIAGNOSIS — R01.1 HEART MURMUR: ICD-10-CM

## 2024-01-05 DIAGNOSIS — I25.10 CORONARY ARTERY DISEASE INVOLVING NATIVE CORONARY ARTERY OF NATIVE HEART WITHOUT ANGINA PECTORIS: Primary | ICD-10-CM

## 2024-01-11 DIAGNOSIS — I10 BENIGN ESSENTIAL HYPERTENSION: ICD-10-CM

## 2024-01-15 ENCOUNTER — MYC MEDICAL ADVICE (OUTPATIENT)
Dept: INTERNAL MEDICINE | Facility: CLINIC | Age: 79
End: 2024-01-15
Payer: COMMERCIAL

## 2024-01-15 DIAGNOSIS — G47.9 SLEEP DISORDER: Primary | ICD-10-CM

## 2024-01-15 RX ORDER — ZOLPIDEM TARTRATE 10 MG/1
10 TABLET ORAL
Qty: 30 TABLET | Refills: 0 | Status: SHIPPED | OUTPATIENT
Start: 2024-01-15 | End: 2024-05-02

## 2024-01-16 DIAGNOSIS — I48.0 PAROXYSMAL ATRIAL FIBRILLATION (H): Primary | ICD-10-CM

## 2024-01-16 RX ORDER — METOPROLOL SUCCINATE 25 MG/1
25 TABLET, EXTENDED RELEASE ORAL EVERY MORNING
Qty: 90 TABLET | Refills: 3 | Status: SHIPPED | OUTPATIENT
Start: 2024-01-16

## 2024-01-16 NOTE — TELEPHONE ENCOUNTER
LVD 10/30/2023  [facility] Internal Medicine [place]     Joao Smiley MD  Internal Medicine

## 2024-01-18 ENCOUNTER — MYC MEDICAL ADVICE (OUTPATIENT)
Dept: ENDOCRINOLOGY | Facility: CLINIC | Age: 79
End: 2024-01-18
Payer: COMMERCIAL

## 2024-01-19 ENCOUNTER — OFFICE VISIT (OUTPATIENT)
Dept: DERMATOLOGY | Facility: CLINIC | Age: 79
End: 2024-01-19
Payer: COMMERCIAL

## 2024-01-19 DIAGNOSIS — D49.2 NEOPLASM OF UNSPECIFIED BEHAVIOR OF BONE, SOFT TISSUE, AND SKIN: Primary | ICD-10-CM

## 2024-01-19 PROCEDURE — 88305 TISSUE EXAM BY PATHOLOGIST: CPT | Mod: TC | Performed by: PHYSICIAN ASSISTANT

## 2024-01-19 PROCEDURE — 11104 PUNCH BX SKIN SINGLE LESION: CPT | Performed by: PHYSICIAN ASSISTANT

## 2024-01-19 PROCEDURE — 88305 TISSUE EXAM BY PATHOLOGIST: CPT | Mod: 26 | Performed by: DERMATOLOGY

## 2024-01-19 ASSESSMENT — PAIN SCALES - GENERAL: PAINLEVEL: MILD PAIN (3)

## 2024-01-19 NOTE — NURSING NOTE
Chief Complaint   Patient presents with    Derm Problem     Patient is here today for a lesion of concern on right hand     Bel T CMA

## 2024-01-19 NOTE — PROGRESS NOTES
[facility] Dermatology Note  Encounter Date: Jan 19, 2024  Office Visit     Reviewed patients past medical history and pertinent chart review prior to patients visit today.     Dermatology Problem List:  #. BCC - upper lip- s/p MMS in 2015 - performed in [place]  #. HAK left malar cheek, 1 cm erythematous and hyperkeratotic plaque, dDx: NMSC versus HAK, s/p shave biopsy on 7/23/2019  #. HAK, left lateral forehead, s/p shave biopsy on 7/23/2019  #. BCC left central chest, shave biopsy on 7/23/2019, excision on 8/6/19  #. Actinic keratoses on the face and ears              -s/p cryo 7/23/19, 11/4/22; fluorouracil 5% cream and calcipotriene 0.005% ointment BID x8 day 1/2023  #.  Eczematous Dermatitis of the lower legs              -clobetasol 0.05% ointment BID  #. Pruritus scroti with possible intertrigo              -desonide ointment BID PRN  #. Filiform wart on the L inner thigh - s/p cryo on 7/23/19    Social history: Orthopedic surgeon   ____________________________________________    Assessment & Plan:     # Neoplasm of uncertain behavior:  *** dorsal hand  DDx includes epidermal inclusion cyst vs squamous cell carcinoma. Punch biopsy today.    Procedure Note: Biopsy by punch technique  The risks and benefits of the procedure were described to the patient. These include but are not limited to bleeding, infection, scar, incomplete removal, and non-diagnostic biopsy. Written informed consent was obtained. The *** dorsal hand was cleansed with an alcohol pad and injected with lidocaine with epinephrine buffered with sodium bicarbonate. Once anesthesia was obtained, an 8 mm punch biopsy was performed. The tissue was placed in a labeled container with formalin and sent to pathology. The site was closed with a figure of 8 suture using 4-0 ethilon. Vaseline and a bandage were applied to the wound. The patient tolerated the procedure well and was given post biopsy care instructions.      All risks,  benefits and alternatives were discussed with patient.  Patient is in agreement and understands the assessment and plan.  All questions were answered.  Vero Carlisle PA-C  [facility] Dermatology  _______________________________________    CC: No chief complaint on file.    HPI:  Mr. Reinaldo Gordillo is a(n) 78 year old male who presents today as a return patient for a lesion on the *** dorsal hand. The lesion is tender with pressure, no discharge, bleeding, or itching. Patient is otherwise feeling well, without additional skin concerns.    No growth, tender, no itch or bleeding.   Physical Exam:  SKIN: Focused examination of *** dorsal hand was performed.  - ***    - No other lesions of concern on areas examined.     Medications:  Current Outpatient Medications   Medication    ASPIRIN LOW DOSE 81 MG EC tablet    benzonatate (TESSALON) 100 MG capsule    guaiFENesin-dextromethorphan (ROBITUSSIN DM) 100-10 MG/5ML syrup    lisinopril (ZESTRIL) 5 MG tablet    melatonin 5 MG tablet    metoprolol succinate ER (TOPROL XL) 25 MG 24 hr tablet    omeprazole (PRILOSEC) 20 MG DR capsule    pravastatin (PRAVACHOL) 80 MG tablet    tacrolimus (PROTOPIC) 0.1 % external ointment    testosterone (ANDROGEL 1 % PUMP) 12.5 MG/ACT (1%) gel    zolpidem (AMBIEN) 10 MG tablet     No current facility-administered medications for this visit.      Past Medical History:   Patient Active Problem List   Diagnosis    Nuclear sclerotic cataract of both eyes    Hyperglycemia    Hyperlipidemia    Stented coronary artery    Coronary artery disease involving native coronary artery without angina pectoris    Basal cell carcinoma (BCC) in situ of skin    Colon polyp    Hereditary and idiopathic peripheral neuropathy    Status post coronary angiogram    Coronary artery disease involving native coronary artery of native heart without angina pectoris     Past Medical History:   Diagnosis Date    ASCVD (arteriosclerotic cardiovascular disease)      Coronary artery disease     Gastroesophageal reflux disease     Hypertension     Stented coronary artery        CC Referred Self, MD  No address on file on close of this encounter.

## 2024-01-19 NOTE — PROGRESS NOTES
[facility] Dermatology Note  Encounter Date: Jan 19, 2024  Office Visit     Reviewed patients past medical history and pertinent chart review prior to patients visit today.     Dermatology Problem List:  NUB, right dorsal hand, punch biopsy 1/19/2024     #. BCC - upper lip- s/p MMS in 2015 - performed in CT  #. HAK left malar cheek, 1 cm erythematous and hyperkeratotic plaque, dDx: NMSC versus HAK, s/p shave biopsy on 7/23/2019  #. HAK, left lateral forehead, s/p shave biopsy on 7/23/2019  #. BCC left central chest, shave biopsy on 7/23/2019, excision on 8/6/19  #. Actinic keratoses on the face and ears              -s/p cryo 7/23/19, 11/4/22; fluorouracil 5% cream and calcipotriene 0.005% ointment BID x8 day 1/2023  #.  Eczematous Dermatitis of the lower legs              -clobetasol 0.05% ointment BID  #. Pruritus scroti with possible intertrigo              -desonide ointment BID PRN  #. Filiform wart on the L inner thigh - s/p cryo on 7/23/19    Social history: Orthopedic surgeon   ____________________________________________    Assessment & Plan:     # Neoplasm of uncertain behavior:  right dorsal hand  DDx includes epidermal inclusion cyst vs squamous cell carcinoma. Punch biopsy today.    Procedure Note: Biopsy by punch technique  The risks and benefits of the procedure were described to the patient. These include but are not limited to bleeding, infection, scar, incomplete removal, and non-diagnostic biopsy. Written informed consent was obtained. The right dorsal hand was cleansed with an alcohol pad and injected with lidocaine with epinephrine buffered with sodium bicarbonate. Once anesthesia was obtained, an 8 mm punch biopsy was performed. The tissue was placed in a labeled container with formalin and sent to pathology. The site was closed with a figure of 8 suture using 4-0 prolene. Vaseline and a bandage were applied to the wound. The patient tolerated the procedure well and was  given post biopsy care instructions.      All risks, benefits and alternatives were discussed with patient.  Patient is in agreement and understands the assessment and plan.  All questions were answered.  Vero Carlisle PA-C  [facility] Dermatology  _______________________________________    CC: Derm Problem (Patient is here today for a lesion of concern on right hand)    HPI:  Mr. Reinaldo Gordillo is a(n) 78 year old male who presents today as a return patient for a lesion on the right dorsal hand. The lesion is tender with pressure, no discharge, bleeding, or itching. Of note, the patient was prescribed Efudex/ Dovonex Patient is otherwise feeling well, without additional skin concerns.    Physical Exam:  SKIN: Focused examination of right dorsal hand was performed.  - The right dorsal hand demonstrates a 0.8 x 0.8 cm mobile, well demarcated nodule with overlying scale.     - No other lesions of concern on areas examined.     Medications:  Current Outpatient Medications   Medication    ASPIRIN LOW DOSE 81 MG EC tablet    benzonatate (TESSALON) 100 MG capsule    guaiFENesin-dextromethorphan (ROBITUSSIN DM) 100-10 MG/5ML syrup    lisinopril (ZESTRIL) 5 MG tablet    melatonin 5 MG tablet    metoprolol succinate ER (TOPROL XL) 25 MG 24 hr tablet    omeprazole (PRILOSEC) 20 MG DR capsule    pravastatin (PRAVACHOL) 80 MG tablet    tacrolimus (PROTOPIC) 0.1 % external ointment    testosterone (ANDROGEL 1 % PUMP) 12.5 MG/ACT (1%) gel    zolpidem (AMBIEN) 10 MG tablet     No current facility-administered medications for this visit.      Past Medical History:   Patient Active Problem List   Diagnosis    Nuclear sclerotic cataract of both eyes    Hyperglycemia    Hyperlipidemia    Stented coronary artery    Coronary artery disease involving native coronary artery without angina pectoris    Basal cell carcinoma (BCC) in situ of skin    Colon polyp    Hereditary and idiopathic peripheral neuropathy    Status post  coronary angiogram    Coronary artery disease involving native coronary artery of native heart without angina pectoris     Past Medical History:   Diagnosis Date    ASCVD (arteriosclerotic cardiovascular disease)     Coronary artery disease     Gastroesophageal reflux disease     Hypertension     Stented coronary artery        CC Referred Self, MD  No address on file on close of this encounter.

## 2024-01-19 NOTE — PATIENT INSTRUCTIONS
Wound Care After a Biopsy    What is a skin biopsy?  A skin biopsy allows the doctor to examine a very small piece of tissue under the microscope to determine the diagnosis and the best treatment for the skin condition. A local anesthetic (numbing medicine) is injected with a very small needle into the skin area to be tested. A small piece of skin is taken from the area. Sometimes a suture (stitch) is used.     What are the risks of a skin biopsy?  I will experience scar, bleeding, swelling, pain, crusting and redness. I may experience incomplete removal or recurrence. Risks of this procedure are excessive bleeding, bruising, infection, nerve damage, numbness, thick (hypertrophic or keloidal) scar and non-diagnostic biopsy.    How should I care for my wound for the first 24 hours?  Keep the wound dry and covered for 24 hours  If it bleeds, hold direct pressure on the area for 15 minutes. If bleeding does not stop, call us or go to the emergency room  Avoid strenuous exercise the first 1-2 days or as your doctor instructs you    How should I care for the wound after 24 hours?  After 24 hours, remove the bandage  You may bathe or shower as normal  If you had a scalp biopsy, you can shampoo as usual and can use shower water to clean the biopsy site daily  Clean the wound once a day with gentle soap and water  Do not scrub, be gentle  Apply white petroleum/Vaseline after cleaning the wound with a cotton swab or a clean finger, and keep the site covered with a Bandaid /bandage. Bandages are not necessary with a scalp biopsy  If you are unable to cover the site with a Bandaid /bandage, re-apply ointment 2-3 times a day to keep the site moist. Moisture will help with healing  Avoid strenuous activity for first 1-2 days  Avoid lakes, rivers, pools, and oceans until the stitches are removed or the site is healed    How do I clean my wound?  Wash hands thoroughly with soap or use hand  before all wound care  Clean  the wound with gentle soap and water  Apply white petroleum/Vaseline  to wound after it is clean  Replace the Bandaid /bandage to keep the wound covered for the first few days or as instructed by your doctor  If you had a scalp biopsy, warm shower water to the area on a daily basis should suffice    What should I use to clean my wound?   Cotton-tipped applicators (Qtips )  White petroleum jelly (Vaseline ). Use a clean new container and use Q-tips to apply.  Bandaids  as needed  Gentle soap     How should I care for my wound long term?  Do not get your wound dirty  Keep up with wound care for one week or until the area is healed.  If you have stitches, stitches need to be removed in 10 days. You may return to our clinic for this or you may have it done locally at your doctor s office.  A small scab will form and fall off by itself when the area is completely healed. The area will be red and will become pink in color as it heals. Sun protection is very important for how your scar will turn out. Sunscreen with an SPF 30 or greater is recommended once the area is healed.  You should have some soreness but it should be mild and slowly go away over several days. Talk to your doctor about using tylenol for pain,    When should I call my doctor?  If you have increased:   Pain or swelling  Pus or drainage (clear or slightly yellow drainage is ok)  Temperature over 100F  Spreading redness or warmth around wound    When will I hear about my results?  The biopsy results can take 2 weeks to come back.  Your results will automatically release to [employer] before your provider has even reviewed them.  The clinic will call you with the results, send you a [employer] message, or have you schedule a follow-up clinic or phone time to discuss the results.  Contact our clinics if you do not hear from us in 2 weeks.    Who should I call with questions?  [facility]: [phone]  [facility]  [facility]: [phone]  For urgent needs outside of business hours call the [facility] at [phone] and ask for the dermatology resident on call

## 2024-01-19 NOTE — NURSING NOTE
Lidocaine-epinephrine 1-1:095411 % injection   1mL once for one use, starting 1/19/2024 ending 1/19/2024,  2mL disp, R-0, injection  Injected by Scar MCGEE CMA

## 2024-01-19 NOTE — LETTER
1/19/2024       RE: Reinaldo Gordillo  [address]  [facility] [address]     Dear Colleague,    Thank you for referring your patient, Reinaldo Gordillo, to the [facility] DERMATOLOGY CLINIC [place] at [facility]. Please see a copy of my visit note below.    [facility] Dermatology Note  Encounter Date: Jan 19, 2024  Office Visit     Reviewed patients past medical history and pertinent chart review prior to patients visit today.     Dermatology Problem List:  NUB, right dorsal hand, punch biopsy 1/19/2024     #. BCC - upper lip- s/p MMS in 2015 - performed in CT  #. HAK left malar cheek, 1 cm erythematous and hyperkeratotic plaque, dDx: NMSC versus HAK, s/p shave biopsy on 7/23/2019  #. HAK, left lateral forehead, s/p shave biopsy on 7/23/2019  #. BCC left central chest, shave biopsy on 7/23/2019, excision on 8/6/19  #. Actinic keratoses on the face and ears              -s/p cryo 7/23/19, 11/4/22; fluorouracil 5% cream and calcipotriene 0.005% ointment BID x8 day 1/2023  #.  Eczematous Dermatitis of the lower legs              -clobetasol 0.05% ointment BID  #. Pruritus scroti with possible intertrigo              -desonide ointment BID PRN  #. Filiform wart on the L inner thigh - s/p cryo on 7/23/19    Social history: Orthopedic surgeon   ____________________________________________    Assessment & Plan:     # Neoplasm of uncertain behavior:  right dorsal hand  DDx includes epidermal inclusion cyst vs squamous cell carcinoma. Punch biopsy today.    Procedure Note: Biopsy by punch technique  The risks and benefits of the procedure were described to the patient. These include but are not limited to bleeding, infection, scar, incomplete removal, and non-diagnostic biopsy. Written informed consent was obtained. The right dorsal hand was cleansed with an alcohol pad and injected with lidocaine with epinephrine buffered with sodium  bicarbonate. Once anesthesia was obtained, an 8 mm punch biopsy was performed. The tissue was placed in a labeled container with formalin and sent to pathology. The site was closed with a figure of 8 suture using 4-0 prolene. Vaseline and a bandage were applied to the wound. The patient tolerated the procedure well and was given post biopsy care instructions.      All risks, benefits and alternatives were discussed with patient.  Patient is in agreement and understands the assessment and plan.  All questions were answered.  Vero Carlisle PA-C  [facility] Dermatology  _______________________________________    CC: Derm Problem (Patient is here today for a lesion of concern on right hand)    HPI:  Mr. Reinaldo Gordillo is a(n) 78 year old male who presents today as a return patient for a lesion on the right dorsal hand. The lesion is tender with pressure, no discharge, bleeding, or itching. Of note, the patient was prescribed Efudex/ Dovonex Patient is otherwise feeling well, without additional skin concerns.    Physical Exam:  SKIN: Focused examination of right dorsal hand was performed.  - The right dorsal hand demonstrates a 0.8 x 0.8 cm mobile, well demarcated nodule with overlying scale.     - No other lesions of concern on areas examined.     Medications:  Current Outpatient Medications   Medication    ASPIRIN LOW DOSE 81 MG EC tablet    benzonatate (TESSALON) 100 MG capsule    guaiFENesin-dextromethorphan (ROBITUSSIN DM) 100-10 MG/5ML syrup    lisinopril (ZESTRIL) 5 MG tablet    melatonin 5 MG tablet    metoprolol succinate ER (TOPROL XL) 25 MG 24 hr tablet    omeprazole (PRILOSEC) 20 MG DR capsule    pravastatin (PRAVACHOL) 80 MG tablet    tacrolimus (PROTOPIC) 0.1 % external ointment    testosterone (ANDROGEL 1 % PUMP) 12.5 MG/ACT (1%) gel    zolpidem (AMBIEN) 10 MG tablet     No current facility-administered medications for this visit.      Past Medical History:   Patient Active Problem List    Diagnosis    Nuclear sclerotic cataract of both eyes    Hyperglycemia    Hyperlipidemia    Stented coronary artery    Coronary artery disease involving native coronary artery without angina pectoris    Basal cell carcinoma (BCC) in situ of skin    Colon polyp    Hereditary and idiopathic peripheral neuropathy    Status post coronary angiogram    Coronary artery disease involving native coronary artery of native heart without angina pectoris     Past Medical History:   Diagnosis Date    ASCVD (arteriosclerotic cardiovascular disease)     Coronary artery disease     Gastroesophageal reflux disease     Hypertension     Stented coronary artery        CC Referred Self, MD  No address on file on close of this encounter.

## 2024-01-21 ENCOUNTER — ORDERS ONLY (AUTO-RELEASED) (OUTPATIENT)
Dept: INTERNAL MEDICINE | Facility: CLINIC | Age: 79
End: 2024-01-21
Payer: COMMERCIAL

## 2024-01-21 DIAGNOSIS — Z12.11 SPECIAL SCREENING FOR MALIGNANT NEOPLASMS, COLON: ICD-10-CM

## 2024-01-21 NOTE — CONFIDENTIAL NOTE
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 Overall stable. He has possibly some higher BP values at home 150/90 range but also some lower values 114/70 range. He is on 10 mg of Lisinopril (he used to be on 20 mg before by-pass surgery). He is not dizzy when he stands up. He has a little balance issue. No recent falls. He tries to get exercise but more difficult in the winter. Otherwise, no additional HEENT, cardiopulmonary, abdominal, , neurological, systemic, psychiatric, lymphatic, endocrine, vascular complaints.      Past Medical History:   Diagnosis Date    ASCVD (arteriosclerotic cardiovascular disease)     Coronary artery disease     Gastroesophageal reflux disease     Hypertension     Stented coronary artery      Past Surgical History:   Procedure Laterality Date    BYPASS GRAFT ARTERY CORONARY N/A 4/24/2023    Procedure: MEDIAN STERNOTOMY, HARVEST OF LEFT INTERNAL MAMMARY ARTERY, ENDOSCOPIC HARVEST OF LEFT GREATER SAPHENOUS VEIN, ON CARDIOPULMONARY BYPASS, CORONARY ARTERY BYPASS GRAFT (CABG) X4 . TRANSESOPHAGEAL ECHOCARDIOGRAM.;  Surgeon: Bro Almeida MD;  Location: [facility] OR    CATARACT IOL, RT/LT      COLONOSCOPY N/A 7/17/2019    Procedure: COLONOSCOPY;  Surgeon: Roque Givens MD;  Location: [facility] OR    CV CORONARY ANGIOGRAM N/A 3/21/2023    Procedure: Coronary Angiogram;  Surgeon: Pito Chapin MD;  Location: [facility] CARDIAC CATH LAB    CV PCI N/A 3/21/2023    Procedure: Percutaneous Coronary Intervention;  Surgeon: Pito Chapin MD;  Location: [facility] CARDIAC CATH LAB    ESOPHAGOSCOPY, GASTROSCOPY, DUODENOSCOPY (EGD), COMBINED N/A 7/17/2019    Procedure: ESOPHAGOGASTRODUODENOSCOPY, WITH BIOPSY;  Surgeon: Roque Givens MD;  Location: [facility] OR    HERNIA REPAIR  2000    MOHS MICROGRAPHIC PROCEDURE      PHACOEMULSIFICATION CLEAR CORNEA WITH STANDARD INTRAOCULAR LENS IMPLANT Right 7/2/2021    Procedure: RIGHT EYE CATARACT REMOVAL WITH INTRAOCULAR LENS IMPLANT;  Surgeon: Justa Liz  MD;  Location: [facility] OR    PHACOEMULSIFICATION CLEAR CORNEA WITH STANDARD INTRAOCULAR LENS IMPLANT Left 2021    Procedure: LEFT EYE CATARACT REMOVAL WITH INTRAOCULAR LENS IMPLANT;  Surgeon: Justa Liz MD;  Location: [facility] OR    [facility] CORONARY STENT PERCUT, EA ADDTL VESSEL       PE:    Vitals noted, gen, nad, cooperative, alert, neck supple nl rom, lungs with good air movement, RRR, S1 S2, systolic murmur present abdomen, no acute findings. Grossly ashley neurological exam. No B LE swelling.     Echocardiogram Complete  [id number]  [id number]  [id number]  [id number]^VAUGHN^YULY^NATALIYA     [facility]  Diagnostic and Treatment-3rd Floor  [address] [place] [address]     Name: RY KOENIG  MRN: [id number]  : 1945  Study Date: 2023 04:51 PM  Age: 78 yrs  Gender: Male  Patient [facility]: [facility]  Reason For Study: Heart murmur  Ordering Physician: YULY MENDES  Referring Physician: YULY MENDES  Performed By: Suly Magdaleno RDCS     BSA: 2.1 m2  Height: 70 in  Weight: 200 lb  HR: 59  BP: 128/81 mmHg  ______________________________________________________________________________  Procedure  Echocardiogram with two-dimensional, color and spectral Doppler performed.  ______________________________________________________________________________  Interpretation Summary  Global and regional left ventricular function is normal with an EF of 55-60%.  Global right ventricular function is mildly reduced.  Moderate left atrial enlargement is present. Severe right atrial enlargement  is present.  Moderate aortic valve calcification is present. Mild aortic stenosis is  present. The peak aortic velocity is 2.8 m/sec.  The aortic valve area is 1.4 cm^2, by the continuity equation. The mean  gradient across the aortic valve is 18 mmHg.  No pericardial effusion is present.  This study was compared with the study from 3/21/2023. RV dysfunction is  new.  Aortic valve stenosis has progressed slightly though remains mild in severity  Mild right ventricular dilation is present.  ______________________________________________________________________________  Left Ventricle  Global and regional left ventricular function is normal with an EF of 55-60%.  Left ventricular wall thickness is normal. Left ventricular size is normal.  Left ventricular diastolic function is normal.     Right Ventricle  Mild right ventricular dilation is present. Global right ventricular function  is mildly reduced.     Atria  Moderate left atrial enlargement is present. Severe right atrial enlargement  is present.     Mitral Valve  The mitral valve is normal. Trace mitral insufficiency is present.     Aortic Valve  Moderate aortic valve calcification is present. Trace aortic insufficiency is  present. Mild aortic stenosis is present. The peak aortic velocity is 2.8  m/sec. The aortic valve area is 1.4 cm^2, by the continuity equation. The  mean  gradient across the aortic valve is 18 mmHg.     Tricuspid Valve  The tricuspid valve is normal. Trace tricuspid insufficiency is present.  Pulmonary artery systolic pressure cannot be assessed.     Pulmonic Valve  The pulmonic valve is normal. On Doppler interrogation, there is no  significant stenosis or regurgitation.     Vessels  The thoracic aorta is normal. The pulmonary artery and bifurcation cannot be  assessed. Sinuses of Valsalva 3.9 cm. Ascending aorta 3.8 cm. IVC diameter and  respiratory changes fall into an intermediate range suggesting an RA pressure  of 8 mmHg.     Pericardium  No pericardial effusion is present.     Compared to Previous Study  This study was compared with the study from 3/21/2023 . RV dysfunction is new.  Aortic valve stenosis has progressed slightly though remains mild in severity.     Attestation  I have personally viewed the imaging and agree with the interpretation and  report as documented by the fellow, Naresh  Svet, and/or edited by me.  ______________________________________________________________________________  MMode/2D Measurements & Calculations  RVDd: 5.3 cm  IVSd: 1.2 cm  LVIDd: 4.5 cm  LVIDs: 3.2 cm  LVPWd: 0.94 cm  FS: 29.6 %  LV mass(C)d: 167.2 grams  LV mass(C)dI: 80.1 grams/m2  Ao root diam: 3.9 cm  asc Aorta Diam: 3.8 cm  LVOT diam: 2.4 cm  LVOT area: 4.7 cm2  Ao root diam index Ht(cm/m): 2.2  Ao root diam index BSA (cm/m2): 1.9  Asc Ao diam index BSA (cm/m2): 1.8  Asc Ao diam index Ht(cm/m): 2.1  LA Volume (BP): 90.1 ml     LA Volume Index (BP): 43.1 ml/m2  RWT: 0.41  TAPSE: 1.6 cm     Doppler Measurements & Calculations  MV E max andrea: 72.2 cm/sec  MV A max andrea: 56.3 cm/sec  MV E/A: 1.3  MV dec slope: 367.6 cm/sec2  MV dec time: 0.20 sec  Ao V2 max: 289.9 cm/sec  Ao max P.0 mmHg  Ao V2 mean: 200.8 cm/sec  Ao mean P.9 mmHg  Ao V2 VTI: 70.0 cm  BELKIS(I,D): 1.4 cm2  BELKIS(V,D): 1.5 cm2  LV V1 max PG: 3.5 mmHg  LV V1 max: 93.4 cm/sec  LV V1 VTI: 20.3 cm  SV(LVOT): 95.2 ml  SI(LVOT): 45.6 ml/m2  AV Andrea Ratio (DI): 0.32  BELKIS Index (cm2/m2): 0.65     E/E' av.4  Lateral E/e': 5.2  Medial E/e': 9.6  RV S Andrea: 9.4 cm/sec     ______________________________________________________________________________  Report approved by: Ajit Arredondo 2023 09:35 AM              A/P:     1. Immunizations; COVID Pfizer vaccine x 5 with bi-valent Pfizer on 2022. He has completed the Shigrix vaccine series. Tdap 2015. Pneumococcal 23 done 2019. Prevnar 13 done 5/15/2018. Influenza vaccine done 2023.   2. Dermatology; seen on 2024 for skin check. next manuel. 2024 with Dr. Randall.   3. PSA; 0.58 on 2022 and repeat 0.63 on 8/3/2023.   4. Colonoscopy; 2019 with no specimens collected. Ordered Cologarud 2024.   5. Increased lipids on Pravastatin 80 mg; lipids (8/3/2023) with TG 60, LDL 56, . He could not tolerate Crestor (CK 1600) with muscle complaints.   6. Hgb on  4/30/2023 was 9.3. And repeat CBC  with Hgb increased to 12.6 (8/3/23).  7. Neuropathy: visit with Dr. Begum, Neurology on 12/22/2023  with follow up scheduled for 6/14/2024.    8. A1c was 6.2% on 4/12/2023. Repeat 6.3% on 8/3/2023.   9. CAD; had PCI with AREN x 4 in 2015. Seen 12/15/2021, [facility] Dr. Madrigal, note in Care Everywhere and there is a detailed note in the chart. He had 11/14/2022 appt. With Dr. Chapin. Resting echo 10/29/2022. He had CABG x 4 on 4/24/2023. Visit with Dr. Chapin on 6/12/23 and remains on aspirin, metoprolol and pravastatin. Soft murmur; he had a 3/21/2023 echo  with aortic stenosis. . He has 4/29/2024 cardiology follow up with Dr. Chapin. Last echo 12/19/2023 with some R sided findings. Dr. Chapin has ordered repeat study in a month or so.   10. A fib/flutter: EP visit with Dr. Saenz on 6/20/23 for a fib/flutter after CABG (April 2023). Low burden PAF (9%) on recent zio patch 5/12/23. He is off  Eliquis, stopped amiodarone due to neuropathy complaints. He was seen by Ms. Peck  EP appt. 11/2/2023 and he next 5/7/2024 by Dr. Saenz.   11. Endocrine: TSH normal (3.44) on 10/30/2023. He does not feel he has sleep apnea. Some mild depression? Low testosterone 2.20 on 8/29/2022 and he had an endocrinology appt. With Dr. White 11/2/2023.    12. Recurrent UTIs and hematuria: UA 7/14/23 with RBCs (9), no signs infection. UA 6/20/2023 with nitrites, LE, WBCs, RBCs (100) and treated with Bactrim x 10 days. UA 6/2/23 with LE, WBCs and RBCs (43) and treated with Bactrim. CT abdomen/pelvis 10/20/2022 with 5 mm non-obstructive right kidney stone, but he notes that is chronic. He was seen 8/29/2023 in Urology by Ms. Callaway.           30 minutes spent on the date of the encounter doing chart review, history and exam, documentation and further activities as noted above exclusive of procedures and other billable interpretations

## 2024-01-22 ENCOUNTER — TELEPHONE (OUTPATIENT)
Dept: DERMATOLOGY | Facility: CLINIC | Age: 79
End: 2024-01-22

## 2024-01-22 ENCOUNTER — OFFICE VISIT (OUTPATIENT)
Dept: INTERNAL MEDICINE | Facility: CLINIC | Age: 79
End: 2024-01-22
Payer: COMMERCIAL

## 2024-01-22 ENCOUNTER — MYC MEDICAL ADVICE (OUTPATIENT)
Dept: DERMATOLOGY | Facility: CLINIC | Age: 79
End: 2024-01-22

## 2024-01-22 VITALS
OXYGEN SATURATION: 99 % | BODY MASS INDEX: 29.69 KG/M2 | WEIGHT: 207.4 LBS | HEIGHT: 70 IN | HEART RATE: 80 BPM | SYSTOLIC BLOOD PRESSURE: 141 MMHG | DIASTOLIC BLOOD PRESSURE: 85 MMHG

## 2024-01-22 DIAGNOSIS — E78.00 HIGH BLOOD CHOLESTEROL: ICD-10-CM

## 2024-01-22 DIAGNOSIS — Z12.11 SPECIAL SCREENING FOR MALIGNANT NEOPLASMS, COLON: Primary | ICD-10-CM

## 2024-01-22 DIAGNOSIS — D04.61 SQUAMOUS CELL CARCINOMA IN SITU (SCCIS) OF DORSUM OF RIGHT HAND: Primary | ICD-10-CM

## 2024-01-22 LAB
PATH REPORT.COMMENTS IMP SPEC: ABNORMAL
PATH REPORT.COMMENTS IMP SPEC: ABNORMAL
PATH REPORT.COMMENTS IMP SPEC: YES
PATH REPORT.FINAL DX SPEC: ABNORMAL
PATH REPORT.GROSS SPEC: ABNORMAL
PATH REPORT.MICROSCOPIC SPEC OTHER STN: ABNORMAL
PATH REPORT.RELEVANT HX SPEC: ABNORMAL

## 2024-01-22 PROCEDURE — 91320 SARSCV2 VAC 30MCG TRS-SUC IM: CPT | Performed by: INTERNAL MEDICINE

## 2024-01-22 PROCEDURE — 99214 OFFICE O/P EST MOD 30 MIN: CPT | Mod: 25 | Performed by: INTERNAL MEDICINE

## 2024-01-22 PROCEDURE — 90480 ADMN SARSCOV2 VAC 1/ONLY CMP: CPT | Performed by: INTERNAL MEDICINE

## 2024-01-22 NOTE — TELEPHONE ENCOUNTER
Called patient to schedule surgery with Dr. Tovar    Date of Surgery: 03/07    Surgery type: Mohs    Consult scheduled: Yes    Has patient had mohs with us before? No    Additional comments: pt will be on a trip for all of February. Scheduled consult and mohs for once he returns.       Nicole Aguirre on 1/22/2024 at 2:56 PM

## 2024-01-23 NOTE — TELEPHONE ENCOUNTER
The patient would like a call back to discuss next steps prior to getting a Virtual follow up scheduled for this week, I encouraged the patient to schedule he refused until he speaks with the care team first thank you.

## 2024-01-24 ENCOUNTER — MYC MEDICAL ADVICE (OUTPATIENT)
Dept: ENDOCRINOLOGY | Facility: CLINIC | Age: 79
End: 2024-01-24
Payer: COMMERCIAL

## 2024-01-26 ENCOUNTER — LAB (OUTPATIENT)
Dept: LAB | Facility: CLINIC | Age: 79
End: 2024-01-26
Payer: COMMERCIAL

## 2024-01-26 DIAGNOSIS — E29.1 MALE HYPOGONADISM: ICD-10-CM

## 2024-01-26 DIAGNOSIS — Z12.11 SPECIAL SCREENING FOR MALIGNANT NEOPLASMS, COLON: ICD-10-CM

## 2024-01-26 DIAGNOSIS — E78.00 HIGH BLOOD CHOLESTEROL: ICD-10-CM

## 2024-01-26 LAB
CHOLEST SERPL-MCNC: 172 MG/DL
ERYTHROCYTE [DISTWIDTH] IN BLOOD BY AUTOMATED COUNT: 12 % (ref 10–15)
FASTING STATUS PATIENT QL REPORTED: YES
HCT VFR BLD AUTO: 41.3 % (ref 40–53)
HDLC SERPL-MCNC: 87 MG/DL
HGB BLD-MCNC: 14.1 G/DL (ref 13.3–17.7)
LDLC SERPL CALC-MCNC: 74 MG/DL
MCH RBC QN AUTO: 33.5 PG (ref 26.5–33)
MCHC RBC AUTO-ENTMCNC: 34.1 G/DL (ref 31.5–36.5)
MCV RBC AUTO: 98 FL (ref 78–100)
NONHDLC SERPL-MCNC: 85 MG/DL
PLATELET # BLD AUTO: 189 10E3/UL (ref 150–450)
PSA SERPL DL<=0.01 NG/ML-MCNC: 0.56 NG/ML (ref 0–6.5)
RBC # BLD AUTO: 4.21 10E6/UL (ref 4.4–5.9)
SHBG SERPL-SCNC: 76 NMOL/L (ref 11–80)
TRIGL SERPL-MCNC: 56 MG/DL
WBC # BLD AUTO: 4.2 10E3/UL (ref 4–11)

## 2024-01-26 PROCEDURE — 99000 SPECIMEN HANDLING OFFICE-LAB: CPT | Performed by: PATHOLOGY

## 2024-01-26 PROCEDURE — 80061 LIPID PANEL: CPT | Performed by: PATHOLOGY

## 2024-01-26 PROCEDURE — 85027 COMPLETE CBC AUTOMATED: CPT | Performed by: PATHOLOGY

## 2024-01-26 PROCEDURE — 84403 ASSAY OF TOTAL TESTOSTERONE: CPT | Performed by: INTERNAL MEDICINE

## 2024-01-26 PROCEDURE — 84270 ASSAY OF SEX HORMONE GLOBUL: CPT | Performed by: INTERNAL MEDICINE

## 2024-01-26 PROCEDURE — G0103 PSA SCREENING: HCPCS | Performed by: PATHOLOGY

## 2024-01-26 PROCEDURE — 36415 COLL VENOUS BLD VENIPUNCTURE: CPT | Performed by: PATHOLOGY

## 2024-01-26 NOTE — TELEPHONE ENCOUNTER
Message noted.  See patient phone message note from today for more details on plan.    ISHMAEL White MD, MS  Endocrinology  [facility]

## 2024-01-26 NOTE — TELEPHONE ENCOUNTER
Message reviewed.  This patient had the testosterone gel 1% medication denial appeal approved in 12/2023.  This medication quantity limitation is unexpected and not logical.  I called the [facility] PA dept today and spoke with [place].  She reviewed this information, confirmed the approval of this testosterone gel medication.  She said [facility] now uses [facility] pharmacy system since 1/2024 and that the medication approval was not documented with [facility], it should have been.  She will contact [facility] to correct this error so patient can get medication refills.    I called patient, explained this information.  He is leaving for [place] next week and will be gone for 1 month.  He said he has been using the testosterone gel 1% as 2-pumps topically each day, feels better on this medication and he anticipates having enough of the medication until he returns to [place] in 1-month.  He plans to have the [facility] testosterone related lab tests tomorrow and will review my lab result [employer] summary message when available.    He thanked me for investigating this medication issues and answering his questions.    ISHMAEL White MD, MS  Endocrinology  [facility]

## 2024-01-27 ENCOUNTER — ORDERS ONLY (AUTO-RELEASED) (OUTPATIENT)
Dept: INTERNAL MEDICINE | Facility: CLINIC | Age: 79
End: 2024-01-27
Payer: COMMERCIAL

## 2024-01-27 DIAGNOSIS — Z12.11 SPECIAL SCREENING FOR MALIGNANT NEOPLASMS, COLON: ICD-10-CM

## 2024-01-27 LAB — NONINV COLON CA DNA+OCC BLD SCRN STL QL: NORMAL

## 2024-01-30 LAB
TESTOST FREE SERPL-MCNC: 8.24 NG/DL
TESTOST SERPL-MCNC: 680 NG/DL (ref 240–950)

## 2024-02-02 ENCOUNTER — MYC MEDICAL ADVICE (OUTPATIENT)
Dept: DERMATOLOGY | Facility: CLINIC | Age: 79
End: 2024-02-02
Payer: COMMERCIAL

## 2024-02-12 ENCOUNTER — TELEPHONE (OUTPATIENT)
Dept: INTERNAL MEDICINE | Facility: CLINIC | Age: 79
End: 2024-02-12
Payer: COMMERCIAL

## 2024-02-20 ENCOUNTER — TELEPHONE (OUTPATIENT)
Dept: CARDIOLOGY | Facility: CLINIC | Age: 79
End: 2024-02-20
Payer: COMMERCIAL

## 2024-02-20 NOTE — TELEPHONE ENCOUNTER
Patient Contacted for the patient to call back and schedule the following:    Appointment type: return cardiology   Provider: dakota   Return date: 07/01/24  Specialty phone number: [phone] opt 1   Additional appointment(s) needed: n/a   Additonal Notes: pt was frustrated that Dr. Chapin Next available appointment is in July 2024 pt is wanting to be seen sooner advised I will add pt to waitlist but did change appointment. To 07/21/24

## 2024-02-22 ENCOUNTER — TELEPHONE (OUTPATIENT)
Dept: CARDIOLOGY | Facility: CLINIC | Age: 79
End: 2024-02-22
Payer: COMMERCIAL

## 2024-02-22 NOTE — TELEPHONE ENCOUNTER
Patient Contacted for the patient to call back and schedule the following:    Appointment type: return cardiology   Provider: dakota   Return date: 05/1324  Specialty phone number: [phone] opt 1   Additional appointment(s) needed: n/a   Additonal Notes: callled to offer pt an appointent in 06/03/24 but pt was already set up with 05/13/24

## 2024-03-04 ENCOUNTER — PRE VISIT (OUTPATIENT)
Dept: DERMATOLOGY | Facility: CLINIC | Age: 79
End: 2024-03-04

## 2024-03-04 ENCOUNTER — OFFICE VISIT (OUTPATIENT)
Dept: DERMATOLOGY | Facility: CLINIC | Age: 79
End: 2024-03-04
Payer: COMMERCIAL

## 2024-03-04 ENCOUNTER — MYC MEDICAL ADVICE (OUTPATIENT)
Dept: INTERNAL MEDICINE | Facility: CLINIC | Age: 79
End: 2024-03-04

## 2024-03-04 DIAGNOSIS — I10 BENIGN ESSENTIAL HYPERTENSION: ICD-10-CM

## 2024-03-04 DIAGNOSIS — C44.622 SCC (SQUAMOUS CELL CARCINOMA), HAND, RIGHT: Primary | ICD-10-CM

## 2024-03-04 DIAGNOSIS — L57.0 ACTINIC KERATOSIS: ICD-10-CM

## 2024-03-04 PROCEDURE — 99213 OFFICE O/P EST LOW 20 MIN: CPT | Mod: 25 | Performed by: DERMATOLOGY

## 2024-03-04 PROCEDURE — 17000 DESTRUCT PREMALG LESION: CPT | Mod: GC | Performed by: DERMATOLOGY

## 2024-03-04 PROCEDURE — 17003 DESTRUCT PREMALG LES 2-14: CPT | Mod: GC | Performed by: DERMATOLOGY

## 2024-03-04 RX ORDER — LISINOPRIL 10 MG/1
10 TABLET ORAL DAILY
Qty: 90 TABLET | Refills: 3 | Status: SHIPPED | OUTPATIENT
Start: 2024-03-04

## 2024-03-04 NOTE — PATIENT INSTRUCTIONS
In advance of your upcoming dermatologic surgery appointment, we wanted to send you a patient handout (see below) on Mohs micrographic surgery, so that you may have an idea of what to expect. This handout is quite detailed as it contains many of the most frequently asked questions that our patients ask.    Mohs Surgery Quick Tips    Please reserve the half day that you are with us and do not schedule any other appointments the morning of your surgery date. We cannot guarantee what time the surgery will be done as it will depend on how many times the Mohs surgeon has to go back and remove more tissue to completely remove your skin cancer.  If you suspect you will experience excess anxiety or claustrophobia during the procedure, please let the Mohs surgeon know prior to surgery to allow us time to address this. If an anti-anxiety medication is required, then you will need a designated  to drive you home.  Mohs surgery is done under local anesthesia, so you should eat breakfast and take your regularly scheduled medications. You do NOT need to fast.  Wear comfortable, loose-fitting clothing that can easily be taken off and put back on before and after surgery.  Most of your time with us will be spent waiting for tissue to be processed and carefully looked at under the microscope by the Mohs surgeon. Bring with you a book, tablet, or smartphone that you can use to spend the waiting time. You are allowed to eat lunch.  You should have a designated  if surgery will involve an area that can occlude vision. This is because you can get swelling/bruising immediately after surgery and will go home with a bulky bandage that could obstruct your view of driving safely.  In most cases, you will go home with a bulky pressure dressing over the site that will need to remain on, clean, and dry for the first 48 hours. You will not be able to get the site wet for the first 48 hours. This bulky pressure dressing is to help  prevent post-op bleeding. Your nurse will provide detailed wound care instructions verbally and in writing on the day of surgery.  The overwhelming majority of patients manage their normal post-op pain with Tylenol alternating with ibuprofen.   Any time the skin is cut surgically, including with Mohs surgery, a scar forms. Scars are unavoidable but are minimized with the Mohs surgery approach. A scar is thought to be better than a skin cancer that could become a serious risk to your future health. The goal with Mohs surgery is for the scar to be barely noticeable by an acquaintance talking with you at a normal conversational distance (say, 4-5 feet away) by 3 months after your surgery. In the meantime, please be patient as it takes about 3 months for scars to mature in appearance and begin to fade.  Do NOT wear make-up on the day of surgery if the skin cancer is on your face.  Do NOT use Neosporin on your wound. It is not effective at preventing infections and can lead to irritation similar to an allergic reaction at the wound site.  Do not use harsh soaps like Dial soap or [place] on your wound as this will also lead to irritation.    What is  Mohs micrographic surgery ?   Mohs micrographic surgery is considered the most effective treatment for many skin cancers. It is named after the late Dr. Frederic Mohs, who pioneered this technique. Note that  Mohs  surgery is not an abbreviation or acronym, but is named after Dr. Mohs.    Overview of Mohs Micrographic Surgery  Skin cancer often resembles the  tip of the iceberg  with more tumor cells growing downward and outward into the skin like the roots of a tree. These  roots  are not visible with the naked eye, but instead can be seen under a microscope. With the Mohs technique, the dermatologic surgeon can precisely identify and remove an entire tumor while leaving the surrounding healthy tissue intact and unharmed. Mohs surgery has the highest success rate of all  treatments for many skin cancers - up to 99%.  Mohs Surgeons: Fellowship-trained Mohs surgeons are specially trained as a cancer surgeon, pathologist, and reconstructive surgeon all in one.    Advantages of Mohs Surgery  Mohs surgery is unique and so effective because of the way the removed tissue is microscopically examined, evaluating 100% of the surgical margins. The pathologic interpretation of the tissue margins is done on site by the Mohs surgeon, who is specially trained in the reading of these slides.  Advantages of Mohs surgery include:  - Ensuring complete cancer removal during surgery to provide the best cure rate and protect against cancer recurrence  - Minimizing the amount of healthy tissue lost  - Maximizing the functional and cosmetic outcome resulting from surgery  - Repairing the site of the cancer the same day the cancer is removed (in most cases)  - Curing skin cancer when other methods have failed  Other skin cancer treatment methods require the provider to often blindly estimate the amount of tissue to treat and remove, which can result in the unnecessary removal of healthy skin tissue, as well as the skin cancer coming back if any part of it is left behind.     Your Mohs Procedure  Mohs skin cancer surgery is an outpatient procedure, which takes place in our on-site surgical suite. Our suite is equipped with a dedicated Mohs laboratory for microscopic examination of tissue. Surgeries start early in the morning and are completed the same day, depending on the extent of the tumor and the amount of reconstruction necessary.  First, you will receive local anesthesia (i.e., injections with lidocaine anesthetic) around the area of the tumor, so you are awake during the entire procedure. The use of local anesthesia versus general anesthesia provides many benefits, including preventing a lengthy recovery, possible side effects from general anesthesia, and cost. You are completely numb in the area of  the surgery, however, so the procedure is comfortable.      After the area has been numbed, all visible tumor, along with a thin layer of surrounding tissue, is taken out with a scalpel blade. After this, patients are bandaged by our nursing staff and can rest in the reception area, cafe on [place] first [place] [facility] or can remain in the patient room while awaiting testing results. A specially trained technician prepares the tissue and puts it on slides for your Mohs surgeon to examine under a microscope. Waiting time can range from 60-90 minutes while the tissue is being processed. If cancer involving the edges of the removed tissue is seen by the Mohs surgeon under the microscope, then another layer of tissue is removed from the area where the cancer was detected using a scalpel blade. This way only cancerous tissue is targeted during the procedure, minimizing the loss of healthy tissue. These steps are repeated until no cancer is remaining. Most skin cancers require 1 to 3 rounds for complete removal (all performed on the same day). Aggressive cancers can take several rounds of surgery to cure. Sometimes skin cancer can appear to be small to the naked eye but in fact is larger in reality. This means that the wound left behind after skin cancer removal could be larger than what you may expect, but remember that this may be necessary to remove all the skin cancer present. You do not want any skin cancer to be left behind as that will lead to the skin cancer coming back within months to years and may require a larger surgery.    After Your Skin Cancer is Removed  When your surgery is complete, your Mohs surgeon, who has expertise in reconstructive surgery, assesses the wound and discusses your options for the best functional and cosmetic outcome.      Closing the wound:  Depending on the size of the wound and what your Mohs surgeon decides, your wound will be closed using one of the following options:  Letting the  wound heal on its own from the edges and without stitches  Using stitches to close the wound in a line  Using stitches to close the wound by shifting skin from a nearby area of skin (called a  skin flap )  Using stitches to place a skin graft from another part of the body on the wound     Every wound is specially managed to maximize the functional and cosmetic outcome. For reconstruction on the head, we take great care to make sure stitches, if needed, do not interfere with the resting positions of the eyelids, nose, mouth, and ears, so we sometimes have to use facial plastic surgery techniques to close wounds. Patients are sometimes surprised by how many stitches are used, but this is because we want to do a good job at carefully lining up the wound edges to minimize scarring and prevent the wound from opening up later on, which could lead to an infection. We try to use stitches that self-dissolve whenever possible. In most cases, you will go home with a bulky pressure dressing over the surgical site that will need to remain on, clean, and dry for the first 48 hours. You will not be able to get the site wet for the first 48 hours. This bulky pressure dressing is to help prevent post-op bleeding. Your nurse will give you detailed wound care instructions verbally and in writing on the day of your surgery.     Post-op pain  Tylenol and ibuprofen are sufficient for pain control for the overwhelming majority of patients. If you have been told by another physician not to take Tylenol (also called acetaminophen) or ibuprofen (also called Motrin or Advil), then let your Mohs surgeon know. We typically suggest taking Tylenol 1,000 mg (i.e., two extra strength tabs) every 8 hours. In between those doses of Tylenol, you can take ibuprofen 400 mg (two tabs) every eight hours. This essentially means that you would take either Tylenol or ibuprofen alternating every four hours. Do NOT take more than 4,000 mg of Tylenol or 3,200  mg of ibuprofen per 24 hour period. Icing for 15 minutes every hour will help with pain and swelling as well, and it is especially helpful for surgeries around the eyes. Keeping the wound area elevated will also help with swelling and pain. If your wound is on your lower leg, then wearing compression stockings can reduce swelling and speed healing.     Expectations About Surgical Scars  Any time the skin is cut, the body forms a scar. This is normal and natural, and a scar is thought to be better than a skin cancer that could become a serious risk to your health. The goal with Mohs surgery is for the scar to be barely noticeable by an acquaintance talking with you at a normal conversational distance (say, 4-5 feet away) by 3 months after your surgery. In the meantime, please be patient as it takes about 3 months for scars to mature in appearance and begin to fade. It is important to wear sunscreen over a scar starting about 1 month after surgery as scars do not tan normally and can become discolored, compared with the surrounding skin, after sun exposure. Additionally, gentle scar massage can often be started about 1 month after surgery. This means gentle, kneading massage on the scar for a couple minutes several times a day. After three months of waiting to ensure all inflammation has resolved and the scar has matured, we are happy to see you if you have persistent concerns with scarring. On rare occasions, we perform steroid injections or use a laser to treat a scar. These types of treatments are not  one-and-done  and multiple sessions are usually necessary to help scar appearance.    Risks:    Scar formation at the site of tumor removal. You may need further treatment with steroid injections/lasers for scarring.  Larger than expected wound created upon removal of the skin cancer.   Poor wound healing, which may be due to the patient's underlying health conditions or failure of the wound repair method.    Excessive bleeding from the wound, which could affect wound healing and/or result in the need for more office visits.    Wound that becomes infected (an uncommon occurrence, minimized by using sterile technique).   Loss of nerve function (muscle or feeling) if a tumor invades a nerve, which can be temporary or permanent.   Regrowth of tumor after removal (more common with previously treated tumors and large, longstanding tumors).   Cosmetic or functional deformities if tumor is near or on an important structure such as eyes, eyelids, nose, ears, lips, forehead, scalp, fingers, or genital area.     If you have any questions, please feel free to contact us.  During business hours (M-F 8:00-5:00 p.m.)    Dermatology Clinic  [phone]

## 2024-03-04 NOTE — NURSING NOTE
Chief Complaint   Patient presents with    Consult For     SCC R hand     Susan BELLO, RN-BSN  Dermatology Surgery  [phone]

## 2024-03-04 NOTE — LETTER
3/4/2024       RE: Reinaldo Gordillo  [address]  [facility] [address]     Dear Colleague,    Thank you for referring your patient, Reinaldo Gordillo, to the [facility] DERMATOLOGIC SURGERY CLINIC [place] at [facility]. Please see a copy of my visit note below.    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   - has had mohs before in [place]   - no other concerns today    Objective: An exam of the right dorsal hand was performed today   - 0.8 x 0.6 cm pink scaly papule on the right dorsal hand  - 4 gritty pink papules on the right preauricular cheek    Procedure Performed:   CRYOTHERAPY PROCEDURE NOTE: After verbal consent obtained, 4 lesions in the above locations were treated with liquid nitrogen  utilizing a 5-10 sec thaw time. Patient was advised that the treated areas will become red, swollen, and/or hypo/hyperpigmented.     Path report:   Case: [id number]   Collected: 01/19/2024     Final Diagnosis   A. Right dorsal hand:  - Squamous cell carcinoma, well-differentiated - (see description       Assessment and Plan:     1. Plan for Mohs micrographic surgery for skin cancer(s) above:  *Review lab result(s): Dermpath report   - We discussed the nature of the diagnosis/condition above. We discussed the treatment options, including the risks benefits and expectations of these options. We recommend micrographic surgery as the most effective and most tissue sparing option for treatment, and the patient agrees to proceed with this.  The patient is aware of the risks, benefits and expectations of this procedure. The patient will be scheduled for this procedure, if not already done so.  - We anticipate the following closure type: Linear closure, such as complex linear closure (CLC)    2. AK x 4, right preauricular cheek  - Cryotherapy performed today on 4 lesions. Discussed with patient that if they do not go away, possible biopsy will be completed at April dermatology appointment.       Patient was discussed with and evaluated by attending physician Dr. Salas Tovar.    Staff Involved:   Scribe/Fellow/Staff    Scribe Disclosure:   I, SHAYLEE PERSAUD, am serving as a scribe; to document services personally performed by Salas Tovar MD -based on data collection and the provider's statements to me.     Shayla Patel MD  Micrographic Surgery and Dermatologic Oncology (MSDO) Fellow, PGY-5    Attending Attestation  I attest that the Fellow recorded the interview and exam that I personally performed.  I have reviewed the note and edited it as necessary.    Salas Tovar M.D.  Professor  Director of Dermatologic Surgery  Department of Dermatology  [facility]        Attending attestation:  I was present for key  elements of the procedure and immediately available for all other portions of the procedure.  I have reviewed the note and edited it as necessary.    Salas Tovar M.D.  Professor  Director of Dermatologic Surgery  Department of Dermatology  [facility]    Dermatology Surgery Clinic  [facility] [place] [phone]

## 2024-03-04 NOTE — PROGRESS NOTES
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   - has had mohs before in [place]   - no other concerns today    Objective: An exam of the right dorsal hand was performed today   - 0.8 x 0.6 cm pink scaly papule on the right dorsal hand  - 4 gritty pink papules on the right preauricular cheek    Procedure Performed:   CRYOTHERAPY PROCEDURE NOTE: After verbal consent obtained, 4 lesions in the above locations were treated with liquid nitrogen utilizing a 5-10 sec thaw time. Patient was advised that the treated areas will become red, swollen, and/or hypo/hyperpigmented.     Path report:   Case: [id number]   Collected: 01/19/2024     Final Diagnosis   A. Right dorsal hand:  - Squamous cell carcinoma, well-differentiated - (see description       Assessment and Plan:      1. Plan for Mohs micrographic surgery for skin cancer(s) above:  *Review lab result(s): Dermpath report   - We discussed the nature of the diagnosis/condition above. We discussed the treatment options, including the risks benefits and expectations of these options. We recommend micrographic surgery as the most effective and most tissue sparing option for treatment, and the patient agrees to proceed with this.  The patient is aware of the risks, benefits and expectations of this procedure. The patient will be scheduled for this procedure, if not already done so.  - We anticipate the following closure type: Linear closure, such as complex linear closure (CLC)    2. AK x 4, right preauricular cheek  - Cryotherapy performed today on 4 lesions. Discussed with patient that if they do not go away, possible biopsy will be completed at April dermatology appointment.       Patient was discussed with and evaluated by attending physician Dr. Salas Tovar.    Staff Involved:   Scribe/Fellow/Staff    Scribe Disclosure:   I, SHAYLEE PERSAUD, am serving as a scribe; to document services personally performed by Salas Tovar MD -based on data collection and the provider's statements to me.     Shayla Patel MD  Micrographic Surgery and Dermatologic Oncology (MSDO) Fellow, PGY-5    Attending Attestation  I attest that the Fellow recorded the interview and exam that I personally performed.  I have reviewed the note and edited it as necessary.    Salas Tovar M.D.  Professor  Director of Dermatologic Surgery  Department of Dermatology  [facility]        Attending attestation:  I was present for key elements of the procedure and immediately available for all other portions of the procedure.  I have reviewed the note and edited it as necessary.    Salas Tovar M.D.  Professor  Director of Dermatologic Surgery  Department of Dermatology  [facility]    Dermatology Surgery Clinic  [facility]  [facility] [address] [address]

## 2024-03-07 ENCOUNTER — ORDERS ONLY (AUTO-RELEASED) (OUTPATIENT)
Dept: CARDIOLOGY | Facility: CLINIC | Age: 79
End: 2024-03-07
Payer: COMMERCIAL

## 2024-03-07 ENCOUNTER — OFFICE VISIT (OUTPATIENT)
Dept: DERMATOLOGY | Facility: CLINIC | Age: 79
End: 2024-03-07
Attending: PHYSICIAN ASSISTANT
Payer: COMMERCIAL

## 2024-03-07 ENCOUNTER — TELEPHONE (OUTPATIENT)
Dept: DERMATOLOGY | Facility: CLINIC | Age: 79
End: 2024-03-07

## 2024-03-07 VITALS — HEART RATE: 75 BPM | DIASTOLIC BLOOD PRESSURE: 78 MMHG | SYSTOLIC BLOOD PRESSURE: 128 MMHG

## 2024-03-07 DIAGNOSIS — I48.0 PAROXYSMAL ATRIAL FIBRILLATION (H): ICD-10-CM

## 2024-03-07 DIAGNOSIS — I48.0 PAROXYSMAL ATRIAL FIBRILLATION (H): Primary | ICD-10-CM

## 2024-03-07 DIAGNOSIS — D04.61 SQUAMOUS CELL CARCINOMA IN SITU (SCCIS) OF DORSUM OF RIGHT HAND: ICD-10-CM

## 2024-03-07 PROCEDURE — 13132 CMPLX RPR F/C/C/M/N/AX/G/H/F: CPT | Mod: 79 | Performed by: DERMATOLOGY

## 2024-03-07 PROCEDURE — 17311 MOHS 1 STAGE H/N/HF/G: CPT | Mod: 79 | Performed by: DERMATOLOGY

## 2024-03-07 NOTE — NURSING NOTE
Chief Complaint   Patient presents with    Derm Problem     SCC R hand     Susan BELLO, RN-BSN  Dermatology Surgery  [phone]

## 2024-03-07 NOTE — PROGRESS NOTES
[facility] Dermatologic Surgery Clinic [place] Procedure Note      Date of Service:  Mar 7, 2024  Surgery: Mohs micrographic surgery    Case 1  Repair Type: Complex  Repair Size: 2.6 cm  Suture Material: 4-0 Monocryl / 5-0 Fast Absorbing Gut   Tumor Type: SCC - Squamous Cell Carcinoma  Location: R Dorsal Hand  Derm-Path Accession #: [id number]   PreOp Size: 1.5 x 0.6 cm  PostOp Size: 2.0 x 1.6 cm  Mohs Accession #:   Level of Defect: Fascia      Procedure:  We discussed the principles of treatment and most likely complications including scarring, bleeding, infection, swelling, pain, crusting, nerve damage, large wound,  incomplete excision, wound dehiscence,  nerve damage, recurrence, and a second procedure may be recommended to obtain the best cosmetic or functional result.    Informed consent was obtained and the patient underwent the procedure as follows:  The patient was placed supine on the operating table.  The cancer was identified, outlined with a marker, and verified by the patient.  The entire surgical field was prepped with Hibiclens.  The surgical site was anesthetized using Lidocaine 1% with epi 1 : 100,000.      The area of clinically apparent tumor was not debulked. The layer of tissue was then surgically excised using a #15 blade and was then transferred onto a specimen sheet maintaining the orientation of the specimen. Hemostasis was obtained using hyfrecator. The wound site was then covered with a dressing while the tissue samples were processed for examination.    The excised tissue was transported to the Mohs histology laboratory maintaining the tissue orientation.  The tissue specimen was relaxed so that the entire surgical margin was in a single horizontal plane for sectioning and inked for precise mapping.  A precise reference map was drawn to reflect the sectioning of the specimen, colored inking of the margins, and orientation on the patient. The tissue was processed using  horizontal sectioning of the base and continuous peripheral margins.  The histopathologic sections were reviewed in conjunction with the reference map.    Total blocks: 1    Total slides:  2    There were no cancer cells visualized on examination, therefore Mohs surgery was complete.    Reconstruction: Complex Linear Closure    Due to one or more of the following factors, complex linear closure was required/indicated: Extensive undermining (equal to or greater than the maximum perpendicular width of the defect), exposure of underlying structure (bone, cartilage, tendon, named neurovascular), free margin involvement (helical rim, vermillion border, nostril rim), and/or placement of retention sutures.     The patient was taken to the operative suite and placed supine on the operating room table.  The defect was identified.  Appropriate markings were made with a marking pen to plan the repair.  The area was infiltrated with Lidocaine 1% with epi 1:100,000 and prepped with Hibiclens and draped with sterile towels.     The wound was debeveled and undermined widely.  Cones were excised within relaxed skin tension lines on both sides of the defect.  Hemostasis was obtained using hyfrecator. A fascial plication suture using 4-0 Monocryl suture material was placed to narrow the primary defect. Subcutaneous tissues were then approximated using 4-0 Monocryl buried vertical mattress sutures.  The wound edges were then approximated additional 4-0 Monocryl buried sutures were placed in a similar fashion where needed.  Percutaneous simple running 5-0 fast absorbing gut sutures were carefully placed for maximum eversion and meticulous approximation.    Repair Size: 2.6 cm    The wound was cleansed with saline and ointment was applied along the wound surface.     A sterile pressure dressing was applied.  Wound care instructions were given verbally and in writing.  The patient left the operating suite in stable condition.  Patient  was informed that additional refinement of the resulting surgical scar may be used as a second stage of this reconstruction.     The attending surgeon was present for entire procedure and always immediately available.    Staff Involved:   Scribe/Staff    Scribe Disclosure:   I, SHAYLEE PERSAUD, am serving as a scribe; to document services personally performed by Salas Tovar MD -based on data collection and the provider's statements to me.     Attending attestation:  I personally performed the entire procedure.  I have reviewed the note and edited it as necessary, and agree with its contents.    Salas Tovar M.D.  Professor  Director of Dermatologic Surgery  Department of Dermatology  [facility]    Dermatology Surgery Clinic  [facility] [place] [id number]

## 2024-03-07 NOTE — TELEPHONE ENCOUNTER
Follow up call completed following Mohs procedure with Dr. Tovar.       Are you having pain? no  Are you taking pain medication? no  Are you applying ice?  no  Have you had any noticeable bleeding through the bandage? no   Do you have any other concerns? no       Please call [phone] if you have any questions or concerns.

## 2024-03-07 NOTE — LETTER
3/7/2024       RE: Reinaldo Gordillo  [address]  [place] [facility] [address]     Dear Colleague,    Thank you for referring your patient, Reinaldo Gordillo, to the [facility] [place] [facility]. Please see a copy of my visit note below.    [facility] Dermatologic Surgery [facility] [place] Procedure Note      Date of Service:  Mar 7, 2024  Surgery: Mohs micrographic surgery    Case 1  Repair Type: Complex  Repair Size: 2.6 cm  Suture Material: 4-0 Monocryl / 5-0 Fast Absorbing Gut   Tumor Type: SCC - Squamous Cell Carcinoma  Location: R Dorsal Hand  Derm-Path Accession #: [id number]   PreOp Size: 1.5 x 0.6 cm  PostOp Size: 2.0 x 1.6 cm  Mohs Accession #:   Level of Defect: Fascia      Procedure:  We discussed the principles of treatment and most likely complications including scarring, bleeding, infection, swelling, pain, crusting, nerve damage, large wound,  incomplete excision, wound dehiscence,  nerve damage, recurrence, and a second procedure may be recommended to obtain the best cosmetic or functional result.    Informed consent was obtained and the patient underwent the procedure as follows:  The patient was placed supine on the operating table.  The cancer was identified, outlined with a marker, and verified by the patient.  The entire surgical field was prepped with Hibiclens.  The surgical site was anesthetized using Lidocaine 1% with epi 1 : 100,000.      The area of clinically apparent tumor was not debulked. The layer of tissue was then surgically excised using a #15 blade and was then transferred onto a specimen sheet maintaining the orientation of the specimen. Hemostasis was obtained using hyfrecator. The wound site was then covered with a dressing while the tissue samples were processed for examination.    The excised tissue was transported to the Mohs histology laboratory maintaining the tissue  orientation.  The tissue specimen was relaxed so that the entire surgical margin was in a single horizontal plane for sectioning and inked for precise mapping.  A precise reference map was drawn to reflect the sectioning of the specimen, colored inking of the margins, and orientation on the patient. The tissue was processed using horizontal sectioning of the base and continuous peripheral margins.  The histopathologic sections were reviewed in conjunction with the reference map.    Total blocks: 1    Total slides:  2    There were no cancer cells visualized on examination, therefore Mohs surgery was complete.    Reconstruction: Complex Linear Closure    Due to one or more of the following factors, complex linear closure was required/indicated: Extensive undermining (equal to or greater than the maximum perpendicular width of the defect), exposure of underlying structure (bone, cartilage, tendon, named neurovascular), free margin involvement (helical rim, vermillion border, nostril rim), and/or placement of retention sutures.     The patient was taken to the operative suite and placed supine on the operating room table.  The defect was identified.  Appropriate markings were made with a marking pen to plan the repair.  The area was infiltrated with Lidocaine 1% with epi 1:100,000 and prepped with Hibiclens and draped with sterile towels.     The wound was debeveled and undermined widely.  Cones were excised within relaxed skin tension lines on both sides of the defect.  Hemostasis was obtained using hyfrecator. A fascial plication suture using 4-0 Monocryl suture material was placed to narrow the primary defect. Subcutaneous tissues were then approximated using 4-0 Monocryl buried vertical mattress sutures.  The wound edges were then approximated additional 4-0 Monocryl buried sutures were placed in a similar fashion where needed.  Percutaneous simple running 5-0 fast absorbing gut sutures were carefully placed for  maximum eversion and meticulous approximation.    Repair Size: 2.6 cm    The wound was cleansed with saline and ointment was applied along the wound surface.     A sterile pressure dressing was applied.  Wound care instructions were given verbally and in writing.  The patient left the operating suite in stable condition.  Patient was informed that additional refinement of the resulting surgical scar may be used as a second stage of this reconstruction.     The attending surgeon was present for entire procedure and always immediately available.    Staff Involved:   Scribe/Staff    Scribe Disclosure:   I, SHAYLEE PERSAUD, am serving as a scribe; to document services personally performed by Salas Tovar MD -based on data collection and the provider's statements to me.     Attending attestation:  I personally performed the entire procedure.  I have reviewed the note and edited it as necessary, and agree with its contents.    Salas Tovar M.D.  Professor  Director of Dermatologic Surgery  Department of Dermatology  [facility]    Dermatology Surgery Clinic  [facility] [place] [id number]            Again, thank you for allowing me to participate in the care of your patient.      Sincerely,    Salas Tovar MD

## 2024-03-07 NOTE — PATIENT INSTRUCTIONS
Wound care    I will experience scar, bleeding, swelling, pain, crusting and redness. I may experience incomplete removal or recurrence. Risks are bleeding, bruising, swelling, infection, nerve damage, & a large wound. A second procedure may be recommended to obtain the best cosmetic or functional result.       A three month office visit with your Surgeon is recommended for scar evaluation. Please reach out sooner if you have concerns about you surgical site/wound.    Caring for your skin after surgery    After your surgery, a pressure bandage will be placed over the area that has stitches. This is important to prevent bleeding. Please follow these instructions over the next 1 to 2 weeks. Following this regimen will help to prevent complications as your wound heals.     For the first 48 hours after your surgery:    Leave the pressure dressing on and keep it dry. If it should come loose, you may re-tape it, but do not take it off.  Relax and take it easy. Do not do any vigorous exercise or heavy lifting. This could cause the wound to bleed.  Post-Operative pain is usually mild. If you are able to take tylenol, You may take plain or extra-strength Tylenol (acetaminophen) As directed on the bottle (do not take more than 4,000mg in one day). If you are able to take ibuprofen, you can alternate the tylenol and ibuprofen.   Avoid alcohol as this may increase your tendency to bleed.   You may put an ice pack around the bandaged area for 20 minutes at a time as needed. This may help reduce swelling, bruising, and pain. Make sure the ice pack is waterproof so that the pressure bandage doesn t get wet.  If the wound is on the face try to sleep with your head elevated. Either in a recliner or propped up in bed, this will decrease swelling around the eyes.   You may see a small amount of drainage or blood on your pressure bandage. This is normal. However:  If drainage or bleeding continues or saturates the bandage, you will  "need to apply firm pressure over the bandage with a piece of gauze for 15 minutes.  If bleeding continues after applying pressure for 15 minutes, apply an ice pack to the bandaged area for 15 minutes.  If bleeding still continues, call our office or go to the nearest emergency room.    Remove pressure dressing 48 hours after surgery:    Carefully remove the pressure bandage. If it seems sticky or too difficult to get off, you may need to soak it off in the shower.  After the pressure dressing is removed, you may shower and get the wound wet. However, Do Not let the forceful stream of the shower hit the wound directly.  Follow these wound care and dressing change instructions:   In the shower wash the surgical site last with its own separate wash cloth.  You may allow water to run over the site. Take a clean wash cloth wet with soapy warm water and gently pat the suture site to help remove any crust or drainage.   Do Not rub or scrub the site    After site is clean pat dry and apply a thin layer of Vaseline ointment  over the suture site with a cotton swab or clean finger.   Cover the suture site with Telfa (non-stick) dressing. You may tape a piece of gauze over the Telfa for extra protection if you wish.  Continue wound care at least once a day, twice if you are active or around a contaminated environment.  Continue daily wound care until your surgical site is completely healed.   Dissolving stitches, if you have been told your stitches are dissolving they should dissolve in one to one and a half week.      If you are able to take acetaminophen (\"Tylenol,\" etc.) and ibuprofen (\"Advil\" or \"Motrin,\" etc.), then you may STAGGER these medications by taking 400 mg of ibuprofen (usually two tabs) every 8 hours and 1,000 mg of acetaminophen (e.g., two tabs of extra-strength Tylenol) every 8 hours.    This means, for example, that you could take the followin,000 mg of Tylenol, followed 4 hours later by 400 mg " Ibuprofen, followed 4 hours later with 1,000 mg of Tylenol, followed 4 hours later by 400 mg Ibuprofen, followed 4 hours later with 1,000 mg of Tylenol, and so forth.     Essentially, you can take either 1,000 mg of Tylenol or 400 mg of ibuprofen in alternating fashion EVERY FOUR HOURS.    Do NOT exceed more than 4,000 mg of Tylenol or 3,200 mg of ibuprofen per 24 hours. If you are not able to take Tylenol or ibuprofen as above due to other health issues (or a physician has told you directly that you are not allowed to take one of them, say due to pre-existing severe liver or kidney issues), then disregard the above directions.    Scientific evidence supports that this combination/schedule of pain medications is just as effective, if not more effective, than taking a narcotic pain medicine.       Follow up will be a 3 month scar evaluation either in person or via a telephone visit with you sending in a photo via [employer]. Unless you have been told to follow up sooner or if you have concerns and would like to be see sooner. Please call or send us in a [employer] message if possible and attach a photo.        What to expect:    The first couple of days your wound may be tender and may bleed slightly when doing wound care.  There may be swelling and bruising around the wound, especially if it is near the eyes. For your comfort, you may apply ice or cold compresses to the bruises after your have removed the pressure bandage.  The area around your wound may be numb for several weeks or even months.  You may experience periodic sharp pain or mild itching around the wound as it heals.   The suture line will look dark for a while but will lighten over time.       When to call us:    You have bleeding that will not stop after applying pressure and ice.  You have pain that is not controlled with Tylenol (acetaminophen.)  You have signs or symptoms of an infection such as:  Fever over 100 degrees Fahrenheit  Redness, warmth or  foul-smelling drainage from the wound  If you have any questions, or are not sure how to take care of the wound.    Phone numbers:    During business hours (M-F 8:00-4:30 p.m.)    [facility]- [phone] (Option 1 appt. Ask the call representative for the Dermatology Surgery Team)    For Dermatology Surgery scheduling please call Nicole at [phone]    ---------------------------------------------------------  Evenings/Weekends/Holidays    Hospital - [phone] (Ask for the dermatology resident on call. They will connect with the surgery Fellow)  TTY for hearing qazkyxfp-[phone]  *Ask  to page dermatologist on-call  Emergency Mmdv-[phone]  TTY for hearing impaired- [phone]

## 2024-03-13 LAB — NONINV COLON CA DNA+OCC BLD SCRN STL QL: NEGATIVE

## 2024-03-14 ENCOUNTER — TELEPHONE (OUTPATIENT)
Dept: CARDIOLOGY | Facility: CLINIC | Age: 79
End: 2024-03-14
Payer: COMMERCIAL

## 2024-03-18 ENCOUNTER — TELEPHONE (OUTPATIENT)
Dept: CARDIOLOGY | Facility: CLINIC | Age: 79
End: 2024-03-18
Payer: COMMERCIAL

## 2024-03-18 NOTE — TELEPHONE ENCOUNTER
3/18 spoke to pt and wanted to get pt scheduled for a ZIO patch   Pt already has it on and he received it by mail  Pt state that he'll mail it back to the [facility] after 5 days  Adv noted

## 2024-03-19 ENCOUNTER — MYC MEDICAL ADVICE (OUTPATIENT)
Dept: ENDOCRINOLOGY | Facility: CLINIC | Age: 79
End: 2024-03-19
Payer: COMMERCIAL

## 2024-04-01 ENCOUNTER — ORDERS ONLY (AUTO-RELEASED) (OUTPATIENT)
Dept: CARDIOLOGY | Facility: CLINIC | Age: 79
End: 2024-04-01
Payer: COMMERCIAL

## 2024-04-01 DIAGNOSIS — I48.0 PAROXYSMAL ATRIAL FIBRILLATION (H): ICD-10-CM

## 2024-04-02 PROCEDURE — 93248 EXT ECG>7D<15D REV&INTERPJ: CPT | Performed by: INTERNAL MEDICINE

## 2024-05-02 ENCOUNTER — MYC MEDICAL ADVICE (OUTPATIENT)
Dept: INTERNAL MEDICINE | Facility: CLINIC | Age: 79
End: 2024-05-02
Payer: COMMERCIAL

## 2024-05-02 DIAGNOSIS — G47.9 SLEEP DISORDER: ICD-10-CM

## 2024-05-02 RX ORDER — ZOLPIDEM TARTRATE 10 MG/1
10 TABLET ORAL
Qty: 30 TABLET | Refills: 0 | Status: SHIPPED | OUTPATIENT
Start: 2024-05-02 | End: 2024-07-29

## 2024-05-07 ENCOUNTER — OFFICE VISIT (OUTPATIENT)
Dept: CARDIOLOGY | Facility: CLINIC | Age: 79
End: 2024-05-07
Attending: INTERNAL MEDICINE
Payer: COMMERCIAL

## 2024-05-07 VITALS
DIASTOLIC BLOOD PRESSURE: 78 MMHG | HEART RATE: 73 BPM | OXYGEN SATURATION: 92 % | BODY MASS INDEX: 29.37 KG/M2 | SYSTOLIC BLOOD PRESSURE: 127 MMHG | WEIGHT: 204.7 LBS

## 2024-05-07 DIAGNOSIS — I48.0 PAROXYSMAL ATRIAL FIBRILLATION (H): Primary | ICD-10-CM

## 2024-05-07 PROCEDURE — 93010 ELECTROCARDIOGRAM REPORT: CPT | Performed by: INTERNAL MEDICINE

## 2024-05-07 PROCEDURE — G0463 HOSPITAL OUTPT CLINIC VISIT: HCPCS | Performed by: INTERNAL MEDICINE

## 2024-05-07 PROCEDURE — 93005 ELECTROCARDIOGRAM TRACING: CPT | Performed by: INTERNAL MEDICINE

## 2024-05-07 PROCEDURE — 99214 OFFICE O/P EST MOD 30 MIN: CPT | Performed by: INTERNAL MEDICINE

## 2024-05-07 ASSESSMENT — PAIN SCALES - GENERAL: PAINLEVEL: NO PAIN (0)

## 2024-05-07 NOTE — PROGRESS NOTES
"[employer] [facility] Metrics    Blood Pressure   {BP < 140/90:4325878::\"BP < 140/90 Yes\"}    On Aspirin  {On Aspirin Yes/No:9376052::\"Yes\"}    On Statin  {On Statin Yes/No:1093477::\"Yes\"}    Tobacco use  {Tobacco use Yes/No:5820808::\"No\"}    "

## 2024-05-07 NOTE — LETTER
5/7/2024      RE: Dellchristi CARDONA Duy  [address]  [facility] [address]       Dear Colleague,    Thank you for the opportunity to participate in the care of your patient, Reinaldo Gordillo, at the [facility]. Please see a copy of my visit note below.    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  His first episode occurred postoperatively after coronary artery bypass surgery.  Initial treatment was with Eliquis and amiodarone.  However Dr. Gordillo was anxious not to be on long-term anticoagulation and subsequent ambulatory ECGs have shown no recurrence of atrial fibrillation.  Consequently Eliquis has been stopped.  Additionally, due to his having developed a peripheral neuropathy he noted that his problems were aggravated by amiodarone and that drug also has been stopped.  Currently he is only being treated with aspirin for anticoagulation purposes.    Since his last visit Dr. Gordillo indicates that he has not had any falls and has not been bothered by any problems with chest discomfort or palpitations.  He is being followed by the interventional team and has an echocardiogram planned for this summer.  Recent echocardiogram from December 2023 is summarized below and showed some mild progression of his aortic stenosis.    Currently Dr. Gordillo has no cardiovascular concerns of note and will from arrhythmia perspective we will plan a routine follow-up in 1 year or see him earlier if new issues arise next week  He will be seeing Dr. Chapin next week.    ECHO  Interpretation Summary  Global and regional left ventricular function is normal with an EF of 55-60%.  Global right ventricular function is mildly reduced.  Moderate left atrial enlargement is present. Severe right atrial enlargement  is present.  Moderate aortic valve calcification is  present. Mild aortic stenosis is  present. The peak aortic velocity is 2.8 m/sec.  The aortic valve area is 1.4 cm^2, by the continuity equation. The mean  gradient across the aortic valve is 18 mmHg.  No pericardial effusion is present.  This study was compared with the study from 3/21/2023. RV dysfunction is new.  Aortic valve stenosis has progressed slightly though remains mild in severity  Mild right ventricular dilation is present.    PAST MEDICAL HISTORY:  Past Medical History:   Diagnosis Date     ASCVD (arteriosclerotic cardiovascular disease)      Coronary artery disease      Gastroesophageal reflux disease      Hypertension      Stented coronary artery        CURRENT MEDICATIONS:  Current Outpatient Medications   Medication Sig Dispense Refill     ASPIRIN LOW DOSE 81 MG EC tablet Take 81 mg by mouth every morning       lisinopril (ZESTRIL) 10 MG tablet Take 1 tablet (10 mg) by mouth daily 90 tablet 3     melatonin 5 MG tablet Take 1 tablet (5 mg) by mouth nightly as needed for sleep 15 tablet 0     metoprolol succinate ER (TOPROL XL) 25 MG 24 hr tablet TAKE 1 TABLET BY MOUTH EVERY MORNING 90 tablet 3     omeprazole (PRILOSEC) 20 MG DR capsule Take 20 mg by mouth as needed       pravastatin (PRAVACHOL) 80 MG tablet Take 1 tablet (80 mg) by mouth At Bedtime 90 tablet 3     tacrolimus (PROTOPIC) 0.1 % external ointment Apply topically 2 times daily To rash on the scrotum, penis, or inguinal folds. 60 g 3     testosterone (ANDROGEL 1 % PUMP) 12.5 MG/ACT (1%) gel Apply 2 pumps topically to shoulder area daily, as directed 150 g 3     zolpidem (AMBIEN) 10 MG tablet Take 1 tablet (10 mg) by mouth nightly as needed for sleep 30 tablet 0       PAST SURGICAL HISTORY:  Past Surgical History:   Procedure Laterality Date     BYPASS GRAFT ARTERY CORONARY N/A 4/24/2023    Procedure: MEDIAN STERNOTOMY, HARVEST OF LEFT INTERNAL MAMMARY ARTERY, ENDOSCOPIC HARVEST OF LEFT GREATER SAPHENOUS VEIN, ON CARDIOPULMONARY BYPASS,  CORONARY ARTERY BYPASS GRAFT (CABG) X4 . TRANSESOPHAGEAL ECHOCARDIOGRAM.;  Surgeon: Bro Almeida MD;  Location: [facility] OR     CATARACT IOL, RT/LT       COLONOSCOPY N/A 7/17/2019    Procedure: COLONOSCOPY;  Surgeon: Roque Givens MD;  [facility]: [facility]     CV CORONARY ANGIOGRAM N/A 3/21/2023    Procedure: Coronary Angiogram;  Surgeon: Pito Chapin MD;  Location:  HEART CARDIAC CATH LAB     CV PCI N/A 3/21/2023    Procedure: Percutaneous Coronary Intervention;  Surgeon: Pito Chapin MD;  Location: [facility] CARDIAC CATH LAB     ESOPHAGOSCOPY, GASTROSCOPY, DUODENOSCOPY (EGD), COMBINED N/A 7/17/2019    Procedure: ESOPHAGOGASTRODUODENOSCOPY, WITH BIOPSY;  Surgeon: Roque Givens MD;  Location:  OR     HERNIA REPAIR  2000     MOHS MICROGRAPHIC PROCEDURE       PHACOEMULSIFICATION CLEAR CORNEA WITH STANDARD INTRAOCULAR LENS IMPLANT Right 7/2/2021    Procedure: RIGHT EYE CATARACT REMOVAL WITH INTRAOCULAR LENS IMPLANT;  Surgeon: Justa Liz MD;  Location: [facility] OR     PHACOEMULSIFICATION CLEAR CORNEA WITH STANDARD INTRAOCULAR LENS IMPLANT Left 7/9/2021    Procedure: LEFT EYE CATARACT REMOVAL WITH INTRAOCULAR LENS IMPLANT;  Surgeon: Justa Liz MD;  Location: [facility] OR     [facility] CORONARY STENT PERCUT, EA ADDTL VESSEL         ALLERGIES:   No Known Allergies    FAMILY HISTORY:  Family History   Problem Relation Age of Onset     Glaucoma No family hx of      Macular Degeneration No family hx of      Retinal detachment No family hx of      Amblyopia No family hx of      Melanoma No family hx of      Skin Cancer No family hx of        SOCIAL HISTORY:  Social History     Tobacco Use     Smoking status: Never     Smokeless tobacco: Never   Substance Use Topics     Alcohol use: Yes     Comment: 1-2 drinks 5 days a week     Drug use: Never       ROS:   Constitutional: No fever, chills, or sweats. Weight stable.   ENT: No visual disturbance, ear ache, epistaxis, sore throat.   Cardiovascular: As per HPI.    Respiratory: No cough, hemoptysis.    GI: No nausea, vomiting, .   : No hematuria.   Integument: Negative.   Psychiatric: Negative.   Hematologic:  Easy bruising, no easy bleeding.  Neuro: Negative apart from peripheral neuropathy which seems to be relatively stable  Endocrinology: Normal  Musculoskeletal: No myalgia or other rheumatologic complaints noted.    Exam:  /78 (BP Location: Right arm, Patient Position: Chair, Cuff Size: Adult Regular)   Pulse 73   Wt 92.9 kg (204 lb 11.2 oz)   SpO2 92%   BMI 29.37 kg/m    GENERAL APPEARANCE: healthy, alert and no distress  HEENT: no icterus, no central cyanosis  NECK: no adenopathy, JVP not elevated  RESPIRATORY: lungs clear to auscultation - no rales, rhonchi or wheezes, no use of accessory muscles, no retractions, respirations are unlabored, normal respiratory rate  CARDIOVASCULAR: regular rhythm, normal S1 with physiologic split S2, no S3 or S4 and no murmur, click or rub,   ABDOMEN: soft, non tender, EXTREMITIES: peripheral pulses normal, no edema, no bruits  NEURO: alert and oriented to person/place/time, normal speech, gait and affect  SKIN: no ecchymoses, no rashes    Labs:  CBC RESULTS:   Lab Results   Component Value Date    WBC 4.2 01/26/2024    RBC 4.21 (L) 01/26/2024    HGB 14.1 01/26/2024    HCT 41.3 01/26/2024    MCV 98 01/26/2024    MCH 33.5 (H) 01/26/2024    MCHC 34.1 01/26/2024    RDW 12.0 01/26/2024     01/26/2024       BMP RESULTS:  Lab Results   Component Value Date     08/03/2023    POTASSIUM 4.4 08/03/2023    POTASSIUM 3.8 04/24/2023    POTASSIUM 4.9 08/29/2022    CHLORIDE 105 08/03/2023    CHLORIDE 107 08/29/2022    CO2 27 08/03/2023    CO2 28 08/29/2022    ANIONGAP 7 08/03/2023    ANIONGAP 4 08/29/2022     (H) 08/03/2023     (H) 04/30/2023     (H) 08/29/2022    BUN 19.1 08/03/2023    BUN 40 (H) 08/29/2022    CR 1.09 08/03/2023    GFRESTIMATED 70 08/03/2023    CRISS 9.4 08/03/2023        INR  RESULTS:  Lab Results   Component Value Date    INR 1.27 (H) 04/25/2023    INR 1.31 (H) 04/24/2023    INR 1.49 (H) 04/24/2023    INR 1.00 04/12/2023       Procedures:  PULMONARY FUNCTION TESTS:        No data to display                  ECHOCARDIOGRAM:   No results found for this or any previous visit (from the past 8760 hour(s)).      Assessment and Plan:   1.  Postoperative atrial fibrillation after CABG surgery which has not recurred either symptomatically or by recent ambulatory ECG monitor  2.  Mild aortic stenosis  3.  No cardiovascular symptoms    Plan  1.  Continue current treatment regimen with aspirin only  2.  Follow-up 1 year or earlier if needed    Total elapsed time today with chart review clinic visit and documentation 30 minutes    I very much appreciated the opportunity to see and assess Reinaldo Gordillo in the clinic today. Please do not hesitate to contact my office if you have any questions or concerns.      Joao Saenz MD  Cardiac Arrhythmia Service  [facility]  [phone]       CC  GE, AJ        Please do not hesitate to contact me if you have any questions/concerns.     Sincerely,     Joao Saenz MD

## 2024-05-07 NOTE — NURSING NOTE
Chief Complaint   Patient presents with    Follow Up     6 mo follow up for afib       Vitals were taken, medications reconciled and EKG performed.    Mary Sharp, Facilitator  10:43 AM

## 2024-05-07 NOTE — PROGRESS NOTES
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  His first episode occurred postoperatively after coronary artery bypass surgery.  Initial treatment was with Eliquis and amiodarone.  However DrTerri Gordillo was anxious not to be on long-term anticoagulation and subsequent ambulatory ECGs have shown no recurrence of atrial fibrillation.  Consequently Eliquis has been stopped.  Additionally, due to his having developed a peripheral neuropathy he noted that his problems were aggravated by amiodarone and that drug also has been stopped.  Currently he is only being treated with aspirin for anticoagulation purposes.    Since his last visit DrTerri Duy indicates that he has not had any falls and has not been bothered by any problems with chest discomfort or palpitations.  He is being followed by the interventional team and has an echocardiogram planned for this summer.  Recent echocardiogram from December 2023 is summarized below and showed some mild progression of his aortic stenosis.    Currently Dr. Gordillo has no cardiovascular concerns of note and will from arrhythmia perspective we will plan a routine follow-up in 1 year or see him earlier if new issues arise next week  He will be seeing Dr. Chapin next week.    ECHO  Interpretation Summary  Global and regional left ventricular function is normal with an EF of 55-60%.  Global right ventricular function is mildly reduced.  Moderate left atrial enlargement is present. Severe right atrial enlargement  is present.  Moderate aortic valve calcification is present. Mild aortic stenosis is  present. The peak aortic velocity is 2.8 m/sec.  The aortic valve area is 1.4 cm^2, by the continuity equation. The mean  gradient across the aortic valve is 18 mmHg.  No pericardial effusion is present.  This study was compared with the study from 3/21/2023. RV dysfunction is new.  Aortic valve stenosis has  progressed slightly though remains mild in severity  Mild right ventricular dilation is present.    PAST MEDICAL HISTORY:  Past Medical History:   Diagnosis Date    ASCVD (arteriosclerotic cardiovascular disease)     Coronary artery disease     Gastroesophageal reflux disease     Hypertension     Stented coronary artery        CURRENT MEDICATIONS:  Current Outpatient Medications   Medication Sig Dispense Refill    ASPIRIN LOW DOSE 81 MG EC tablet Take 81 mg by mouth every morning      lisinopril (ZESTRIL) 10 MG tablet Take 1 tablet (10 mg) by mouth daily 90 tablet 3    melatonin 5 MG tablet Take 1 tablet (5 mg) by mouth nightly as needed for sleep 15 tablet 0    metoprolol succinate ER (TOPROL XL) 25 MG 24 hr tablet TAKE 1 TABLET BY MOUTH EVERY MORNING 90 tablet 3    omeprazole (PRILOSEC) 20 MG DR capsule Take 20 mg by mouth as needed      pravastatin (PRAVACHOL) 80 MG tablet Take 1 tablet (80 mg) by mouth At Bedtime 90 tablet 3    tacrolimus (PROTOPIC) 0.1 % external ointment Apply topically 2 times daily To rash on the scrotum, penis, or inguinal folds. 60 g 3    testosterone (ANDROGEL 1 % PUMP) 12.5 MG/ACT (1%) gel Apply 2 pumps topically to shoulder area daily, as directed 150 g 3    zolpidem (AMBIEN) 10 MG tablet Take 1 tablet (10 mg) by mouth nightly as needed for sleep 30 tablet 0       PAST SURGICAL HISTORY:  Past Surgical History:   Procedure Laterality Date    BYPASS GRAFT ARTERY CORONARY N/A 4/24/2023    Procedure: MEDIAN STERNOTOMY, HARVEST OF LEFT INTERNAL MAMMARY ARTERY, ENDOSCOPIC HARVEST OF LEFT GREATER SAPHENOUS VEIN, ON CARDIOPULMONARY BYPASS, CORONARY ARTERY BYPASS GRAFT (CABG) X4 . TRANSESOPHAGEAL ECHOCARDIOGRAM.;  Surgeon: Bro Almeida MD;  Location: [facility] OR    CATARACT IOL, RT/LT      COLONOSCOPY N/A 7/17/2019    Procedure: COLONOSCOPY;  Surgeon: Roque Givens MD;  [facility] OR    CV CORONARY ANGIOGRAM N/A 3/21/2023    Procedure: Coronary Angiogram;  Surgeon: Pito Chapin MD;   Location:  HEART CARDIAC CATH LAB    CV PCI N/A 3/21/2023    Procedure: Percutaneous Coronary Intervention;  Surgeon: Pito Chapin MD;  Location: [facility] CARDIAC CATH LAB    ESOPHAGOSCOPY, GASTROSCOPY, DUODENOSCOPY (EGD), COMBINED N/A 7/17/2019    Procedure: ESOPHAGOGASTRODUODENOSCOPY, WITH BIOPSY;  Surgeon: Roque Givens MD;  Location: [facility] OR    HERNIA REPAIR  2000    MOHS MICROGRAPHIC PROCEDURE      PHACOEMULSIFICATION CLEAR CORNEA WITH STANDARD INTRAOCULAR LENS IMPLANT Right 7/2/2021    Procedure: RIGHT EYE CATARACT REMOVAL WITH INTRAOCULAR LENS IMPLANT;  Surgeon: Justa Liz MD;  Location: [facility] OR    PHACOEMULSIFICATION CLEAR CORNEA WITH STANDARD INTRAOCULAR LENS IMPLANT Left 7/9/2021    Procedure: LEFT EYE CATARACT REMOVAL WITH INTRAOCULAR LENS IMPLANT;  Surgeon: Justa Liz MD;  [place] [facility] [place]    [facility] CORONARY STENT PERCUT, EA ADDTL VESSEL         ALLERGIES:   No Known Allergies    FAMILY HISTORY:  Family History   Problem Relation Age of Onset    Glaucoma No family hx of     Macular Degeneration No family hx of     Retinal detachment No family hx of     Amblyopia No family hx of     Melanoma No family hx of     Skin Cancer No family hx of        SOCIAL HISTORY:  Social History     Tobacco Use    Smoking status: Never    Smokeless tobacco: Never   Substance Use Topics    Alcohol use: Yes     Comment: 1-2 drinks 5 days a week    Drug use: Never       ROS:   Constitutional: No fever, chills, or sweats. Weight stable.   ENT: No visual disturbance, ear ache, epistaxis, sore throat.   Cardiovascular: As per HPI.   Respiratory: No cough, hemoptysis.    GI: No nausea, vomiting, .   : No hematuria.   Integument: Negative.   Psychiatric: Negative.   Hematologic:  Easy bruising, no easy bleeding.  Neuro: Negative apart from peripheral neuropathy which seems to be relatively stable  Endocrinology: Normal  Musculoskeletal: No myalgia or other rheumatologic complaints noted.    Exam:  BP  127/78 (BP Location: Right arm, Patient Position: Chair, Cuff Size: Adult Regular)   Pulse 73   Wt 92.9 kg (204 lb 11.2 oz)   SpO2 92%   BMI 29.37 kg/m    GENERAL APPEARANCE: healthy, alert and no distress  HEENT: no icterus, no central cyanosis  NECK: no adenopathy, JVP not elevated  RESPIRATORY: lungs clear to auscultation - no rales, rhonchi or wheezes, no use of accessory muscles, no retractions, respirations are unlabored, normal respiratory rate  CARDIOVASCULAR: regular rhythm, normal S1 with physiologic split S2, no S3 or S4 and no murmur, click or rub,   ABDOMEN: soft, non tender, EXTREMITIES: peripheral pulses normal, no edema, no bruits  NEURO: alert and oriented to person/place/time, normal speech, gait and affect  SKIN: no ecchymoses, no rashes    Labs:  CBC RESULTS:   Lab Results   Component Value Date    WBC 4.2 01/26/2024    RBC 4.21 (L) 01/26/2024    HGB 14.1 01/26/2024    HCT 41.3 01/26/2024    MCV 98 01/26/2024    MCH 33.5 (H) 01/26/2024    MCHC 34.1 01/26/2024    RDW 12.0 01/26/2024     01/26/2024       BMP RESULTS:  Lab Results   Component Value Date     08/03/2023    POTASSIUM 4.4 08/03/2023    POTASSIUM 3.8 04/24/2023    POTASSIUM 4.9 08/29/2022    CHLORIDE 105 08/03/2023    CHLORIDE 107 08/29/2022    CO2 27 08/03/2023    CO2 28 08/29/2022    ANIONGAP 7 08/03/2023    ANIONGAP 4 08/29/2022     (H) 08/03/2023     (H) 04/30/2023     (H) 08/29/2022    BUN 19.1 08/03/2023    BUN 40 (H) 08/29/2022    CR 1.09 08/03/2023    GFRESTIMATED 70 08/03/2023    CRISS 9.4 08/03/2023        INR RESULTS:  Lab Results   Component Value Date    INR 1.27 (H) 04/25/2023    INR 1.31 (H) 04/24/2023    INR 1.49 (H) 04/24/2023    INR 1.00 04/12/2023       Procedures:  PULMONARY FUNCTION TESTS:        No data to display                  ECHOCARDIOGRAM:   No results found for this or any previous visit (from the past 8760 hour(s)).      Assessment and Plan:   1.  Postoperative atrial  fibrillation after CABG surgery which has not recurred either symptomatically or by recent ambulatory ECG monitor  2.  Mild aortic stenosis  3.  No cardiovascular symptoms    Plan  1.  Continue current treatment regimen with aspirin only  2.  Follow-up 1 year or earlier if needed    Total elapsed time today with chart review clinic visit and documentation 30 minutes    I very much appreciated the opportunity to see and assess Reinaldo Gordillo in the clinic today. Please do not hesitate to contact my office if you have any questions or concerns.      Joao Saenz MD  Cardiac Arrhythmia Service  [facility]  [phone]       CC  AJ MARTIN

## 2024-05-07 NOTE — PATIENT INSTRUCTIONS
You were seen in the Electrophysiology Clinic today by: Dr Saenz    Plan:       Follow up Visit:  1 year         If you have further questions, please utilize [employer] to contact us.     Your Care Team:    EP Cardiology   Telephone Number     Nurse Line  Marcie Azevedo, RN   Ami Epps, SEAN Dunlap, SEAN   [phone]     For scheduling appointments:   Luis Daniel   [phone]   For procedure scheduling:    Carmen Smalls     [phone]   For the Device Clinic (Pacemakers, ICDs, Loop Recorders)    During business hours: [phone]  After business hours:   [phone]- select option 4 and ask for job code [id number].       On-call cardiologist for after hours or on weekends:   [phone], option #4, and ask to speak to the on-call cardiologist.     Cardiovascular Clinic:   [address] [place] [address]. [place] [address]      As always, Thank you for trusting us with your health care needs!

## 2024-05-08 ENCOUNTER — MYC MEDICAL ADVICE (OUTPATIENT)
Dept: CARDIOLOGY | Facility: CLINIC | Age: 79
End: 2024-05-08
Payer: COMMERCIAL

## 2024-05-08 LAB
ATRIAL RATE - MUSE: 69 BPM
DIASTOLIC BLOOD PRESSURE - MUSE: NORMAL MMHG
INTERPRETATION ECG - MUSE: NORMAL
P AXIS - MUSE: 4 DEGREES
PR INTERVAL - MUSE: 160 MS
QRS DURATION - MUSE: 92 MS
QT - MUSE: 376 MS
QTC - MUSE: 402 MS
R AXIS - MUSE: -2 DEGREES
SYSTOLIC BLOOD PRESSURE - MUSE: NORMAL MMHG
T AXIS - MUSE: 2 DEGREES
VENTRICULAR RATE- MUSE: 69 BPM

## 2024-05-14 ENCOUNTER — LAB (OUTPATIENT)
Dept: LAB | Facility: CLINIC | Age: 79
End: 2024-05-14
Payer: COMMERCIAL

## 2024-05-14 DIAGNOSIS — E29.1 MALE HYPOGONADISM: ICD-10-CM

## 2024-05-14 PROCEDURE — 99000 SPECIMEN HANDLING OFFICE-LAB: CPT | Performed by: PATHOLOGY

## 2024-05-14 PROCEDURE — 36415 COLL VENOUS BLD VENIPUNCTURE: CPT | Performed by: PATHOLOGY

## 2024-05-14 PROCEDURE — 84403 ASSAY OF TOTAL TESTOSTERONE: CPT | Performed by: INTERNAL MEDICINE

## 2024-05-16 LAB — TESTOST SERPL-MCNC: 1117 NG/DL (ref 240–950)

## 2024-05-18 ENCOUNTER — MYC MEDICAL ADVICE (OUTPATIENT)
Dept: ENDOCRINOLOGY | Facility: CLINIC | Age: 79
End: 2024-05-18
Payer: COMMERCIAL

## 2024-05-18 DIAGNOSIS — E29.1 MALE HYPOGONADISM: Primary | ICD-10-CM

## 2024-05-30 ENCOUNTER — LAB (OUTPATIENT)
Dept: LAB | Facility: CLINIC | Age: 79
End: 2024-05-30
Payer: COMMERCIAL

## 2024-05-30 DIAGNOSIS — E29.1 MALE HYPOGONADISM: ICD-10-CM

## 2024-05-30 DIAGNOSIS — I10 BENIGN ESSENTIAL HYPERTENSION: ICD-10-CM

## 2024-05-30 DIAGNOSIS — E78.00 HIGH CHOLESTEROL: ICD-10-CM

## 2024-05-30 LAB
ALBUMIN SERPL BCG-MCNC: 4 G/DL (ref 3.5–5.2)
ALP SERPL-CCNC: 54 U/L (ref 40–150)
ALT SERPL W P-5'-P-CCNC: 46 U/L (ref 0–70)
ANION GAP SERPL CALCULATED.3IONS-SCNC: 9 MMOL/L (ref 7–15)
AST SERPL W P-5'-P-CCNC: 35 U/L (ref 0–45)
BASOPHILS # BLD AUTO: 0 10E3/UL (ref 0–0.2)
BASOPHILS NFR BLD AUTO: 1 %
BILIRUB SERPL-MCNC: 0.5 MG/DL
BUN SERPL-MCNC: 16.3 MG/DL (ref 8–23)
CALCIUM SERPL-MCNC: 9.3 MG/DL (ref 8.8–10.2)
CHLORIDE SERPL-SCNC: 102 MMOL/L (ref 98–107)
CHOLEST SERPL-MCNC: 161 MG/DL
CREAT SERPL-MCNC: 1.1 MG/DL (ref 0.67–1.17)
DEPRECATED HCO3 PLAS-SCNC: 24 MMOL/L (ref 22–29)
EGFRCR SERPLBLD CKD-EPI 2021: 69 ML/MIN/1.73M2
EOSINOPHIL # BLD AUTO: 0.1 10E3/UL (ref 0–0.7)
EOSINOPHIL NFR BLD AUTO: 2 %
ERYTHROCYTE [DISTWIDTH] IN BLOOD BY AUTOMATED COUNT: 13.2 % (ref 10–15)
FASTING STATUS PATIENT QL REPORTED: YES
FASTING STATUS PATIENT QL REPORTED: YES
GLUCOSE SERPL-MCNC: 108 MG/DL (ref 70–99)
HCT VFR BLD AUTO: 40.4 % (ref 40–53)
HDLC SERPL-MCNC: 90 MG/DL
HGB BLD-MCNC: 13.7 G/DL (ref 13.3–17.7)
IMM GRANULOCYTES # BLD: 0 10E3/UL
IMM GRANULOCYTES NFR BLD: 0 %
LDLC SERPL CALC-MCNC: 58 MG/DL
LYMPHOCYTES # BLD AUTO: 1.1 10E3/UL (ref 0.8–5.3)
LYMPHOCYTES NFR BLD AUTO: 21 %
MCH RBC QN AUTO: 32.5 PG (ref 26.5–33)
MCHC RBC AUTO-ENTMCNC: 33.9 G/DL (ref 31.5–36.5)
MCV RBC AUTO: 96 FL (ref 78–100)
MONOCYTES # BLD AUTO: 0.4 10E3/UL (ref 0–1.3)
MONOCYTES NFR BLD AUTO: 7 %
NEUTROPHILS # BLD AUTO: 3.8 10E3/UL (ref 1.6–8.3)
NEUTROPHILS NFR BLD AUTO: 69 %
NONHDLC SERPL-MCNC: 71 MG/DL
NRBC # BLD AUTO: 0 10E3/UL
NRBC BLD AUTO-RTO: 0 /100
PLATELET # BLD AUTO: 175 10E3/UL (ref 150–450)
POTASSIUM SERPL-SCNC: 4.7 MMOL/L (ref 3.4–5.3)
PROT SERPL-MCNC: 6.5 G/DL (ref 6.4–8.3)
RBC # BLD AUTO: 4.22 10E6/UL (ref 4.4–5.9)
SODIUM SERPL-SCNC: 135 MMOL/L (ref 135–145)
TRIGL SERPL-MCNC: 65 MG/DL
WBC # BLD AUTO: 5.4 10E3/UL (ref 4–11)

## 2024-05-30 PROCEDURE — 84403 ASSAY OF TOTAL TESTOSTERONE: CPT | Performed by: INTERNAL MEDICINE

## 2024-05-30 PROCEDURE — 84270 ASSAY OF SEX HORMONE GLOBUL: CPT | Performed by: INTERNAL MEDICINE

## 2024-05-30 PROCEDURE — 36415 COLL VENOUS BLD VENIPUNCTURE: CPT | Performed by: PATHOLOGY

## 2024-05-30 PROCEDURE — 99000 SPECIMEN HANDLING OFFICE-LAB: CPT | Performed by: PATHOLOGY

## 2024-05-30 PROCEDURE — 80061 LIPID PANEL: CPT | Performed by: PATHOLOGY

## 2024-05-30 PROCEDURE — 85025 COMPLETE CBC W/AUTO DIFF WBC: CPT | Performed by: PATHOLOGY

## 2024-05-30 PROCEDURE — 80053 COMPREHEN METABOLIC PANEL: CPT | Performed by: PATHOLOGY

## 2024-05-30 NOTE — PROGRESS NOTES
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 Overall doing well. He has h/o reflux and states his father had gastric cancer. He does use PRN PPI. No weight loss. He is not on Eliquis. He is trying to stay active and exercise. No other HEENT, cardiopulmonary, abdominal, , neurological, systemic, psychiatric, lymphatic, endocrine, vascular complaints.     Past Medical History:   Diagnosis Date    ASCVD (arteriosclerotic cardiovascular disease)     Coronary artery disease     Gastroesophageal reflux disease     Hypertension     Stented coronary artery      Past Surgical History:   Procedure Laterality Date    BYPASS GRAFT ARTERY CORONARY N/A 4/24/2023    Procedure: MEDIAN STERNOTOMY, HARVEST OF LEFT INTERNAL MAMMARY ARTERY, ENDOSCOPIC HARVEST OF LEFT GREATER SAPHENOUS VEIN, ON CARDIOPULMONARY BYPASS, CORONARY ARTERY BYPASS GRAFT (CABG) X4 . TRANSESOPHAGEAL ECHOCARDIOGRAM.;  Surgeon: Bro Almeida MD;  [facility]    CATARACT IOL, RT/LT      COLONOSCOPY N/A 7/17/2019    Procedure: COLONOSCOPY;  Surgeon: Roque Givens MD;  [facility]    CV CORONARY ANGIOGRAM N/A 3/21/2023    Procedure: Coronary Angiogram;  Surgeon: Pito Chapin MD;  Location: [facility] CARDIAC CATH LAB    CV PCI N/A 3/21/2023    Procedure: Percutaneous Coronary Intervention;  Surgeon: Pito Chapin MD;  Location: [facility] CARDIAC CATH LAB    ESOPHAGOSCOPY, GASTROSCOPY, DUODENOSCOPY (EGD), COMBINED N/A 7/17/2019    Procedure: ESOPHAGOGASTRODUODENOSCOPY, WITH BIOPSY;  Surgeon: Roque Givens MD;  [facility]    HERNIA REPAIR  2000    MOHS MICROGRAPHIC PROCEDURE      PHACOEMULSIFICATION CLEAR CORNEA WITH STANDARD INTRAOCULAR LENS IMPLANT Right 7/2/2021    Procedure: RIGHT EYE CATARACT REMOVAL WITH INTRAOCULAR LENS IMPLANT;  Surgeon: Justa Liz MD;  [facility]    PHACOEMULSIFICATION CLEAR CORNEA WITH STANDARD INTRAOCULAR LENS IMPLANT Left 7/9/2021    Procedure: LEFT EYE CATARACT REMOVAL WITH INTRAOCULAR  LENS IMPLANT;  Surgeon: Justa Liz MD;  Location: [facility] OR    [facility] CORONARY STENT PERCUT, EA ADDTL VESSEL       PE    Vitals noted gen nad, cooperative, alert, neck supple nl rom, lungs with good air movement, RRR, S1, S2, no MRG, abdomen, no acute findings, Grossly normal neurological exam.     Recent Results (from the past 720 hour(s))   EKG 12-lead, tracing only (Same Day)    Collection Time: 05/07/24 10:46 AM   Result Value Ref Range    Systolic Blood Pressure  mmHg    Diastolic Blood Pressure  mmHg    Ventricular Rate 69 BPM    Atrial Rate 69 BPM    TX Interval 160 ms    QRS Duration 92 ms     ms    QTc 402 ms    P Axis 4 degrees    R AXIS -2 degrees    T Axis 2 degrees    Interpretation ECG       Sinus rhythm  Inferior infarct (cited on or before 25-APR-2023)  Abnormal ECG  When compared with ECG of 02-NOV-2023 11:15,  Premature ventricular complexes are no longer Present  Confirmed by MD BECKY, YULY ([id number]) on 5/8/2024 11:45:20 AM     Testosterone total    Collection Time: 05/14/24  2:08 PM   Result Value Ref Range    Testosterone Total 1,117 (H) 240 - 950 ng/dL   Comprehensive metabolic panel    Collection Time: 05/30/24  2:58 PM   Result Value Ref Range    Sodium 135 135 - 145 mmol/L    Potassium 4.7 3.4 - 5.3 mmol/L    Carbon Dioxide (CO2) 24 22 - 29 mmol/L    Anion Gap 9 7 - 15 mmol/L    Urea Nitrogen 16.3 8.0 - 23.0 mg/dL    Creatinine 1.10 0.67 - 1.17 mg/dL    GFR Estimate 69 >60 mL/min/1.73m2    Calcium 9.3 8.8 - 10.2 mg/dL    Chloride 102 98 - 107 mmol/L    Glucose 108 (H) 70 - 99 mg/dL    Alkaline Phosphatase 54 40 - 150 U/L    AST 35 0 - 45 U/L    ALT 46 0 - 70 U/L    Protein Total 6.5 6.4 - 8.3 g/dL    Albumin 4.0 3.5 - 5.2 g/dL    Bilirubin Total 0.5 <=1.2 mg/dL    Patient Fasting > 8hrs? Yes    Lipid panel reflex to direct LDL Fasting    Collection Time: 05/30/24  2:58 PM   Result Value Ref Range    Cholesterol 161 <200 mg/dL    Triglycerides 65 <150 mg/dL    Direct Measure HDL 90  >=40 mg/dL    LDL Cholesterol Calculated 58 <=100 mg/dL    Non HDL Cholesterol 71 <130 mg/dL    Patient Fasting > 8hrs? Yes    CBC with platelets and differential    Collection Time: 05/30/24  2:58 PM   Result Value Ref Range    WBC Count 5.4 4.0 - 11.0 10e3/uL    RBC Count 4.22 (L) 4.40 - 5.90 10e6/uL    Hemoglobin 13.7 13.3 - 17.7 g/dL    Hematocrit 40.4 40.0 - 53.0 %    MCV 96 78 - 100 fL    MCH 32.5 26.5 - 33.0 pg    MCHC 33.9 31.5 - 36.5 g/dL    RDW 13.2 10.0 - 15.0 %    Platelet Count 175 150 - 450 10e3/uL    % Neutrophils 69 %    % Lymphocytes 21 %    % Monocytes 7 %    % Eosinophils 2 %    % Basophils 1 %    % Immature Granulocytes 0 %    NRBCs per 100 WBC 0 <1 /100    Absolute Neutrophils 3.8 1.6 - 8.3 10e3/uL    Absolute Lymphocytes 1.1 0.8 - 5.3 10e3/uL    Absolute Monocytes 0.4 0.0 - 1.3 10e3/uL    Absolute Eosinophils 0.1 0.0 - 0.7 10e3/uL    Absolute Basophils 0.0 0.0 - 0.2 10e3/uL    Absolute Immature Granulocytes 0.0 <=0.4 10e3/uL    Absolute NRBCs 0.0 10e3/uL       A/P:     1. Immunizations; COVID Pfizer vaccine x 6. He has completed the Shigrix vaccine series. Tdap 8/7/2015. Pneumococcal 23 done 9/6/2019. Prevnar 13 done 5/15/2018. Prevnar 20 in the fall 2024.   2. Dermatology; seen on 1/19/2024 for skin check. next manuel. 12/3/2024 with Dr. Randall. He was seen 3/7/2024 by Dr. Tovar   3. PSA; 0.58 on 8/29/2022 and repeat 0.63 on 8/3/2023.   4. Colonoscopy; 7/17/2019 with no specimens collected. Cologuard negative on 3/7/2024.   5. Increased lipids on Pravastatin 80 mg; lipids (8/3/2023) with TG 60, LDL 56, . He could not tolerate Crestor (CK 1600) with muscle complaints.   6. Hgb on 4/30/2023 was 9.3. And repeat CBC  with Hgb increased to 12.6 (8/3/23). Last Hgb was 14.1 on 1/26/2024.   7. Neuropathy: visit with Dr. Begum, Neurology on 12/22/2023  with follow up scheduled for 6/14/2024.    8. A1c was 6.2% on 4/12/2023. Repeat 6.3% on 8/3/2023.   9. CAD; had PCI with AREN x 4 in 2015. Seen  12/15/2021, [facility] Dr. Madrigal, note in Care Everywhere and there is a detailed note in the chart. He had 11/14/2022 appt. With Dr. Chapin. Resting echo 10/29/2022. He had CABG x 4 on 4/24/2023. Visit with Dr. Chapin on 6/12/2023 and remains on aspirin, metoprolol and pravastatin. Soft murmur; he had a 3/21/2023 echo  with aortic stenosis. . He was to follow up in cardiology with Dr. Chapin. Last echo 12/19/2023 with some R sided findings and he has next scheduled for 7/1/2024.    10. A fib/flutter: EP visit with Dr. Saenz on 5/7/2024 for a fib/flutter after CABG (April 2023). Low burden PAF (9%) on recent zio patch 5/12/23. He is off  Eliquis, stopped amiodarone due to neuropathy complaints. .   11. Endocrine: TSH normal (3.44) on 10/30/2023. He does not feel he has sleep apnea. Some mild depression? Low testosterone and he had an endocrinology appt. With Dr. White 11/2/2023.  Next 6/6/2024. He is on androgel.   12. Recurrent UTIs and hematuria: UA 7/14/23 with RBCs (9), no signs infection. UA 6/20/2023 with nitrites, LE, WBCs, RBCs (100) and treated with Bactrim x 10 days. UA 6/2/23 with LE, WBCs and RBCs (43) and treated with Bactrim. CT abdomen/pelvis 10/20/2022 with 5 mm non-obstructive right kidney stone, but he notes that is chronic. He was seen 8/29/2023 in Urology by Ms. Callaway.    13. Reflux on PPI with father's h/o gastric cancer (last EGD 7/17/2019). Ordered EGD today 5/31/2024            30 minutes spent on the date of the encounter doing chart review, history and exam, documentation and further activities as noted above exclusive of procedures and other billable interpretations

## 2024-05-31 ENCOUNTER — OFFICE VISIT (OUTPATIENT)
Dept: INTERNAL MEDICINE | Facility: CLINIC | Age: 79
End: 2024-05-31
Payer: COMMERCIAL

## 2024-05-31 ENCOUNTER — MYC MEDICAL ADVICE (OUTPATIENT)
Dept: INTERNAL MEDICINE | Facility: CLINIC | Age: 79
End: 2024-05-31

## 2024-05-31 VITALS
SYSTOLIC BLOOD PRESSURE: 136 MMHG | BODY MASS INDEX: 28.86 KG/M2 | WEIGHT: 201.6 LBS | HEART RATE: 58 BPM | HEIGHT: 70 IN | DIASTOLIC BLOOD PRESSURE: 73 MMHG | OXYGEN SATURATION: 98 %

## 2024-05-31 DIAGNOSIS — K21.9 GASTROESOPHAGEAL REFLUX DISEASE WITHOUT ESOPHAGITIS: Primary | ICD-10-CM

## 2024-05-31 LAB — SHBG SERPL-SCNC: 103 NMOL/L (ref 11–80)

## 2024-05-31 PROCEDURE — 99214 OFFICE O/P EST MOD 30 MIN: CPT | Performed by: INTERNAL MEDICINE

## 2024-05-31 NOTE — PROGRESS NOTES
Ed is a 79 year old, presenting for the following health issues:  Follow Up        5/31/2024     8:54 AM   Additional Questions   Roomed by Tara GRIMES     History of Present Illness       Hypertension: He presents for follow up of hypertension.  He does check blood pressure  regularly outside of the clinic. Outside blood pressures have been over 140/90. He does not follow a low salt diet.     He eats 2-3 servings of fruits and vegetables daily.He consumes 1 sweetened beverage(s) daily.He exercises with enough effort to increase his heart rate 30 to 60 minutes per day.  He exercises with enough effort to increase his heart rate 6 days per week.   He is taking medications regularly.

## 2024-06-01 LAB
TESTOST FREE SERPL-MCNC: 8.88 NG/DL
TESTOST SERPL-MCNC: 907 NG/DL (ref 240–950)

## 2024-06-06 ENCOUNTER — VIRTUAL VISIT (OUTPATIENT)
Dept: ENDOCRINOLOGY | Facility: CLINIC | Age: 79
End: 2024-06-06
Payer: COMMERCIAL

## 2024-06-06 DIAGNOSIS — E29.1 MALE HYPOGONADISM: Primary | ICD-10-CM

## 2024-06-06 PROCEDURE — G2211 COMPLEX E/M VISIT ADD ON: HCPCS | Mod: 95 | Performed by: INTERNAL MEDICINE

## 2024-06-06 PROCEDURE — 99214 OFFICE O/P EST MOD 30 MIN: CPT | Mod: 95 | Performed by: INTERNAL MEDICINE

## 2024-06-06 NOTE — PROGRESS NOTES
Virtual Visit Details    Type of service:  Video Visit   Video Start Time: 3:07 PM  Video End Time: 3:25 PM    Originating Location (pt. Location): Home  Distant Location (provider location):  Off-site  Platform used for Video Visit: Francy        Recent issues:  Testosterone follow-up evaluation  Previous history of CABG 4v in 3/2023, transient AFib  Had symptoms with fatigue, also numbness sensation at feet c/w neuropathy, neurologist evaluation  Confirmed low free testosterone levels, started low dose testosterone gel, feels better... 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 Naresh Chandler/[facility]  8/2021. Lab testing showed low testosterone level, but management plan unclear  Previous  labs include:      Additional health history:   Testicular injury:    none            Testicular surgery:   vasectomy  Testosterone med use:     none        Fam Hx Hypogonadism: none    Previous [facility] labs include:   Latest Reference Range & Units 08/29/22 08:42   Testosterone Total 240 - 950 ng/dL 295      Latest Reference Range & Units 08/29/22 08:42   Free Testosterone Calculated ng/dL 2.20          Cheko 5: 4.1-23.9   Latest Reference Range & Units 08/29/22 08:42   Sex Hormone Binding Globulin 11 - 80 nmol/L 119 (H)      Latest Reference Range & Units 08/29/22 08:42   PSA 0.00 - 4.00 ug/L 0.58      Latest Reference Range & Units 08/03/23 15:34   PSA Tumor Marker 0.00 - 6.50 ng/mL 0.63         1/3/23. Initial endocrinology evaluation with me at [place]  Reviewed health history and testosterone issues  Symptoms included low libido, muscle weakness, decreased axillary hair  Plan  for repeat testosterone related labs and then followup evaluation  Previous [facility] testosterone labs include:   Latest Reference Range & Units 08/29/22 08:42 01/28/23 10:13 10/30/23 18:06 01/26/24 11:17 05/14/24 14:08 05/30/24 14:58   Testosterone Total 240 - 950 ng/dL 295 311 255  267 680 1,117 (H) 907      Latest Reference Range & Units 08/29/22 08:42 01/28/23 10:13 10/30/23 18:06 01/26/24 11:17 05/30/24 14:58   Free Testosterone Calculated 4.1-23.9 ng/dL 2.20 2.73 2.51 8.24 8.88     Testosterone gel 1% pumps:   0 0  0  2  1     Latest Reference Range & Units 08/29/22 08:42 01/28/23 10:13 10/30/23 18:06 01/26/24 11:17 05/30/24 14:58   Sex Hormone Binding Globulin 11 - 80 nmol/L 119 (H) 99 (H) 85 (H) 76 103 (H)      Latest Reference Range & Units 08/03/23 15:34 01/26/24 11:17   PSA Tumor Marker 0.00 - 6.50 ng/mL 0.63 0.56       Recent [facility] labs include:  Lab Results   Component Value Date    FSH 65.1 (H) 10/30/2023    TESTOSTTOTAL 907 05/30/2024    FT 8.88 05/30/2024    TSH 3.44 10/30/2023    SG 1.016 08/29/2023      Current dose:  testosterone gel 1-pump topically to shoulder areas daily      Lives in [facility]  Sees Dr. Joao Smiley/[facility] for general medicine evaluations.    PMH/PSH:  Past Medical History:   Diagnosis Date    ASCVD (arteriosclerotic cardiovascular disease)     Coronary artery disease     Gastroesophageal reflux disease     Hypertension     Male hypogonadism     Stented coronary artery      Past Surgical History:   Procedure Laterality Date    BYPASS GRAFT ARTERY CORONARY N/A 4/24/2023    Procedure: MEDIAN STERNOTOMY, HARVEST OF LEFT INTERNAL MAMMARY ARTERY, ENDOSCOPIC HARVEST OF LEFT GREATER SAPHENOUS VEIN, ON CARDIOPULMONARY BYPASS, CORONARY ARTERY BYPASS GRAFT (CABG) X4 . TRANSESOPHAGEAL ECHOCARDIOGRAM.;  Surgeon: Bro Almeida MD;  [facility] OR    CATARACT IOL, RT/LT      COLONOSCOPY N/A 7/17/2019    Procedure: COLONOSCOPY;  Surgeon: Roque Givens MD;  [facility]     CV CORONARY ANGIOGRAM N/A 3/21/2023    Procedure: Coronary Angiogram;  Surgeon: Pito Chapin MD;  [facility]    CV PCI N/A 3/21/2023    Procedure: Percutaneous Coronary Intervention;  Surgeon: Pito Chapin MD;  Location: [facility] CARDIAC CATH LAB    ESOPHAGOSCOPY, GASTROSCOPY, DUODENOSCOPY (EGD), COMBINED N/A 7/17/2019    Procedure: ESOPHAGOGASTRODUODENOSCOPY, WITH BIOPSY;  Surgeon: Roque Givens MD;  [facility]    HERNIA REPAIR  2000    MOHS MICROGRAPHIC PROCEDURE      PHACOEMULSIFICATION CLEAR CORNEA WITH STANDARD INTRAOCULAR LENS IMPLANT Right 7/2/2021    Procedure: RIGHT EYE CATARACT REMOVAL WITH INTRAOCULAR LENS IMPLANT;  Surgeon: Justa Liz MD;  [facility] [place]    PHACOEMULSIFICATION CLEAR CORNEA WITH STANDARD INTRAOCULAR LENS IMPLANT Left 7/9/2021    Procedure: LEFT EYE CATARACT REMOVAL WITH INTRAOCULAR LENS IMPLANT;  Surgeon: Justa Liz MD;  [facility]    [facility] CORONARY STENT PERCUT, EA ADDTL VESSEL         Family Hx:  Family History   Problem Relation Age of Onset    Glaucoma No family hx of     Macular Degeneration No family hx of     Retinal detachment No family hx of     Amblyopia No family hx of     Melanoma No family hx of     Skin Cancer No family hx of          Social Hx:  Social History     Socioeconomic History    Marital status:      Spouse name: Not on file    Number of children: Not on file    Years of education: Not on file    Highest education level: Not on file   Occupational History    Not on file   Tobacco Use    Smoking status: Never    Smokeless tobacco: Never   Substance and Sexual Activity    Alcohol use: Yes     Comment: 1-2 drinks 5 days a week    Drug use: Never    Sexual activity: Not on file   Other Topics Concern    Parent/sibling w/ CABG, MI or angioplasty before 65F 55M? Not Asked   Social History Narrative    Not on file     Social Determinants of Health     Financial Resource Strain: Low Risk   (1/15/2024)    Financial Resource Strain     Within the past 12 months, have you or your family members you live with been unable to get utilities (heat, electricity) when it was really needed?: No   Food Insecurity: Low Risk  (1/15/2024)    Food Insecurity     Within the past 12 months, did you worry that your food would run out before you got money to buy more?: No     Within the past 12 months, did the food you bought just not last and you didn t have money to get more?: No   Transportation Needs: Low Risk  (1/15/2024)    Transportation Needs     Within the past 12 months, has lack of transportation kept you from medical appointments, getting your medicines, non-medical meetings or appointments, work, or from getting things that you need?: No   Physical Activity: Not on file   Stress: Not on file   Social Connections: Not on file   Interpersonal Safety: Low Risk  (10/30/2023)    Interpersonal Safety     Do you feel physically and emotionally safe where you currently live?: Yes     Within the past 12 months, have you been hit, slapped, kicked or otherwise physically hurt by someone?: No     Within the past 12 months, have you been humiliated or emotionally abused in other ways by your partner or ex-partner?: No   Housing Stability: Low Risk  (1/15/2024)    Housing Stability     Do you have housing? : Yes     Are you worried about losing your housing?: No          MEDICATIONS:  has a current medication list which includes the following prescription(s): aspirin low dose, lisinopril, melatonin, metoprolol succinate er, omeprazole, pravastatin, tacrolimus, testosterone, and zolpidem.    ROS:     ROS: 10 point ROS neg other than the symptoms noted above in the HPI.    GENERAL: less fatigue, wt stable; denies fevers, chills, night sweats.    HEENT: normal sense of smell; no dysphagia, odonophagia, diplopia, neck pain  THYROID:  no apparent hyper or hypothyroid symptoms  CV: no chest pain, pressure, palpitations  LUNGS:  no SOB, ANTHONY, cough, wheezing   ABDOMEN: no diarrhea, constipation, abdominal pain  EXTREMITIES: no rashes, ulcers, edema  NEUROLOGY: decreased sensation at feet; no headaches, denies changes in vision, tingling  MSK: generally improved muscle strength, some myalgias as noted; no arthralgias  SKIN: decreased axillary hair growth; no rashes or lesions  : ?irritable bladder, some decreased libido and erectile function  PSYCH:  stable mood, no significant anxiety or depression  ENDOCRINE: no heat or cold intolerance    Physical Exam (visual exam)  VS:  no vital signs taken for video visit  CONSTITUTIONAL: healthy, alert and NAD, well dressed, answering questions appropriately  ENT: no nose swelling or nasal discharge, mouth redness or gum changes.  EYES: eyes grossly normal to inspection, conjunctivae and sclerae normal, no exophthalmos or proptosis  THYROID:  no apparent nodules or goiter  LUNGS: no audible wheeze, cough or visible cyanosis, no visible retractions or increased work of breathing  ABDOMEN: abdomen not evaluated  EXTREMITIES: no hand tremors, limited exam  NEUROLOGY: CN grossly intact, mentation intact and speech normal   SKIN:  no apparent skin lesions, rash, or edema with visualized skin appearance  PSYCH: mentation appears normal, affect normal/bright, judgement and insight intact,   normal speech and appearance well groomed      LABS:    All pertinent notes, labs, and images personally reviewed by me.     A/P:  Encounter Diagnosis   Name Primary?    Male hypogonadism Yes       Comments:  Reviewed health history and hypogonadism issues.  Previous symptoms and low free testosterone correlate with male hypogonadism  High SHBG causing normal or high total testosterone levels, though free testosterone a more accurate barometer of his testosterone levels  Reviewed and interpreted tests that I previously ordered.   Ordered appropriate tests for the endocrinology disease management.    Management options  discussed and implemented after shared medical decision making with the patient.  Hypogonadism problem is chronic-stable, improved     Plan:  Reviewed general issues with the hypogonadism diagnosis and management  Discussed lab tests to assess testosterone axis hormone levels.  We have reviewed treatment options with topical, nasal, and injectable testosterone medication use.    Recommend:  We have reviewed the testosterone gel med option, dosing, potential benefits and SE's  Continue the current testosterone gel 1% as 1-pump topically to shoulders daily  Monitor for symptom changes  Track the free testosterone levels, rather than the total testosterone levels  Plan repeat lab testing in 10/2024   Consider nearby [facility] or [facility] clinic   Lab orders placed  No testicular or pituitary imaging needed at this time  Contact me if questions regarding treatment plan    Keep regular follow-up appointments with PCP, cardiologist also  Addressed patient questions today    The longitudinal plan of care for the endocrine problem(s) were addressed during this visit.  Due to added complexity of care,   we will continue to support the patient and the subsequent management of this condition with ongoing continuity of care.    There are no Patient Instructions on file for this visit.    Future labs ordered today:   Orders Placed This Encounter   Procedures    Testosterone Free and Total     Radiology/Consults ordered today: None    Total time spent on day of encounter:  31 min    Follow-up:  6/2025, Return    ISHMAEL White MD, MS  Endocrinology  [facility]    CC: Joao Smiley

## 2024-06-08 ENCOUNTER — HEALTH MAINTENANCE LETTER (OUTPATIENT)
Age: 79
End: 2024-06-08

## 2024-06-14 ENCOUNTER — OFFICE VISIT (OUTPATIENT)
Dept: NEUROLOGY | Facility: CLINIC | Age: 79
End: 2024-06-14
Payer: COMMERCIAL

## 2024-06-14 VITALS
HEART RATE: 83 BPM | DIASTOLIC BLOOD PRESSURE: 76 MMHG | WEIGHT: 197.9 LBS | BODY MASS INDEX: 28.4 KG/M2 | SYSTOLIC BLOOD PRESSURE: 117 MMHG | OXYGEN SATURATION: 96 % | RESPIRATION RATE: 16 BRPM

## 2024-06-14 DIAGNOSIS — G60.9 HEREDITARY AND IDIOPATHIC PERIPHERAL NEUROPATHY: Primary | ICD-10-CM

## 2024-06-14 PROCEDURE — 99213 OFFICE O/P EST LOW 20 MIN: CPT | Performed by: PSYCHIATRY & NEUROLOGY

## 2024-06-14 ASSESSMENT — PAIN SCALES - GENERAL: PAINLEVEL: NO PAIN (0)

## 2024-06-14 NOTE — LETTER
6/14/2024       RE: Reinaldo Gordillo  [address]  [place] [address]       Dear Colleague,    Thank you for referring your patient, Reinaldo Gordillo, to the [facility] [place] at [facility]. Please see a copy of my visit note below.    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     20 minutes spent caring for the patient on the day of the visit.     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 He has a moderately severe peripheral neuropathy that has been fairly stable over the past few years.     He reports no falls.  He has been doing balance exercises and working with a  to be active.   No foot wounds.              Physical Exam:     /76 (BP Location: Right arm, Patient Position: Sitting, Cuff Size: Adult Regular)   Pulse 83   Resp 16   Wt 89.8 kg (197 lb 14.4 oz)   SpO2 96%   BMI 28.40 kg/m    Gen: Awake, alert, NAD  Neuro: Gait is stable, some trouble with tandem, he has a positive romberg. He has normal strength in the lower ext. He has decreased pinprick in the feet worse on left side vs right. Vibration absent at toes, early extinction at ankles, normal at knees.          Pertinent History/Data for appointment:     No changes      Again, thank you for allowing me to participate in the care of your patient.      Sincerely,    Bushra Begum MD

## 2024-06-14 NOTE — NURSING NOTE
Chief Complaint   Patient presents with    RECHECK     /76 (BP Location: Right arm, Patient Position: Sitting, Cuff Size: Adult Regular)   Pulse 83   Resp 16   Wt 89.8 kg (197 lb 14.4 oz)   SpO2 96%   BMI 28.40 kg/m      BELL PEDRAZA

## 2024-06-14 NOTE — PROGRESS NOTES
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     20 minutes spent caring for the patient on the day of the visit.     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 He has a moderately severe peripheral neuropathy that has been fairly stable over the past few years.     He reports no falls.  He has been doing balance exercises and working with a  to be active.   No foot wounds.              Physical Exam:     /76 (BP Location: Right arm, Patient Position: Sitting, Cuff Size: Adult Regular)   Pulse 83   Resp 16   Wt 89.8 kg (197 lb 14.4 oz)   SpO2 96%   BMI 28.40 kg/m    Gen: Awake, alert, NAD  Neuro: Gait is stable, some trouble with tandem, he has a positive romberg. He has normal strength in the lower ext. He has decreased pinprick in the feet worse on left side vs right. Vibration absent at toes, early extinction at ankles, normal at knees.          Pertinent History/Data for appointment:     No changes

## 2024-06-21 ENCOUNTER — MYC MEDICAL ADVICE (OUTPATIENT)
Dept: INTERNAL MEDICINE | Facility: CLINIC | Age: 79
End: 2024-06-21
Payer: COMMERCIAL

## 2024-06-21 DIAGNOSIS — K21.9 GASTROESOPHAGEAL REFLUX DISEASE WITHOUT ESOPHAGITIS: Primary | ICD-10-CM

## 2024-06-21 RX ORDER — OMEPRAZOLE 40 MG/1
40 CAPSULE, DELAYED RELEASE ORAL
Qty: 90 CAPSULE | Refills: 0 | Status: SHIPPED | OUTPATIENT
Start: 2024-06-21 | End: 2024-07-29

## 2024-06-24 ENCOUNTER — TELEPHONE (OUTPATIENT)
Dept: GASTROENTEROLOGY | Facility: CLINIC | Age: 79
End: 2024-06-24
Payer: COMMERCIAL

## 2024-06-24 NOTE — TELEPHONE ENCOUNTER
Pre Assessment RN Review    Focused Assessments    Cardiac Review      Has the patient had a stent placed in the last year?  Patient thought he had cardiac event this past March in 2024, but it was March of 23.       Scheduling Status & Recommendations    Location Type: No Scheduling Restrictions - Per exclusion criteria.  Cardiac event more than 1 year ago, no pacemaker, mild aortic stenosis, preserved EF.

## 2024-06-24 NOTE — TELEPHONE ENCOUNTER
"Endoscopy Scheduling Screen    Have you had a positive Covid test in the last 14 days?  No    What is your communication preference for Instructions and/or Bowel Prep?   MyChart    What insurance is in the chart?  Other:  [facility]/MEDICARE    Ordering/Referring Provider: Joao Smiley MD     (If ordering provider performs procedure, schedule with ordering provider unless otherwise instructed. )    BMI: Estimated body mass index is 28.4 kg/m  as calculated from the following:    Height as of 5/31/24: 1.778 m (5' 10\").    Weight as of 6/14/24: 89.8 kg (197 lb 14.4 oz).     Sedation Ordered  moderate sedation.   If patient BMI > 50 do not schedule in [facility].    If patient BMI > 45 do not schedule at [facility].    Are you taking methadone or Suboxone?  No    Have you had difficulties, pain, or discomfort during past endoscopy procedures?  No    Are you taking any prescription medications for pain 3 or more times per week?   NO, No RN review required.    Do you have a history of malignant hyperthermia?  No    (Females) Are you currently pregnant?   No     Have you been diagnosed or told you have pulmonary hypertension?   No    Do you have an LVAD?  No    Have you been told you have moderate to severe sleep apnea?  No    Have you been told you have COPD, asthma, or any other lung disease?  No    Do you have any heart conditions?  Yes     In the past year, have you had any hospitalizations for heart related issues including cardiomyopathy, heart attack, or stent placement?  No    Do you have any implantable devices in your body (pacemaker, ICD)?  No    Do you take nitroglycerine?  No    Have you ever had or are you waiting for an organ transplant?  No. Continue scheduling, no site restrictions.    Have you had a stroke or transient ischemic attack (TIA aka \"mini stroke\" in the last 6 months?   No    Have you been diagnosed with or been told you have cirrhosis of the liver?   No    Are you currently on dialysis?   No    Do you " "need assistance transferring?   No    BMI: Estimated body mass index is 28.4 kg/m  as calculated from the following:    Height as of 5/31/24: 1.778 m (5' 10\").    Weight as of 6/14/24: 89.8 kg (197 lb 14.4 oz).     Is patients BMI > 40 and scheduling location UP?  No    Do you take an injectable medication for weight loss or diabetes (excluding insulin)?  No    Do you take the medication Naltrexone?  No    Do you take blood thinners?  No       Prep   Are you currently on dialysis or do you have chronic kidney disease?  No    Do you have a diagnosis of diabetes?  No    Do you have a diagnosis of cystic fibrosis (CF)?  No    On a regular basis do you go 3 -5 days between bowel movements?  No    BMI > 40?  No    Preferred Pharmacy:    [id number] - [place], [place] - [address]  [address] [address]  Phone: [phone] Fax: [phone]    [facility] [id number] [place] [facility] - [place], [place] - [address]  [address] [address]  Phone: [phone] Fax: [phone]      Final Scheduling Details     Procedure scheduled  Upper endoscopy (EGD)    Surgeon:  PENNY     Date of procedure:  7/24     Pre-OP / PAC:   No - Not required for this site.    Location  [facility] - [facility] - Patient preference.    Sedation   Moderate Sedation - Per order.      Patient Reminders:   You will receive a call from a Nurse to review instructions and health history.  This assessment must be completed prior to your procedure.  Failure to complete the Nurse assessment may result in the procedure being cancelled.      On the day of your procedure, please designate an adult(s) who can drive you home stay with you for the next 24 hours. The medicines used in the exam will make you sleepy. You will not be able to drive.      You cannot take public transportation, ride share services, or non-medical taxi service without a responsible caregiver.  Medical transport services are allowed " with the requirement that a responsible caregiver will receive you at your destination.  We require that drivers and caregivers are confirmed prior to your procedure.

## 2024-07-01 ENCOUNTER — MYC MEDICAL ADVICE (OUTPATIENT)
Dept: INTERNAL MEDICINE | Facility: CLINIC | Age: 79
End: 2024-07-01

## 2024-07-15 ENCOUNTER — TELEPHONE (OUTPATIENT)
Dept: GASTROENTEROLOGY | Facility: CLINIC | Age: 79
End: 2024-07-15
Payer: COMMERCIAL

## 2024-07-15 NOTE — TELEPHONE ENCOUNTER
Pre assessment completed for upcoming procedure.   (Please see previous telephone encounter notes for complete details)    Patient  returned call.       Procedure details:    Arrival time and facility location reviewed.    Pre op exam needed? N/A    Designated  policy reviewed. Instructed to have someone stay 6  hours post procedure.       Medication review:    Medications reviewed. Please see supporting documentation below. Holding recommendations discussed (if applicable).       Prep for procedure:     Patient stated they have already reviewed the bowel prep instructions and writer answered all patient questions (if applicable). NPO instructions reviewed.    Pt noted no known gastroparesis, achalasia, or delayed gastric emptying, pt will follow standard NPO guidelines.     Patient was instructed to call if any questions or concerns arise.        Any additional information needed:  N/A      Patient  verbalized understanding and had no questions or concerns at this time.      Ami Yost RN  Endoscopy Procedure Pre Assessment   [phone] option 4

## 2024-07-15 NOTE — TELEPHONE ENCOUNTER
Attempted to contact patient in order to complete pre assessment questions.     Patient scheduled for Upper endoscopy (EGD) on 7/24/24.     No answer. Left message to return call to [phone] option 4    Callback required communication sent via Akella.      Calista Rm RN  Endoscopy Procedure Pre Assessment

## 2024-07-15 NOTE — TELEPHONE ENCOUNTER
Pre visit planning completed.      Procedure details:    Patient scheduled for Upper endoscopy (EGD) on 7/24/24.     Arrival time: 1425. Procedure time 1525    Facility location: [facility]; [address] [place] [address], [address] Floor, [place] [address]. Check in location: 5th Floor.    Sedation type: Conscious sedation     Pre op exam needed? N/A    Indication for procedure: GERD      Chart review:     Electronic implanted devices? No    Recent diagnosis of diverticulitis within the last 6 weeks? N/A    Diabetic? No      Medication review:    Anticoagulants? No    NSAIDS? No NSAID medications per patient's medication list.  RN will verify with pre-assessment call.    Other medication HOLDING recommendations:  N/A      Prep for procedure:     Prep instructions sent via larissa Rm RN  Endoscopy Procedure Pre Assessment RN  [phone] option 4

## 2024-07-24 ENCOUNTER — ANESTHESIA (OUTPATIENT)
Dept: SURGERY | Facility: AMBULATORY SURGERY CENTER | Age: 79
End: 2024-07-24
Payer: COMMERCIAL

## 2024-07-24 ENCOUNTER — HOSPITAL ENCOUNTER (OUTPATIENT)
Facility: AMBULATORY SURGERY CENTER | Age: 79
Discharge: HOME | End: 2024-07-24
Attending: INTERNAL MEDICINE | Admitting: SURGERY
Payer: COMMERCIAL

## 2024-07-24 ENCOUNTER — ANESTHESIA EVENT (OUTPATIENT)
Dept: SURGERY | Facility: AMBULATORY SURGERY CENTER | Age: 79
End: 2024-07-24
Payer: COMMERCIAL

## 2024-07-24 VITALS
HEIGHT: 72 IN | HEART RATE: 50 BPM | WEIGHT: 195 LBS | BODY MASS INDEX: 26.41 KG/M2 | DIASTOLIC BLOOD PRESSURE: 70 MMHG | OXYGEN SATURATION: 98 % | RESPIRATION RATE: 16 BRPM | TEMPERATURE: 98.6 F | SYSTOLIC BLOOD PRESSURE: 105 MMHG

## 2024-07-24 VITALS — HEART RATE: 51 BPM

## 2024-07-24 LAB — UPPER GI ENDOSCOPY: NORMAL

## 2024-07-24 PROCEDURE — 99100 ANES PT EXTEME AGE<1 YR&>70: CPT | Performed by: ANESTHESIOLOGY

## 2024-07-24 PROCEDURE — 43235 EGD DIAGNOSTIC BRUSH WASH: CPT | Performed by: ANESTHESIOLOGY

## 2024-07-24 PROCEDURE — 43235 EGD DIAGNOSTIC BRUSH WASH: CPT | Performed by: NURSE ANESTHETIST, CERTIFIED REGISTERED

## 2024-07-24 PROCEDURE — 43235 EGD DIAGNOSTIC BRUSH WASH: CPT | Performed by: INTERNAL MEDICINE

## 2024-07-24 PROCEDURE — 99100 ANES PT EXTEME AGE<1 YR&>70: CPT | Performed by: NURSE ANESTHETIST, CERTIFIED REGISTERED

## 2024-07-24 RX ORDER — ONDANSETRON 4 MG/1
4 TABLET, ORALLY DISINTEGRATING ORAL EVERY 6 HOURS PRN
Status: DISCONTINUED | OUTPATIENT
Start: 2024-07-24 | End: 2024-07-25 | Stop reason: HOSPADM

## 2024-07-24 RX ORDER — LIDOCAINE 40 MG/G
CREAM TOPICAL
Status: DISCONTINUED | OUTPATIENT
Start: 2024-07-24 | End: 2024-07-24 | Stop reason: HOSPADM

## 2024-07-24 RX ORDER — LIDOCAINE HYDROCHLORIDE 20 MG/ML
INJECTION, SOLUTION INFILTRATION; PERINEURAL PRN
Status: DISCONTINUED | OUTPATIENT
Start: 2024-07-24 | End: 2024-07-24

## 2024-07-24 RX ORDER — PROPOFOL 10 MG/ML
INJECTION, EMULSION INTRAVENOUS PRN
Status: DISCONTINUED | OUTPATIENT
Start: 2024-07-24 | End: 2024-07-24

## 2024-07-24 RX ORDER — PROPOFOL 10 MG/ML
INJECTION, EMULSION INTRAVENOUS CONTINUOUS PRN
Status: DISCONTINUED | OUTPATIENT
Start: 2024-07-24 | End: 2024-07-24

## 2024-07-24 RX ORDER — FLUMAZENIL 0.1 MG/ML
0.2 INJECTION, SOLUTION INTRAVENOUS
Status: DISCONTINUED | OUTPATIENT
Start: 2024-07-24 | End: 2024-07-25 | Stop reason: HOSPADM

## 2024-07-24 RX ORDER — NALOXONE HYDROCHLORIDE 0.4 MG/ML
0.2 INJECTION, SOLUTION INTRAMUSCULAR; INTRAVENOUS; SUBCUTANEOUS
Status: DISCONTINUED | OUTPATIENT
Start: 2024-07-24 | End: 2024-07-25 | Stop reason: HOSPADM

## 2024-07-24 RX ORDER — ONDANSETRON 2 MG/ML
4 INJECTION INTRAMUSCULAR; INTRAVENOUS EVERY 6 HOURS PRN
Status: DISCONTINUED | OUTPATIENT
Start: 2024-07-24 | End: 2024-07-25 | Stop reason: HOSPADM

## 2024-07-24 RX ORDER — NALOXONE HYDROCHLORIDE 0.4 MG/ML
0.4 INJECTION, SOLUTION INTRAMUSCULAR; INTRAVENOUS; SUBCUTANEOUS
Status: DISCONTINUED | OUTPATIENT
Start: 2024-07-24 | End: 2024-07-25 | Stop reason: HOSPADM

## 2024-07-24 RX ORDER — ONDANSETRON 2 MG/ML
4 INJECTION INTRAMUSCULAR; INTRAVENOUS
Status: DISCONTINUED | OUTPATIENT
Start: 2024-07-24 | End: 2024-07-24 | Stop reason: HOSPADM

## 2024-07-24 RX ORDER — PROCHLORPERAZINE MALEATE 5 MG
5 TABLET ORAL EVERY 6 HOURS PRN
Status: DISCONTINUED | OUTPATIENT
Start: 2024-07-24 | End: 2024-07-25 | Stop reason: HOSPADM

## 2024-07-24 RX ORDER — SODIUM CHLORIDE, SODIUM LACTATE, POTASSIUM CHLORIDE, CALCIUM CHLORIDE 600; 310; 30; 20 MG/100ML; MG/100ML; MG/100ML; MG/100ML
INJECTION, SOLUTION INTRAVENOUS CONTINUOUS
Status: DISCONTINUED | OUTPATIENT
Start: 2024-07-24 | End: 2024-07-24 | Stop reason: HOSPADM

## 2024-07-24 RX ADMIN — PROPOFOL 30 MG: 10 INJECTION, EMULSION INTRAVENOUS at 15:33

## 2024-07-24 RX ADMIN — LIDOCAINE HYDROCHLORIDE 40 MG: 20 INJECTION, SOLUTION INFILTRATION; PERINEURAL at 15:35

## 2024-07-24 RX ADMIN — PROPOFOL 200 MCG/KG/MIN: 10 INJECTION, EMULSION INTRAVENOUS at 15:33

## 2024-07-24 RX ADMIN — PROPOFOL 20 MG: 10 INJECTION, EMULSION INTRAVENOUS at 15:36

## 2024-07-24 RX ADMIN — SODIUM CHLORIDE, SODIUM LACTATE, POTASSIUM CHLORIDE, CALCIUM CHLORIDE: 600; 310; 30; 20 INJECTION, SOLUTION INTRAVENOUS at 14:42

## 2024-07-24 NOTE — H&P
Reinaldo Gordillo  [id number]  male  79 year old      Reason for procedure/surgery: Reflux    Patient Active Problem List   Diagnosis    Nuclear sclerotic cataract of both eyes    Hyperglycemia    Hyperlipidemia    Stented coronary artery    Coronary artery disease involving native coronary artery without angina pectoris    Basal cell carcinoma (BCC) in situ of skin    Colon polyp    Hereditary and idiopathic peripheral neuropathy    Status post coronary angiogram    Coronary artery disease involving native coronary artery of native heart without angina pectoris       Past Surgical History:    Past Surgical History:   Procedure Laterality Date    BYPASS GRAFT ARTERY CORONARY N/A 4/24/2023    Procedure: MEDIAN STERNOTOMY, HARVEST OF LEFT INTERNAL MAMMARY ARTERY, ENDOSCOPIC HARVEST OF LEFT GREATER SAPHENOUS VEIN, ON CARDIOPULMONARY BYPASS, CORONARY ARTERY BYPASS GRAFT (CABG) X4 . TRANSESOPHAGEAL ECHOCARDIOGRAM.;  Surgeon: Bro Almeida MD;  Location: [facility] OR    CATARACT IOL, RT/LT      COLONOSCOPY N/A 7/17/2019    Procedure: COLONOSCOPY;  Surgeon: Roque Givens MD;  [facility]    CV CORONARY ANGIOGRAM N/A 3/21/2023    Procedure: Coronary Angiogram;  Surgeon: Pito Chapin MD;  Location: [facility] CARDIAC CATH LAB    CV PCI N/A 3/21/2023    Procedure: Percutaneous Coronary Intervention;  Surgeon: Pito Chapin MD;  Location: [facility] CARDIAC CATH LAB    ESOPHAGOSCOPY, GASTROSCOPY, DUODENOSCOPY (EGD), COMBINED N/A 7/17/2019    Procedure: ESOPHAGOGASTRODUODENOSCOPY, WITH BIOPSY;  Surgeon: Roque Givens MD;  [facility]    HERNIA REPAIR  2000    MOHS MICROGRAPHIC PROCEDURE      PHACOEMULSIFICATION CLEAR CORNEA WITH STANDARD INTRAOCULAR LENS IMPLANT Right 7/2/2021    Procedure: RIGHT EYE CATARACT REMOVAL WITH INTRAOCULAR LENS IMPLANT;  Surgeon: Justa Liz MD;  Location: [facility] OR    PHACOEMULSIFICATION CLEAR CORNEA WITH STANDARD INTRAOCULAR LENS IMPLANT Left 7/9/2021    Procedure: LEFT EYE  CATARACT REMOVAL WITH INTRAOCULAR LENS IMPLANT;  Surgeon: Justa Liz MD;  Location: [facility] OR    [facility] CORONARY STENT PERCUT, EA ADDTL VESSEL         Past Medical History:   Past Medical History:   Diagnosis Date    ASCVD (arteriosclerotic cardiovascular disease)     Coronary artery disease     Gastroesophageal reflux disease     Hypertension     Male hypogonadism     Stented coronary artery        Social History:   Social History     Tobacco Use    Smoking status: Never    Smokeless tobacco: Never   Substance Use Topics    Alcohol use: Yes     Comment: 1-2 drinks 5 days a week       Family History:   Family History   Problem Relation Age of Onset    Glaucoma No family hx of     Macular Degeneration No family hx of     Retinal detachment No family hx of     Amblyopia No family hx of     Melanoma No family hx of     Skin Cancer No family hx of        Allergies: No Known Allergies    Active Medications:   Current Outpatient Medications   Medication Sig Dispense Refill    ASPIRIN LOW DOSE 81 MG EC tablet Take 81 mg by mouth every morning      lisinopril (ZESTRIL) 10 MG tablet Take 1 tablet (10 mg) by mouth daily 90 tablet 3    metoprolol succinate ER (TOPROL XL) 25 MG 24 hr tablet TAKE 1 TABLET BY MOUTH EVERY MORNING 90 tablet 3    omeprazole (PRILOSEC) 40 MG DR capsule Take 1 capsule (40 mg) by mouth daily before breakfast 90 capsule 0    pravastatin (PRAVACHOL) 80 MG tablet Take 1 tablet (80 mg) by mouth At Bedtime 90 tablet 3    tacrolimus (PROTOPIC) 0.1 % external ointment Apply topically 2 times daily To rash on the scrotum, penis, or inguinal folds. 60 g 3    testosterone (ANDROGEL 1 % PUMP) 12.5 MG/ACT (1%) gel Apply 2 pumps topically to shoulder area daily, as directed 150 g 3    zolpidem (AMBIEN) 10 MG tablet Take 1 tablet (10 mg) by mouth nightly as needed for sleep 30 tablet 0    melatonin 5 MG tablet Take 1 tablet (5 mg) by mouth nightly as needed for sleep 15 tablet 0    omeprazole (PRILOSEC) 20 MG DR  capsule Take 20 mg by mouth as needed         Systemic Review:   CONSTITUTIONAL: NEGATIVE for fever, chills, change in weight  ENT/MOUTH: NEGATIVE for ear, mouth and throat problems  RESP: NEGATIVE for significant cough or SOB  CV: NEGATIVE for chest pain, palpitations or peripheral edema    Physical Examination:   Vital Signs: /70   Pulse 65   Temp 97.5  F (36.4  C) (Temporal)   Resp 16   Ht 1.829 m (6')   Wt 88.5 kg (195 lb)   SpO2 98%   BMI 26.45 kg/m    GENERAL: healthy, alert and no distress  NECK: no adenopathy, no asymmetry, masses, or scars  RESP: lungs clear to auscultation - no rales, rhonchi or wheezes  CV: regular rate and rhythm, normal S1 S2, no S3 or S4, no murmur, click or rub, no peripheral edema and peripheral pulses strong  ABDOMEN: soft, nontender, no hepatosplenomegaly, no masses and bowel sounds normal  MS: no gross musculoskeletal defects noted, no edema    Plan: Appropriate to proceed as scheduled.      Roque Givens MD  7/24/2024    PCP:  Joao Smiley

## 2024-07-24 NOTE — DISCHARGE INSTRUCTIONS
Discharge Instructions after  Upper Endoscopy (EGD)    Activity and Diet  You were given medicine for pain. You may be dizzy or sleepy.  For 24 hours:   Do not drive or use heavy equipment.   Do not make important decisions.   Do not drink any alcohol.  ___ You may return to your regular diet.    Discomfort  You may have a sore throat for 2 to 3 days. It may help to:   Avoid hot liquids for 24 hours.   Use sore throat lozenges.   Gargle as needed with salt water up to 4 times a day. Mix 1 cup of warm water  with 1 teaspoon of salt. Do not swallow.  ___ Your esophagus was dilated (opened) or banded during the exam:   Drink only cool liquids for the rest of the day. Eat a soft diet for the next few days.   You may have a sore chest for 2 to 3 days.    You may take Tylenol (acetaminophen) for pain unless your doctor has told you not to.    Do not take aspirin or ibuprofen (Advil, Motrin) or other NSAIDS  (anti-inflammatory drugs) for ___ days.    Follow-up  ___ We took small tissue samples for study. If you do not have a follow-up visit scheduled,  call your provider s office in 2 weeks for the results.    Other instructions________________________________________________________    When to call us:  Problems are rare. Call right away if you have:   Unusual throat pain or trouble swallowing   Unusual pain in belly or chest that is not relieved by belching or passing air   Black stools (tar-like looking bowel movement)   Temperature above 100.6  F. (37.5  C).    If you vomit blood or have severe pain, go to an emergency room.    If you have questions, call:  Monday to Friday, 8 a.m. to 4:30 p.m.: Central Scheduling Department:[phone]    After hours: Hospital: [phone] (Ask for the GI fellow on call)

## 2024-07-24 NOTE — ANESTHESIA PREPROCEDURE EVALUATION
Anesthesia Pre-Procedure Evaluation    Patient: Reinaldo Gordillo   MRN: [id number] : 1945        Procedure : Procedure(s):  Esophagoscopy, gastroscopy, duodenoscopy (EGD), combined          Past Medical History:   Diagnosis Date    ASCVD (arteriosclerotic cardiovascular disease)     Coronary artery disease     Gastroesophageal reflux disease     Hypertension     Male hypogonadism     Stented coronary artery       Past Surgical History:   Procedure Laterality Date    BYPASS GRAFT ARTERY CORONARY N/A 2023    Procedure: MEDIAN STERNOTOMY, HARVEST OF LEFT INTERNAL MAMMARY ARTERY, ENDOSCOPIC HARVEST OF LEFT GREATER SAPHENOUS VEIN, ON CARDIOPULMONARY BYPASS, CORONARY ARTERY BYPASS GRAFT (CABG) X4 . TRANSESOPHAGEAL ECHOCARDIOGRAM.;  Surgeon: Bro Almeida MD;  Location: [facility] OR    CATARACT IOL, RT/LT      COLONOSCOPY N/A 2019    Procedure: COLONOSCOPY;  Surgeon: Roque Givens MD;  [facility]    CV CORONARY ANGIOGRAM N/A 3/21/2023    Procedure: Coronary Angiogram;  Surgeon: Pito Chapin MD;  [facility]    CV PCI N/A 3/21/2023    Procedure: Percutaneous Coronary Intervention;  Surgeon: Pito Chapin MD;  [facility]    ESOPHAGOSCOPY, GASTROSCOPY, DUODENOSCOPY (EGD), COMBINED N/A 2019    Procedure: ESOPHAGOGASTRODUODENOSCOPY, WITH BIOPSY;  Surgeon: Roque Givens MD;  [facility]    HERNIA REPAIR  2000    MOHS MICROGRAPHIC PROCEDURE      PHACOEMULSIFICATION CLEAR CORNEA WITH STANDARD INTRAOCULAR LENS IMPLANT Right 2021    Procedure: RIGHT EYE CATARACT REMOVAL WITH INTRAOCULAR LENS IMPLANT;  Surgeon: Justa Liz MD;  [place] [facility] [place]    PHACOEMULSIFICATION CLEAR CORNEA WITH STANDARD INTRAOCULAR LENS IMPLANT Left 2021    Procedure: LEFT EYE CATARACT REMOVAL WITH INTRAOCULAR LENS IMPLANT;  Surgeon: Justa Liz MD;  [place] [facility] [place]    [facility] CORONARY STENT PERCUT, EA ADDTL VESSEL        No Known Allergies  "  Social History     Tobacco Use    Smoking status: Never    Smokeless tobacco: Never   Substance Use Topics    Alcohol use: Yes     Comment: 1-2 drinks 5 days a week      Wt Readings from Last 1 Encounters:   07/24/24 88.5 kg (195 lb)        Anesthesia Evaluation            ROS/MED HX  ENT/Pulmonary:       Neurologic:       Cardiovascular:     (+)  hypertension- -  CAD -  - stent-                                      METS/Exercise Tolerance:     Hematologic:       Musculoskeletal:       GI/Hepatic:     (+) GERD,                   Renal/Genitourinary:       Endo:       Psychiatric/Substance Use:       Infectious Disease:       Malignancy:       Other:            Physical Exam    Airway  airway exam normal      Mallampati: II   TM distance: > 3 FB   Neck ROM: full   Mouth opening: > 3 cm    Respiratory Devices and Support         Dental       (+) Completely normal teeth      Cardiovascular          Rhythm and rate: regular and normal     Pulmonary   pulmonary exam normal        breath sounds clear to auscultation           OUTSIDE LABS:  CBC:   Lab Results   Component Value Date    WBC 5.4 05/30/2024    WBC 4.2 01/26/2024    HGB 13.7 05/30/2024    HGB 14.1 01/26/2024    HCT 40.4 05/30/2024    HCT 41.3 01/26/2024     05/30/2024     01/26/2024     BMP:   Lab Results   Component Value Date     05/30/2024     08/03/2023    POTASSIUM 4.7 05/30/2024    POTASSIUM 4.4 08/03/2023    CHLORIDE 102 05/30/2024    CHLORIDE 105 08/03/2023    CO2 24 05/30/2024    CO2 27 08/03/2023    BUN 16.3 05/30/2024    BUN 19.1 08/03/2023    CR 1.10 05/30/2024    CR 1.09 08/03/2023     (H) 05/30/2024     (H) 08/03/2023     COAGS:   Lab Results   Component Value Date    PTT 42 (H) 04/24/2023    INR 1.27 (H) 04/25/2023    FIBR 168 (L) 04/24/2023     POC: No results found for: \"BGM\", \"HCG\", \"HCGS\"  HEPATIC:   Lab Results   Component Value Date    ALBUMIN 4.0 05/30/2024    PROTTOTAL 6.5 05/30/2024    ALT 46 " 05/30/2024    AST 35 05/30/2024    ALKPHOS 54 05/30/2024    BILITOTAL 0.5 05/30/2024     OTHER:   Lab Results   Component Value Date    PH 7.40 04/25/2023    LACT 1.7 04/24/2023    A1C 6.3 (H) 08/03/2023    CRISS 9.3 05/30/2024    PHOS 3.7 04/30/2023    MAG 1.8 04/30/2023    TSH 3.44 10/30/2023    CRP <2.9 08/29/2022    SED 9 08/29/2022       Anesthesia Plan    ASA Status:  3    NPO Status:  NPO Appropriate    Anesthesia Type: MAC.     - Reason for MAC: immobility needed, straight local not clinically adequate   Induction: Intravenous.   Maintenance: TIVA.        Consents    Anesthesia Plan(s) and associated risks, benefits, and realistic alternatives discussed. Questions answered and patient/representative(s) expressed understanding.     - Discussed:     - Discussed with:  Patient      - Extended Intubation/Ventilatory Support Discussed: No.      - Patient is DNR/DNI Status: No     Use of blood products discussed: No .     Postoperative Care    Pain management: IV analgesics, Oral pain medications, Multi-modal analgesia.   PONV prophylaxis: Background Propofol Infusion, Ondansetron (or other 5HT-3)     Comments:               Pito Retana MD    I have reviewed the pertinent notes and labs in the chart from the past 30 days and (re)examined the patient.  Any updates or changes from those notes are reflected in this note.             # Drug Induced Platelet Defect: home medication list includes an antiplatelet medication  # Overweight: Estimated body mass index is 26.45 kg/m  as calculated from the following:    Height as of this encounter: 1.829 m (6').    Weight as of this encounter: 88.5 kg (195 lb).

## 2024-07-24 NOTE — ANESTHESIA POSTPROCEDURE EVALUATION
Patient: Reinaldo Gordillo    Procedure: Procedure(s):  Esophagoscopy, gastroscopy, duodenoscopy (EGD), combined       Anesthesia Type:  MAC    Note:  Disposition: Outpatient   Postop Pain Control: Uneventful            Sign Out: Well controlled pain   PONV: No   Neuro/Psych: Uneventful            Sign Out: Acceptable/Baseline neuro status   Airway/Respiratory: Uneventful            Sign Out: Acceptable/Baseline resp. status   CV/Hemodynamics: Uneventful            Sign Out: Acceptable CV status   Other NRE: NONE   DID A NON-ROUTINE EVENT OCCUR? No           Last vitals:  Vitals Value Taken Time   /70 07/24/24 1625   Temp 37  C (98.6  F) 07/24/24 1625   Pulse 50 07/24/24 1625   Resp 16 07/24/24 1625   SpO2 98 % 07/24/24 1625       Electronically Signed By: Pito Retana MD  July 24, 2024  5:38 PM

## 2024-07-24 NOTE — ANESTHESIA CARE TRANSFER NOTE
Patient: Reinaldo Gordillo    Procedure: Procedure(s):  Esophagoscopy, gastroscopy, duodenoscopy (EGD), combined       Diagnosis: Gastroesophageal reflux disease without esophagitis [K21.9]  Diagnosis Additional Information: No value filed.    Anesthesia Type:   MAC     Note:    Oropharynx: oropharynx clear of all foreign objects and spontaneously breathing  Level of Consciousness: awake  Oxygen Supplementation: room air    Independent Airway: airway patency satisfactory and stable  Dentition: dentition unchanged  Vital Signs Stable: post-procedure vital signs reviewed and stable  Report to RN Given: handoff report given  Destination: [facility].  Comments: Uneventful transport   Report to RN- Aileen  Exchanging well; color natl  Pt responds appropriately to command  IV patent  Lips/teeth/dentition as preop status  Questions answered            Vitals:  Vitals Value Taken Time   /91 1555   Temp 97.9    Pulse 56    Resp 16    SpO2 97%        Electronically Signed By: SARITA MCQUEEN CRNA  July 24, 2024  3:55 PM

## 2024-07-25 ENCOUNTER — HOSPITAL ENCOUNTER (OUTPATIENT)
Dept: CARDIOLOGY | Facility: CLINIC | Age: 79
Discharge: HOME | End: 2024-07-25
Attending: INTERNAL MEDICINE | Admitting: INTERNAL MEDICINE
Payer: COMMERCIAL

## 2024-07-25 DIAGNOSIS — R01.1 HEART MURMUR: ICD-10-CM

## 2024-07-25 DIAGNOSIS — I48.0 PAROXYSMAL ATRIAL FIBRILLATION (H): ICD-10-CM

## 2024-07-25 DIAGNOSIS — I25.10 CORONARY ARTERY DISEASE INVOLVING NATIVE CORONARY ARTERY OF NATIVE HEART WITHOUT ANGINA PECTORIS: ICD-10-CM

## 2024-07-25 LAB — LVEF ECHO: NORMAL

## 2024-07-25 PROCEDURE — 93306 TTE W/DOPPLER COMPLETE: CPT

## 2024-07-25 PROCEDURE — 93306 TTE W/DOPPLER COMPLETE: CPT | Mod: 26 | Performed by: STUDENT IN AN ORGANIZED HEALTH CARE EDUCATION/TRAINING PROGRAM

## 2024-07-29 ENCOUNTER — OFFICE VISIT (OUTPATIENT)
Dept: CARDIOLOGY | Facility: CLINIC | Age: 79
End: 2024-07-29
Attending: INTERNAL MEDICINE
Payer: COMMERCIAL

## 2024-07-29 VITALS
SYSTOLIC BLOOD PRESSURE: 125 MMHG | HEART RATE: 72 BPM | DIASTOLIC BLOOD PRESSURE: 76 MMHG | WEIGHT: 195 LBS | OXYGEN SATURATION: 100 % | BODY MASS INDEX: 26.45 KG/M2

## 2024-07-29 DIAGNOSIS — I25.10 CORONARY ARTERY DISEASE INVOLVING NATIVE CORONARY ARTERY OF NATIVE HEART WITHOUT ANGINA PECTORIS: ICD-10-CM

## 2024-07-29 DIAGNOSIS — Z95.5 STENTED CORONARY ARTERY: Primary | ICD-10-CM

## 2024-07-29 PROCEDURE — G0463 HOSPITAL OUTPT CLINIC VISIT: HCPCS | Performed by: INTERNAL MEDICINE

## 2024-07-29 PROCEDURE — 99214 OFFICE O/P EST MOD 30 MIN: CPT | Performed by: INTERNAL MEDICINE

## 2024-07-29 ASSESSMENT — PAIN SCALES - GENERAL: PAINLEVEL: NO PAIN (0)

## 2024-07-29 NOTE — PROGRESS NOTES
[facility]  CARDIOVASCULAR MEDICINE CLINIC NOTE    Referring Provider: Referred Self   Primary Care Provider: Joao Smiley     Patient Name: Reinaldo Gordillo   MRN: [id number]     PERTINENT CLINICAL HISTORY:   Reinaldo Gordillo is a 79 year old male with history of coronary artery disease (s/p AREN x4 to RCA in 2015) and recent CABG x4 (4/24/2023), HTN, HLD who returns to clinic for follow up.     Today he reports that he is doing very well without symptoms.  He is active, even more than prior.  Has hired a .  Denies all chest pain, palpitations, SOB, edema, orthopnea, PND, syncope or near syncope.  He is tolerating his medications well.     PAST MEDICAL HISTORY:     Past Medical History:   Diagnosis Date    ASCVD (arteriosclerotic cardiovascular disease)     Coronary artery disease     Gastroesophageal reflux disease     Hypertension     Male hypogonadism     Stented coronary artery         PAST SURGICAL HISTORY:     Past Surgical History:   Procedure Laterality Date    BYPASS GRAFT ARTERY CORONARY N/A 4/24/2023    Procedure: MEDIAN STERNOTOMY, HARVEST OF LEFT INTERNAL MAMMARY ARTERY, ENDOSCOPIC HARVEST OF LEFT GREATER SAPHENOUS VEIN, ON CARDIOPULMONARY BYPASS, CORONARY ARTERY BYPASS GRAFT (CABG) X4 . TRANSESOPHAGEAL ECHOCARDIOGRAM.;  Surgeon: Bro Almeida MD;  Location:  OR    CATARACT IOL, RT/LT      COLONOSCOPY N/A 7/17/2019    Procedure: COLONOSCOPY;  Surgeon: Roque Givens MD;  Location:  OR    CV CORONARY ANGIOGRAM N/A 3/21/2023    Procedure: Coronary Angiogram;  Surgeon: Pito Chapin MD;  Location: [facility] CARDIAC CATH LAB    CV PCI N/A 3/21/2023    Procedure: Percutaneous Coronary Intervention;  Surgeon: Pito Chapin MD;  Location: [facility] CARDIAC CATH LAB    ESOPHAGOSCOPY, GASTROSCOPY, DUODENOSCOPY (EGD), COMBINED N/A 7/17/2019    Procedure: ESOPHAGOGASTRODUODENOSCOPY, WITH BIOPSY;  Surgeon: Roque Givens MD;  Location:  OR    ESOPHAGOSCOPY, GASTROSCOPY,  DUODENOSCOPY (EGD), COMBINED N/A 7/24/2024    Procedure: Esophagoscopy, gastroscopy, duodenoscopy (EGD), combined;  Surgeon: Roque Givens MD;  Location: [facility] OR    HERNIA REPAIR  2000    MOHS MICROGRAPHIC PROCEDURE      PHACOEMULSIFICATION CLEAR CORNEA WITH STANDARD INTRAOCULAR LENS IMPLANT Right 7/2/2021    Procedure: RIGHT EYE CATARACT REMOVAL WITH INTRAOCULAR LENS IMPLANT;  Surgeon: Justa Liz MD;  Location: [facility] OR    PHACOEMULSIFICATION CLEAR CORNEA WITH STANDARD INTRAOCULAR LENS IMPLANT Left 7/9/2021    Procedure: LEFT EYE CATARACT REMOVAL WITH INTRAOCULAR LENS IMPLANT;  Surgeon: Justa Liz MD;  Location: [facility] OR    ZZHC CORONARY STENT PERCUT, EA ADDTL VESSEL          CURRENT MEDICATIONS:     Current Outpatient Medications   Medication Sig Dispense Refill    ASPIRIN LOW DOSE 81 MG EC tablet Take 81 mg by mouth every morning      lisinopril (ZESTRIL) 10 MG tablet Take 1 tablet (10 mg) by mouth daily 90 tablet 3    melatonin 5 MG tablet Take 1 tablet (5 mg) by mouth nightly as needed for sleep 15 tablet 0    metoprolol succinate ER (TOPROL XL) 25 MG 24 hr tablet TAKE 1 TABLET BY MOUTH EVERY MORNING 90 tablet 3    pravastatin (PRAVACHOL) 80 MG tablet Take 1 tablet (80 mg) by mouth At Bedtime 90 tablet 3    tacrolimus (PROTOPIC) 0.1 % external ointment Apply topically 2 times daily To rash on the scrotum, penis, or inguinal folds. 60 g 3    testosterone (ANDROGEL 1 % PUMP) 12.5 MG/ACT (1%) gel Apply 2 pumps topically to shoulder area daily, as directed 150 g 3    omeprazole (PRILOSEC) 20 MG DR capsule Take 20 mg by mouth as needed      omeprazole (PRILOSEC) 40 MG DR capsule Take 1 capsule (40 mg) by mouth daily before breakfast (Patient not taking: Reported on 7/29/2024) 90 capsule 0    zolpidem (AMBIEN) 10 MG tablet Take 1 tablet (10 mg) by mouth nightly as needed for sleep (Patient not taking: Reported on 7/29/2024) 30 tablet 0        ALLERGIES:   No Known Allergies      PHYSICAL  EXAMINATION:   /76 (BP Location: Right arm, Patient Position: Chair, Cuff Size: Adult Regular)   Pulse 72   Wt 88.5 kg (195 lb)   SpO2 100%   BMI 26.45 kg/m    Body mass index is 26.45 kg/m .  Wt Readings from Last 2 Encounters:   07/29/24 88.5 kg (195 lb)   07/24/24 88.5 kg (195 lb)     Constitutional: no acute distress, pleasant and cooperative, appears overall well.  Cardiovascular: RRR nl S1S2, JVP not elevated, extremities with no edema or cyanosis  Respiratory: clear to auscultation and percussion bilaterally anterior and posterior  Gastrointestinal: soft, nontender, non distended, no hepatosplenomegaly or masses  Neurologic: AOx3     LABORATORY DATA:   I have reviewed the labs below.    LIPID RESULTS:  Recent Labs   Lab Test 05/30/24  1458 01/26/24  1117   CHOL 161 172   HDL 90 87   LDL 58 74   TRIG 65 56        LIVER ENZYME RESULTS:  Recent Labs   Lab Test 05/30/24  1458 10/30/23  1806   AST 35 33   ALT 46 39       CBC RESULTS:  Recent Labs   Lab Test 05/30/24  1458 01/26/24  1117   WBC 5.4 4.2   HGB 13.7 14.1   HCT 40.4 41.3    189       BMP RESULTS:  Recent Labs   Lab Test 05/30/24  1458 08/03/23  1534    139   POTASSIUM 4.7 4.4   CHLORIDE 102 105   CO2 24 27   ANIONGAP 9 7   * 104*   BUN 16.3 19.1   CR 1.10 1.09   CRISS 9.3 9.4       A1C RESULTS:  Lab Results   Component Value Date    A1C 6.3 (H) 08/03/2023       INR RESULTS:  Recent Labs   Lab Test 04/25/23  0645 04/24/23  1518   INR 1.27* 1.31*          PROCEDURES & FURTHER ASSESSMENTS:   I have reviewed the test results below.    ECHO: 7/25/2024  Global and regional left ventricular function is normal with an EF of 55-60%.  Mild right ventricular dilation is present. Global right ventricular function is mildly reduced.  Mild aortic stenosis (Vm2.8m/s, MG 16mmHg, BELKIS per VTI 1.5cm2)  No pericardial effusion is present.     Compared to prior TTE 12/19/23, no significant changes noted.    CARDIAC CATH:  3/21/2023  Two vessel  coronary artery disease involving the LAD and RCA.  The RCA lesion is highly calcified between stented regions.    Given the severe calcification of the RCA and the long LAD lesion extending to the ostium of the vessel, recommend CVTS consult and eval for CABG.  However, PCI is possible if the patient does not want CABG    CABG/ Cardiac surgery:4/24/2023  1.  Coronary artery bypass grafting x4 (left internal mammary artery to left anterior descending artery, saphenous vein graft to posterolateral artery, saphenous vein graft to obtuse marginal artery, saphenous vein graft to first diagonal artery).  2.  Endoscopic vein harvest.     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     #. Coronary artery disease (s/p PCIs and new CABG x4)  -He is currently asymptomatic.  TTE with mild RV dilation and mildly reduced fxn  -Continue aspirin 81 mg daily, pravastatin 80 mg daily, and Toprol-XL 25 mg daily     # History of NSVT  # New Post Op paroxysmal AFib  - Continue Toprolol-XL 25 mg daily     #.  Hypertension: well-controlled  -Continue lisinopril 10mg Qday  -Continue Toprol-XL 25 mg daily     #.  Hyperlipidemia  -LDL: 58 on 5/30/2024  -On Pravastatin 80 mg daily.  -Did not tolerate rosuvastatin in the past due to muscle cramps and elevated CK.     Follow-up: 1 year with TTE    Thank you for allowing us to take part in the care of this very pleasant patient.  Please do not hesitate to call if any further questions or concerns arise.    I spent 30 min today reviewing the medical record, meeting with the patient, and completing this note.    Pito hCapin MD, PhD  Interventional/Critical Care Cardiology  [phone]    July 29, 2024      CC  Patient Care Team:  Joao Smiley MD as PCP - Bushra Linares  MD Michelle as MD (Neurology)  Yonathan Randall MD as MD (Dermatology)  Joao Smiley MD as Assigned PCP  Salomon White MD as MD (Endocrinology, Diabetes, and Metabolism)  Salomon White MD as Assigned Endocrinology Provider  Yony Cormier, SEAN as Specialty Care Coordinator (Cardiovascular & Thoracic Surgery)  Sarah Lemus, APRN CNP as Nurse Practitioner (Anesthesiology)  Bro Almeida MD as MD (Cardiovascular & Thoracic Surgery)  Sravanthi Callaway PA-C as Physician Assistant (Urology)  Bushra Begum MD as Assigned Neuroscience Provider  Pito Chapin MD as MD (Cardiovascular Disease)  Salas Tovar MD as Assigned Surgical Provider  Yonathan Randall MD as MD (Dermatology)  Joao Saenz MD as Assigned Heart and Vascular Provider  SELF, REFERRED

## 2024-07-29 NOTE — PATIENT INSTRUCTIONS
Patient Instructions:  It was a pleasure to see you in the cardiology clinic today.      If you have any questions, call  Jackelyn Contreras RN, at [phone].   [facility] Cardiology Clinics.  To schedule an appointment or to leave a message for your Care Team Press #1  If you are a physician calling for another physician Press #2  For Billing Press #3  For Medical Records Press #4  We are encouraging the use of [employer] to communicate with your HealthCare Provider    Note the new medications: none  Stop the following medications: none    The results from today include: none  Please follow up with cardiology in one year with an ECHO      If you have an urgent need after hours (8:00 am to 4:30 pm) please call [phone] and ask for the cardiology fellow on call.

## 2024-07-29 NOTE — LETTER
7/29/2024      RE: Reinaldo Gordillo  [address]  [place] [address]       Dear Colleague,    Thank you for the opportunity to participate in the care of your patient, Reinaldo Gordillo, at the [facility] HEART [facility] [place] at [facility]. Please see a copy of my visit note below.    [facility]  CARDIOVASCULAR MEDICINE CLINIC NOTE    Referring Provider: Referred Self   Primary Care Provider: Joao Smiley     Patient Name: Reinaldo Gordillo   MRN: [id number]     PERTINENT CLINICAL HISTORY:   Reinaldo Gordillo is a 79 year old male ***    No symptoms.  Active.  Has hired a .  Denies all chest pain, palpitations, SOB, edema, orthopnea, PND, syncope or near syncope.      I have personally reviewed outside notes from ***     PAST MEDICAL HISTORY:     Past Medical History:   Diagnosis Date     ASCVD (arteriosclerotic cardiovascular disease)      Coronary artery disease      Gastroesophageal reflux disease      Hypertension      Male hypogonadism      Stented coronary artery         PAST SURGICAL HISTORY:     Past Surgical History:   Procedure Laterality Date     BYPASS GRAFT ARTERY CORONARY N/A 4/24/2023    Procedure: MEDIAN STERNOTOMY, HARVEST OF LEFT INTERNAL MAMMARY ARTERY, ENDOSCOPIC HARVEST OF LEFT GREATER SAPHENOUS VEIN, ON CARDIOPULMONARY BYPASS, CORONARY ARTERY BYPASS GRAFT (CABG) X4 . TRANSESOPHAGEAL ECHOCARDIOGRAM.;  Surgeon: Bro Almeida MD;  Location:  OR     CATARACT IOL, RT/LT       COLONOSCOPY N/A 7/17/2019    Procedure: COLONOSCOPY;  Surgeon: Roque Givens MD;  Location: [facility] OR     CV CORONARY ANGIOGRAM N/A 3/21/2023    Procedure: Coronary Angiogram;  Surgeon: Pito Chapin MD;  Location: [facility] CARDIAC CATH LAB     CV PCI N/A 3/21/2023    Procedure: Percutaneous Coronary Intervention;  Surgeon: Pito Chapin MD;  Location: [facility] CARDIAC CATH LAB     ESOPHAGOSCOPY, GASTROSCOPY, DUODENOSCOPY (EGD), COMBINED  N/A 7/17/2019    Procedure: ESOPHAGOGASTRODUODENOSCOPY, WITH BIOPSY;  Surgeon: Roque Givens MD;  Location: [facility] OR     ESOPHAGOSCOPY, GASTROSCOPY, DUODENOSCOPY (EGD), COMBINED N/A 7/24/2024    Procedure: Esophagoscopy, gastroscopy, duodenoscopy (EGD), combined;  Surgeon: Roque Givens MD;  Location: [facility] OR     HERNIA REPAIR  2000     MOHS MICROGRAPHIC PROCEDURE       PHACOEMULSIFICATION CLEAR CORNEA WITH STANDARD INTRAOCULAR LENS IMPLANT Right 7/2/2021    Procedure: RIGHT EYE CATARACT REMOVAL WITH INTRAOCULAR LENS IMPLANT;  Surgeon: Justa Liz MD;  Location: [facility] OR     PHACOEMULSIFICATION CLEAR CORNEA WITH STANDARD INTRAOCULAR LENS IMPLANT Left 7/9/2021    Procedure: LEFT EYE CATARACT REMOVAL WITH INTRAOCULAR LENS IMPLANT;  Surgeon: Justa Liz MD;  Location: [facility] OR     ZZHC CORONARY STENT PERCUT, EA ADDTL VESSEL          CURRENT MEDICATIONS:     Current Outpatient Medications   Medication Sig Dispense Refill     ASPIRIN LOW DOSE 81 MG EC tablet Take 81 mg by mouth every morning       lisinopril (ZESTRIL) 10 MG tablet Take 1 tablet (10 mg) by mouth daily 90 tablet 3     melatonin 5 MG tablet Take 1 tablet (5 mg) by mouth nightly as needed for sleep 15 tablet 0     metoprolol succinate ER (TOPROL XL) 25 MG 24 hr tablet TAKE 1 TABLET BY MOUTH EVERY MORNING 90 tablet 3     pravastatin (PRAVACHOL) 80 MG tablet Take 1 tablet (80 mg) by mouth At Bedtime 90 tablet 3     tacrolimus (PROTOPIC) 0.1 % external ointment Apply topically 2 times daily To rash on the scrotum, penis, or inguinal folds. 60 g 3     testosterone (ANDROGEL 1 % PUMP) 12.5 MG/ACT (1%) gel Apply 2 pumps topically to shoulder area daily, as directed 150 g 3     omeprazole (PRILOSEC) 20 MG DR capsule Take 20 mg by mouth as needed       omeprazole (PRILOSEC) 40 MG DR capsule Take 1 capsule (40 mg) by mouth daily before breakfast (Patient not taking: Reported on 7/29/2024) 90 capsule 0     zolpidem (AMBIEN) 10 MG tablet Take 1  tablet (10 mg) by mouth nightly as needed for sleep (Patient not taking: Reported on 7/29/2024) 30 tablet 0        ALLERGIES:   No Known Allergies      PHYSICAL EXAMINATION:   /76 (BP Location: Right arm, Patient Position: Chair, Cuff Size: Adult Regular)   Pulse 72   Wt 88.5 kg (195 lb)   SpO2 100%   BMI 26.45 kg/m    Body mass index is 26.45 kg/m .  Wt Readings from Last 2 Encounters:   07/29/24 88.5 kg (195 lb)   07/24/24 88.5 kg (195 lb)     Constitutional: no acute distress, pleasant and cooperative, appears overall well.  Cardiovascular: RRR nl S1S2, JVP not elevated, extremities with no edema or cyanosis  Respiratory: clear to auscultation and percussion bilaterally anterior and posterior  Gastrointestinal: soft, nontender, non distended, no hepatosplenomegaly or masses  Neurologic: AOx3     LABORATORY DATA:   I have reviewed the labs below.    LIPID RESULTS:  Recent Labs   Lab Test 05/30/24  1458 01/26/24  1117   CHOL 161 172   HDL 90 87   LDL 58 74   TRIG 65 56        LIVER ENZYME RESULTS:  Recent Labs   Lab Test 05/30/24  1458 10/30/23  1806   AST 35 33   ALT 46 39       CBC RESULTS:  Recent Labs   Lab Test 05/30/24  1458 01/26/24  1117   WBC 5.4 4.2   HGB 13.7 14.1   HCT 40.4 41.3    189       BMP RESULTS:  Recent Labs   Lab Test 05/30/24  1458 08/03/23  1534    139   POTASSIUM 4.7 4.4   CHLORIDE 102 105   CO2 24 27   ANIONGAP 9 7   * 104*   BUN 16.3 19.1   CR 1.10 1.09   CRISS 9.3 9.4       A1C RESULTS:  Lab Results   Component Value Date    A1C 6.3 (H) 08/03/2023       INR RESULTS:  Recent Labs   Lab Test 04/25/23  0645 04/24/23  1518   INR 1.27* 1.31*          PROCEDURES & FURTHER ASSESSMENTS:   I have reviewed the test results below.    EKG: ***    ECHO: ***    STRESS TEST:  ***    CARDIAC CATH:  ***    CABG/ Cardiac surgery:***    I personally reviewed the images from the *** above.     CLINICAL IMPRESSION:     Reinaldo Gordillo is a 79 year old male  ***    PLAN:        Follow-up: ***    Thank you for allowing us to take part in the care of this very pleasant patient.  Please do not hesitate to call if any further questions or concerns arise.    I spent *** min today reviewing the medical record, meeting with the patient, and completing this note.    Pito Chapin MD, PhD  Interventional/Critical Care Cardiology  [phone]    July 29, 2024      CC  Patient Care Team:  Joao Smiley MD as PCP - General  Bushra Begum MD as MD (Neurology)  Yonathan Randall MD as MD (Dermatology)  Joao Smiley MD as Assigned PCP  Salomon White MD as MD (Endocrinology, Diabetes, and Metabolism)  Salomon White MD as Assigned Endocrinology Provider  Yony Cormier, SEAN as Specialty Care Coordinator (Cardiovascular & Thoracic Surgery)  Sarah Lemus, APRN CNP as Nurse Practitioner (Anesthesiology)  Bro Almeida MD as MD (Cardiovascular & Thoracic Surgery)  Sravanthi Callaway PA-C as Physician Assistant (Urology)  Bushra Begum MD as Assigned Neuroscience Provider  Pito Chapin MD as MD (Cardiovascular Disease)  Salas Tovar MD as Assigned Surgical Provider  Yonathan Randall MD as MD (Dermatology)  Joao Saenz MD as Assigned Heart and Vascular Provider  SELF, REFERRED       Please do not hesitate to contact me if you have any questions/concerns.     Sincerely,     Pito Chapin MD

## 2024-07-29 NOTE — NURSING NOTE
Chief Complaint   Patient presents with    Follow Up     S/P CABG X 4       Vitals were taken, medications reconciled.     Tadeo Natarajan, Visit Facilitator    9:54 AM

## 2024-07-30 ENCOUNTER — MYC MEDICAL ADVICE (OUTPATIENT)
Dept: INTERNAL MEDICINE | Facility: CLINIC | Age: 79
End: 2024-07-30
Payer: COMMERCIAL

## 2024-07-30 DIAGNOSIS — H92.09 OTALGIA, UNSPECIFIED LATERALITY: Primary | ICD-10-CM

## 2024-08-20 ENCOUNTER — MYC MEDICAL ADVICE (OUTPATIENT)
Dept: INTERNAL MEDICINE | Facility: CLINIC | Age: 79
End: 2024-08-20
Payer: COMMERCIAL

## 2024-08-27 DIAGNOSIS — E78.00 HIGH CHOLESTEROL: ICD-10-CM

## 2024-08-30 RX ORDER — PRAVASTATIN SODIUM 80 MG/1
80 TABLET ORAL AT BEDTIME
Qty: 90 TABLET | Refills: 3 | OUTPATIENT
Start: 2024-08-30

## 2024-09-12 DIAGNOSIS — E29.1 MALE HYPOGONADISM: ICD-10-CM

## 2024-09-12 RX ORDER — TESTOSTERONE GEL, 1% 10 MG/G
GEL TRANSDERMAL
Qty: 150 G | Refills: 1 | Status: SHIPPED | OUTPATIENT
Start: 2024-09-12

## 2024-09-12 NOTE — TELEPHONE ENCOUNTER
Last Written Prescription Date:  1/2/24  Last Fill Quantity: 150g,  # refills: 3   Last office visit: Visit date not found ; last virtual visit: 6/6/2024 with prescribing provider:  Cindy   Future Office Visit:  none scheduled    Requested Prescriptions   Pending Prescriptions Disp Refills    testosterone (ANDROGEL 1 % PUMP) 12.5 MG/ACT (1%) gel [Pharmacy Med Name: TESTOSTERONE 12.5 MG/1.25 GRAM] 150 g      Sig: APPLY 2 PUMPS TOPICALLY TO SHOULDER AREA DAILY, AS DIRECTED       Androgen Agents Failed - 9/12/2024  9:29 AM        Failed - Refills for this classification require provider review        Passed - Patient is of age 12 and older        Passed - Lipid panel on file in past 12 mos     Recent Labs   Lab Test 05/30/24  1458   CHOL 161   TRIG 65   HDL 90   LDL 58   NHDL 71               Passed - ALT on file within past 12 mos     Recent Labs   Lab Test 05/30/24  1458   ALT 46             Passed - Medication is active on med list        Passed - HCT less than 54% on file within past 12 mos     Recent Labs   Lab Test 05/30/24  1458   HCT 40.4             Passed - Serum Testosterone on file within past 12 mos     Recent Labs   Lab Test 05/30/24  1458   TESTOSTTOTAL 907             Passed - Serum PSA on file within past 12 mos     Lab Results   Component Value Date    PSA 0.56 01/26/2024    PSA 0.58 08/29/2022             Passed - Blood pressure under 140/90 in past 6 months     BP Readings from Last 3 Encounters:   07/29/24 125/76   07/24/24 105/70   06/14/24 117/76       No data recorded            Passed - Patient is not pregnant        Passed - No positive pregnancy test on file within past 12 mos        Passed - Recent (6 mo) or future (30 days) visit within the authorizing provider's specialty        Passed - AST on file within past 12 mos     Recent Labs   Lab Test 05/30/24  1458   AST 35                Cande Ornelas RN

## 2024-09-15 DIAGNOSIS — G47.9 SLEEP DISORDER: ICD-10-CM

## 2024-09-15 RX ORDER — ZOLPIDEM TARTRATE 10 MG/1
10 TABLET ORAL
Qty: 30 TABLET | Refills: 0 | Status: SHIPPED | OUTPATIENT
Start: 2024-09-15

## 2024-09-15 NOTE — TELEPHONE ENCOUNTER
zolpidem (AMBIEN) 10 MG tablet    Disp    refills     start        end  30         0         5/2/24 7/29/24 5/31/2024  [facility] Internal Medicine [place]      Joao Smiley MD  Internal Medicine       Routed because: controlled.   End date 7/29/24

## 2024-09-19 DIAGNOSIS — K21.9 GASTROESOPHAGEAL REFLUX DISEASE WITHOUT ESOPHAGITIS: ICD-10-CM

## 2024-09-24 NOTE — TELEPHONE ENCOUNTER
Omeprazole 40 mg      Last Written Prescription Date:  6/21/24  Last Fill Quantity: 90,   # refills: 0  Last Office Visit : 5/31/24  Future Office visit:  11/7/24    Routing refill request to provider for review/approval because:Med D/C'd 7/29/24  Patient reported not taking  Drug not active on patient's medication list    Veronique KRISHNAMURTHY RN  [employer] [facility]/Red Flag Triage & Med Refill Team

## 2024-09-26 RX ORDER — OMEPRAZOLE 40 MG/1
40 CAPSULE, DELAYED RELEASE ORAL
Qty: 90 CAPSULE | Refills: 2 | Status: SHIPPED | OUTPATIENT
Start: 2024-09-26

## 2024-11-07 ENCOUNTER — LAB (OUTPATIENT)
Dept: LAB | Facility: CLINIC | Age: 79
End: 2024-11-07
Payer: COMMERCIAL

## 2024-11-07 ENCOUNTER — OFFICE VISIT (OUTPATIENT)
Dept: INTERNAL MEDICINE | Facility: CLINIC | Age: 79
End: 2024-11-07
Payer: COMMERCIAL

## 2024-11-07 VITALS
OXYGEN SATURATION: 98 % | HEART RATE: 61 BPM | BODY MASS INDEX: 26.9 KG/M2 | SYSTOLIC BLOOD PRESSURE: 163 MMHG | TEMPERATURE: 97.8 F | HEIGHT: 72 IN | WEIGHT: 198.6 LBS | DIASTOLIC BLOOD PRESSURE: 76 MMHG | RESPIRATION RATE: 16 BRPM

## 2024-11-07 DIAGNOSIS — E29.1 MALE HYPOGONADISM: ICD-10-CM

## 2024-11-07 DIAGNOSIS — Z29.11 NEED FOR VACCINATION AGAINST RESPIRATORY SYNCYTIAL VIRUS: ICD-10-CM

## 2024-11-07 LAB — SHBG SERPL-SCNC: 99 NMOL/L (ref 11–80)

## 2024-11-07 PROCEDURE — 99000 SPECIMEN HANDLING OFFICE-LAB: CPT | Performed by: PATHOLOGY

## 2024-11-07 PROCEDURE — G0009 ADMIN PNEUMOCOCCAL VACCINE: HCPCS | Performed by: INTERNAL MEDICINE

## 2024-11-07 PROCEDURE — 90677 PCV20 VACCINE IM: CPT | Performed by: INTERNAL MEDICINE

## 2024-11-07 PROCEDURE — 36415 COLL VENOUS BLD VENIPUNCTURE: CPT | Performed by: PATHOLOGY

## 2024-11-07 PROCEDURE — 84403 ASSAY OF TOTAL TESTOSTERONE: CPT | Performed by: INTERNAL MEDICINE

## 2024-11-07 PROCEDURE — 84270 ASSAY OF SEX HORMONE GLOBUL: CPT | Performed by: INTERNAL MEDICINE

## 2024-11-07 PROCEDURE — 99214 OFFICE O/P EST MOD 30 MIN: CPT | Mod: 25 | Performed by: INTERNAL MEDICINE

## 2024-11-07 NOTE — PROGRESS NOTES
HPI:    Overall stable. 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 He feels his BP may be a little elevated at home but he remains on his Lisinopril same dose. He remains on Metoprolol as well. No other HEENT, cardiopulmonary, abdominal, , neurological, systemic, psychiatric, lymphatic, endocrine, vascular complaints.     Past Medical History:   Diagnosis Date    ASCVD (arteriosclerotic cardiovascular disease)     Coronary artery disease     Gastroesophageal reflux disease     Hypertension     Male hypogonadism     Stented coronary artery      Past Surgical History:   Procedure Laterality Date    BYPASS GRAFT ARTERY CORONARY N/A 4/24/2023    Procedure: MEDIAN STERNOTOMY, HARVEST OF LEFT INTERNAL MAMMARY ARTERY, ENDOSCOPIC HARVEST OF LEFT GREATER SAPHENOUS VEIN, ON CARDIOPULMONARY BYPASS, CORONARY ARTERY BYPASS GRAFT (CABG) X4 . TRANSESOPHAGEAL ECHOCARDIOGRAM.;  Surgeon: Bro Almeida MD;  Location: [facility] OR    CATARACT IOL, RT/LT      COLONOSCOPY N/A 7/17/2019    Procedure: COLONOSCOPY;  Surgeon: Roque Givens MD;  [facility]: [facility] OR    CV CORONARY ANGIOGRAM N/A 3/21/2023    Procedure: Coronary Angiogram;  Surgeon: Pito Chapin MD;  Location: [facility] CARDIAC CATH LAB    CV PCI N/A 3/21/2023    Procedure: Percutaneous Coronary Intervention;  Surgeon: Pito Chapin MD;  Location: [facility] CARDIAC CATH LAB    ESOPHAGOSCOPY, GASTROSCOPY, DUODENOSCOPY (EGD), COMBINED N/A 7/17/2019    Procedure: ESOPHAGOGASTRODUODENOSCOPY, WITH BIOPSY;  Surgeon: Roque Givens MD;  Location:  OR    ESOPHAGOSCOPY, GASTROSCOPY, DUODENOSCOPY (EGD), COMBINED N/A 7/24/2024    Procedure: Esophagoscopy, gastroscopy, duodenoscopy (EGD), combined;  Surgeon: Roque Givens MD;  Location: [facility] OR    HERNIA REPAIR  2000    MOHS MICROGRAPHIC PROCEDURE      PHACOEMULSIFICATION CLEAR CORNEA WITH STANDARD INTRAOCULAR LENS IMPLANT Right 7/2/2021    Procedure: RIGHT EYE CATARACT REMOVAL WITH INTRAOCULAR LENS IMPLANT;  Surgeon: Agusto  Justa HAMM MD;  Location: [facility] OR    PHACOEMULSIFICATION CLEAR CORNEA WITH STANDARD INTRAOCULAR LENS IMPLANT Left 2021    Procedure: LEFT EYE CATARACT REMOVAL WITH INTRAOCULAR LENS IMPLANT;  Surgeon: Justa Liz MD;  Location: [facility] OR    [facility] CORONARY STENT PERCUT, EA ADDTL VESSEL       PE:    Vitals noted, gen, nad, cooperative, alert, neck supple nl rom, lungs with good air movement, RRR, S1, S2, no MRG, abdomen, no acute findings. Grossly normal neurological exam.       Echocardiogram Complete  [id number]  [id number]  [id number]  [id number]^SCOUT^WAGNER^CARLOZ     [facility],[place]  Echocardiography Laboratory  [address] [place] [address]     Name: RY KOENIG  MRN: [id number]  : 1945  Study Date: 2024 01:24 PM  Age: 79 yrs  Gender: Male  Patient Location: [facility]  Reason For Study: Coronary artery disease involving native coronary artery of  native heart without angina pectoris  Ordering Physician: WAGNER BUTTERFIELD  Referring Physician: WAGNER BUTTERFIELD  Performed By: Alisia Natarajan     BSA: 2.1 m2  Height: 72 in  Weight: 195 lb  HR: 57  BP: 105/70 mmHg  ______________________________________________________________________________  Procedure  Echocardiogram with two-dimensional, color and spectral Doppler performed.  Technically difficult study.  ______________________________________________________________________________  Interpretation Summary  Global and regional left ventricular function is normal with an EF of 55-60%.  Mild right ventricular dilation is present. Global right ventricular function  is mildly reduced.  Mild aortic stenosis (Vm2.8m/s, MG 16mmHg, BELKIS per VTI 1.5cm2)  No pericardial effusion is present.     Compared to prior TTE 23, no significant changes noted.  ______________________________________________________________________________  Left Ventricle  Left ventricular size is normal. Global and regional left ventricular  function  is normal with an EF of 55-60%. Thickening of the anterobasal septum is  present. Left ventricular diastolic function is normal.     Right Ventricle  Mild right ventricular dilation is present. Global right ventricular function  is mildly reduced.     Atria  Both atria appear normal.     Mitral Valve  The mitral valve is normal.     Aortic Valve  The aortic valve is tricuspid. Severe aortic valve calcification is present.  Mild aortic stenosis (Vm2.8m/s, MG 16mmHg, BELKIS per VTI 1.5cm2).     Tricuspid Valve  The tricuspid valve is normal. Pulmonary artery systolic pressure cannot be  assessed.     Pulmonic Valve  The valve leaflets are not well visualized. On Doppler interrogation, there is  no significant stenosis or regurgitation.     Vessels  The aorta root is normal. The thoracic aorta is normal. The inferior vena cava  cannot be assessed.     Pericardium  No pericardial effusion is present.     Compared to Previous Study  Compared to prior TTE 23, no significant changes noted.     Attestation  I have personally viewed the imaging and agree with the interpretation and  report as documented by the fellow, Jaime Lovelace, and/or edited by me.  ______________________________________________________________________________  MMode/2D Measurements & Calculations  IVSd: 0.88 cm  LVIDd: 4.9 cm  LVIDs: 3.4 cm  LVPWd: 0.87 cm  FS: 30.6 %  LV mass(C)d: 149.0 grams  LV mass(C)dI: 70.7 grams/m2  Ao root diam: 3.7 cm  asc Aorta Diam: 3.8 cm  LVOT diam: 2.4 cm  LVOT area: 4.6 cm2  Ao root diam index Ht(cm/m): 2.0  Ao root diam index BSA (cm/m2): 1.8  Asc Ao diam index BSA (cm/m2): 1.8  Asc Ao diam index Ht(cm/m): 2.1  LA Volume (BP): 53.1 ml     LA Volume Index (BP): 25.2 ml/m2  RWT: 0.35     Doppler Measurements & Calculations  MV E max vladimir: 58.5 cm/sec  MV A max vladimir: 70.2 cm/sec  MV E/A: 0.83  MV dec slope: 228.2 cm/sec2  MV dec time: 0.26 sec  Ao V2 max: 265.9 cm/sec  Ao max P.3 mmHg  Ao V2 mean: 191.9  cm/sec  Ao mean P.4 mmHg  Ao V2 VTI: 54.7 cm  BELKIS(I,D): 1.7 cm2  BELKIS(V,D): 1.8 cm2  LV V1 max P.3 mmHg  LV V1 max: 103.9 cm/sec  LV V1 VTI: 19.9 cm  SV(LVOT): 91.6 ml  SI(LVOT): 43.5 ml/m2  PA acc time: 0.08 sec  AV Andrea Ratio (DI): 0.39  BELKIS Index (cm2/m2): 0.79     E/E' av.4  Lateral E/e': 4.3  Medial E/e': 10.5     ______________________________________________________________________________  Report approved by: Alpesh White 2024 04:28 PM            A/P:     1. Immunizations; COVID Pfizer vaccine x 6. Moderna x 1.  He has completed the Shigrix vaccine series. Tdap 2015. Pneumococcal 23 done 2019. Prevnar 13 done 5/15/2018. Prevnar 20 in the fall . Influenza vaccine done 10/4/2024.   2. Dermatology; seen on 2024 for skin check. next manuel. 12/3/2024 with Dr. Randall. He was seen 3/7/2024 by Dr. Tovar   3. PSA; 0.56 on 2024.   4. Colonoscopy; 2019 with no specimens collected. Cologuard negative on 3/7/2024.   5. Increased lipids on Pravastatin 80 mg; lipids 2024; LDL 58, HDL 90 and TG's 65. He could not tolerate Crestor (CK 1600) with muscle complaints.   6. Hgb on 2023 was 9.3. And repeat CBC  with Hgb increased to 12.6 (8/3/23). Last Hgb was 13.7 on .   7. Neuropathy: visit with Dr. Begum, Neurology on 2023  with follow up scheduled for 2025.    8. A1c was 6.2% on 2023. Repeat 6.3% on 8/3/2023.   9. CAD; had PCI with AREN x 4 in . Seen 12/15/2021, [facility] Dr. Madrigal, note in Care Everywhere and there is a detailed note in the chart. He had 2022 appt. With Dr. Chapin. Resting echo 10/29/2022. He had CABG x 4 on 2023. Visit with Dr. Chapin on 2024 and remains on aspirin, metoprolol and pravastatin. Soft murmur; he had a 3/21/2023 echo  with aortic stenosis.  Last echo 2024   10. A fib/flutter: EP visit with Dr. Saenz on 2024 for a fib/flutter after CABG (2023). Low  burden PAF (9%) on recent zio patch 5/12/23. He is off  Eliquis, stopped amiodarone due to neuropathy complaints. .   11. Endocrine: TSH normal (3.44) on 10/30/2023. He does not feel he has sleep apnea. Some mild depression? Low testosterone and he had an endocrinology appt. With Dr. White  6/6/2024. He is on androgel.   12. Recurrent UTIs and hematuria: UA 7/14/23 with RBCs (9), no signs infection. UA 6/20/2023 with nitrites, LE, WBCs, RBCs (100) and treated with Bactrim x 10 days. UA 6/2/23 with LE, WBCs and RBCs (43) and treated with Bactrim. CT abdomen/pelvis 10/20/2022 with 5 mm non-obstructive right kidney stone, but he notes that is chronic. He was seen 8/29/2023 in Urology by Ms. Callaway.    13. Reflux on PPI with father's h/o gastric cancer (last EGD 7/17/2019). EGD done 7/24/2024   14. Elevated BP; he will continue to check at home and telephone visit with us in 1-2 weeks. If still elevated discussed checking a 24 hour BP monitor.            30 minutes spent on the date of the encounter doing chart review, history and exam, documentation and further activities as noted above exclusive of procedures and other billable interpretations

## 2024-11-07 NOTE — PROGRESS NOTES
Subjective   Ed is a 79 year old, presenting for the following health issues:  Follow Up      11/7/2024     4:52 PM   Additional Questions   Roomed by SK EMT     History of Present Illness       Hyperlipidemia:  He presents for follow up of hyperlipidemia.   He is taking medication to lower cholesterol. He is not having myalgia or other side effects to statin medications.    Hypertension: He presents for follow up of hypertension.  He does not check blood pressure  regularly outside of the clinic. Outside blood pressures have been over 140/90. He does not follow a low salt diet.     Vascular Disease:  He presents for follow up of vascular disease.     He never takes nitroglycerin. He takes daily aspirin.    He eats 2-3 servings of fruits and vegetables daily.He consumes 0 sweetened beverage(s) daily.He exercises with enough effort to increase his heart rate 30 to 60 minutes per day.  He exercises with enough effort to increase his heart rate 5 days per week.   He is taking medications regularly.       Objective    BP (!) 163/76 (BP Location: Right arm, Patient Position: Sitting, Cuff Size: Adult Regular)   Pulse 61   Temp 97.8  F (36.6  C) (Oral)   Resp 16   Ht 1.829 m (6')   Wt 90.1 kg (198 lb 9.6 oz)   SpO2 98%   BMI 26.94 kg/m    Body mass index is 26.94 kg/m .    Signed Electronically by: Joao Smiley MD

## 2024-11-10 DIAGNOSIS — E29.1 MALE HYPOGONADISM: Primary | ICD-10-CM

## 2024-11-10 LAB
TESTOST FREE SERPL-MCNC: 8.42 NG/DL
TESTOST SERPL-MCNC: 842 NG/DL (ref 240–950)

## 2024-11-13 ENCOUNTER — MYC MEDICAL ADVICE (OUTPATIENT)
Dept: ENDOCRINOLOGY | Facility: CLINIC | Age: 79
End: 2024-11-13
Payer: COMMERCIAL

## 2024-11-14 NOTE — TELEPHONE ENCOUNTER
Patient feedback plan noted.    ISHMAEL White MD, MS  Endocrinology  [facility]

## 2024-11-21 ENCOUNTER — VIRTUAL VISIT (OUTPATIENT)
Dept: INTERNAL MEDICINE | Facility: CLINIC | Age: 79
End: 2024-11-21
Payer: COMMERCIAL

## 2024-11-21 ENCOUNTER — MYC MEDICAL ADVICE (OUTPATIENT)
Dept: INTERNAL MEDICINE | Facility: CLINIC | Age: 79
End: 2024-11-21

## 2024-11-21 DIAGNOSIS — I10 BENIGN ESSENTIAL HYPERTENSION: Primary | ICD-10-CM

## 2024-11-21 NOTE — PATIENT INSTRUCTIONS
Thank you for visiting the Primary Care Center today at the [facility]! The following is some information about our clinic:     Primary Care Center Frequently-Asked Questions    (1) How do I schedule appointments at the [facility]?     Primary Care--to schedule or make changes to an existing appointment, please call our primary care line at [phone].    Labs--to schedule a lab appointment at the [facility] you can use [employer] or call [phone]. If you have a [place] location that is closer to home, you can reach out to that location for scheduling options.     Imaging--if you need to schedule a CT, X-ray, MRI, ultrasound, or other imaging study you can call [phone] to schedule at the [facility] or any other [facility] imaging location.     Referrals--if a referral to another specialty was ordered you can expect a phone call from their scheduling team. If you have not heard from them in a week, please call us or send us a [employer] message to check the status or get a scheduling number. Please note that this only applies to internal [facility] referrals. If the referral is external you would need to contact their office for scheduling.     (2) I have a question about my visit, who do I contact?     You can call us at the primary care line at [phone] to ask questions about your visit. You can also send a secure message through [employer], which is reviewed by clinic staff. Please note that [employer] messages have a twenty-four to forty-eight business hour turnaround time and should not be used for urgent concerns.    (3) How will I get the results of my tests?    If you are signed up for [employer] all tests will be released to you within twenty-four hours of resulting. Please allow three to five days for your doctor to review your results and place a note interpreting the results. If you do not have [employer] you will receive your  results through mail seven to ten business days following the return of the tests. Please note that if there should be any urgent or concerning results that your doctor or their registered nurse will reach out to you the same day as the tests come back. If you have follow up questions about your results or would like to discuss the results in detail please schedule a follow up with your provider either in person or virtually.     (4) How do I get refills of my prescriptions?     You should always first contact your pharmacy for refills of your medications. If submitting a refill request on [employer], please be sure to submit the request only once--repeat requests can cause delays in refill. If you are requesting a NEW medication or a medication related to new symptoms you will need to schedule an appointment with a provider prior to approval. Please note: Routine medication refills have up to one to three business day turnaround whereas controlled substances refills have up to five to seven business day turnaround.    (5) I have new symptoms, what do I do?     If you are having an immediate medical emergency, you should dial 911 for assistance.   For anything urgent that needs to be seen within a few hours to one day you should visit a local urgent care for assistance.  For non-urgent symptoms that need to be seen within a few days to a week you can schedule with an available provider in primary care by going to [employer] or calling [phone].   If you are not sure how serious your symptoms are or you would like to receive medical advice you can always call [phone] to speak with a triage nurse.

## 2024-11-21 NOTE — TELEPHONE ENCOUNTER
"Spoke with the patient to schedule, but he was unable to schedule at the moment. He advised will be gone in Feb/March 2025. He will call to schedule once he has his schedule, and agreed to a [email or web address] message with scheduling info. Per check out with Dr. Smiley: \"In person visit with me in 3 months \"  "

## 2024-11-21 NOTE — PROGRESS NOTES
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     Follow up for elevated BP. He states at home values running 150/70-80. He has been on 20 mg of Lisinopril in the past and will increase    Labs checked 5/30/2024    Follow up visit with me in 3 months

## 2024-12-03 ENCOUNTER — OFFICE VISIT (OUTPATIENT)
Dept: DERMATOLOGY | Facility: CLINIC | Age: 79
End: 2024-12-03
Payer: COMMERCIAL

## 2024-12-03 DIAGNOSIS — L82.1 SK (SEBORRHEIC KERATOSIS): Primary | ICD-10-CM

## 2024-12-03 DIAGNOSIS — L57.0 AK (ACTINIC KERATOSIS): ICD-10-CM

## 2024-12-03 DIAGNOSIS — Z85.828 HISTORY OF NONMELANOMA SKIN CANCER: ICD-10-CM

## 2024-12-03 PROCEDURE — 17000 DESTRUCT PREMALG LESION: CPT | Performed by: DERMATOLOGY

## 2024-12-03 PROCEDURE — 17003 DESTRUCT PREMALG LES 2-14: CPT | Performed by: DERMATOLOGY

## 2024-12-03 PROCEDURE — 99213 OFFICE O/P EST LOW 20 MIN: CPT | Mod: 25 | Performed by: DERMATOLOGY

## 2024-12-03 ASSESSMENT — PAIN SCALES - GENERAL: PAINLEVEL_OUTOF10: NO PAIN (0)

## 2024-12-03 NOTE — LETTER
12/3/2024       RE: Reinaldo Gordillo  [address]  [facility] [address]     Dear Colleague,    Thank you for referring your patient, Reinaldo Gordillo, to the [facility] [place] at [facility]. Please see a copy of my visit note below.    [facility] Dermatology Note  Encounter Date: Dec 3, 2024  Office Visit     Dermatology Problem List:  History of NMSC  - SCC, right dorsal hand, s/p shave 1/19/24, s/p MMS 3/7/24   - BCC left central chest, shave biopsy on 7/23/2019, excision on 8/6/19  - BCC - upper lip- s/p MMS in 2015 - performed in CT  History of HAK  - left malar cheek, s/p shave biopsy on 7/23/2019  - left lateral forehead, s/p shave biopsy on 7/23/2019  3. Multiple AK on hair part and face, cryo therapy of 9 locations on face, in hair part on scalp and left ear antitragus 12/3 2024  4. History of Multiple AK of the face and ears  - s/p cryo 7/23/19, 11/4/22; fluorouracil 5% cream and calcipotriene 0.005% ointment BID x8 day 1/2023, s/p cryo x 4 right preauricular cheek 3/4/24  5. Eczematous Dermatitis, lower legs, current tx: clobetasol 0.05% ointment BID  6. Pruritus Scroti with possible intertrigo, current tx: desonide ointment BID PRN  7. Filiform wart on the left inner thigh, s/p cryo 7/23/2019     Social history: Orthopedic surgeon    ____________________________________________    Assessment & Plan:    # History of NMSC  - no evidence of recurrence on exam today.    # Actinic keratosis on two locations in hair part and several locations on forehead and cheeks.  Discussions on options for treatment other than cryo therapy.   These options are pure Efudex daily for 30 days and Efudex and Calcipotriene combined twice a day for 4 days.  Cryo therapy today of 9 locations as noted below under Procedures.    # Seborrheic keratosis, non irritated on several locations on back.  Asymptomatic, not discussed  today.    Procedures Performed:   - Cryotherapy procedure note: After verbal consent and discussion of risks and benefits including, but not limited to, dyspigmentation/scar, blister, and pain, 9 lesion(s) were treated with 1-2 mm freeze border for 2 cycles with liquid nitrogen. Post cryotherapy instructions were provided. Treated lesions were situated: 1 crown scalp in hair part, 1 vertex scalp in hair part, 3 left forehead, 1 left buckle cheek, 1 right forehead, 1 right temple and 1 on left antitragus.    Follow-up: 6 months    Staff and Scribe:   I, EMBER VILLAFUERTE, am serving as a scribe; to document services personally performed by Yonathan Randall MD -based on data collection and the provider's statements to me.    Staff attestation:  The documentation recorded by the scribe accurately reflects the services I personally performed and the decisions I personally made. I have made edits where needed.    Yonathan Randall MD  Staff Dermatologist and Dermatopathologist  , Department of Dermatology     ____________________________________________    CC: Skin Check (Here today for a skin check. No concerns. )    HPI:  Mr. Reinaldo Gordillo is a(n) 79 year old male who presents today as a return patient for FBSE. 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 Reinaldo comes today with questions regarding the earlier proposed treatment for his multiple face AK. We discuss the setup and benefits of two possible ways of doing field treatments as compared to cryo therapy. Reinaldo also raises concern for a non healing lesion in his scalp that he keeps scratching and that he has not been able to see. Patient is otherwise feeling well and denies other new skin concerns or lesions that have caught his attention through for example itchiness or pain.     Labs Reviewed:  N/A    Physical Exam:  Vitals: There were no vitals taken for this visit.  SKIN: Total skin excluding the  undergarment areas was performed. The exam included the head/face, neck, both arms, chest, back, abdomen, both legs, digits and/or nails.   - Several pink scaly, with a tendency to crusty, plaques on face as well as one on vertex scalp in hair part and one on left antitragus.  - Crusty lesion of 0,3mm on crown scalp in hair part.  - Several 0,5 - 1cm in diameter brownish oily plaques on back.  - No other lesions of concern on areas examined.     Medications:  Current Outpatient Medications   Medication Sig Dispense Refill     ASPIRIN LOW DOSE 81 MG EC tablet Take 81 mg by mouth every morning       lisinopril (ZESTRIL) 10 MG tablet Take 1 tablet (10 mg) by mouth daily 90 tablet 3     melatonin 5 MG tablet Take 1 tablet (5 mg) by mouth nightly as needed for sleep 15 tablet 0     metoprolol succinate ER (TOPROL XL) 25 MG 24 hr tablet TAKE 1 TABLET BY MOUTH EVERY MORNING 90 tablet 3     omeprazole (PRILOSEC) 40 MG DR capsule TAKE 1 CAPSULE BY MOUTH DAILY BEFORE BREAKFAST. 90 capsule 2     pravastatin (PRAVACHOL) 80 MG tablet Take 1 tablet (80 mg) by mouth at bedtime. 90 tablet 2     tacrolimus (PROTOPIC) 0.1 % external ointment Apply topically 2 times daily To rash on the scrotum, penis, or inguinal folds. 60 g 3     testosterone (ANDROGEL 1 % PUMP) 12.5 MG/ACT (1%) gel APPLY 2 PUMPS TOPICALLY TO SHOULDER AREA DAILY, AS DIRECTED 150 g 1     zolpidem (AMBIEN) 10 MG tablet TAKE 1 TABLET (10 MG) BY MOUTH NIGHTLY AS NEEDED FOR SLEEP 30 tablet 0     No current facility-administered medications for this visit.      Past Medical History:   Patient Active Problem List   Diagnosis     Nuclear sclerotic cataract of both eyes     Hyperglycemia     Hyperlipidemia     Stented coronary artery     Coronary artery disease involving native coronary artery without angina pectoris     Basal cell carcinoma (BCC) in situ of skin     Colon polyp     Hereditary and idiopathic peripheral neuropathy     Status post coronary angiogram      Coronary artery disease involving native coronary artery of native heart without angina pectoris     Past Medical History:   Diagnosis Date     ASCVD (arteriosclerotic cardiovascular disease)      Coronary artery disease      Gastroesophageal reflux disease      Hypertension      Male hypogonadism      Stented coronary artery         CC Referred Self, MD  No address on file on close of this encounter.      Again, thank you for allowing me to participate in the care of your patient.      Sincerely,    Yonathan Randall MD

## 2024-12-03 NOTE — PROGRESS NOTES
[facility] Dermatology Note  Encounter Date: Dec 3, 2024  Office Visit     Dermatology Problem List:  History of NMSC  - SCC, right dorsal hand, s/p shave 1/19/24, s/p MMS 3/7/24   - BCC left central chest, shave biopsy on 7/23/2019, excision on 8/6/19  - BCC - upper lip- s/p MMS in 2015 - performed in CT  History of HAK  - left malar cheek, s/p shave biopsy on 7/23/2019  - left lateral forehead, s/p shave biopsy on 7/23/2019  3. Multiple AK on hair part and face, cryo therapy of 9 locations on face, in hair part on scalp and left ear antitragus 12/3 2024  4. History of Multiple AK of the face and ears  - s/p cryo 7/23/19, 11/4/22; fluorouracil 5% cream and calcipotriene 0.005% ointment BID x8 day 1/2023, s/p cryo x 4 right preauricular cheek 3/4/24  5. Eczematous Dermatitis, lower legs, current tx: clobetasol 0.05% ointment BID  6. Pruritus Scroti with possible intertrigo, current tx: desonide ointment BID PRN  7. Filiform wart on the left inner thigh, s/p cryo 7/23/2019     Social history: Orthopedic surgeon    ____________________________________________    Assessment & Plan:    # History of NMSC  - no evidence of recurrence on exam today.    # Actinic keratosis on two locations in hair part and several locations on forehead and cheeks.  Discussions on options for treatment other than cryo therapy.   These options are pure Efudex daily for 30 days and Efudex and Calcipotriene combined twice a day for 4 days.  Cryo therapy today of 9 locations as noted below under Procedures.    # Seborrheic keratosis, non irritated on several locations on back.  Asymptomatic, not discussed today.    Procedures Performed:   - Cryotherapy procedure note: After verbal consent and discussion of risks and benefits including, but not limited to, dyspigmentation/scar, blister, and pain, 9 lesion(s) were treated with 1-2 mm freeze border for 2 cycles with liquid nitrogen. Post cryotherapy instructions were provided.  Treated lesions were situated: 1 crown scalp in hair part, 1 vertex scalp in hair part, 3 left forehead, 1 left buckle cheek, 1 right forehead, 1 right temple and 1 on left antitragus.    Follow-up: 6 months    Staff and Scribe:   I, EMBER VILLAFUERTE, am serving as a scribe; to document services personally performed by Yonathan Randall MD -based on data collection and the provider's statements to me.    Staff attestation:  The documentation recorded by the scribe accurately reflects the services I personally performed and the decisions I personally made. I have made edits where needed.    Yonathan Randall MD  Staff Dermatologist and Dermatopathologist  , Department of Dermatology     ____________________________________________    CC: Skin Check (Here today for a skin check. No concerns. )    HPI:  Mr. Reinaldo Gordillo is a(n) 79 year old male who presents today as a return patient for FBSE. 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 Reinaldo comes today with questions regarding the earlier proposed treatment for his multiple face AK. We discuss the setup and benefits of two possible ways of doing field treatments as compared to cryo therapy. Reinaldo also raises concern for a non healing lesion in his scalp that he keeps scratching and that he has not been able to see. Patient is otherwise feeling well and denies other new skin concerns or lesions that have caught his attention through for example itchiness or pain.     Labs Reviewed:  N/A    Physical Exam:  Vitals: There were no vitals taken for this visit.  SKIN: Total skin excluding the undergarment areas was performed. The exam included the head/face, neck, both arms, chest, back, abdomen, both legs, digits and/or nails.   - Several pink scaly, with a tendency to crusty, plaques on face as well as one on vertex scalp in hair part and one on left antitragus.  - Crusty lesion of 0,3mm on crown scalp in hair  part.  - Several 0,5 - 1cm in diameter brownish oily plaques on back.  - No other lesions of concern on areas examined.     Medications:  Current Outpatient Medications   Medication Sig Dispense Refill    ASPIRIN LOW DOSE 81 MG EC tablet Take 81 mg by mouth every morning      lisinopril (ZESTRIL) 10 MG tablet Take 1 tablet (10 mg) by mouth daily 90 tablet 3    melatonin 5 MG tablet Take 1 tablet (5 mg) by mouth nightly as needed for sleep 15 tablet 0    metoprolol succinate ER (TOPROL XL) 25 MG 24 hr tablet TAKE 1 TABLET BY MOUTH EVERY MORNING 90 tablet 3    omeprazole (PRILOSEC) 40 MG DR capsule TAKE 1 CAPSULE BY MOUTH DAILY BEFORE BREAKFAST. 90 capsule 2    pravastatin (PRAVACHOL) 80 MG tablet Take 1 tablet (80 mg) by mouth at bedtime. 90 tablet 2    tacrolimus (PROTOPIC) 0.1 % external ointment Apply topically 2 times daily To rash on the scrotum, penis, or inguinal folds. 60 g 3    testosterone (ANDROGEL 1 % PUMP) 12.5 MG/ACT (1%) gel APPLY 2 PUMPS TOPICALLY TO SHOULDER AREA DAILY, AS DIRECTED 150 g 1    zolpidem (AMBIEN) 10 MG tablet TAKE 1 TABLET (10 MG) BY MOUTH NIGHTLY AS NEEDED FOR SLEEP 30 tablet 0     No current facility-administered medications for this visit.      Past Medical History:   Patient Active Problem List   Diagnosis    Nuclear sclerotic cataract of both eyes    Hyperglycemia    Hyperlipidemia    Stented coronary artery    Coronary artery disease involving native coronary artery without angina pectoris    Basal cell carcinoma (BCC) in situ of skin    Colon polyp    Hereditary and idiopathic peripheral neuropathy    Status post coronary angiogram    Coronary artery disease involving native coronary artery of native heart without angina pectoris     Past Medical History:   Diagnosis Date    ASCVD (arteriosclerotic cardiovascular disease)     Coronary artery disease     Gastroesophageal reflux disease     Hypertension     Male hypogonadism     Stented coronary artery         CC Referred Self,  MD  No address on file on close of this encounter.

## 2024-12-03 NOTE — NURSING NOTE
Dermatology Rooming Note    Reinaldo Gordillo's goals for this visit include:   Chief Complaint   Patient presents with    Skin Check     Here today for a skin check. No concerns.      Trudi Gunderson RN

## 2024-12-15 ENCOUNTER — MYC MEDICAL ADVICE (OUTPATIENT)
Dept: INTERNAL MEDICINE | Facility: CLINIC | Age: 79
End: 2024-12-15
Payer: COMMERCIAL

## 2024-12-15 DIAGNOSIS — G47.9 SLEEP DISORDER: ICD-10-CM

## 2024-12-16 ENCOUNTER — MYC MEDICAL ADVICE (OUTPATIENT)
Dept: INTERNAL MEDICINE | Facility: CLINIC | Age: 79
End: 2024-12-16
Payer: COMMERCIAL

## 2024-12-16 RX ORDER — ZOLPIDEM TARTRATE 10 MG/1
10 TABLET ORAL
Qty: 30 TABLET | Refills: 0 | Status: SHIPPED | OUTPATIENT
Start: 2024-12-16

## 2024-12-16 NOTE — TELEPHONE ENCOUNTER
Duplicate refill request      Yan Contreras CMA ([facility]) at 8:03 AM on 12/16/2024      generalized

## 2024-12-16 NOTE — TELEPHONE ENCOUNTER
Controlled substance refill request notes    Refill request received for: Zolpidem Tartrate 10 Mg Tablet  [place]  data reviewed 12/16/24:  Medication last refill: 30 tab, 30 day supply, filled/sold to patient on 09/21/2024  Pended order: Zolpidem Tartrate 10 Mg Tablet, 30 tab, 30 day supply, no delay in fill date     Primary care provider: Joao Smiley  Last office visit this department: 11/7/2024  Last virtual visit this department: 11/21/2024  Next appointment with PCP: 01/06/2025    Refill request forwarded to provider for review.     Vero MCGEE LPN  [facility]

## 2025-01-04 NOTE — PROGRESS NOTES
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 Last in-person visit with us was 11/7/2024 and additional information in that note    He is on 25 mg of Metoprolol and now 20 mg of Lisinopril and home BP readings 140/78 range. Ordered 24 hour BP monitor. Could  consider switching to Coreg after results of 24 hour BP monitor    Ordered labs and CK while on statin medication. He feels he may have some muscle pain/complaints.

## 2025-01-06 ENCOUNTER — VIRTUAL VISIT (OUTPATIENT)
Dept: INTERNAL MEDICINE | Facility: CLINIC | Age: 80
End: 2025-01-06
Payer: COMMERCIAL

## 2025-01-06 DIAGNOSIS — I10 BENIGN ESSENTIAL HYPERTENSION: Primary | ICD-10-CM

## 2025-01-06 DIAGNOSIS — R03.0 ELEVATED BLOOD-PRESSURE READING, WITHOUT DIAGNOSIS OF HYPERTENSION: ICD-10-CM

## 2025-01-06 DIAGNOSIS — M79.10 MUSCLE PAIN: ICD-10-CM

## 2025-01-06 PROCEDURE — 98013 SYNCH AUDIO-ONLY EST LOW 20: CPT | Performed by: INTERNAL MEDICINE

## 2025-01-06 NOTE — PATIENT INSTRUCTIONS
Thank you for visiting the [facility] The following is some information about our clinic:     Primary Care Center Frequently-Asked Questions    (1) How do I schedule appointments at the [facility]?     Primary Care--to schedule or make changes to an existing appointment, please call our primary care line at [phone].    Labs--to schedule a lab appointment at the [facility] you can use [employer] or call [phone]. If you have a [place] location that is closer to home, you can reach out to that location for scheduling options.     Imaging--if you need to schedule a CT, X-ray, MRI, ultrasound, or other imaging study you can call [phone] to schedule at the [facility] or any other [facility] imaging location.     Referrals--if a referral to another specialty was ordered you can expect a phone call from their scheduling team. If you have not heard from them in a week, please call us or send us a [employer] message to check the status or get a scheduling number. Please note that this only applies to internal [facility] referrals. If the referral is external you would need to contact their office for scheduling.     (2) I have a question about my visit, who do I contact?     You can call us at the primary care line at [phone] to ask questions about your visit. You can also send a secure message through [employer], which is reviewed by clinic staff. Please note that [employer] messages have a twenty-four to forty-eight business hour turnaround time and should not be used for urgent concerns.    (3) How will I get the results of my tests?    If you are signed up for [employer] all tests will be released to you within twenty-four hours of resulting. Please allow three to five days for your doctor to review your results and place a note interpreting the results. If you do not have [employer] you will receive your  results through mail seven to ten business days following the return of the tests. Please note that if there should be any urgent or concerning results that your doctor or their registered nurse will reach out to you the same day as the tests come back. If you have follow up questions about your results or would like to discuss the results in detail please schedule a follow up with your provider either in person or virtually.     (4) How do I get refills of my prescriptions?     You should always first contact your pharmacy for refills of your medications. If submitting a refill request on [employer], please be sure to submit the request only once--repeat requests can cause delays in refill. If you are requesting a NEW medication or a medication related to new symptoms you will need to schedule an appointment with a provider prior to approval. Please note: Routine medication refills have up to one to three business day turnaround whereas controlled substances refills have up to five to seven business day turnaround.    (5) I have new symptoms, what do I do?     If you are having an immediate medical emergency, you should dial 911 for assistance.   For anything urgent that needs to be seen within a few hours to one day you should visit a local urgent care for assistance.  For non-urgent symptoms that need to be seen within a few days to a week you can schedule with an available provider in primary care by going to [employer] or calling [phone].   If you are not sure how serious your symptoms are or you would like to receive medical advice you can always call [phone] to speak with a triage nurse.

## 2025-01-06 NOTE — Clinical Note
(1) future 24 hour BP monitor (2) labs same day as he picks up the monitor (3) dear Sterling; I would like to see GirishTerri Duy's wife sometime in January.   Thanks, JESSICA Smiley

## 2025-01-20 DIAGNOSIS — E29.1 MALE HYPOGONADISM: ICD-10-CM

## 2025-01-20 RX ORDER — TESTOSTERONE GEL, 1% 10 MG/G
GEL TRANSDERMAL
Qty: 75 G | Refills: 5 | Status: SHIPPED | OUTPATIENT
Start: 2025-01-20

## 2025-01-20 NOTE — TELEPHONE ENCOUNTER
Requested Prescriptions   Pending Prescriptions Disp Refills    testosterone (ANDROGEL 1 % PUMP) 12.5 MG/ACT (1%) gel [Pharmacy Med Name: TESTOSTERONE 12.5 MG/1.25 GRAM] 150 g 1     Sig: APPLY 2 PUMPS TOPICALLY TO SHOULDER AREA DAILY, AS DIRECTED       Androgen Agents Failed - 1/20/2025 10:16 AM        Failed - Refills for this classification require provider review        Failed - Most recent blood pressure under 140/90 in past 6 months     BP Readings from Last 3 Encounters:   11/07/24 (!) 163/76   07/29/24 125/76   07/24/24 105/70       Systolic (Patient Reported): 155 (11/21/2024  8:14 AM)  Diastolic (Patient Reported): 75 (11/21/2024  8:14 AM)              Failed - Recent (6 mo) or future (30 days) visit within the authorizing provider's specialty        Passed - Patient is of age 12 and older        Passed - Lipid panel on file in past 12 mos     Recent Labs   Lab Test 05/30/24  1458   CHOL 161   TRIG 65   HDL 90   LDL 58   NHDL 71               Passed - ALT on file within past 12 mos     Recent Labs   Lab Test 05/30/24  1458   ALT 46             Passed - Medication is active on med list        Passed - HCT less than 54% on file within past 12 mos     Recent Labs   Lab Test 05/30/24  1458   HCT 40.4             Passed - Serum Testosterone on file within past 12 mos     Recent Labs   Lab Test 11/07/24  1637   TESTOSTTOTAL 842             Passed - Serum PSA on file within past 12 mos     Lab Results   Component Value Date    PSA 0.56 01/26/2024    PSA 0.58 08/29/2022             Passed - Patient is not pregnant        Passed - No positive pregnancy test on file within past 12 mos        Passed - AST on file within past 12 mos     Recent Labs   Lab Test 05/30/24  1458   AST 35                Last Written Prescription Date:  9/12/24  Last Fill Quantity: 150g,  # refills: 1   Last office visit: Visit date not found ; last virtual visit: 6/6/2024 with prescribing provider:  Dr White   Future Office Visit:  RTC  6/2025    Routed Rx to provider per protocol.    Jaime Mccullough RN

## 2025-01-26 ENCOUNTER — MYC MEDICAL ADVICE (OUTPATIENT)
Dept: INTERNAL MEDICINE | Facility: CLINIC | Age: 80
End: 2025-01-26
Payer: COMMERCIAL

## 2025-01-26 DIAGNOSIS — I10 BENIGN ESSENTIAL HYPERTENSION: ICD-10-CM

## 2025-01-27 RX ORDER — LISINOPRIL 10 MG/1
10 TABLET ORAL DAILY
Qty: 90 TABLET | Status: CANCELLED | OUTPATIENT
Start: 2025-01-27

## 2025-01-28 ENCOUNTER — HOSPITAL ENCOUNTER (OUTPATIENT)
Dept: CARDIOLOGY | Facility: CLINIC | Age: 80
Discharge: HOME | End: 2025-01-28
Attending: INTERNAL MEDICINE
Payer: COMMERCIAL

## 2025-01-28 DIAGNOSIS — R03.0 ELEVATED BLOOD-PRESSURE READING, WITHOUT DIAGNOSIS OF HYPERTENSION: ICD-10-CM

## 2025-01-28 DIAGNOSIS — I10 BENIGN ESSENTIAL HYPERTENSION: ICD-10-CM

## 2025-01-28 PROCEDURE — 93788 AMBL BP MNTR W/SW A/R: CPT

## 2025-01-28 PROCEDURE — 93786 AMBL BP MNTR W/SW REC ONLY: CPT

## 2025-01-28 RX ORDER — LISINOPRIL 20 MG/1
20 TABLET ORAL DAILY
Qty: 90 TABLET | Refills: 3 | Status: SHIPPED | OUTPATIENT
Start: 2025-01-28

## 2025-02-04 NOTE — TELEPHONE ENCOUNTER
Patient Rounding sent to patient via [employer] e-mail.     Patient confirmed scheduled appointment:  Date: 11/7/24    Time: 5pm  Visit type: UMP Return

## 2025-04-01 ENCOUNTER — TELEPHONE (OUTPATIENT)
Dept: CARDIOLOGY | Facility: CLINIC | Age: 80
End: 2025-04-01
Payer: COMMERCIAL

## 2025-04-01 NOTE — TELEPHONE ENCOUNTER
Left Voicemail (1st Attempt) and Sent Mychart (1st Attempt) for the patient to call back and schedule the following:    Appointment type: return cardio  Provider: general Poli  Return date: next available  Specialty phone number: [phone] opt 1  Additional appointment(s) needed: Echo  Additonal Notes: N/A

## 2025-04-02 ENCOUNTER — LAB (OUTPATIENT)
Dept: LAB | Facility: CLINIC | Age: 80
End: 2025-04-02
Payer: COMMERCIAL

## 2025-04-02 DIAGNOSIS — R03.0 ELEVATED BLOOD-PRESSURE READING, WITHOUT DIAGNOSIS OF HYPERTENSION: ICD-10-CM

## 2025-04-02 DIAGNOSIS — I10 BENIGN ESSENTIAL HYPERTENSION: ICD-10-CM

## 2025-04-02 DIAGNOSIS — M79.10 MUSCLE PAIN: ICD-10-CM

## 2025-04-02 LAB — CK SERPL-CCNC: 217 U/L (ref 39–308)

## 2025-04-02 PROCEDURE — 36415 COLL VENOUS BLD VENIPUNCTURE: CPT | Performed by: PATHOLOGY

## 2025-04-02 PROCEDURE — 82550 ASSAY OF CK (CPK): CPT | Performed by: PATHOLOGY

## 2025-04-03 NOTE — TELEPHONE ENCOUNTER
Left Voicemail (2nd Attempt) for the patient to call back and schedule the following:    Appointment type: return cardio  Provider: general Poli  Return date: next available  Specialty phone number: [phone] opt 1  Additional appointment(s) needed: Echo  Additonal Notes: N/A

## 2025-04-27 DIAGNOSIS — E78.00 HIGH CHOLESTEROL: ICD-10-CM

## 2025-04-29 RX ORDER — PRAVASTATIN SODIUM 80 MG/1
80 TABLET ORAL AT BEDTIME
Qty: 64 TABLET | Refills: 0 | Status: SHIPPED | OUTPATIENT
Start: 2025-04-29 | End: 2025-05-01

## 2025-04-29 NOTE — TELEPHONE ENCOUNTER
Last Written Prescription:   Disp Refills Start End KAREN   pravastatin (PRAVACHOL) 80 MG tablet 90 tablet 2 8/30/2024 -- No   Sig - Route: Take 1 tablet (80 mg) by mouth at bedtime. - Oral     ----------------------  Last Visit Date:     1/6/2025  [facility] [place]      Future Visit Date: 0  ----------------------      Refill decision: Medication refilled per  Medication Refill in Ambulatory Care  policy.   [x]  If no future appointment scheduled: Route to Clinic Coordinators to contact the pt for appointment.    gerardo refill given, pt due for labs. FYI sent to care team.    LDL Cholesterol Calculated   Date Value Ref Range Status   05/30/2024 58 <=100 mg/dL Final         Request from pharmacy:  Requested Prescriptions   Pending Prescriptions Disp Refills    pravastatin (PRAVACHOL) 80 MG tablet [Pharmacy Med Name: PRAVASTATIN SODIUM 80 MG TAB] 64 tablet 2     Sig: TAKE 1 TABLET BY MOUTH AT BEDTIME       Antihyperlipidemic agents Passed - 4/29/2025  9:09 AM        Passed - LDL on file in the past 12 months        Passed - Medication is active on med list and the sig matches. RN to manually verify dose and sig if red X/fail.     If the protocol passes (green check), you do not need to verify med dose and sig.    A prescription matches if they are the same clinical intention.    For Example: once daily and every morning are the same.    The protocol can not identify upper and lower case letters as matching and will fail.     For Example: Take 1 tablet (50 mg) by mouth daily     TAKE 1 TABLET (50 MG) BY MOUTH DAILY    For all fails (red x), verify dose and sig.    If the refill does match what is on file, the RN can still proceed to approve the refill request.       If they do not match, route to the appropriate provider.             Passed - Recent (12 mo) or future (90 days) visit within the authorizing provider's specialty     The patient must have completed an in-person or virtual  visit within the past 12 months or has a future visit scheduled within the next 90 days with the authorizing provider s specialty.  Urgent care and e-visits do not qualify as an office visit for this protocol.          Passed - Patient is age 18 years or older

## 2025-05-01 ENCOUNTER — MYC MEDICAL ADVICE (OUTPATIENT)
Dept: INTERNAL MEDICINE | Facility: CLINIC | Age: 80
End: 2025-05-01
Payer: COMMERCIAL

## 2025-05-01 DIAGNOSIS — E78.00 HIGH CHOLESTEROL: ICD-10-CM

## 2025-05-01 RX ORDER — PRAVASTATIN SODIUM 80 MG/1
80 TABLET ORAL AT BEDTIME
Qty: 64 TABLET | Refills: 0 | Status: SHIPPED | OUTPATIENT
Start: 2025-05-01

## 2025-05-01 NOTE — TELEPHONE ENCOUNTER
Pharm transfer:   pravastatin (PRAVACHOL) 80 MG tablet 64 tablet 0 4/29/2025 - No  Sig - Route: Take 1 tablet (80 mg) by mouth at bedtime. - Oral  Prescribing Provider's NPI: [id number]  Joao Smiley    Request from pharmacy:  Requested Prescriptions

## 2025-05-06 NOTE — TELEPHONE ENCOUNTER
Last Written Prescription:    ----------------------  Last Visit Date:   1/6/2025  [facility] [place]      Future Visit Date: 0  ----------------------          Refill decision: Medication unable to be refilled by RN due to:  Medication not active on Pt's med list         Request from pharmacy:  Requested Prescriptions   Pending Prescriptions Disp Refills    benzonatate (TESSALON) 100 MG capsule [Pharmacy Med Name: BENZONATATE 100 MG CAPSULE] 20 capsule 1     Sig: TAKE 1 CAPSULE (100 MG) BY MOUTH 2 TIMES DAILY AS NEEDED FOR COUGH       There is no refill protocol information for this order

## 2025-05-07 RX ORDER — BENZONATATE 100 MG/1
100 CAPSULE ORAL 2 TIMES DAILY PRN
Qty: 20 CAPSULE | Refills: 1 | OUTPATIENT
Start: 2025-05-07

## 2025-05-22 ENCOUNTER — LAB (OUTPATIENT)
Dept: LAB | Facility: CLINIC | Age: 80
End: 2025-05-22
Payer: COMMERCIAL

## 2025-05-22 DIAGNOSIS — E29.1 MALE HYPOGONADISM: ICD-10-CM

## 2025-05-22 LAB
HOLD SPECIMEN: NORMAL
HOLD SPECIMEN: NORMAL
SHBG SERPL-SCNC: 93 NMOL/L (ref 11–80)

## 2025-05-22 PROCEDURE — 84270 ASSAY OF SEX HORMONE GLOBUL: CPT | Performed by: INTERNAL MEDICINE

## 2025-05-22 PROCEDURE — 84403 ASSAY OF TOTAL TESTOSTERONE: CPT | Performed by: INTERNAL MEDICINE

## 2025-05-22 PROCEDURE — 99000 SPECIMEN HANDLING OFFICE-LAB: CPT | Performed by: PATHOLOGY

## 2025-05-25 LAB
TESTOST FREE SERPL-MCNC: 4.35 NG/DL
TESTOST SERPL-MCNC: 453 NG/DL (ref 240–950)

## 2025-06-10 ENCOUNTER — VIRTUAL VISIT (OUTPATIENT)
Dept: ENDOCRINOLOGY | Facility: CLINIC | Age: 80
End: 2025-06-10
Payer: COMMERCIAL

## 2025-06-10 DIAGNOSIS — E29.1 MALE HYPOGONADISM: Primary | ICD-10-CM

## 2025-06-10 DIAGNOSIS — Z12.5 ENCOUNTER FOR SCREENING FOR MALIGNANT NEOPLASM OF PROSTATE: ICD-10-CM

## 2025-06-10 PROCEDURE — 98006 SYNCH AUDIO-VIDEO EST MOD 30: CPT | Performed by: INTERNAL MEDICINE

## 2025-06-10 PROCEDURE — G2211 COMPLEX E/M VISIT ADD ON: HCPCS | Performed by: INTERNAL MEDICINE

## 2025-06-10 NOTE — PROGRESS NOTES
Virtual Visit Details    Type of service:  Video Visit   Video Start Time: 3:11 PM  Video End Time:    Originating Location (pt. Location): Home  Distant Location (provider location):  Off-site  Platform used for Video Visit: Francy      Recent issues:  Testosterone follow-up evaluation  Previous history of CABG 4v in 3/2023, transient AFib  Confirmed low free testosterone levels, started low dose testosterone gel, feels better... 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 Naresh Chandler/[facility]  8/2021. Lab testing showed low testosterone level, but management plan unclear  Previous  labs include:      Additional health history:   Testicular injury:    none            Testicular surgery:   vasectomy  Testosterone med use:     none        Fam Hx Hypogonadism: none    Previous [facility] labs include:   Latest Reference Range & Units 08/29/22 08:42   Testosterone Total 240 - 950 ng/dL 295      Latest Reference Range & Units 08/29/22 08:42   Free Testosterone Calculated ng/dL 2.20          Cheko 5: 4.1-23.9   Latest Reference Range & Units 08/29/22 08:42   Sex Hormone Binding Globulin 11 - 80 nmol/L 119 (H)      Latest Reference Range & Units 08/29/22 08:42   PSA 0.00 - 4.00 ug/L 0.58      Latest Reference Range & Units 08/03/23 15:34   PSA Tumor Marker 0.00 - 6.50 ng/mL 0.63         1/3/23. Initial endocrinology evaluation with me at [facility]  Reviewed health history and testosterone issues  Symptoms included low libido, muscle weakness, decreased axillary hair  Plan for repeat testosterone related labs and then followup evaluation  Previous [facility] testosterone labs include:    Latest Reference Range & Units 08/29/22 08:42 01/28/23 10:13 10/30/23 18:06 01/26/24 11:17 05/14/24 14:08 05/30/24 14:58   Testosterone Total 240 - 950 ng/dL 295 311 255  267 680 1,117 (H) 907      Latest Reference Range & Units 08/29/22 08:42 01/28/23 10:13 10/30/23 18:06 01/26/24 11:17 05/30/24 14:58   Free Testosterone Calculated 4.1-23.9 ng/dL 2.20 2.73 2.51 8.24 8.88     Testosterone gel 1% pumps:   0 0  0  2  1     Latest Reference Range & Units 08/29/22 08:42 01/28/23 10:13 10/30/23 18:06 01/26/24 11:17 05/30/24 14:58   Sex Hormone Binding Globulin 11 - 80 nmol/L 119 (H) 99 (H) 85 (H) 76 103 (H)      Latest Reference Range & Units 08/03/23 15:34 01/26/24 11:17   PSA Tumor Marker 0.00 - 6.50 ng/mL 0.63 0.56       Recent [facility] labs include:  Lab Results   Component Value Date    FSH 65.1 (H) 10/30/2023    TESTOSTTOTAL 453 05/22/2025    FT 4.35 05/22/2025    TSH 3.44 10/30/2023    SG 1.016 08/29/2023      Current dose:  testosterone gel 1-pump topically to shoulder areas daily      Lives in [facility]  Sees Dr. Joao Smiley[facility] for general medicine evaluations.    PMH/PSH:  Past Medical History:   Diagnosis Date    ASCVD (arteriosclerotic cardiovascular disease)     Coronary artery disease     Gastroesophageal reflux disease     Hypertension     Male hypogonadism     Stented coronary artery      Past Surgical History:   Procedure Laterality Date    BYPASS GRAFT ARTERY CORONARY N/A 4/24/2023    Procedure: MEDIAN STERNOTOMY, HARVEST OF LEFT INTERNAL MAMMARY ARTERY, ENDOSCOPIC HARVEST OF LEFT GREATER SAPHENOUS VEIN, ON CARDIOPULMONARY BYPASS, CORONARY ARTERY BYPASS GRAFT (CABG) X4 . TRANSESOPHAGEAL ECHOCARDIOGRAM.;  Surgeon: Bro Almeida MD;  [facility] OR    CATARACT IOL, RT/LT      COLONOSCOPY N/A 7/17/2019    Procedure: COLONOSCOPY;  Surgeon: Roque Givens MD;  [facility]    CV CORONARY ANGIOGRAM N/A 3/21/2023    Procedure: Coronary Angiogram;  Surgeon: Pito Cahpin MD;   Location: [facility] CARDIAC CATH LAB    CV PCI N/A 3/21/2023    Procedure: Percutaneous Coronary Intervention;  Surgeon: Pito Chapin MD;  Location: [facility] CARDIAC CATH LAB    ESOPHAGOSCOPY, GASTROSCOPY, DUODENOSCOPY (EGD), COMBINED N/A 7/17/2019    Procedure: ESOPHAGOGASTRODUODENOSCOPY, WITH BIOPSY;  Surgeon: Roque Givens MD;  Location:  OR    ESOPHAGOSCOPY, GASTROSCOPY, DUODENOSCOPY (EGD), COMBINED N/A 7/24/2024    Procedure: Esophagoscopy, gastroscopy, duodenoscopy (EGD), combined;  Surgeon: Roque Givens MD;  Location: [facility] OR    HERNIA REPAIR  2000    MOHS MICROGRAPHIC PROCEDURE      PHACOEMULSIFICATION CLEAR CORNEA WITH STANDARD INTRAOCULAR LENS IMPLANT Right 7/2/2021    Procedure: RIGHT EYE CATARACT REMOVAL WITH INTRAOCULAR LENS IMPLANT;  Surgeon: Justa Liz MD;  Location: [facility] OR    PHACOEMULSIFICATION CLEAR CORNEA WITH STANDARD INTRAOCULAR LENS IMPLANT Left 7/9/2021    Procedure: LEFT EYE CATARACT REMOVAL WITH INTRAOCULAR LENS IMPLANT;  Surgeon: Justa Liz MD;  Location: [facility] OR    [facility] CORONARY STENT PERCUT, EA ADDTL VESSEL         Family Hx:  Family History   Problem Relation Age of Onset    Glaucoma No family hx of     Macular Degeneration No family hx of     Retinal detachment No family hx of     Amblyopia No family hx of     Melanoma No family hx of     Skin Cancer No family hx of          Social Hx:  Social History     Socioeconomic History    Marital status:      Spouse name: Not on file    Number of children: Not on file    Years of education: Not on file    Highest education level: Not on file   Occupational History    Not on file   Tobacco Use    Smoking status: Never    Smokeless tobacco: Never   Substance and Sexual Activity    Alcohol use: Yes     Comment: 1-2 drinks 5 days a week    Drug use: Never    Sexual activity: Not on file   Other Topics Concern    Parent/sibling w/ CABG, MI or angioplasty before 65F 55M? Not Asked   Social History Narrative     Not on file     Social Drivers of Health     Financial Resource Strain: Low Risk  (11/16/2024)    Financial Resource Strain     Within the past 12 months, have you or your family members you live with been unable to get utilities (heat, electricity) when it was really needed?: No   Food Insecurity: Low Risk  (11/16/2024)    Food Insecurity     Within the past 12 months, did you worry that your food would run out before you got money to buy more?: No     Within the past 12 months, did the food you bought just not last and you didn t have money to get more?: No   Transportation Needs: Low Risk  (11/16/2024)    Transportation Needs     Within the past 12 months, has lack of transportation kept you from medical appointments, getting your medicines, non-medical meetings or appointments, work, or from getting things that you need?: No   Physical Activity: Not on file   Stress: Not on file   Social Connections: Not on file   Interpersonal Safety: Low Risk  (11/7/2024)    Interpersonal Safety     Do you feel physically and emotionally safe where you currently live?: Yes     Within the past 12 months, have you been hit, slapped, kicked or otherwise physically hurt by someone?: No     Within the past 12 months, have you been humiliated or emotionally abused in other ways by your partner or ex-partner?: No   Housing Stability: Low Risk  (11/16/2024)    Housing Stability     Do you have housing? : Yes     Are you worried about losing your housing?: No          MEDICATIONS:  has a current medication list which includes the following prescription(s): aspirin low dose, lisinopril, melatonin, metoprolol succinate er, pravastatin, testosterone, benzonatate, lisinopril, omeprazole, tacrolimus, and zolpidem.    ROS:     ROS: 10 point ROS neg other than the symptoms noted above in the HPI.    GENERAL: less fatigue, wt stable?; denies fevers, chills, night sweats.    HEENT: normal sense of smell; no dysphagia, odonophagia, diplopia,  neck pain  THYROID:  no apparent hyper or hypothyroid symptoms  CV: no chest pain, pressure, palpitations  LUNGS: no SOB, ANTHONY, cough, wheezing   ABDOMEN: no diarrhea, constipation, abdominal pain  EXTREMITIES: no rashes, ulcers, edema  NEUROLOGY: decreased sensation at feet; no headaches, denies changes in vision, tingling  MSK: generally improved muscle strength, some myalgias as noted; no arthralgias  SKIN: decreased axillary hair growth; no rashes or lesions  : ?irritable bladder, some decreased libido and erectile function  PSYCH:  stable mood, no significant anxiety or depression  ENDOCRINE: no heat or cold intolerance    Physical Exam (visual exam)  VS:  no vital signs taken for video visit  CONSTITUTIONAL: healthy, alert and NAD, well dressed, answering questions appropriately  ENT: no nose swelling or nasal discharge, mouth redness or gum changes.  EYES: eyes grossly normal to inspection, conjunctivae and sclerae normal, no exophthalmos or proptosis  THYROID:  no apparent nodules or goiter  LUNGS: no audible wheeze, cough or visible cyanosis, no visible retractions or increased work of breathing  ABDOMEN: abdomen not evaluated  EXTREMITIES: no hand tremors, limited exam  NEUROLOGY: CN grossly intact, mentation intact and speech normal   SKIN:  no apparent skin lesions, rash, or edema with visualized skin appearance  PSYCH: mentation appears normal, affect normal/bright, judgement and insight intact,   normal speech and appearance well groomed      LABS:    All pertinent notes, labs, and images personally reviewed by me.     A/P:  Encounter Diagnoses   Name Primary?    Male hypogonadism Yes    Encounter for screening for malignant neoplasm of prostate        Comments:  Reviewed health history and hypogonadism issues.  Previous symptoms and low free testosterone correlate with male hypogonadism  High SHBG causing normal or high total testosterone levels, though free testosterone a more accurate barometer of  his testosterone levels  He feels better on the low dose testosterone gel, improved testosterone levels, and he reports cost manageable  Reviewed and interpreted tests that I previously ordered.   Ordered appropriate tests for the endocrinology disease management.    Management options discussed and implemented after shared medical decision making with the patient.  Hypogonadism problem is chronic-stable, improved     Plan:  Reviewed general issues with the hypogonadism diagnosis and management  Discussed lab tests to assess testosterone axis hormone levels.  We have reviewed treatment options with topical, nasal, and injectable testosterone medication use.    Recommend:  We have reviewed the testosterone gel med option, dosing, potential benefits and SE's  Continue the current testosterone gel 1% as 1-pump topically to shoulders daily  Monitor for symptom changes  Track the free testosterone levels, rather than the total testosterone levels  Plan repeat lab testing    PCP appt 6/25/25, CBC and PSA   11/2025 with T&F testosterone   Lab orders placed  No testicular or pituitary imaging needed at this time  Contact me if questions regarding treatment plan    Keep regular follow-up appointments with PCP, cardiologist also  Addressed patient questions today    The longitudinal plan of care for the endocrine problem(s) were addressed during this visit.  Due to added complexity of care,   we will continue to support the patient and the subsequent management of this condition with ongoing continuity of care.    There are no Patient Instructions on file for this visit.    Future labs ordered today:   Orders Placed This Encounter   Procedures    CBC with platelets    Prostate Specific Antigen Screen    Testosterone Free and Total     Radiology/Consults ordered today: None    Total time spent on day of encounter:  20 min    Follow-up:  6/2026, Return    ISHMAEL White MD, MS  Endocrinology  [facility]    CC: Sherice  Joao

## 2025-06-15 ENCOUNTER — HEALTH MAINTENANCE LETTER (OUTPATIENT)
Age: 80
End: 2025-06-15

## 2025-06-23 ENCOUNTER — MYC MEDICAL ADVICE (OUTPATIENT)
Dept: INTERNAL MEDICINE | Facility: CLINIC | Age: 80
End: 2025-06-23
Payer: COMMERCIAL

## 2025-06-24 NOTE — PROGRESS NOTES
HPI:    Overall doing well. He tries to get some exercise His BP is well controlled and he remains on his same medications (lisinopril and metoprolol).  No other HEENT, cardiopulmonary, abdominal, , neurological, systemic, psychiatric, lymphatic, endocrine, vascular complaints.     Past Medical History:   Diagnosis Date    ASCVD (arteriosclerotic cardiovascular disease)     Coronary artery disease     Gastroesophageal reflux disease     Hypertension     Male hypogonadism     Stented coronary artery      Past Surgical History:   Procedure Laterality Date    BYPASS GRAFT ARTERY CORONARY N/A 4/24/2023    Procedure: MEDIAN STERNOTOMY, HARVEST OF LEFT INTERNAL MAMMARY ARTERY, ENDOSCOPIC HARVEST OF LEFT GREATER SAPHENOUS VEIN, ON CARDIOPULMONARY BYPASS, CORONARY ARTERY BYPASS GRAFT (CABG) X4 . TRANSESOPHAGEAL ECHOCARDIOGRAM.;  Surgeon: Bro Almeida MD;  [facility] OR    CATARACT IOL, RT/LT      COLONOSCOPY N/A 7/17/2019    Procedure: COLONOSCOPY;  Surgeon: Roque Givens MD;  [facility]    CV CORONARY ANGIOGRAM N/A 3/21/2023    Procedure: Coronary Angiogram;  Surgeon: Pito Chapin MD;  [facility]    CV PCI N/A 3/21/2023    Procedure: Percutaneous Coronary Intervention;  Surgeon: Pito Chapin MD;  [facility]  [facility]    ESOPHAGOSCOPY, GASTROSCOPY, DUODENOSCOPY (EGD), COMBINED N/A 7/17/2019    Procedure: ESOPHAGOGASTRODUODENOSCOPY, WITH BIOPSY;  Surgeon: Roque Givens MD;  [facility]    ESOPHAGOSCOPY, GASTROSCOPY, DUODENOSCOPY (EGD), COMBINED N/A 7/24/2024    Procedure: Esophagoscopy, gastroscopy, duodenoscopy (EGD), combined;  Surgeon: Roque Givens MD;  Location: [facility] OR    HERNIA REPAIR  2000    MOHS MICROGRAPHIC PROCEDURE      PHACOEMULSIFICATION CLEAR CORNEA WITH STANDARD INTRAOCULAR LENS IMPLANT Right 7/2/2021    Procedure: RIGHT EYE CATARACT REMOVAL WITH INTRAOCULAR LENS IMPLANT;  Surgeon: Justa Liz MD;  Location: [facility] OR     PHACOEMULSIFICATION CLEAR CORNEA WITH STANDARD INTRAOCULAR LENS IMPLANT Left 7/9/2021    Procedure: LEFT EYE CATARACT REMOVAL WITH INTRAOCULAR LENS IMPLANT;  Surgeon: Justa Liz MD;  Location: [facility] OR    [facility] CORONARY STENT ANGELA CORTES VESSEL       PE:    Vitals noted, gen, nad, cooperative, alert, neck supple nl rom, lungs with good air movement, RRR , S1, S2, murmur present, abdomen, no acute findings, Grossly normal neurological exam.     A/P:     1. Immunizations; COVID Pfizer vaccine x 6. Moderna x 1.  He has completed the Shigrix vaccine series. Tdap 8/7/2015. Pneumococcal 23 done 9/6/2019. Prevnar 13 done 5/15/2018. Prevnar 20 in the fall 2024. Influenza vaccine done 10/4/2024.   2. Dermatology; seen on 1/19/2024 for skin check. next manuel. 8/5/2025 with Dr. Randall. He was seen 3/7/2024 by Dr. Tovar   3. PSA; 0.56 on 1/26/2024.   4. Colonoscopy; 7/17/2019 with no specimens collected. Cologuard negative on 3/7/2024.   5. Increased lipids on Pravastatin 80 mg; lipids 5/30/2025; LDL 58, HDL 90 and TG's 65. He could not tolerate Crestor (CK 1600) with muscle complaints. Last CK normal at 217 on 4/2/2025  6. Hgb on 4/30/2023 was 9.3. And repeat CBC  with Hgb increased to 12.6 (8/3/23). Last Hgb was 13.7 on 5/30/2024.   7. Neuropathy: visit with Dr. Begum, Neurology on 6/13/2025  with follow up scheduled for 6/26/2026.    8. A1c was 6.2% on 4/12/2023. Repeat 6.3% on 8/3/2023.   9. CAD; had PCI with AREN x 4 in 2015. Seen 12/15/2021, [facility] Dr. Madrigal, note in Care Everywhere and there is a detailed note in the chart. He had 11/14/2022 appt. With Dr. Chapin. Resting echo 10/29/2022. He had CABG x 4 on 4/24/2023. Visit with Dr. Chapin on 7/29/2024 and remains on aspirin, metoprolol and pravastatin. Soft murmur; he had a 3/21/2023 echo  with aortic stenosis.  Last echo 7/25/2024   10. A fib/flutter: EP visit with Dr. Saenz on 5/7/2024 (next 7/10/2025)  for a fib/flutter after CABG  (April 2023). Low burden PAF (9%) on recent zio patch 5/12/23. He is off  Eliquis, stopped amiodarone due to neuropathy complaints. .   11. Endocrine: TSH normal (3.44) on 10/30/2023. He does not feel he has sleep apnea. Some mild depression? Low testosterone and he had an endocrinology appt. With Dr. White 6/10/2025. He is on androgel.   12. Recurrent UTIs and hematuria: UA 7/14/23 with RBCs (9), no signs infection. UA 6/20/2023 with nitrites, LE, WBCs, RBCs (100) and treated with Bactrim x 10 days. UA 6/2/23 with LE, WBCs and RBCs (43) and treated with Bactrim. CT abdomen/pelvis 10/20/2022 with 5 mm non-obstructive right kidney stone, but he notes that is chronic. He was seen 8/29/2023 in Urology by Ms. Callaway.    13. Reflux on PPI with father's h/o gastric cancer (last EGD 7/17/2019). EGD done 7/24/2024   14. Elevated BP;  24 hour BP monitor was normal at 109/57 on 1/28/2025           40 minutes spent on the date of the encounter doing chart review, history and exam, documentation and further activities as noted above exclusive of procedures and other billable interpretations

## 2025-06-25 ENCOUNTER — OFFICE VISIT (OUTPATIENT)
Dept: INTERNAL MEDICINE | Facility: CLINIC | Age: 80
End: 2025-06-25
Payer: COMMERCIAL

## 2025-06-25 VITALS
WEIGHT: 197.3 LBS | TEMPERATURE: 97.6 F | HEIGHT: 72 IN | RESPIRATION RATE: 18 BRPM | DIASTOLIC BLOOD PRESSURE: 70 MMHG | SYSTOLIC BLOOD PRESSURE: 112 MMHG | BODY MASS INDEX: 26.72 KG/M2 | OXYGEN SATURATION: 96 % | HEART RATE: 70 BPM

## 2025-06-25 DIAGNOSIS — I10 BENIGN ESSENTIAL HYPERTENSION: Primary | ICD-10-CM

## 2025-06-25 PROCEDURE — 3074F SYST BP LT 130 MM HG: CPT | Performed by: INTERNAL MEDICINE

## 2025-06-25 PROCEDURE — 3078F DIAST BP <80 MM HG: CPT | Performed by: INTERNAL MEDICINE

## 2025-06-25 PROCEDURE — 99215 OFFICE O/P EST HI 40 MIN: CPT | Performed by: INTERNAL MEDICINE

## 2025-06-25 NOTE — PROGRESS NOTES
Ed is a 80 year old that presents in clinic today for the following:     Chief Complaint   Patient presents with    Follow Up           6/25/2025     7:48 AM   Additional Questions   Roomed by MR     Screenings as of today     Fallen 2 or more times in the past year?  No        Judy Lopez, EMT at 7:51 AM on 6/25/2025    Answers submitted by the patient for this visit:  Hypertension Visit (Submitted on 6/15/2025)  Chief Complaint: Chronic problems general questions HPI Form  Do you check your blood pressure regularly outside of the clinic?: No  Are your blood pressures ever more than 140 on the top number (systolic) OR more than 90 on the bottom number (diastolic)? (For example, greater than 140/90): No  Are you following a low salt diet?: No  Vascular Disease (Submitted on 6/15/2025)  Chief Complaint: Chronic problems general questions HPI Form  Nitroglycerin use:: never  Do you take an aspirin every day?: Yes  General Questionnaire (Submitted on 6/15/2025)  Chief Complaint: Chronic problems general questions HPI Form  How many servings of fruits and vegetables do you eat daily?: 2-3  On average, how many sweetened beverages do you drink each day (Examples: soda, juice, sweet tea, etc.  Do NOT count diet or artificially sweetened beverages)?: 1  How many minutes a day do you exercise enough to make your heart beat faster?: 30 to 60  How many days a week do you exercise enough to make your heart beat faster?: 5  How many days per week do you miss taking your medication?: 0  Questionnaire about: Chronic problems general questions HPI Form (Submitted on 6/15/2025)  Chief Complaint: Chronic problems general questions HPI Form

## 2025-07-10 ENCOUNTER — VIRTUAL VISIT (OUTPATIENT)
Dept: CARDIOLOGY | Facility: CLINIC | Age: 80
End: 2025-07-10
Attending: INTERNAL MEDICINE
Payer: COMMERCIAL

## 2025-07-10 VITALS
WEIGHT: 194 LBS | BODY MASS INDEX: 26.28 KG/M2 | DIASTOLIC BLOOD PRESSURE: 75 MMHG | SYSTOLIC BLOOD PRESSURE: 120 MMHG | HEIGHT: 72 IN

## 2025-07-10 DIAGNOSIS — I48.0 PAROXYSMAL ATRIAL FIBRILLATION (H): Primary | ICD-10-CM

## 2025-07-10 ASSESSMENT — PAIN SCALES - GENERAL: PAINLEVEL_OUTOF10: NO PAIN (0)

## 2025-07-10 NOTE — PROGRESS NOTES
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     At past visits and has indicated his wish not to be on long-term anticoagulants or antiarrhythmics.  He is taking aspirin on a regular basis    His A-fib episodes are largely asymptomatic and he remains on daily beta-blocker for rate control should he develop an episode..  He has not had any symptomatic concerns since our last visit and indicates that he is feeling quite well.    Plan will be due reassess annually and I have asked him to contact us at any time should there be a change in his currently  asymptomatic  status.    I reviewed his last echocardiogram which shows aortic calcification but deemed mild as far as aortic stenosis is concerned.  His ejection fraction is excellent and there does not appear to be any concern regarding left ventricular function from a diastolic perspective.    ECHO 7/24  Interpretation Summary  Global and regional left ventricular function is normal with an EF of 55-60%.  Mild right ventricular dilation is present. Global right ventricular function  is mildly reduced.  Mild aortic stenosis (Vm2.8m/s, MG 16mmHg, BELKIS per VTI 1.5cm2)  No pericardial effusion is present.     Compared to prior TTE 12/19/23, no significant changes noted.  ______________________________________________________________________________  Left Ventricle  Left ventricular size is normal. Global and regional left ventricular function  is normal with an EF of 55-60%. Thickening of the anterobasal septum is  present. Left ventricular diastolic function is normal.     Right Ventricle  Mild right ventricular dilation is present. Global right ventricular function  is mildly reduced.     Atria  Both atria appear normal.     Mitral Valve  The mitral valve is normal.     Aortic Valve  The aortic valve is tricuspid. Severe aortic valve calcification is present.  Mild  aortic stenosis (Vm2.8m/s, MG 16mmHg, BELKIS per VTI 1.5cm2).     Tricuspid Valve  The tricuspid valve is normal. Pulmonary artery systolic pressure cannot be  assessed.     Pulmonic Valve  The valve leaflets are not well visualized. On Doppler interrogation, there is  no significant stenosis or regurgitation.     Vessels  The aorta root is normal. The thoracic aorta is normal. The inferior vena cava  cannot be assessed.    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 45 capsule 1    lisinopril (ZESTRIL) 20 MG tablet Take 1 tablet (20 mg) by mouth daily. 90 tablet 3    melatonin 5 MG tablet Take 1 tablet (5 mg) by mouth nightly as needed for sleep 15 tablet 0    metoprolol succinate ER (TOPROL XL) 25 MG 24 hr tablet TAKE 1 TABLET BY MOUTH EVERY MORNING 90 tablet 3    omeprazole (PRILOSEC) 40 MG DR capsule TAKE 1 CAPSULE BY MOUTH DAILY BEFORE BREAKFAST. 90 capsule 2    pravastatin (PRAVACHOL) 80 MG tablet Take 1 tablet (80 mg) by mouth at bedtime. 64 tablet 0    tacrolimus (PROTOPIC) 0.1 % external ointment Apply topically 2 times daily To rash on the scrotum, penis, or inguinal folds. 60 g 3    testosterone (ANDROGEL 1 % PUMP) 12.5 MG/ACT (1%) gel Apply 1 pump amount topically to shoulder area daily, as directed 75 g 5    zolpidem (AMBIEN) 10 MG tablet TAKE 1 TABLET (10 MG) BY MOUTH NIGHTLY AS NEEDED FOR SLEEP 30 tablet 0       PAST SURGICAL HISTORY:  Past Surgical History:   Procedure Laterality Date    BIOPSY       Skin    BYPASS GRAFT ARTERY CORONARY N/A 04/24/2023    Procedure: MEDIAN STERNOTOMY, HARVEST OF LEFT INTERNAL MAMMARY ARTERY, ENDOSCOPIC HARVEST OF LEFT GREATER SAPHENOUS VEIN, ON CARDIOPULMONARY BYPASS, CORONARY ARTERY BYPASS GRAFT (CABG) X4 . TRANSESOPHAGEAL ECHOCARDIOGRAM.;  Surgeon: Bro Almeida MD;  Location: [facility] OR    CATARACT IOL, RT/LT      COLONOSCOPY N/A 07/17/2019    Procedure: COLONOSCOPY;  Surgeon: Roque Givens MD;  [facility]    CV CORONARY ANGIOGRAM N/A 03/21/2023    Procedure: Coronary Angiogram;  Surgeon: Pito Chapin MD;  Location:  HEART CARDIAC CATH LAB    CV PCI N/A 03/21/2023    Procedure: Percutaneous Coronary Intervention;  Surgeon: Pito Chapin MD;  Location:  HEART CARDIAC CATH LAB    ENT SURGERY  1950    Tracheostomy    ESOPHAGOSCOPY, GASTROSCOPY, DUODENOSCOPY (EGD), COMBINED N/A 07/17/2019    Procedure: ESOPHAGOGASTRODUODENOSCOPY, WITH BIOPSY;  Surgeon: Roque Givens MD;  Location:  OR    ESOPHAGOSCOPY, GASTROSCOPY, DUODENOSCOPY (EGD), COMBINED N/A 07/24/2024    Procedure: Esophagoscopy, gastroscopy, duodenoscopy (EGD), combined;  Surgeon: Roque Givens MD;  Location: [facility] [place] [place] OR    HERNIA REPAIR  2000    MOHS MICROGRAPHIC PROCEDURE      ORTHOPEDIC SURGERY      Hand surgery 2012    PHACOEMULSIFICATION CLEAR CORNEA WITH STANDARD INTRAOCULAR LENS IMPLANT Right 07/02/2021    Procedure: RIGHT EYE CATARACT REMOVAL WITH INTRAOCULAR LENS IMPLANT;  Surgeon: Justa Liz MD;  Location: [facility] [place] [place] OR    PHACOEMULSIFICATION CLEAR CORNEA WITH STANDARD INTRAOCULAR LENS IMPLANT Left 07/09/2021    Procedure: LEFT EYE CATARACT REMOVAL WITH INTRAOCULAR LENS IMPLANT;  Surgeon: Justa Liz MD;  Location: [facility] [place] [place] OR    ZZHC CORONARY STENT PERCUT, EA ADDTL VESSEL         ALLERGIES:   No Known Allergies    FAMILY HISTORY:  Family History   Problem Relation Age of Onset    Other Cancer Father         Gastric    Diabetes Daughter         Type1    Glaucoma No family hx of      Macular Degeneration No family hx of     Retinal detachment No family hx of     Amblyopia No family hx of     Melanoma No family hx of     Skin Cancer No family hx of      SOCIAL HISTORY:  Social History     Tobacco Use    Smoking status: Never    Smokeless tobacco: Never   Substance Use Topics    Alcohol use: Yes     Comment: 1-2 drinks 5 days a week    Drug use: Never       ROS:   Constitutional: No fever, chills, or sweats. Weight stable.   ENT: No visual disturbance, ear ache, epistaxis, sore throat.   Cardiovascular: As per HPI.   Respiratory: No cough, hemoptysis.    GI: No nausea, vomiting, : No hematuria.   Integument: Negative.   Psychiatric: Negative.   Hematologic:   no easy bleeding.  Neuro: Negative.   Endocrinology: No significant heat or cold intolerance   Musculoskeletal: No myalgia.    Exam:  /75   Ht 1.829 m (6')   Wt 88 kg (194 lb)   BMI 26.31 kg/m    GENERAL APPEARANCE: healthy, alert and no distress  HEENT: no icterus, no xanthelasmas, normal pupil size and reaction, normal palate, mucosa moist, no central cyanosis  NECK: no adenopathy, no asymmetry, masses, or scars, thyroid normal to palpation and no bruits, JVP not elevated  RESPIRATORY: lungs clear to auscultation - no rales, rhonchi or wheezes, no use of accessory muscles, no retractions, respirations are unlabored, normal respiratory rate  NEURO: alert and oriented to person/place/time, normal speech, and affect.  SKIN: no ecchymoses, no rashes    Labs:  CBC RESULTS:   Lab Results   Component Value Date    WBC 5.4 05/30/2024    RBC 4.22 (L) 05/30/2024    HGB 13.7 05/30/2024    HCT 40.4 05/30/2024    MCV 96 05/30/2024    MCH 32.5 05/30/2024    MCHC 33.9 05/30/2024    RDW 13.2 05/30/2024     05/30/2024       BMP RESULTS:  Lab Results   Component Value Date     05/30/2024    POTASSIUM 4.7 05/30/2024    POTASSIUM 3.8 04/24/2023    POTASSIUM 4.9 08/29/2022    CHLORIDE 102 05/30/2024    CHLORIDE 107 08/29/2022    CO2  24 05/30/2024    CO2 28 08/29/2022    ANIONGAP 9 05/30/2024    ANIONGAP 4 08/29/2022     (H) 05/30/2024     (H) 04/30/2023     (H) 08/29/2022    BUN 16.3 05/30/2024    BUN 40 (H) 08/29/2022    CR 1.10 05/30/2024    GFRESTIMATED 69 05/30/2024    CRISS 9.3 05/30/2024        INR RESULTS:  Lab Results   Component Value Date    INR 1.27 (H) 04/25/2023    INR 1.31 (H) 04/24/2023    INR 1.49 (H) 04/24/2023    INR 1.00 04/12/2023       Procedures:  PULMONARY FUNCTION TESTS:        No data to display                  ECHOCARDIOGRAM:   No results found for this or any previous visit (from the past 8760 hours).      Assessment and Plan:   1.  Paroxysmal atrial fibrillation--largely asymptomatic  2.  No new cardiovascular symptoms    Plan  1.  No change in treatment strategy--we have discussed anticoagulants and antiarrhythmics on several occasions in the past.  2.  Follow-up video 1 year or earlier if Dr Gordillo contacts us    Total elapsed time today with chart review, clinic visit and documentation 20 minutes    Video on 4: 33; off 4: 48  [facility]  Patient at [facility]; clinic [facility] heart    I very much appreciated the opportunity to see and assess Reinaldo BRENDAN Gordillo in the clinic today. Please do not hesitate to contact my office if you have any questions or concerns.      Yuly Saenz MD  Cardiac Arrhythmia Service  [facility]  [phone]     CC  YULY Smiley MD

## 2025-07-10 NOTE — NURSING NOTE
Current patient location: [address] [place] [address]  [facility] [address]    Is the patient currently in the state of [place]? YES    Visit mode: VIDEO    If the visit is dropped, the patient can be reconnected by:VIDEO VISIT: Text to cell phone:   Telephone Information:   Mobile [phone]       Will anyone else be joining the visit? NO  (If patient encounters technical issues they should call [phone]    Are changes needed to the allergy or medication list? Pt confirms medications and allergies are correct, echeck-in completed.     Are refills needed on medications prescribed by this physician? NO    Rooming Documentation:  Questionnaire(s) not done per department protocol    Reason for visit: RECHFAYE LOPEZF

## 2025-07-10 NOTE — LETTER
7/10/2025      RE: Reinaldo Gordillo  [address]  [place] [address]       Dear Colleague,    Thank you for the opportunity to participate in the care of your patient, Reinaldo Gordillo, at the [facility] [place] [facility]. Please see a copy of my visit note below.    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     At past visits and has indicated his wish not to be on long-term anticoagulants or antiarrhythmics.  He is taking aspirin on a regular basis    His A-fib episodes are largely asymptomatic and he remains on daily beta-blocker for rate control should he develop an episode..  He has not had any symptomatic concerns since our last visit and indicates that he is feeling quite well.    Plan will be due reassess annually and I have asked him to contact us at any time should there be a change in his currently  asymptomatic  status.    I reviewed his last echocardiogram which shows aortic calcification but deemed mild as far as aortic stenosis is concerned.  His ejection fraction is excellent and there does not appear to be any concern regarding left ventricular function from a diastolic perspective.    ECHO 7/24  Interpretation Summary  Global and regional left ventricular function is normal with an EF of 55-60%.  Mild right ventricular dilation is present. Global right ventricular function  is mildly reduced.  Mild aortic stenosis (Vm2.8m/s, MG 16mmHg, BELKIS per VTI 1.5cm2)  No pericardial effusion is present.     Compared to prior TTE 12/19/23, no significant changes noted.  ______________________________________________________________________________  Left Ventricle  Left ventricular size is normal. Global and regional left ventricular function  is normal with an EF of 55-60%. Thickening of the anterobasal septum is  present. Left  ventricular diastolic function is normal.     Right Ventricle  Mild right ventricular dilation is present. Global right ventricular function  is mildly reduced.     Atria  Both atria appear normal.     Mitral Valve  The mitral valve is normal.     Aortic Valve  The aortic valve is tricuspid. Severe aortic valve calcification is present.  Mild aortic stenosis (Vm2.8m/s, MG 16mmHg, BELKIS per VTI 1.5cm2).     Tricuspid Valve  The tricuspid valve is normal. Pulmonary artery systolic pressure cannot be  assessed.     Pulmonic Valve  The valve leaflets are not well visualized. On Doppler interrogation, there is  no significant stenosis or regurgitation.     Vessels  The aorta root is normal. The thoracic aorta is normal. The inferior vena cava  cannot be assessed.    PAST MEDICAL HISTORY:  Past Medical History:   Diagnosis Date     ASCVD (arteriosclerotic cardiovascular disease)      Cancer (H) 2017    Basal cell face     Coronary artery disease      Gastroesophageal reflux disease      Hypertension      Male hypogonadism      Stented coronary artery        CURRENT MEDICATIONS:  Current Outpatient Medications   Medication Sig Dispense Refill     ASPIRIN LOW DOSE 81 MG EC tablet Take 81 mg by mouth every morning       benzonatate (TESSALON) 100 MG capsule Take 1 capsule (100 mg) by mouth 3 times daily as needed for cough. 45 capsule 1     lisinopril (ZESTRIL) 20 MG tablet Take 1 tablet (20 mg) by mouth daily. 90 tablet 3     melatonin 5 MG tablet Take 1 tablet (5 mg) by mouth nightly as needed for sleep 15 tablet 0     metoprolol succinate ER (TOPROL XL) 25 MG 24 hr tablet TAKE 1 TABLET BY MOUTH EVERY MORNING 90 tablet 3     omeprazole (PRILOSEC) 40 MG DR capsule TAKE 1 CAPSULE BY MOUTH DAILY BEFORE BREAKFAST. 90 capsule 2     pravastatin (PRAVACHOL) 80 MG tablet Take 1 tablet (80 mg) by mouth at bedtime. 64 tablet 0     tacrolimus (PROTOPIC) 0.1 % external ointment Apply topically 2 times daily To rash on the scrotum,  penis, or inguinal folds. 60 g 3     testosterone (ANDROGEL 1 % PUMP) 12.5 MG/ACT (1%) gel Apply 1 pump amount topically to shoulder area daily, as directed 75 g 5     zolpidem (AMBIEN) 10 MG tablet TAKE 1 TABLET (10 MG) BY MOUTH NIGHTLY AS NEEDED FOR SLEEP 30 tablet 0       PAST SURGICAL HISTORY:  Past Surgical History:   Procedure Laterality Date     BIOPSY      Skin     BYPASS GRAFT ARTERY CORONARY N/A 04/24/2023    Procedure: MEDIAN STERNOTOMY, HARVEST OF LEFT INTERNAL MAMMARY ARTERY, ENDOSCOPIC HARVEST OF LEFT GREATER SAPHENOUS VEIN, ON CARDIOPULMONARY BYPASS, CORONARY ARTERY BYPASS GRAFT (CABG) X4 . TRANSESOPHAGEAL ECHOCARDIOGRAM.;  Surgeon: Bro Almeida MD;  Location: [facility] OR     CATARACT IOL, RT/LT       COLONOSCOPY N/A 07/17/2019    Procedure: COLONOSCOPY;  Surgeon: Roque Givens MD;  [facility]     CV CORONARY ANGIOGRAM N/A 03/21/2023    Procedure: Coronary Angiogram;  Surgeon: Pito Chapin MD;  Location:  HEART CARDIAC CATH LAB     CV PCI N/A 03/21/2023    Procedure: Percutaneous Coronary Intervention;  Surgeon: Pito Chapin MD;  Location:  HEART CARDIAC CATH LAB     ENT SURGERY  1950    Tracheostomy     ESOPHAGOSCOPY, GASTROSCOPY, DUODENOSCOPY (EGD), COMBINED N/A 07/17/2019    Procedure: ESOPHAGOGASTRODUODENOSCOPY, WITH BIOPSY;  Surgeon: Roque Givens MD;  [facility]     ESOPHAGOSCOPY, GASTROSCOPY, DUODENOSCOPY (EGD), COMBINED N/A 07/24/2024    Procedure: Esophagoscopy, gastroscopy, duodenoscopy (EGD), combined;  Surgeon: Roque Givens MD;  Location: [facility] [place] [facility] OR     HERNIA REPAIR  2000     MOHS MICROGRAPHIC PROCEDURE       ORTHOPEDIC SURGERY      Hand surgery 2012     PHACOEMULSIFICATION CLEAR CORNEA WITH STANDARD INTRAOCULAR LENS IMPLANT Right 07/02/2021    Procedure: RIGHT EYE CATARACT REMOVAL WITH INTRAOCULAR LENS IMPLANT;  Surgeon: Justa Liz MD;  Location: [facility] [place] OR     PHACOEMULSIFICATION CLEAR CORNEA WITH STANDARD INTRAOCULAR LENS IMPLANT Left  07/09/2021    Procedure: LEFT EYE CATARACT REMOVAL WITH INTRAOCULAR LENS IMPLANT;  Surgeon: Justa Liz MD;  Location: [facility] [place]     [facility] CORONARY STENT PERCUT, EA ADDTL VESSEL         ALLERGIES:   No Known Allergies    FAMILY HISTORY:  Family History   Problem Relation Age of Onset     Other Cancer Father         Gastric     Diabetes Daughter         Type1     Glaucoma No family hx of      Macular Degeneration No family hx of      Retinal detachment No family hx of      Amblyopia No family hx of      Melanoma No family hx of      Skin Cancer No family hx of      SOCIAL HISTORY:  Social History     Tobacco Use     Smoking status: Never     Smokeless tobacco: Never   Substance Use Topics     Alcohol use: Yes     Comment: 1-2 drinks 5 days a week     Drug use: Never       ROS:   Constitutional: No fever, chills, or sweats. Weight stable.   ENT: No visual disturbance, ear ache, epistaxis, sore throat.   Cardiovascular: As per HPI.   Respiratory: No cough, hemoptysis.    GI: No nausea, vomiting, : No hematuria.   Integument: Negative.   Psychiatric: Negative.   Hematologic:   no easy bleeding.  Neuro: Negative.   Endocrinology: No significant heat or cold intolerance   Musculoskeletal: No myalgia.    Exam:  /75   Ht 1.829 m (6')   Wt 88 kg (194 lb)   BMI 26.31 kg/m    GENERAL APPEARANCE: healthy, alert and no distress  HEENT: no icterus, no xanthelasmas, normal pupil size and reaction, normal palate, mucosa moist, no central cyanosis  NECK: no adenopathy, no asymmetry, masses, or scars, thyroid normal to palpation and no bruits, JVP not elevated  RESPIRATORY: lungs clear to auscultation - no rales, rhonchi or wheezes, no use of accessory muscles, no retractions, respirations are unlabored, normal respiratory rate  NEURO: alert and oriented to person/place/time, normal speech, and affect.  SKIN: no ecchymoses, no rashes    Labs:  CBC RESULTS:   Lab Results   Component Value Date    WBC 5.4 05/30/2024     RBC 4.22 (L) 05/30/2024    HGB 13.7 05/30/2024    HCT 40.4 05/30/2024    MCV 96 05/30/2024    MCH 32.5 05/30/2024    MCHC 33.9 05/30/2024    RDW 13.2 05/30/2024     05/30/2024       BMP RESULTS:  Lab Results   Component Value Date     05/30/2024    POTASSIUM 4.7 05/30/2024    POTASSIUM 3.8 04/24/2023    POTASSIUM 4.9 08/29/2022    CHLORIDE 102 05/30/2024    CHLORIDE 107 08/29/2022    CO2 24 05/30/2024    CO2 28 08/29/2022    ANIONGAP 9 05/30/2024    ANIONGAP 4 08/29/2022     (H) 05/30/2024     (H) 04/30/2023     (H) 08/29/2022    BUN 16.3 05/30/2024    BUN 40 (H) 08/29/2022    CR 1.10 05/30/2024    GFRESTIMATED 69 05/30/2024    CRISS 9.3 05/30/2024        INR RESULTS:  Lab Results   Component Value Date    INR 1.27 (H) 04/25/2023    INR 1.31 (H) 04/24/2023    INR 1.49 (H) 04/24/2023    INR 1.00 04/12/2023       Procedures:  PULMONARY FUNCTION TESTS:        No data to display                  ECHOCARDIOGRAM:   No results found for this or any previous visit (from the past 8760 hours).      Assessment and Plan:   1.  Paroxysmal atrial fibrillation--largely asymptomatic  2.  No new cardiovascular symptoms    Plan  1.  No change in treatment strategy--we have discussed anticoagulants and antiarrhythmics on several occasions in the past.  2.  Follow-up video 1 year or earlier if Dr Gordillo contacts us    Total elapsed time today with chart review, clinic visit and documentation 20 minutes    Video on 4: 33; off 4: 48  Platform [employer]  Patient at home; clinic [facility] heart    I very much appreciated the opportunity to see and assess Dellchristi Gordillo in the clinic today. Please do not hesitate to contact my office if you have any questions or concerns.      Yuly Saenz MD  Cardiac Arrhythmia Service  [facility]  [phone]     CC  YULY Smiley MD      Please do not hesitate to contact me if you have any questions/concerns.     Sincerely,     Yuly Saenz MD

## 2025-07-15 DIAGNOSIS — E78.00 HIGH CHOLESTEROL: ICD-10-CM

## 2025-07-15 DIAGNOSIS — I10 BENIGN ESSENTIAL HYPERTENSION: ICD-10-CM

## 2025-07-16 ENCOUNTER — MYC MEDICAL ADVICE (OUTPATIENT)
Dept: INTERNAL MEDICINE | Facility: CLINIC | Age: 80
End: 2025-07-16
Payer: COMMERCIAL

## 2025-07-16 DIAGNOSIS — I25.10 CORONARY ARTERY DISEASE INVOLVING NATIVE CORONARY ARTERY OF NATIVE HEART WITHOUT ANGINA PECTORIS: ICD-10-CM

## 2025-07-16 RX ORDER — PRAVASTATIN SODIUM 80 MG/1
80 TABLET ORAL AT BEDTIME
Qty: 90 TABLET | Refills: 3 | Status: SHIPPED | OUTPATIENT
Start: 2025-07-16

## 2025-07-31 ENCOUNTER — LAB (OUTPATIENT)
Dept: LAB | Facility: CLINIC | Age: 80
End: 2025-07-31
Payer: COMMERCIAL

## 2025-07-31 DIAGNOSIS — I25.10 CORONARY ARTERY DISEASE INVOLVING NATIVE CORONARY ARTERY OF NATIVE HEART WITHOUT ANGINA PECTORIS: ICD-10-CM

## 2025-07-31 DIAGNOSIS — I10 BENIGN ESSENTIAL HYPERTENSION: ICD-10-CM

## 2025-07-31 DIAGNOSIS — E29.1 MALE HYPOGONADISM: ICD-10-CM

## 2025-07-31 DIAGNOSIS — Z12.5 ENCOUNTER FOR SCREENING FOR MALIGNANT NEOPLASM OF PROSTATE: ICD-10-CM

## 2025-07-31 LAB
ANION GAP SERPL CALCULATED.3IONS-SCNC: 11 MMOL/L (ref 7–15)
BUN SERPL-MCNC: 26.7 MG/DL (ref 8–23)
CALCIUM SERPL-MCNC: 9.6 MG/DL (ref 8.8–10.4)
CHLORIDE SERPL-SCNC: 105 MMOL/L (ref 98–107)
CHOLEST SERPL-MCNC: 167 MG/DL
CREAT SERPL-MCNC: 1.06 MG/DL (ref 0.67–1.17)
EGFRCR SERPLBLD CKD-EPI 2021: 72 ML/MIN/1.73M2
ERYTHROCYTE [DISTWIDTH] IN BLOOD BY AUTOMATED COUNT: 12.1 % (ref 10–15)
FASTING STATUS PATIENT QL REPORTED: ABNORMAL
FASTING STATUS PATIENT QL REPORTED: NORMAL
GLUCOSE SERPL-MCNC: 123 MG/DL (ref 70–99)
HCO3 SERPL-SCNC: 25 MMOL/L (ref 22–29)
HCT VFR BLD AUTO: 37.6 % (ref 40–53)
HDLC SERPL-MCNC: 89 MG/DL
HGB BLD-MCNC: 12.5 G/DL (ref 13.3–17.7)
LDLC SERPL CALC-MCNC: 68 MG/DL
MCH RBC QN AUTO: 33.8 PG (ref 26.5–33)
MCHC RBC AUTO-ENTMCNC: 33.2 G/DL (ref 31.5–36.5)
MCV RBC AUTO: 102 FL (ref 78–100)
NONHDLC SERPL-MCNC: 78 MG/DL
PLATELET # BLD AUTO: 125 10E3/UL (ref 150–450)
POTASSIUM SERPL-SCNC: 4.4 MMOL/L (ref 3.4–5.3)
PSA SERPL DL<=0.01 NG/ML-MCNC: 0.45 NG/ML (ref 0–6.5)
RBC # BLD AUTO: 3.7 10E6/UL (ref 4.4–5.9)
SODIUM SERPL-SCNC: 141 MMOL/L (ref 135–145)
TRIGL SERPL-MCNC: 50 MG/DL
WBC # BLD AUTO: 3.9 10E3/UL (ref 4–11)

## 2025-08-05 ENCOUNTER — OFFICE VISIT (OUTPATIENT)
Dept: DERMATOLOGY | Facility: CLINIC | Age: 80
End: 2025-08-05
Attending: DERMATOLOGY
Payer: COMMERCIAL

## 2025-08-05 ENCOUNTER — LAB (OUTPATIENT)
Dept: LAB | Facility: CLINIC | Age: 80
End: 2025-08-05
Payer: COMMERCIAL

## 2025-08-05 DIAGNOSIS — D61.818 PANCYTOPENIA (H): ICD-10-CM

## 2025-08-05 DIAGNOSIS — Z85.828 HISTORY OF NONMELANOMA SKIN CANCER: ICD-10-CM

## 2025-08-05 DIAGNOSIS — L57.0 AK (ACTINIC KERATOSIS): Primary | ICD-10-CM

## 2025-08-05 DIAGNOSIS — D49.2 NEOPLASM OF UNSPECIFIED BEHAVIOR OF BONE, SOFT TISSUE, AND SKIN: ICD-10-CM

## 2025-08-05 DIAGNOSIS — D64.9 ANEMIA, UNSPECIFIED TYPE: ICD-10-CM

## 2025-08-05 LAB
BASOPHILS # BLD AUTO: 0 10E3/UL (ref 0–0.2)
BASOPHILS NFR BLD AUTO: 1 %
CRP SERPL-MCNC: <3 MG/L
EOSINOPHIL # BLD AUTO: 0.1 10E3/UL (ref 0–0.7)
EOSINOPHIL NFR BLD AUTO: 3 %
ERYTHROCYTE [DISTWIDTH] IN BLOOD BY AUTOMATED COUNT: 12.3 % (ref 10–15)
ERYTHROCYTE [SEDIMENTATION RATE] IN BLOOD BY WESTERGREN METHOD: 12 MM/HR (ref 0–20)
FERRITIN SERPL-MCNC: 79 NG/ML (ref 31–409)
FOLATE SERPL-MCNC: 22.1 NG/ML (ref 4.6–34.8)
HCT VFR BLD AUTO: 37.6 % (ref 40–53)
HGB BLD-MCNC: 12.6 G/DL (ref 13.3–17.7)
IMM GRANULOCYTES # BLD: 0 10E3/UL
IMM GRANULOCYTES NFR BLD: 1 %
IRON BINDING CAPACITY (ROCHE): 334 UG/DL (ref 240–430)
IRON SATN MFR SERPL: 45 % (ref 15–46)
IRON SERPL-MCNC: 149 UG/DL (ref 61–157)
LYMPHOCYTES # BLD AUTO: 1.2 10E3/UL (ref 0.8–5.3)
LYMPHOCYTES NFR BLD AUTO: 28 %
MCH RBC QN AUTO: 34 PG (ref 26.5–33)
MCHC RBC AUTO-ENTMCNC: 33.5 G/DL (ref 31.5–36.5)
MCV RBC AUTO: 101 FL (ref 78–100)
MONOCYTES # BLD AUTO: 0.3 10E3/UL (ref 0–1.3)
MONOCYTES NFR BLD AUTO: 7 %
NEUTROPHILS # BLD AUTO: 2.5 10E3/UL (ref 1.6–8.3)
NEUTROPHILS NFR BLD AUTO: 61 %
NRBC # BLD AUTO: 0 10E3/UL
NRBC BLD AUTO-RTO: 0 /100
PLATELET # BLD AUTO: 133 10E3/UL (ref 150–450)
PROT SERPL-MCNC: 6.3 G/DL (ref 6.4–8.3)
RBC # BLD AUTO: 3.71 10E6/UL (ref 4.4–5.9)
RETICS # AUTO: 0.06 10E6/UL (ref 0.03–0.1)
RETICS/RBC NFR AUTO: 1.6 % (ref 0.5–2)
VIT B12 SERPL-MCNC: 667 PG/ML (ref 232–1245)
WBC # BLD AUTO: 4.1 10E3/UL (ref 4–11)

## 2025-08-05 PROCEDURE — 88305 TISSUE EXAM BY PATHOLOGIST: CPT | Mod: 26 | Performed by: DERMATOLOGY

## 2025-08-05 PROCEDURE — 99000 SPECIMEN HANDLING OFFICE-LAB: CPT | Performed by: PATHOLOGY

## 2025-08-05 PROCEDURE — 87389 HIV-1 AG W/HIV-1&-2 AB AG IA: CPT | Performed by: STUDENT IN AN ORGANIZED HEALTH CARE EDUCATION/TRAINING PROGRAM

## 2025-08-05 PROCEDURE — 82607 VITAMIN B-12: CPT | Performed by: INTERNAL MEDICINE

## 2025-08-05 PROCEDURE — 85652 RBC SED RATE AUTOMATED: CPT | Performed by: PATHOLOGY

## 2025-08-05 PROCEDURE — 85025 COMPLETE CBC W/AUTO DIFF WBC: CPT | Performed by: PATHOLOGY

## 2025-08-05 PROCEDURE — 84165 PROTEIN E-PHORESIS SERUM: CPT | Mod: TC | Performed by: STUDENT IN AN ORGANIZED HEALTH CARE EDUCATION/TRAINING PROGRAM

## 2025-08-05 PROCEDURE — 86334 IMMUNOFIX E-PHORESIS SERUM: CPT | Performed by: STUDENT IN AN ORGANIZED HEALTH CARE EDUCATION/TRAINING PROGRAM

## 2025-08-05 PROCEDURE — 82728 ASSAY OF FERRITIN: CPT | Performed by: PATHOLOGY

## 2025-08-05 PROCEDURE — 82746 ASSAY OF FOLIC ACID SERUM: CPT | Performed by: INTERNAL MEDICINE

## 2025-08-05 PROCEDURE — 99207 BLOOD MORPHOLOGY PATHOLOGIST REVIEW: CPT | Performed by: STUDENT IN AN ORGANIZED HEALTH CARE EDUCATION/TRAINING PROGRAM

## 2025-08-05 PROCEDURE — 86706 HEP B SURFACE ANTIBODY: CPT | Performed by: STUDENT IN AN ORGANIZED HEALTH CARE EDUCATION/TRAINING PROGRAM

## 2025-08-05 PROCEDURE — 86140 C-REACTIVE PROTEIN: CPT | Performed by: PATHOLOGY

## 2025-08-05 PROCEDURE — 85045 AUTOMATED RETICULOCYTE COUNT: CPT | Performed by: PATHOLOGY

## 2025-08-05 PROCEDURE — 86803 HEPATITIS C AB TEST: CPT | Performed by: STUDENT IN AN ORGANIZED HEALTH CARE EDUCATION/TRAINING PROGRAM

## 2025-08-05 PROCEDURE — 88341 IMHCHEM/IMCYTCHM EA ADD ANTB: CPT | Mod: 26 | Performed by: DERMATOLOGY

## 2025-08-05 PROCEDURE — 86704 HEP B CORE ANTIBODY TOTAL: CPT | Performed by: STUDENT IN AN ORGANIZED HEALTH CARE EDUCATION/TRAINING PROGRAM

## 2025-08-05 PROCEDURE — 84165 PROTEIN E-PHORESIS SERUM: CPT | Mod: 26 | Performed by: STUDENT IN AN ORGANIZED HEALTH CARE EDUCATION/TRAINING PROGRAM

## 2025-08-05 PROCEDURE — 88342 IMHCHEM/IMCYTCHM 1ST ANTB: CPT | Mod: TC | Performed by: DERMATOLOGY

## 2025-08-05 PROCEDURE — 83540 ASSAY OF IRON: CPT | Performed by: PATHOLOGY

## 2025-08-05 PROCEDURE — 87340 HEPATITIS B SURFACE AG IA: CPT | Performed by: STUDENT IN AN ORGANIZED HEALTH CARE EDUCATION/TRAINING PROGRAM

## 2025-08-05 PROCEDURE — 83521 IG LIGHT CHAINS FREE EACH: CPT | Mod: 59 | Performed by: INTERNAL MEDICINE

## 2025-08-05 PROCEDURE — 83550 IRON BINDING TEST: CPT | Performed by: PATHOLOGY

## 2025-08-05 PROCEDURE — 86334 IMMUNOFIX E-PHORESIS SERUM: CPT | Mod: 26 | Performed by: STUDENT IN AN ORGANIZED HEALTH CARE EDUCATION/TRAINING PROGRAM

## 2025-08-05 PROCEDURE — 36415 COLL VENOUS BLD VENIPUNCTURE: CPT | Performed by: PATHOLOGY

## 2025-08-05 PROCEDURE — 84155 ASSAY OF PROTEIN SERUM: CPT | Performed by: PATHOLOGY

## 2025-08-05 ASSESSMENT — PAIN SCALES - GENERAL: PAINLEVEL_OUTOF10: NO PAIN (0)

## 2025-08-06 LAB
ALBUMIN SERPL ELPH-MCNC: 3.8 G/DL (ref 3.7–5.1)
ALPHA1 GLOB SERPL ELPH-MCNC: 0.3 G/DL (ref 0.2–0.4)
ALPHA2 GLOB SERPL ELPH-MCNC: 0.7 G/DL (ref 0.5–0.9)
B-GLOBULIN SERPL ELPH-MCNC: 0.9 G/DL (ref 0.6–1)
GAMMA GLOB SERPL ELPH-MCNC: 0.6 G/DL (ref 0.7–1.6)
KAPPA LC FREE SER-MCNC: 1.67 MG/DL (ref 0.33–1.94)
KAPPA LC FREE/LAMBDA FREE SER NEPH: 1.18 {RATIO} (ref 0.26–1.65)
LAMBDA LC FREE SERPL-MCNC: 1.42 MG/DL (ref 0.57–2.63)
LOCATION OF TASK: ABNORMAL
LOCATION OF TASK: NORMAL
M PROTEIN SERPL ELPH-MCNC: 0 G/DL
PATH REPORT.COMMENTS IMP SPEC: NORMAL
PATH REPORT.COMMENTS IMP SPEC: NORMAL
PATH REPORT.FINAL DX SPEC: NORMAL
PATH REPORT.MICROSCOPIC SPEC OTHER STN: NORMAL
PATH REPORT.MICROSCOPIC SPEC OTHER STN: NORMAL
PATH REPORT.RELEVANT HX SPEC: NORMAL
PROT PATTERN SERPL ELPH-IMP: ABNORMAL
PROT PATTERN SERPL IFE-IMP: NORMAL

## 2025-08-07 ENCOUNTER — MYC MEDICAL ADVICE (OUTPATIENT)
Dept: DERMATOLOGY | Facility: CLINIC | Age: 80
End: 2025-08-07
Payer: COMMERCIAL

## 2025-08-07 LAB
PATH REPORT.COMMENTS IMP SPEC: ABNORMAL
PATH REPORT.COMMENTS IMP SPEC: YES
PATH REPORT.FINAL DX SPEC: ABNORMAL
PATH REPORT.GROSS SPEC: ABNORMAL
PATH REPORT.MICROSCOPIC SPEC OTHER STN: ABNORMAL
PATH REPORT.RELEVANT HX SPEC: ABNORMAL

## 2025-08-09 DIAGNOSIS — G47.9 SLEEP DISORDER: ICD-10-CM

## 2025-08-09 LAB
HBV CORE AB SERPL QL IA: NONREACTIVE
HBV SURFACE AB SERPL IA-ACNC: 158 M[IU]/ML
HBV SURFACE AB SERPL IA-ACNC: REACTIVE M[IU]/ML
HBV SURFACE AG SERPL QL IA: NONREACTIVE
HCV AB SERPL QL IA: NONREACTIVE
HIV 1+2 AB+HIV1 P24 AG SERPL QL IA: NONREACTIVE

## 2025-08-11 ENCOUNTER — PATIENT OUTREACH (OUTPATIENT)
Dept: ONCOLOGY | Facility: CLINIC | Age: 80
End: 2025-08-11
Payer: COMMERCIAL

## 2025-08-11 RX ORDER — ZOLPIDEM TARTRATE 10 MG/1
10 TABLET ORAL
Qty: 30 TABLET | Refills: 1 | Status: SHIPPED | OUTPATIENT
Start: 2025-08-11

## 2025-08-12 ENCOUNTER — MYC MEDICAL ADVICE (OUTPATIENT)
Dept: INTERNAL MEDICINE | Facility: CLINIC | Age: 80
End: 2025-08-12
Payer: COMMERCIAL

## 2025-08-12 DIAGNOSIS — D64.9 ANEMIA, UNSPECIFIED TYPE: Primary | ICD-10-CM

## 2025-08-14 ENCOUNTER — ANCILLARY PROCEDURE (OUTPATIENT)
Dept: ULTRASOUND IMAGING | Facility: CLINIC | Age: 80
End: 2025-08-14
Attending: INTERNAL MEDICINE
Payer: COMMERCIAL

## 2025-08-14 DIAGNOSIS — D69.6 PLATELETS DECREASED: ICD-10-CM

## 2025-08-14 DIAGNOSIS — Z11.59 NEED FOR HEPATITIS C SCREENING TEST: ICD-10-CM

## 2025-08-14 PROCEDURE — 76700 US EXAM ABDOM COMPLETE: CPT | Performed by: RADIOLOGY

## 2025-08-20 ENCOUNTER — PRE VISIT (OUTPATIENT)
Dept: DERMATOLOGY | Facility: CLINIC | Age: 80
End: 2025-08-20

## 2025-08-20 ENCOUNTER — OFFICE VISIT (OUTPATIENT)
Dept: DERMATOLOGY | Facility: CLINIC | Age: 80
End: 2025-08-20
Attending: DERMATOLOGY
Payer: COMMERCIAL

## 2025-08-20 ENCOUNTER — TELEPHONE (OUTPATIENT)
Dept: DERMATOLOGY | Facility: CLINIC | Age: 80
End: 2025-08-20

## 2025-08-20 VITALS — DIASTOLIC BLOOD PRESSURE: 74 MMHG | SYSTOLIC BLOOD PRESSURE: 125 MMHG | HEART RATE: 70 BPM

## 2025-08-20 DIAGNOSIS — D03.59 MELANOMA IN SITU OF BACK (H): ICD-10-CM

## 2025-08-20 PROCEDURE — 88305 TISSUE EXAM BY PATHOLOGIST: CPT | Mod: TC | Performed by: DERMATOLOGY

## 2025-08-20 PROCEDURE — 88305 TISSUE EXAM BY PATHOLOGIST: CPT | Mod: 26 | Performed by: DERMATOLOGY

## 2025-08-20 ASSESSMENT — PAIN SCALES - GENERAL: PAINLEVEL_OUTOF10: NO PAIN (0)

## 2025-08-24 LAB
PATH REPORT.COMMENTS IMP SPEC: NORMAL
PATH REPORT.COMMENTS IMP SPEC: NORMAL
PATH REPORT.FINAL DX SPEC: NORMAL
PATH REPORT.GROSS SPEC: NORMAL
PATH REPORT.MICROSCOPIC SPEC OTHER STN: NORMAL
PATH REPORT.RELEVANT HX SPEC: NORMAL

## 2025-08-29 DIAGNOSIS — K21.9 GASTROESOPHAGEAL REFLUX DISEASE WITHOUT ESOPHAGITIS: ICD-10-CM

## 2025-09-03 RX ORDER — OMEPRAZOLE 40 MG/1
40 CAPSULE, DELAYED RELEASE ORAL
Qty: 44 CAPSULE | Refills: 7 | Status: SHIPPED | OUTPATIENT
Start: 2025-09-03

## (undated) DEVICE — CATH ANGIO SUPERTORQUE PLUS JR4 6FRX100CM 533621

## (undated) DEVICE — EYE PACK CUSTOM ANTERIOR 30DEG TIP CENTURION PPK6682-04

## (undated) DEVICE — TUBING INSUFFLATION PNEUMOCLEAR 0620050100

## (undated) DEVICE — GOWN XLG DISP 9545

## (undated) DEVICE — EYE CANN IRR 27GA ANTERIOR CHAMBER 581280

## (undated) DEVICE — EYE CANN IRR 25GA CYSTOTOME 581610

## (undated) DEVICE — DECANTER BAG 2002S

## (undated) DEVICE — SHTH INTRO 0.021IN ID 6FR DIA

## (undated) DEVICE — MANIFOLD KIT ANGIO AUTOMATED 014613

## (undated) DEVICE — FASTENER CATH BALLOON CLAMPX2 STATLOCK 0684-00-493

## (undated) DEVICE — SUCTION CATH AIRLIFE TRI-FLO W/CONTROL PORT 14FR  T60C

## (undated) DEVICE — DRSG TELFA 3X8" 1238

## (undated) DEVICE — SPONGE RAY-TEC 4X8" 7318

## (undated) DEVICE — KIT ENDO TURNOVER/PROCEDURE CARRY-ON 101822

## (undated) DEVICE — RX SURGIFLO HEMOSTATIC MATRIX W/THROMBIN 8ML 2994

## (undated) DEVICE — VALVE HMSTS PHD SM BORE W/ SPR MTL INS TOOL TRQ DVC MAP802

## (undated) DEVICE — BLADE KNIFE BEAVER MINI STR BEAVER6900

## (undated) DEVICE — CLIP HORIZON SM RED WIDE SLOT 001201

## (undated) DEVICE — TUBING PRESSURE 30"

## (undated) DEVICE — SU PROLENE 4-0 SHDA 36" 8521H

## (undated) DEVICE — PUNCH AORTIC 4.0MMX8" RCB40

## (undated) DEVICE — BNDG ELASTIC 6"X5YDS STERILE 6611-6S

## (undated) DEVICE — SU PROLENE 4-0 RB-1DA 36" 8557H

## (undated) DEVICE — SOL WATER IRRIG 1000ML BOTTLE 2F7114

## (undated) DEVICE — EYE KNIFE STILETTO VISITEC 1.1MM ANG 45DEG SIDEPORT 376620

## (undated) DEVICE — SU VICRYL 0 CTX 36" J370H

## (undated) DEVICE — CLIP HORIZON MED BLUE 002200

## (undated) DEVICE — ENDO BITE BLOCK ADULT OMNI-BLOC

## (undated) DEVICE — SU STEEL MYO/WIRE II STERNOTOMY 8 BE-1 3X14" 048-217

## (undated) DEVICE — PROTECTOR ARM ONE-STEP TRENDELENBURG 40418

## (undated) DEVICE — PACK CATARACT CUSTOM ASC SEY15CPUMC

## (undated) DEVICE — GLOVE PROTEXIS MICRO 6.5  2D73PM65

## (undated) DEVICE — GLOVE EXAM NITRILE LG PF LATEX FREE 5064

## (undated) DEVICE — PACK HEART LEFT CUSTOM

## (undated) DEVICE — SUCTION MANIFOLD NEPTUNE 2 SYS 4 PORT 0702-020-000

## (undated) DEVICE — SOL WATER IRRIG 500ML BOTTLE 2F7113

## (undated) DEVICE — DRAIN CHEST TUBE RIGHT ANGLED 28FR 8128

## (undated) DEVICE — SU ETHIBOND 3-0 BBDA 36" X588H

## (undated) DEVICE — SU ETHIBOND 0 CT-1 CR 8X18" CX21D

## (undated) DEVICE — ADH SKIN CLOSURE PREMIERPRO EXOFIN 1.0ML 3470

## (undated) DEVICE — WIPES FOLEY CARE SURESTEP PROVON DFC100

## (undated) DEVICE — GLOVE BIOGEL PI SZ 7.0 40870

## (undated) DEVICE — LINEN TOWEL PACK X6 WHITE 5487

## (undated) DEVICE — TUBING SUCTION MEDI-VAC 1/4"X20' N620A

## (undated) DEVICE — DRSG ABDOMINAL 07 1/2X8" 7197D

## (undated) DEVICE — SLEEVE TR BAND RADIAL COMPRESSION DEVICE 24CM TRB24-REG

## (undated) DEVICE — BNDG ELASTIC 6" DBL LENGTH UNSTERILE 6611-16

## (undated) DEVICE — SOL NACL 0.9% 10ML VIAL 0409-4888-02

## (undated) DEVICE — LINEN TOWEL PACK X5 5464

## (undated) DEVICE — GOWN IMPERVIOUS 2XL BLUE

## (undated) DEVICE — CLIP SPRING FOGARTY SOFTJAW CSOFT6

## (undated) DEVICE — TIES BANDING T50R

## (undated) DEVICE — GLOVE BIOGEL PI ULTRATOUCH G SZ 7.0 42170

## (undated) DEVICE — ANTIFOG SOLUTION W/FOAM PAD 31142527

## (undated) DEVICE — DRAPE FLUID WARMING 52 X 60" ORS-321

## (undated) DEVICE — DEVICE TISSUE STABILIZATION OCTOBASE 28707

## (undated) DEVICE — SURGICEL HEMOSTAT 4X8" 1952

## (undated) DEVICE — BLOWER/MISTER CLEARVIEW 22150

## (undated) DEVICE — SYR 50ML SLIP TIP W/O NDL 309654

## (undated) DEVICE — GLOVE BIOGEL PI ULTRATOUCH SZ 7.5 41175

## (undated) DEVICE — SYR 01ML 27GA 0.5" NDL TBC 309623

## (undated) DEVICE — GLOVE BIOGEL PI MICRO SZ 6.5 48565

## (undated) DEVICE — SYR 30ML SLIP TIP W/O NDL 302833

## (undated) DEVICE — CANNULA VESSEL 3ML BEVELED DPL 30000

## (undated) DEVICE — Device

## (undated) DEVICE — KIT HAND CONTROL ACIST 016795

## (undated) DEVICE — TUBING SUCTION DRAINAGE PLEURAL DUAL 8884714200

## (undated) DEVICE — DRSG DRAIN 4X4" 7086

## (undated) DEVICE — SPECIMEN CONTAINER 5OZ STERILE 2600SA

## (undated) DEVICE — SU PLEDGET SOFT TFE 3/8"X3/26"X1/16" PCP40

## (undated) DEVICE — GLOVE BIOGEL PI MICRO SZ 6.0 48560

## (undated) DEVICE — BLADE CLIPPER SGL USE 9680

## (undated) DEVICE — SU STEEL 6 CCS 4X18" M654G

## (undated) DEVICE — SU RETRACT-O-TAPE 1041

## (undated) DEVICE — TUBING SUCTION 12"X1/4" N612

## (undated) DEVICE — BLADE KNIFE BEAVER MICROSHARP GREEN 377515

## (undated) DEVICE — CATH ANGIO SUPERTORQUE PLUS JL4 6FRX100CM 533620

## (undated) DEVICE — EYE SHIELD PLASTIC

## (undated) DEVICE — KIT ENDO VASOVIEW HEMOPRO 2 VH-4000

## (undated) DEVICE — BLADE KNIFE SURG 15C 371716

## (undated) DEVICE — SOL NACL 0.9% IRRIG 1000ML BOTTLE 07138-09

## (undated) DEVICE — GW VASC .035IN DIA 260CML 7CML 3 MM RADIUS J CURVE 502455

## (undated) DEVICE — DRAPE IOBAN INCISE 23X17" 6650EZ

## (undated) DEVICE — EYE KNIFE SLIT XSTAR VISITEC 2.6MM 45DEG 373726

## (undated) DEVICE — EYE TIP IRRIGATION & ASPIRATION POLYMER CVD 0.3MM 8065751512

## (undated) DEVICE — ESU ELEC BLADE 2.75" COATED/INSULATED E1455

## (undated) DEVICE — SUCTION MANIFOLD NEPTUNE 2 SYS 1 PORT 702-025-000

## (undated) DEVICE — SU SILK 0 TIE 6X30" A306H

## (undated) DEVICE — KIT ENDO FIRST STEP DISINFECTANT 200ML W/POUCH EP-4

## (undated) DEVICE — PACK ADULT HEART UMMC PV15CG92D

## (undated) DEVICE — LEAD PACER MYOCARDIAL BIPOLAR TEMPORARY 53CM 6495F

## (undated) DEVICE — SU PROLENE 6-0 C-1DA 4X24" M8726

## (undated) DEVICE — ESU ELEC BLADE E-SEP INSULATED NEPTUNE 70MM 0703-070-002

## (undated) DEVICE — SU PROLENE 7-0 BV-1DA 24" 8702H

## (undated) DEVICE — ESU HOLSTER PLASTIC DISP E2400

## (undated) DEVICE — DRSG TEGADERM 8X12" 1629

## (undated) DEVICE — DEFIB PRO-PADZ LVP LQD GEL ADULT 8900-2105-01

## (undated) DEVICE — CONNECTOR SIMS TUBING FOR CHEST TUBES 361

## (undated) DEVICE — DRAIN CHEST TUBE 36FR STR 8036

## (undated) DEVICE — SU PROLENE 5-0 RB-2DA 30" 8710H

## (undated) DEVICE — SU ETHIBOND 2-0 SHDA 30" X563H

## (undated) DEVICE — ESU PENCIL SMOKE EVAC W/ROCKER SWITCH 0703-047-000

## (undated) DEVICE — LINEN TOWEL PACK X30 5481

## (undated) DEVICE — PREP CHLORAPREP 26ML TINTED HI-LITE ORANGE 930815

## (undated) DEVICE — SPONGE LAP 12X12" X8425

## (undated) DEVICE — SPECIMEN CONTAINER 3OZ W/FORMALIN 59901

## (undated) DEVICE — GUIDEWIRE OPTOWIRE 3 W/O GAUGE FACTOR CONNECTOR F1032

## (undated) DEVICE — SOL NACL 0.9% IRRIG 3000ML BAG 2B7477

## (undated) DEVICE — TAPE MEDIPORE 4"X2YD 2864

## (undated) DEVICE — SOL NACL 0.9% INJ 1000ML BAG 2B1324X

## (undated) DEVICE — SUCTION DRY CHEST DRAIN OASIS 3600-100

## (undated) DEVICE — CLIP HORIZON LG ORANGE 004200

## (undated) DEVICE — SU PROLENE 6-0 C-1DA 30" 8706H

## (undated) DEVICE — BLADE SAW STERNAL 20X30MM KM-32

## (undated) DEVICE — GLOVE BIOGEL PI MICRO SZ 7.0 48570

## (undated) DEVICE — BNDG ELASTIC 4"X5YDS STERILE 6611-4S

## (undated) RX ORDER — CEFAZOLIN SODIUM 1 G/3ML
INJECTION, POWDER, FOR SOLUTION INTRAMUSCULAR; INTRAVENOUS
Status: DISPENSED
Start: 2023-04-24

## (undated) RX ORDER — FENTANYL CITRATE 50 UG/ML
INJECTION, SOLUTION INTRAMUSCULAR; INTRAVENOUS
Status: DISPENSED
Start: 2023-04-24

## (undated) RX ORDER — HEPARIN SODIUM 1000 [USP'U]/ML
INJECTION, SOLUTION INTRAVENOUS; SUBCUTANEOUS
Status: DISPENSED
Start: 2023-04-24

## (undated) RX ORDER — NITROGLYCERIN 5 MG/ML
VIAL (ML) INTRAVENOUS
Status: DISPENSED
Start: 2023-03-21

## (undated) RX ORDER — PROPOFOL 10 MG/ML
INJECTION, EMULSION INTRAVENOUS
Status: DISPENSED
Start: 2023-04-24

## (undated) RX ORDER — NICARDIPINE HCL-0.9% SOD CHLOR 1 MG/10 ML
SYRINGE (ML) INTRAVENOUS
Status: DISPENSED
Start: 2023-03-21

## (undated) RX ORDER — LIDOCAINE HYDROCHLORIDE 10 MG/ML
INJECTION, SOLUTION EPIDURAL; INFILTRATION; INTRACAUDAL; PERINEURAL
Status: DISPENSED
Start: 2023-03-21

## (undated) RX ORDER — ASPIRIN 81 MG/1
TABLET, CHEWABLE ORAL
Status: DISPENSED
Start: 2023-04-24

## (undated) RX ORDER — CEFAZOLIN SODIUM/WATER 2 G/20 ML
SYRINGE (ML) INTRAVENOUS
Status: DISPENSED
Start: 2023-04-24

## (undated) RX ORDER — DIPHENHYDRAMINE HYDROCHLORIDE 50 MG/ML
INJECTION INTRAMUSCULAR; INTRAVENOUS
Status: DISPENSED
Start: 2019-07-17

## (undated) RX ORDER — CHLORHEXIDINE GLUCONATE ORAL RINSE 1.2 MG/ML
SOLUTION DENTAL
Status: DISPENSED
Start: 2023-04-24

## (undated) RX ORDER — FENTANYL CITRATE 50 UG/ML
INJECTION, SOLUTION INTRAMUSCULAR; INTRAVENOUS
Status: DISPENSED
Start: 2019-07-17

## (undated) RX ORDER — GABAPENTIN 100 MG/1
CAPSULE ORAL
Status: DISPENSED
Start: 2023-04-24

## (undated) RX ORDER — HYDROMORPHONE HYDROCHLORIDE 1 MG/ML
INJECTION, SOLUTION INTRAMUSCULAR; INTRAVENOUS; SUBCUTANEOUS
Status: DISPENSED
Start: 2023-04-24

## (undated) RX ORDER — ASPIRIN 81 MG/1
TABLET ORAL
Status: DISPENSED
Start: 2023-04-24

## (undated) RX ORDER — FENTANYL CITRATE 50 UG/ML
INJECTION, SOLUTION INTRAMUSCULAR; INTRAVENOUS
Status: DISPENSED
Start: 2021-07-02

## (undated) RX ORDER — FENTANYL CITRATE 50 UG/ML
INJECTION, SOLUTION INTRAMUSCULAR; INTRAVENOUS
Status: DISPENSED
Start: 2021-07-09

## (undated) RX ORDER — HEPARIN SODIUM 1000 [USP'U]/ML
INJECTION, SOLUTION INTRAVENOUS; SUBCUTANEOUS
Status: DISPENSED
Start: 2023-03-21

## (undated) RX ORDER — FAMOTIDINE 20 MG/1
TABLET, FILM COATED ORAL
Status: DISPENSED
Start: 2023-04-24

## (undated) RX ORDER — FENTANYL CITRATE 50 UG/ML
INJECTION, SOLUTION INTRAMUSCULAR; INTRAVENOUS
Status: DISPENSED
Start: 2023-03-21

## (undated) RX ORDER — SODIUM CHLORIDE 9 MG/ML
INJECTION, SOLUTION INTRAVENOUS
Status: DISPENSED
Start: 2023-03-21

## (undated) RX ORDER — PAPAVERINE HYDROCHLORIDE 30 MG/ML
INJECTION INTRAMUSCULAR; INTRAVENOUS
Status: DISPENSED
Start: 2023-04-24

## (undated) RX ORDER — ACETAMINOPHEN 325 MG/1
TABLET ORAL
Status: DISPENSED
Start: 2023-04-24

## (undated) RX ORDER — ACETAMINOPHEN 325 MG/1
TABLET ORAL
Status: DISPENSED
Start: 2021-07-02